# Patient Record
Sex: MALE | Race: WHITE | Employment: OTHER | URBAN - NONMETROPOLITAN AREA
[De-identification: names, ages, dates, MRNs, and addresses within clinical notes are randomized per-mention and may not be internally consistent; named-entity substitution may affect disease eponyms.]

---

## 2017-01-10 ENCOUNTER — NURSE ONLY (OUTPATIENT)
Dept: FAMILY MEDICINE CLINIC | Age: 77
End: 2017-01-10

## 2017-01-10 DIAGNOSIS — I10 ESSENTIAL HYPERTENSION: ICD-10-CM

## 2017-01-10 DIAGNOSIS — E78.5 HYPERLIPIDEMIA, UNSPECIFIED HYPERLIPIDEMIA TYPE: ICD-10-CM

## 2017-01-10 DIAGNOSIS — R74.8 ELEVATED VITAMIN B12 LEVEL: ICD-10-CM

## 2017-01-10 DIAGNOSIS — R00.1 BRADYCARDIA: Primary | ICD-10-CM

## 2017-01-10 PROCEDURE — 36415 COLL VENOUS BLD VENIPUNCTURE: CPT | Performed by: NURSE PRACTITIONER

## 2017-01-11 LAB
A/G RATIO: 1.7 (ref 1.1–2.2)
ALBUMIN SERPL-MCNC: 4.3 G/DL (ref 3.4–5)
ALP BLD-CCNC: 59 U/L (ref 40–129)
ALT SERPL-CCNC: 36 U/L (ref 10–40)
ANION GAP SERPL CALCULATED.3IONS-SCNC: 17 MMOL/L (ref 3–16)
AST SERPL-CCNC: 29 U/L (ref 15–37)
BASOPHILS ABSOLUTE: 0.1 K/UL (ref 0–0.2)
BASOPHILS RELATIVE PERCENT: 1.9 %
BILIRUB SERPL-MCNC: 0.5 MG/DL (ref 0–1)
BUN BLDV-MCNC: 21 MG/DL (ref 7–20)
CALCIUM SERPL-MCNC: 9.3 MG/DL (ref 8.3–10.6)
CHLORIDE BLD-SCNC: 101 MMOL/L (ref 99–110)
CHOLESTEROL, TOTAL: 137 MG/DL (ref 0–199)
CO2: 27 MMOL/L (ref 21–32)
CREAT SERPL-MCNC: 1.1 MG/DL (ref 0.8–1.3)
EOSINOPHILS ABSOLUTE: 0.5 K/UL (ref 0–0.6)
EOSINOPHILS RELATIVE PERCENT: 7.1 %
FOLATE: 17.14 NG/ML (ref 4.78–24.2)
GFR AFRICAN AMERICAN: >60
GFR NON-AFRICAN AMERICAN: >60
GLOBULIN: 2.6 G/DL
GLUCOSE BLD-MCNC: 114 MG/DL (ref 70–99)
HCT VFR BLD CALC: 44.5 % (ref 40.5–52.5)
HDLC SERPL-MCNC: 45 MG/DL (ref 40–60)
HEMOGLOBIN: 15 G/DL (ref 13.5–17.5)
LDL CHOLESTEROL CALCULATED: 71 MG/DL
LYMPHOCYTES ABSOLUTE: 1.9 K/UL (ref 1–5.1)
LYMPHOCYTES RELATIVE PERCENT: 26.4 %
MCH RBC QN AUTO: 31.6 PG (ref 26–34)
MCHC RBC AUTO-ENTMCNC: 33.8 G/DL (ref 31–36)
MCV RBC AUTO: 93.5 FL (ref 80–100)
MONOCYTES ABSOLUTE: 0.6 K/UL (ref 0–1.3)
MONOCYTES RELATIVE PERCENT: 8 %
NEUTROPHILS ABSOLUTE: 4 K/UL (ref 1.7–7.7)
NEUTROPHILS RELATIVE PERCENT: 56.6 %
PDW BLD-RTO: 13.5 % (ref 12.4–15.4)
PLATELET # BLD: 190 K/UL (ref 135–450)
PMV BLD AUTO: 7.3 FL (ref 5–10.5)
POTASSIUM SERPL-SCNC: 3.8 MMOL/L (ref 3.5–5.1)
RBC # BLD: 4.76 M/UL (ref 4.2–5.9)
SODIUM BLD-SCNC: 145 MMOL/L (ref 136–145)
TOTAL PROTEIN: 6.9 G/DL (ref 6.4–8.2)
TRIGL SERPL-MCNC: 103 MG/DL (ref 0–150)
TSH REFLEX: 4.03 UIU/ML (ref 0.27–4.2)
VITAMIN B-12: 581 PG/ML (ref 211–911)
VLDLC SERPL CALC-MCNC: 21 MG/DL
WBC # BLD: 7 K/UL (ref 4–11)

## 2017-01-13 ENCOUNTER — OFFICE VISIT (OUTPATIENT)
Dept: FAMILY MEDICINE CLINIC | Age: 77
End: 2017-01-13

## 2017-01-13 VITALS
DIASTOLIC BLOOD PRESSURE: 60 MMHG | SYSTOLIC BLOOD PRESSURE: 130 MMHG | OXYGEN SATURATION: 97 % | TEMPERATURE: 97.4 F | HEART RATE: 73 BPM

## 2017-01-13 DIAGNOSIS — E03.9 HYPOTHYROIDISM, UNSPECIFIED TYPE: ICD-10-CM

## 2017-01-13 DIAGNOSIS — I10 ESSENTIAL HYPERTENSION: ICD-10-CM

## 2017-01-13 DIAGNOSIS — G25.2 ESSENTIAL AND OTHER SPECIFIED FORMS OF TREMOR: ICD-10-CM

## 2017-01-13 DIAGNOSIS — L30.9 DERMATITIS: Primary | ICD-10-CM

## 2017-01-13 DIAGNOSIS — G25.81 RLS (RESTLESS LEGS SYNDROME): ICD-10-CM

## 2017-01-13 DIAGNOSIS — E78.5 HYPERLIPIDEMIA, UNSPECIFIED HYPERLIPIDEMIA TYPE: ICD-10-CM

## 2017-01-13 DIAGNOSIS — G25.0 ESSENTIAL AND OTHER SPECIFIED FORMS OF TREMOR: ICD-10-CM

## 2017-01-13 PROCEDURE — 99213 OFFICE O/P EST LOW 20 MIN: CPT | Performed by: NURSE PRACTITIONER

## 2017-01-13 RX ORDER — LEVOTHYROXINE SODIUM 0.07 MG/1
75 TABLET ORAL DAILY
Qty: 30 TABLET | Refills: 1 | Status: SHIPPED | OUTPATIENT
Start: 2017-01-13 | End: 2017-03-23 | Stop reason: SDUPTHER

## 2017-01-13 ASSESSMENT — PATIENT HEALTH QUESTIONNAIRE - PHQ9
SUM OF ALL RESPONSES TO PHQ QUESTIONS 1-9: 2
SUM OF ALL RESPONSES TO PHQ9 QUESTIONS 1 & 2: 2
1. LITTLE INTEREST OR PLEASURE IN DOING THINGS: 1
2. FEELING DOWN, DEPRESSED OR HOPELESS: 1

## 2017-01-27 PROBLEM — E03.9 HYPOTHYROIDISM: Status: ACTIVE | Noted: 2017-01-27

## 2017-01-27 ASSESSMENT — ENCOUNTER SYMPTOMS
RESPIRATORY NEGATIVE: 1
EYES NEGATIVE: 1
GASTROINTESTINAL NEGATIVE: 1

## 2017-01-30 ENCOUNTER — NURSE ONLY (OUTPATIENT)
Dept: FAMILY MEDICINE CLINIC | Age: 77
End: 2017-01-30

## 2017-01-30 DIAGNOSIS — F32.A DEPRESSION, UNSPECIFIED DEPRESSION TYPE: Primary | ICD-10-CM

## 2017-01-30 DIAGNOSIS — E78.5 HYPERLIPIDEMIA: ICD-10-CM

## 2017-01-30 RX ORDER — SIMVASTATIN 10 MG
TABLET ORAL
Qty: 30 TABLET | Refills: 5 | Status: SHIPPED | OUTPATIENT
Start: 2017-01-30 | End: 2017-07-31 | Stop reason: SDUPTHER

## 2017-02-17 ENCOUNTER — TELEPHONE (OUTPATIENT)
Dept: FAMILY MEDICINE CLINIC | Age: 77
End: 2017-02-17

## 2017-02-22 ENCOUNTER — OFFICE VISIT (OUTPATIENT)
Dept: FAMILY MEDICINE CLINIC | Age: 77
End: 2017-02-22

## 2017-02-22 VITALS
BODY MASS INDEX: 37.16 KG/M2 | SYSTOLIC BLOOD PRESSURE: 122 MMHG | OXYGEN SATURATION: 97 % | HEART RATE: 52 BPM | DIASTOLIC BLOOD PRESSURE: 62 MMHG | WEIGHT: 244.4 LBS

## 2017-02-22 DIAGNOSIS — H61.23 BILATERAL IMPACTED CERUMEN: Primary | ICD-10-CM

## 2017-02-22 PROCEDURE — 69210 REMOVE IMPACTED EAR WAX UNI: CPT | Performed by: NURSE PRACTITIONER

## 2017-02-22 PROCEDURE — 99212 OFFICE O/P EST SF 10 MIN: CPT | Performed by: NURSE PRACTITIONER

## 2017-03-01 RX ORDER — MELOXICAM 15 MG/1
TABLET ORAL
Qty: 30 TABLET | Refills: 2 | Status: SHIPPED | OUTPATIENT
Start: 2017-03-01 | End: 2017-05-31 | Stop reason: SDUPTHER

## 2017-03-03 RX ORDER — VENLAFAXINE 75 MG/1
TABLET ORAL
Qty: 60 TABLET | Refills: 4 | Status: SHIPPED | OUTPATIENT
Start: 2017-03-03 | End: 2017-07-31 | Stop reason: SDUPTHER

## 2017-03-03 RX ORDER — CLONAZEPAM 2 MG/1
2 TABLET ORAL NIGHTLY
Qty: 30 TABLET | Refills: 0 | Status: SHIPPED | OUTPATIENT
Start: 2017-03-03 | End: 2017-03-31 | Stop reason: SDUPTHER

## 2017-03-23 DIAGNOSIS — E03.9 HYPOTHYROIDISM, UNSPECIFIED TYPE: ICD-10-CM

## 2017-03-23 RX ORDER — LEVOTHYROXINE SODIUM 0.07 MG/1
TABLET ORAL
Qty: 30 TABLET | Refills: 2 | Status: SHIPPED | OUTPATIENT
Start: 2017-03-23 | End: 2017-07-01 | Stop reason: SDUPTHER

## 2017-03-31 RX ORDER — AMLODIPINE BESYLATE 10 MG/1
TABLET ORAL
Qty: 30 TABLET | Refills: 3 | Status: SHIPPED | OUTPATIENT
Start: 2017-03-31 | End: 2017-07-31 | Stop reason: SDUPTHER

## 2017-03-31 RX ORDER — CLONAZEPAM 2 MG/1
TABLET ORAL
Qty: 30 TABLET | Refills: 0 | Status: SHIPPED | OUTPATIENT
Start: 2017-04-02 | End: 2017-04-27 | Stop reason: SDUPTHER

## 2017-04-13 ENCOUNTER — OFFICE VISIT (OUTPATIENT)
Dept: FAMILY MEDICINE CLINIC | Age: 77
End: 2017-04-13

## 2017-04-13 VITALS
SYSTOLIC BLOOD PRESSURE: 142 MMHG | WEIGHT: 245.4 LBS | OXYGEN SATURATION: 98 % | DIASTOLIC BLOOD PRESSURE: 64 MMHG | HEIGHT: 68 IN | HEART RATE: 60 BPM | BODY MASS INDEX: 37.19 KG/M2

## 2017-04-13 DIAGNOSIS — Z23 NEED FOR TDAP VACCINATION: ICD-10-CM

## 2017-04-13 DIAGNOSIS — L71.9 ROSACEA: ICD-10-CM

## 2017-04-13 DIAGNOSIS — G25.81 RESTLESS LEGS SYNDROME: Primary | ICD-10-CM

## 2017-04-13 PROCEDURE — 99213 OFFICE O/P EST LOW 20 MIN: CPT | Performed by: NURSE PRACTITIONER

## 2017-04-13 RX ORDER — TETRACYCLINE HYDROCHLORIDE 250 MG/1
250 CAPSULE ORAL 2 TIMES DAILY
Qty: 60 CAPSULE | Refills: 0 | Status: SHIPPED | OUTPATIENT
Start: 2017-04-13 | End: 2017-04-27 | Stop reason: SDUPTHER

## 2017-04-13 RX ORDER — TETRACYCLINE HYDROCHLORIDE 250 MG/1
250 CAPSULE ORAL NIGHTLY
Qty: 30 CAPSULE | Refills: 11 | Status: SHIPPED | OUTPATIENT
Start: 2017-05-13 | End: 2017-04-27 | Stop reason: SDUPTHER

## 2017-04-13 ASSESSMENT — PATIENT HEALTH QUESTIONNAIRE - PHQ9
2. FEELING DOWN, DEPRESSED OR HOPELESS: 0
SUM OF ALL RESPONSES TO PHQ QUESTIONS 1-9: 0
1. LITTLE INTEREST OR PLEASURE IN DOING THINGS: 0
SUM OF ALL RESPONSES TO PHQ9 QUESTIONS 1 & 2: 0

## 2017-04-23 ASSESSMENT — ENCOUNTER SYMPTOMS
EYES NEGATIVE: 1
RESPIRATORY NEGATIVE: 1
GASTROINTESTINAL NEGATIVE: 1

## 2017-04-27 DIAGNOSIS — L71.9 ROSACEA: ICD-10-CM

## 2017-04-27 RX ORDER — TETRACYCLINE HYDROCHLORIDE 250 MG/1
250 CAPSULE ORAL NIGHTLY
Qty: 30 CAPSULE | Refills: 11 | Status: SHIPPED | OUTPATIENT
Start: 2017-04-27 | End: 2018-08-09

## 2017-04-27 RX ORDER — DUTASTERIDE 0.5 MG/1
CAPSULE, LIQUID FILLED ORAL
Qty: 30 CAPSULE | Refills: 4 | Status: SHIPPED | OUTPATIENT
Start: 2017-04-27 | End: 2017-10-11 | Stop reason: ALTCHOICE

## 2017-05-01 RX ORDER — CLONAZEPAM 2 MG/1
TABLET ORAL
Qty: 30 TABLET | Refills: 0 | Status: SHIPPED | OUTPATIENT
Start: 2017-05-01 | End: 2017-05-31 | Stop reason: SDUPTHER

## 2017-05-08 ENCOUNTER — HOSPITAL ENCOUNTER (OUTPATIENT)
Dept: PULMONOLOGY | Age: 77
Discharge: HOME | End: 2017-05-08
Attending: INTERNAL MEDICINE | Admitting: INTERNAL MEDICINE

## 2017-05-08 ENCOUNTER — OFFICE VISIT (OUTPATIENT)
Dept: PULMONOLOGY | Age: 77
End: 2017-05-08

## 2017-05-08 VITALS
OXYGEN SATURATION: 98 % | SYSTOLIC BLOOD PRESSURE: 130 MMHG | DIASTOLIC BLOOD PRESSURE: 70 MMHG | WEIGHT: 245 LBS | HEART RATE: 57 BPM | BODY MASS INDEX: 37.13 KG/M2 | HEIGHT: 68 IN | TEMPERATURE: 97.8 F | RESPIRATION RATE: 20 BRPM

## 2017-05-08 VITALS — OXYGEN SATURATION: 99 %

## 2017-05-08 DIAGNOSIS — G47.33 OSA TREATED WITH BIPAP: Primary | ICD-10-CM

## 2017-05-08 DIAGNOSIS — R05.9 COUGH: ICD-10-CM

## 2017-05-08 DIAGNOSIS — J84.9 INTERSTITIAL PULMONARY DISEASE (HCC): ICD-10-CM

## 2017-05-08 DIAGNOSIS — J84.9 ILD (INTERSTITIAL LUNG DISEASE) (HCC): ICD-10-CM

## 2017-05-08 PROCEDURE — 99214 OFFICE O/P EST MOD 30 MIN: CPT | Performed by: INTERNAL MEDICINE

## 2017-05-08 ASSESSMENT — SLEEP AND FATIGUE QUESTIONNAIRES
HOW LIKELY ARE YOU TO NOD OFF OR FALL ASLEEP WHILE SITTING AND TALKING TO SOMEONE: 0
HOW LIKELY ARE YOU TO NOD OFF OR FALL ASLEEP WHILE LYING DOWN TO REST IN THE AFTERNOON WHEN CIRCUMSTANCES PERMIT: 2
HOW LIKELY ARE YOU TO NOD OFF OR FALL ASLEEP WHILE SITTING QUIETLY AFTER LUNCH WITHOUT ALCOHOL: 2
HOW LIKELY ARE YOU TO NOD OFF OR FALL ASLEEP WHILE SITTING INACTIVE IN A PUBLIC PLACE: 1
ESS TOTAL SCORE: 13
HOW LIKELY ARE YOU TO NOD OFF OR FALL ASLEEP WHILE WATCHING TV: 2
HOW LIKELY ARE YOU TO NOD OFF OR FALL ASLEEP WHILE SITTING AND READING: 3
NECK CIRCUMFERENCE (INCHES): 15
HOW LIKELY ARE YOU TO NOD OFF OR FALL ASLEEP WHEN YOU ARE A PASSENGER IN A CAR FOR AN HOUR WITHOUT A BREAK: 3
HOW LIKELY ARE YOU TO NOD OFF OR FALL ASLEEP IN A CAR, WHILE STOPPED FOR A FEW MINUTES IN TRAFFIC: 0

## 2017-05-15 ENCOUNTER — OFFICE VISIT (OUTPATIENT)
Dept: FAMILY MEDICINE CLINIC | Age: 77
End: 2017-05-15

## 2017-05-15 VITALS
HEIGHT: 68 IN | OXYGEN SATURATION: 98 % | SYSTOLIC BLOOD PRESSURE: 138 MMHG | HEART RATE: 58 BPM | DIASTOLIC BLOOD PRESSURE: 64 MMHG | WEIGHT: 245.6 LBS | BODY MASS INDEX: 37.22 KG/M2

## 2017-05-15 DIAGNOSIS — G47.33 OSA TREATED WITH BIPAP: ICD-10-CM

## 2017-05-15 DIAGNOSIS — F41.9 ANXIETY: ICD-10-CM

## 2017-05-15 DIAGNOSIS — R00.1 BRADYCARDIA: ICD-10-CM

## 2017-05-15 DIAGNOSIS — G25.2 ESSENTIAL AND OTHER SPECIFIED FORMS OF TREMOR: ICD-10-CM

## 2017-05-15 DIAGNOSIS — J84.9 ILD (INTERSTITIAL LUNG DISEASE) (HCC): ICD-10-CM

## 2017-05-15 DIAGNOSIS — G25.0 ESSENTIAL AND OTHER SPECIFIED FORMS OF TREMOR: ICD-10-CM

## 2017-05-15 DIAGNOSIS — G25.81 RESTLESS LEGS SYNDROME (RLS): ICD-10-CM

## 2017-05-15 DIAGNOSIS — N39.44 NOCTURNAL ENURESIS: ICD-10-CM

## 2017-05-15 DIAGNOSIS — L71.9 ROSACEA: ICD-10-CM

## 2017-05-15 DIAGNOSIS — I25.9 CHRONIC ISCHEMIC HEART DISEASE: ICD-10-CM

## 2017-05-15 DIAGNOSIS — I50.32 CHRONIC DIASTOLIC CONGESTIVE HEART FAILURE (HCC): ICD-10-CM

## 2017-05-15 DIAGNOSIS — G25.81 RESTLESS LEGS SYNDROME: ICD-10-CM

## 2017-05-15 DIAGNOSIS — E78.5 HYPERLIPIDEMIA, UNSPECIFIED HYPERLIPIDEMIA TYPE: ICD-10-CM

## 2017-05-15 DIAGNOSIS — Z01.818 PRE-OP EXAMINATION: Primary | ICD-10-CM

## 2017-05-15 DIAGNOSIS — I20.8 ANGINAL EQUIVALENT (HCC): ICD-10-CM

## 2017-05-15 DIAGNOSIS — R05.9 COUGH: ICD-10-CM

## 2017-05-15 DIAGNOSIS — I10 ESSENTIAL HYPERTENSION: ICD-10-CM

## 2017-05-15 DIAGNOSIS — E03.9 HYPOTHYROIDISM, UNSPECIFIED TYPE: ICD-10-CM

## 2017-05-15 PROCEDURE — 93000 ELECTROCARDIOGRAM COMPLETE: CPT | Performed by: NURSE PRACTITIONER

## 2017-05-15 PROCEDURE — 99213 OFFICE O/P EST LOW 20 MIN: CPT | Performed by: NURSE PRACTITIONER

## 2017-05-19 ENCOUNTER — HOSPITAL ENCOUNTER (OUTPATIENT)
Dept: SURGERY | Age: 77
Discharge: HOME | End: 2017-05-19
Attending: UROLOGY | Admitting: UROLOGY

## 2017-05-19 VITALS
HEART RATE: 67 BPM | DIASTOLIC BLOOD PRESSURE: 77 MMHG | TEMPERATURE: 97.8 F | SYSTOLIC BLOOD PRESSURE: 145 MMHG | OXYGEN SATURATION: 94 % | RESPIRATION RATE: 16 BRPM

## 2017-05-19 RX ORDER — DIPHENHYDRAMINE HYDROCHLORIDE 50 MG/ML
12.5 INJECTION INTRAMUSCULAR; INTRAVENOUS
Status: ACTIVE | OUTPATIENT
Start: 2017-05-19 | End: 2017-05-19

## 2017-05-19 RX ORDER — ONDANSETRON 2 MG/ML
4 INJECTION INTRAMUSCULAR; INTRAVENOUS
Status: ACTIVE | OUTPATIENT
Start: 2017-05-19 | End: 2017-05-19

## 2017-05-19 RX ORDER — OXYBUTYNIN CHLORIDE 10 MG/1
10 TABLET, EXTENDED RELEASE ORAL DAILY
Qty: 30 TABLET | Refills: 3 | Status: SHIPPED | OUTPATIENT
Start: 2017-05-19 | End: 2017-10-11 | Stop reason: ALTCHOICE

## 2017-05-19 RX ORDER — HYDROMORPHONE HCL 110MG/55ML
0.5 PATIENT CONTROLLED ANALGESIA SYRINGE INTRAVENOUS EVERY 5 MIN PRN
Status: DISCONTINUED | OUTPATIENT
Start: 2017-05-19 | End: 2017-05-20 | Stop reason: HOSPADM

## 2017-05-19 RX ORDER — LABETALOL HYDROCHLORIDE 5 MG/ML
5 INJECTION, SOLUTION INTRAVENOUS EVERY 10 MIN PRN
Status: DISCONTINUED | OUTPATIENT
Start: 2017-05-19 | End: 2017-05-20 | Stop reason: HOSPADM

## 2017-05-19 RX ORDER — MORPHINE SULFATE 4 MG/ML
1 INJECTION, SOLUTION INTRAMUSCULAR; INTRAVENOUS EVERY 5 MIN PRN
Status: DISCONTINUED | OUTPATIENT
Start: 2017-05-19 | End: 2017-05-20 | Stop reason: HOSPADM

## 2017-05-19 RX ORDER — SODIUM CHLORIDE 0.9 % (FLUSH) 0.9 %
10 SYRINGE (ML) INJECTION PRN
Status: DISCONTINUED | OUTPATIENT
Start: 2017-05-19 | End: 2017-05-20 | Stop reason: HOSPADM

## 2017-05-19 RX ORDER — PROMETHAZINE HYDROCHLORIDE 25 MG/ML
6.25 INJECTION, SOLUTION INTRAMUSCULAR; INTRAVENOUS
Status: ACTIVE | OUTPATIENT
Start: 2017-05-19 | End: 2017-05-19

## 2017-05-19 RX ORDER — SODIUM CHLORIDE, SODIUM LACTATE, POTASSIUM CHLORIDE, CALCIUM CHLORIDE 600; 310; 30; 20 MG/100ML; MG/100ML; MG/100ML; MG/100ML
INJECTION, SOLUTION INTRAVENOUS CONTINUOUS
Status: DISCONTINUED | OUTPATIENT
Start: 2017-05-19 | End: 2017-05-20 | Stop reason: HOSPADM

## 2017-05-19 RX ORDER — HYDROMORPHONE HCL 110MG/55ML
0.25 PATIENT CONTROLLED ANALGESIA SYRINGE INTRAVENOUS EVERY 5 MIN PRN
Status: DISCONTINUED | OUTPATIENT
Start: 2017-05-19 | End: 2017-05-20 | Stop reason: HOSPADM

## 2017-05-19 RX ORDER — MEPERIDINE HYDROCHLORIDE 25 MG/ML
12.5 INJECTION INTRAMUSCULAR; INTRAVENOUS; SUBCUTANEOUS EVERY 5 MIN PRN
Status: DISCONTINUED | OUTPATIENT
Start: 2017-05-19 | End: 2017-05-20 | Stop reason: HOSPADM

## 2017-05-19 RX ORDER — SODIUM CHLORIDE 0.9 % (FLUSH) 0.9 %
10 SYRINGE (ML) INJECTION EVERY 12 HOURS SCHEDULED
Status: DISCONTINUED | OUTPATIENT
Start: 2017-05-19 | End: 2017-05-20 | Stop reason: HOSPADM

## 2017-05-19 RX ORDER — HYDRALAZINE HYDROCHLORIDE 20 MG/ML
5 INJECTION INTRAMUSCULAR; INTRAVENOUS EVERY 10 MIN PRN
Status: DISCONTINUED | OUTPATIENT
Start: 2017-05-19 | End: 2017-05-20 | Stop reason: HOSPADM

## 2017-05-19 RX ORDER — LIDOCAINE HYDROCHLORIDE 10 MG/ML
1 INJECTION, SOLUTION EPIDURAL; INFILTRATION; INTRACAUDAL; PERINEURAL
Status: COMPLETED | OUTPATIENT
Start: 2017-05-19 | End: 2017-05-19

## 2017-05-19 RX ORDER — MORPHINE SULFATE 4 MG/ML
2 INJECTION, SOLUTION INTRAMUSCULAR; INTRAVENOUS EVERY 5 MIN PRN
Status: DISCONTINUED | OUTPATIENT
Start: 2017-05-19 | End: 2017-05-20 | Stop reason: HOSPADM

## 2017-05-19 RX ADMIN — LIDOCAINE HYDROCHLORIDE: 10 INJECTION, SOLUTION EPIDURAL; INFILTRATION; INTRACAUDAL; PERINEURAL at 11:57

## 2017-05-19 RX ADMIN — SODIUM CHLORIDE, SODIUM LACTATE, POTASSIUM CHLORIDE, CALCIUM CHLORIDE: 600; 310; 30; 20 INJECTION, SOLUTION INTRAVENOUS at 11:57

## 2017-05-19 ASSESSMENT — PAIN SCALES - GENERAL
PAINLEVEL_OUTOF10: 0
PAINLEVEL_OUTOF10: 0

## 2017-05-19 ASSESSMENT — PAIN - FUNCTIONAL ASSESSMENT: PAIN_FUNCTIONAL_ASSESSMENT: 0-10

## 2017-06-01 RX ORDER — CLONAZEPAM 2 MG/1
TABLET ORAL
Qty: 30 TABLET | Refills: 0 | Status: SHIPPED | OUTPATIENT
Start: 2017-06-01 | End: 2017-06-30 | Stop reason: SDUPTHER

## 2017-06-06 RX ORDER — BACLOFEN 10 MG/1
5 TABLET ORAL 3 TIMES DAILY PRN
Qty: 90 TABLET | Refills: 1 | Status: SHIPPED | OUTPATIENT
Start: 2017-06-06 | End: 2017-06-30

## 2017-06-30 ENCOUNTER — OFFICE VISIT (OUTPATIENT)
Dept: FAMILY MEDICINE CLINIC | Age: 77
End: 2017-06-30

## 2017-06-30 VITALS
HEART RATE: 57 BPM | DIASTOLIC BLOOD PRESSURE: 70 MMHG | OXYGEN SATURATION: 98 % | BODY MASS INDEX: 36.77 KG/M2 | SYSTOLIC BLOOD PRESSURE: 112 MMHG | HEIGHT: 68 IN | WEIGHT: 242.6 LBS

## 2017-06-30 DIAGNOSIS — Z01.818 PRE-OP EXAMINATION: Primary | ICD-10-CM

## 2017-06-30 DIAGNOSIS — E78.5 HYPERLIPIDEMIA, UNSPECIFIED HYPERLIPIDEMIA TYPE: ICD-10-CM

## 2017-06-30 DIAGNOSIS — I50.32 CHRONIC DIASTOLIC CONGESTIVE HEART FAILURE (HCC): ICD-10-CM

## 2017-06-30 DIAGNOSIS — E03.9 HYPOTHYROIDISM, UNSPECIFIED TYPE: ICD-10-CM

## 2017-06-30 DIAGNOSIS — I25.9 CHRONIC ISCHEMIC HEART DISEASE: ICD-10-CM

## 2017-06-30 DIAGNOSIS — F41.8 DEPRESSION WITH ANXIETY: ICD-10-CM

## 2017-06-30 DIAGNOSIS — N39.41 URGE INCONTINENCE OF URINE: ICD-10-CM

## 2017-06-30 DIAGNOSIS — G47.33 OSA TREATED WITH BIPAP: ICD-10-CM

## 2017-06-30 DIAGNOSIS — G25.2 ESSENTIAL AND OTHER SPECIFIED FORMS OF TREMOR: ICD-10-CM

## 2017-06-30 DIAGNOSIS — N40.1 BENIGN PROSTATIC HYPERPLASIA WITH LOWER URINARY TRACT SYMPTOMS, UNSPECIFIED MORPHOLOGY: ICD-10-CM

## 2017-06-30 DIAGNOSIS — G25.81 RESTLESS LEGS SYNDROME: ICD-10-CM

## 2017-06-30 DIAGNOSIS — J84.9 ILD (INTERSTITIAL LUNG DISEASE) (HCC): ICD-10-CM

## 2017-06-30 DIAGNOSIS — G25.0 ESSENTIAL AND OTHER SPECIFIED FORMS OF TREMOR: ICD-10-CM

## 2017-06-30 DIAGNOSIS — I10 ESSENTIAL HYPERTENSION: ICD-10-CM

## 2017-06-30 LAB
ANION GAP SERPL CALCULATED.3IONS-SCNC: 17 MMOL/L (ref 3–16)
BASOPHILS ABSOLUTE: 0.2 K/UL (ref 0–0.2)
BASOPHILS RELATIVE PERCENT: 2 %
BUN BLDV-MCNC: 25 MG/DL (ref 7–20)
CALCIUM SERPL-MCNC: 9.5 MG/DL (ref 8.3–10.6)
CHLORIDE BLD-SCNC: 99 MMOL/L (ref 99–110)
CO2: 25 MMOL/L (ref 21–32)
CREAT SERPL-MCNC: 1.3 MG/DL (ref 0.8–1.3)
EOSINOPHILS ABSOLUTE: 0.4 K/UL (ref 0–0.6)
EOSINOPHILS RELATIVE PERCENT: 4.6 %
GFR AFRICAN AMERICAN: >60
GFR NON-AFRICAN AMERICAN: 54
GLUCOSE BLD-MCNC: 97 MG/DL (ref 70–99)
HCT VFR BLD CALC: 44.3 % (ref 40.5–52.5)
HEMOGLOBIN: 15.2 G/DL (ref 13.5–17.5)
LYMPHOCYTES ABSOLUTE: 1.9 K/UL (ref 1–5.1)
LYMPHOCYTES RELATIVE PERCENT: 21.4 %
MCH RBC QN AUTO: 32.3 PG (ref 26–34)
MCHC RBC AUTO-ENTMCNC: 34.3 G/DL (ref 31–36)
MCV RBC AUTO: 94.3 FL (ref 80–100)
MONOCYTES ABSOLUTE: 0.6 K/UL (ref 0–1.3)
MONOCYTES RELATIVE PERCENT: 7.2 %
NEUTROPHILS ABSOLUTE: 5.7 K/UL (ref 1.7–7.7)
NEUTROPHILS RELATIVE PERCENT: 64.8 %
PDW BLD-RTO: 13.7 % (ref 12.4–15.4)
PLATELET # BLD: 182 K/UL (ref 135–450)
PMV BLD AUTO: 7.7 FL (ref 5–10.5)
POTASSIUM SERPL-SCNC: 3.5 MMOL/L (ref 3.5–5.1)
RBC # BLD: 4.7 M/UL (ref 4.2–5.9)
SODIUM BLD-SCNC: 141 MMOL/L (ref 136–145)
WBC # BLD: 8.7 K/UL (ref 4–11)

## 2017-06-30 PROCEDURE — 36415 COLL VENOUS BLD VENIPUNCTURE: CPT | Performed by: NURSE PRACTITIONER

## 2017-06-30 PROCEDURE — 99212 OFFICE O/P EST SF 10 MIN: CPT | Performed by: NURSE PRACTITIONER

## 2017-06-30 RX ORDER — CLONAZEPAM 2 MG/1
TABLET ORAL
Qty: 30 TABLET | Refills: 0 | Status: SHIPPED | OUTPATIENT
Start: 2017-06-30 | End: 2017-08-03 | Stop reason: SDUPTHER

## 2017-07-14 LAB
GLUCOSE BLD-MCNC: 117 MG/DL (ref 70–99)
PERFORMED ON: ABNORMAL

## 2017-07-31 DIAGNOSIS — E78.5 HYPERLIPIDEMIA: ICD-10-CM

## 2017-07-31 RX ORDER — VENLAFAXINE 75 MG/1
TABLET ORAL
Qty: 60 TABLET | Refills: 3 | Status: SHIPPED | OUTPATIENT
Start: 2017-07-31 | End: 2017-11-18 | Stop reason: SDUPTHER

## 2017-07-31 RX ORDER — SIMVASTATIN 10 MG
TABLET ORAL
Qty: 30 TABLET | Refills: 4 | Status: SHIPPED | OUTPATIENT
Start: 2017-07-31 | End: 2018-01-02 | Stop reason: SDUPTHER

## 2017-07-31 RX ORDER — AMLODIPINE BESYLATE 10 MG/1
TABLET ORAL
Qty: 30 TABLET | Refills: 2 | Status: SHIPPED | OUTPATIENT
Start: 2017-07-31 | End: 2017-10-30 | Stop reason: SDUPTHER

## 2017-08-03 ENCOUNTER — OFFICE VISIT (OUTPATIENT)
Dept: FAMILY MEDICINE CLINIC | Age: 77
End: 2017-08-03

## 2017-08-03 VITALS
BODY MASS INDEX: 36.68 KG/M2 | HEIGHT: 68 IN | DIASTOLIC BLOOD PRESSURE: 70 MMHG | HEART RATE: 54 BPM | OXYGEN SATURATION: 98 % | WEIGHT: 242 LBS | SYSTOLIC BLOOD PRESSURE: 138 MMHG

## 2017-08-03 DIAGNOSIS — E55.9 VITAMIN D DEFICIENCY: ICD-10-CM

## 2017-08-03 DIAGNOSIS — G25.81 RESTLESS LEGS SYNDROME: ICD-10-CM

## 2017-08-03 DIAGNOSIS — G20 PARALYSIS AGITANS (HCC): Primary | ICD-10-CM

## 2017-08-03 DIAGNOSIS — E78.5 HYPERLIPIDEMIA, UNSPECIFIED HYPERLIPIDEMIA TYPE: ICD-10-CM

## 2017-08-03 DIAGNOSIS — I10 ESSENTIAL HYPERTENSION: ICD-10-CM

## 2017-08-03 DIAGNOSIS — E03.9 HYPOTHYROIDISM, UNSPECIFIED TYPE: ICD-10-CM

## 2017-08-03 LAB
A/G RATIO: 1.8 (ref 1.1–2.2)
ALBUMIN SERPL-MCNC: 4.8 G/DL (ref 3.4–5)
ALP BLD-CCNC: 51 U/L (ref 40–129)
ALT SERPL-CCNC: 36 U/L (ref 10–40)
ANION GAP SERPL CALCULATED.3IONS-SCNC: 17 MMOL/L (ref 3–16)
AST SERPL-CCNC: 31 U/L (ref 15–37)
BILIRUB SERPL-MCNC: 0.7 MG/DL (ref 0–1)
BUN BLDV-MCNC: 20 MG/DL (ref 7–20)
CALCIUM SERPL-MCNC: 9.9 MG/DL (ref 8.3–10.6)
CHLORIDE BLD-SCNC: 98 MMOL/L (ref 99–110)
CHOLESTEROL, TOTAL: 182 MG/DL (ref 0–199)
CO2: 27 MMOL/L (ref 21–32)
CREAT SERPL-MCNC: 1 MG/DL (ref 0.8–1.3)
GFR AFRICAN AMERICAN: >60
GFR NON-AFRICAN AMERICAN: >60
GLOBULIN: 2.6 G/DL
GLUCOSE BLD-MCNC: 98 MG/DL (ref 70–99)
HDLC SERPL-MCNC: 45 MG/DL (ref 40–60)
LDL CHOLESTEROL CALCULATED: 102 MG/DL
POTASSIUM SERPL-SCNC: 3.3 MMOL/L (ref 3.5–5.1)
SODIUM BLD-SCNC: 142 MMOL/L (ref 136–145)
TOTAL PROTEIN: 7.4 G/DL (ref 6.4–8.2)
TRIGL SERPL-MCNC: 175 MG/DL (ref 0–150)
VLDLC SERPL CALC-MCNC: 35 MG/DL

## 2017-08-03 PROCEDURE — 36415 COLL VENOUS BLD VENIPUNCTURE: CPT | Performed by: NURSE PRACTITIONER

## 2017-08-03 PROCEDURE — 99214 OFFICE O/P EST MOD 30 MIN: CPT | Performed by: NURSE PRACTITIONER

## 2017-08-03 RX ORDER — CLONAZEPAM 2 MG/1
TABLET ORAL
Qty: 30 TABLET | Refills: 0 | Status: SHIPPED | OUTPATIENT
Start: 2017-08-03 | End: 2017-08-30 | Stop reason: SDUPTHER

## 2017-08-03 ASSESSMENT — ENCOUNTER SYMPTOMS
RESPIRATORY NEGATIVE: 1
EYES NEGATIVE: 1
GASTROINTESTINAL NEGATIVE: 1

## 2017-08-04 LAB
TSH SERPL DL<=0.05 MIU/L-ACNC: 3.16 UIU/ML (ref 0.27–4.2)
VITAMIN D 25-HYDROXY: 41.9 NG/ML

## 2017-08-07 DIAGNOSIS — E87.6 LOW BLOOD POTASSIUM: Primary | ICD-10-CM

## 2017-08-30 DIAGNOSIS — G25.81 RESTLESS LEGS SYNDROME: ICD-10-CM

## 2017-08-30 DIAGNOSIS — E03.9 HYPOTHYROIDISM, UNSPECIFIED TYPE: ICD-10-CM

## 2017-08-30 RX ORDER — LEVOTHYROXINE SODIUM 0.07 MG/1
TABLET ORAL
Qty: 30 TABLET | Refills: 1 | Status: SHIPPED | OUTPATIENT
Start: 2017-08-30 | End: 2017-10-30 | Stop reason: SDUPTHER

## 2017-08-31 RX ORDER — CLONAZEPAM 2 MG/1
TABLET ORAL
Qty: 30 TABLET | Refills: 0 | Status: SHIPPED | OUTPATIENT
Start: 2017-08-31 | End: 2017-10-30 | Stop reason: SDUPTHER

## 2017-10-02 RX ORDER — DOXAZOSIN 8 MG/1
TABLET ORAL
Qty: 60 TABLET | Refills: 9 | Status: SHIPPED | OUTPATIENT
Start: 2017-10-02 | End: 2018-08-28 | Stop reason: SDUPTHER

## 2017-10-11 ENCOUNTER — HOSPITAL ENCOUNTER (OUTPATIENT)
Dept: OTHER | Age: 77
Discharge: HOME | End: 2017-10-11
Attending: INTERNAL MEDICINE | Admitting: INTERNAL MEDICINE

## 2017-10-11 VITALS
TEMPERATURE: 97.4 F | HEIGHT: 68 IN | SYSTOLIC BLOOD PRESSURE: 137 MMHG | HEART RATE: 44 BPM | WEIGHT: 240 LBS | DIASTOLIC BLOOD PRESSURE: 66 MMHG | OXYGEN SATURATION: 97 % | BODY MASS INDEX: 36.37 KG/M2 | RESPIRATION RATE: 16 BRPM

## 2017-10-11 ASSESSMENT — PAIN DESCRIPTION - DESCRIPTORS: DESCRIPTORS: BURNING

## 2017-10-11 ASSESSMENT — PAIN SCALES - GENERAL
PAINLEVEL_OUTOF10: 0

## 2017-10-11 ASSESSMENT — PAIN - FUNCTIONAL ASSESSMENT: PAIN_FUNCTIONAL_ASSESSMENT: 0-10

## 2017-10-11 NOTE — PLAN OF CARE
ENDOSCOPY  PRE, INTRA, & POST PROCEDURAL  CARE PLAN    HEMODYNAMIC STATUS  INTERDISCIPLINARY   Goal: Hemodynamic Status to Baseline / Discharge Criteria Met  Interventions     1. Obtain Patient  Medical /  Surgical History     1 Assess & Review Allergies Prior to Endo & All  Meds (PRN)     2. Assess / Monitor Vital Signs / LOC (PRN). Intra Procedure VS/ LOC  Q5 Mins  & As Per Policy Until Discharge. 3. Obtain Baseline Opal & Post Procedural  Opal Per   Policy     4. Check Monitors, Alarms On     5. Patient Re Assessed by Physician     6. Monitor  I&O Post Procedure   NURSING   SAFETY & PSYCHO SOCIAL  INTERDISCIPLINARY   Goal: Patient Returns to Baseline Activity/ Meets Discharge Criteria  Interventions     1. Greet Patient with ID Badge/ Picture in View (PRN)     2. Be Available & Sensitive to Patients Needs (PRN)     3. Communicate referral to Pastoral Care as Appropriate (PRN)     4. Provide Age Specific Measures (PRN)     4. Admission Data Base Reviewed     5. Administer Meds Per Orders (PRN)     5. Maintain Baseline Activity  Or Activity as Ordered Per Physician     NUTRITION   INTERDISCIPLINARY   Goal: Patient to baseline/ Improved Nutrition  Interventions     1. Assess NPO Status / Notify Physician if Necessary (PRN)     2. NPO per Physician Orders     3. Assess Nutritional Status (PRN)     4. Assess Ability to Swallow as Indicated     LAB & DIAGNOSTICS   Goal: Additional Tests per Physicians   Orders  Interventions     1. Lab & Diagnostics per Physician Orders (PRN)     2.  Obtain  Urine / Serum HCG & FSBS On All Diabetic Patients  Per Policy & (PRN)     3. Assess Lab Time Out  for  Patient Safety as Needed (PRN)   RESPIRATORY  INTERDISCIPLINARY   Goal: Airway   Patency, SAO2   Saturation, Cough mechanism Maintained   Interventions     1. Evaluate Bilateral Breath Sounds  Baseline, (PRN), & Prior to Discharge     2. Weight & Height Noted ( PRN)     3.   Assess Baseline SPo2 (PRN)

## 2017-10-11 NOTE — H&P
History and Physical / Pre-Sedation Assessment    Patient:  Shania Hough   :   1940     Intended Procedure:  EGD    HPI: 77year old male with esophageal stricture complains of recurrent dysphagia    Nurses notes reviewed and agreed. Medications reviewed  Allergies: Allergies   Allergen Reactions    Percocet [Oxycodone-Acetaminophen] Itching    Ace Inhibitors Other (See Comments)     Cough       Physical Exam:  Vital Signs: BP (!) 161/76   Pulse (!) 45   Temp 97 °F (36.1 °C) (Temporal)   Resp 16   Ht 5' 8\" (1.727 m)   Wt 240 lb (108.9 kg)   SpO2 98%   BMI 36.49 kg/m²    Airway: Mallampati: II (soft palate, uvula, fauces visible)  Pulmonary:Normal  Cardiac:Normal  Abdomen:Normal    Pre-Procedure Assessment / Plan:  ASA: Class 3 - A patient with severe systemic disease that limits activity but is not incapacitating  Level of Sedation Plan: Moderate sedation  Post Procedure plan: Return to same level of care    I assessed the patient and find that the patient is in satisfactory condition to proceed with the planned procedure and sedation plan. I have explained the risk, benefits, and alternatives to the procedure; the patient understands and agrees to proceed.        Rock Olson  10/11/2017

## 2017-10-11 NOTE — OP NOTE
Al Adames   10/11/2017  Esophagogastroduodenoscopy  A pre-procedure re-evaluation was performed immediately prior to the procedure.   Preprocedure Dx: dysphagia  Postprocedure Dx: esophageal stricture  Medications: Procedural sedation with Versed & Fentanyl  Complications: None  Estimated Blood Loss: <5cc  Specimens: were obtained  Billy Rahman
examined carefully  during withdrawal.  The patient tolerated the procedure well without  any difficulties. FINDINGS:  ESOPHAGUS:  There was evidence of tortuous esophagus with multiple  contractions. The Z-line was identified at 40 cm from entry. There  was evidence of moderate stricture at GE junction at 40 cm from entry  characterized by less than 1 cm thickness and 15 mm diameter. Biopsies were taken to rule out high-risk pathology. Then 18, 19, 20  mm balloon catheter was used to sequentially dilate this stricture. There was 4 cm hiatal hernia from 40 to 44 cm from entry. STOMACH:  Examined portion of the stomach appeared normal both in  forward and retroflexed views. DUODENUM:  Examined portion of the duodenum appeared normal.    SUMMARY:  1. Peptic stricture at 40 cm from entry. 2.  Tortuous, redundant esophagus. 3.  A 4 cm hiatal hernia. RECOMMENDATIONS:  1. Discharge the patient to home when standard parameters are met. 2.  Continue PPI therapy. 3.  Await pathology results. 4.  Repeat EGD p.r.n. dysphagia. 5.  The patient may also need a barium esophagram to better  characterize the dysmotility aspect of dysphagia. Yogesh Cruz MD    D: 10/11/2017 11:27:15       T: 10/11/2017 16:53:14     [email or web address]  Job#: [id number]     Doc#: [id number]    CC:   John Jaimes NP

## 2017-10-20 ENCOUNTER — HOSPITAL ENCOUNTER (OUTPATIENT)
Dept: GENERAL RADIOLOGY | Age: 77
Discharge: HOME | End: 2017-10-20
Attending: INTERNAL MEDICINE | Admitting: INTERNAL MEDICINE

## 2017-10-20 DIAGNOSIS — R13.19 OTHER DYSPHAGIA: ICD-10-CM

## 2017-10-20 DIAGNOSIS — R13.10 ODYNOPHAGIA: ICD-10-CM

## 2017-10-30 DIAGNOSIS — G25.81 RESTLESS LEGS SYNDROME: ICD-10-CM

## 2017-10-30 DIAGNOSIS — E03.9 HYPOTHYROIDISM, UNSPECIFIED TYPE: ICD-10-CM

## 2017-10-30 RX ORDER — AMLODIPINE BESYLATE 10 MG/1
TABLET ORAL
Qty: 30 TABLET | Refills: 1 | Status: SHIPPED | OUTPATIENT
Start: 2017-10-30 | End: 2018-01-02 | Stop reason: SDUPTHER

## 2017-10-30 NOTE — TELEPHONE ENCOUNTER
Medication Agreement 4/29/15  [place] 4/29/15, 7/29/15, 10/29/15, 1/13/16, 4/13/16, 7/13/16, 10/13/16, 1/13/17, 4/13/17, 7/13/17: pt no showed for appointment  Urine Drug 4/29/15;2/2/16, 1/30/17    Has appt 11/8/17

## 2017-11-01 RX ORDER — LEVOTHYROXINE SODIUM 0.07 MG/1
TABLET ORAL
Qty: 30 TABLET | Refills: 0 | Status: SHIPPED | OUTPATIENT
Start: 2017-11-01 | End: 2017-11-18 | Stop reason: SDUPTHER

## 2017-11-01 RX ORDER — CLONAZEPAM 2 MG/1
TABLET ORAL
Qty: 30 TABLET | Refills: 0 | Status: SHIPPED | OUTPATIENT
Start: 2017-11-01 | End: 2017-12-01 | Stop reason: SDUPTHER

## 2017-11-08 ENCOUNTER — OFFICE VISIT (OUTPATIENT)
Dept: FAMILY MEDICINE CLINIC | Age: 77
End: 2017-11-08

## 2017-11-08 VITALS
SYSTOLIC BLOOD PRESSURE: 128 MMHG | OXYGEN SATURATION: 99 % | DIASTOLIC BLOOD PRESSURE: 72 MMHG | HEART RATE: 79 BPM | BODY MASS INDEX: 37.9 KG/M2 | WEIGHT: 250.1 LBS | HEIGHT: 68 IN

## 2017-11-08 DIAGNOSIS — G25.81 RESTLESS LEGS SYNDROME (RLS): ICD-10-CM

## 2017-11-08 DIAGNOSIS — F33.42 RECURRENT MAJOR DEPRESSIVE EPISODES, IN FULL REMISSION (HCC): Primary | ICD-10-CM

## 2017-11-08 DIAGNOSIS — F41.9 ANXIETY: ICD-10-CM

## 2017-11-08 DIAGNOSIS — Z23 NEED FOR INFLUENZA VACCINATION: ICD-10-CM

## 2017-11-08 PROCEDURE — 90688 IIV4 VACCINE SPLT 0.5 ML IM: CPT | Performed by: NURSE PRACTITIONER

## 2017-11-08 PROCEDURE — 99213 OFFICE O/P EST LOW 20 MIN: CPT | Performed by: NURSE PRACTITIONER

## 2017-11-08 PROCEDURE — G0008 ADMIN INFLUENZA VIRUS VAC: HCPCS | Performed by: NURSE PRACTITIONER

## 2017-11-08 RX ORDER — TRIAMCINOLONE ACETONIDE 1 MG/G
CREAM TOPICAL 2 TIMES DAILY
COMMUNITY
End: 2017-12-03

## 2017-11-08 NOTE — PROGRESS NOTES
Patient presented today for Influenza vaccine. Patient tolerated with no reaction. Patient informed to call the office if any concerns. Vaccine Information Sheet, \"Influenza - Inactivated\"  given to [employer], and verbalized understanding. Patient responses:    Have you ever had a reaction to a flu vaccine? No  Are you able to eat eggs without adverse effects? Yes  Do you have any current illness? No  Have you ever had Guillian [place]ch[place] Syndrome? No    Flu vaccine given per order. Please see immunization tab.

## 2017-11-08 NOTE — PROGRESS NOTES
impaired memory. Symptoms/signs of vipul: none. External stressors: birth. Current treatment includes: Effexor- 75 mg bid. Medication side effects: none. Review of Systems   Constitutional: Negative. HENT: Negative. Eyes: Negative. Respiratory: Negative. Cardiovascular: Negative. Gastrointestinal: Negative. Genitourinary: Negative. Musculoskeletal: Positive for myalgias (Due to restless legs syndrome). Skin: Negative. Neurological: Negative. Endo/Heme/Allergies: Negative. Psychiatric/Behavioral: Positive for depression (Controlled with medication). All other systems reviewed and are negative.        Past Medical History:   Diagnosis Date    Arthritis     Back injuries     BPH (benign prostatic hyperplasia)     CPAP (continuous positive airway pressure) dependence     Diverticulosis     colonoscopy 9/2015    GERD (gastroesophageal reflux disease)     Hiatal hernia     History of colon polyps     seen on colonoscopy again 9/2015    Hyperlipidemia     Hypertension     ILD (interstitial lung disease) ([facility] [address] 7/30/2013    Internal hemorrhoid     colonoscopy 5/06/94    Lichen sclerosus of penis 2017    Dr Andreas Dandy Mild cognitive impairment 08/2016    [facility] neurology    COLLIN (obstructive sleep apnea)     Renal insufficiency     Restless legs syndrome     Rosacea     Schatzki's ring     last dilated 2002    Sleep apnea      Family History   Problem Relation Age of Onset    Stroke Father     Cancer Father      prostate    Diabetes Sister     Diabetes Brother     Asthma Neg Hx     Emphysema Neg Hx     Heart Failure Neg Hx     Hypertension Neg Hx      Past Surgical History:   Procedure Laterality Date    CIRCUMCISION      COLONOSCOPY  2010    COLONOSCOPY  9/14/2015    CORONARY ANGIOPLASTY      CYSTOSCOPY  5/13/16    CYSTOSCOPY  05/19/2017    CYSTOSCOPY     CYSTOSCOPY  07/14/2017    ENDOSCOPY, COLON, DIAGNOSTIC  10/11/2017    esoph. stricture    FRACTURE SURGERY      right leg    FRACTURE SURGERY      wrist   right    HERNIA REPAIR      TOE SURGERY      TURP  07/14/2017    UPPER GASTROINTESTINAL ENDOSCOPY  09/12/2016    UPPER GASTROINTESTINAL ENDOSCOPY  10/11/2017    esophageal stricture     Social History     Social History    Marital status:      Spouse name: N/A    Number of children: N/A    Years of education: N/A     Occupational History    Not on file. Social History Main Topics    Smoking status: Former Smoker     Packs/day: 1.50     Years: 17.00     Types: Cigarettes     Quit date: 1/1/1974    Smokeless tobacco: Never Used    Alcohol use No    Drug use: No    Sexual activity: Not Currently     Other Topics Concern    Not on file     Social History Narrative    No narrative on file     Current Outpatient Prescriptions   Medication Sig Dispense Refill    Resveratrol (RESERVAPAK PLUS PO) Take by mouth OMEGA Q Plus      triamcinolone (KENALOG) 0.1 % cream Apply topically 2 times daily Apply topically 2 times daily.       levothyroxine (SYNTHROID) 75 MCG tablet TAKE ONE TABLET BY MOUTH DAILY 30 tablet 0    clonazePAM (KLONOPIN) 2 MG tablet TAKE ONE TABLET BY MOUTH EVERY NIGHT AT BEDTIME 30 tablet 0    amLODIPine (NORVASC) 10 MG tablet TAKE ONE TABLET BY MOUTH DAILY 30 tablet 1    Mirabegron ER 25 MG TB24 Take 25 mg by mouth 2 times daily      doxazosin (CARDURA) 8 MG tablet TAKE ONE TABLET BY MOUTH TWICE A DAY 60 tablet 9    simvastatin (ZOCOR) 10 MG tablet TAKE ONE TABLET BY MOUTH ONCE NIGHTLY 30 tablet 4    venlafaxine (EFFEXOR) 75 MG tablet TAKE ONE TABLET BY MOUTH TWICE A DAY 60 tablet 3    meloxicam (MOBIC) 15 MG tablet TAKE ONE TABLET BY MOUTH DAILY 30 tablet 5    albuterol sulfate (PROAIR RESPICLICK) 803 (90 BASE) MCG/ACT aerosol powder inhalation Inhale 2 puffs into the lungs every 6 hours as needed for Wheezing or Shortness of Breath 1 Inhaler 5    tetracycline (ACHROMYCIN;SUMYCIN) 250 MG capsule Take 1 (right side): No submandibular adenopathy present. Head (left side): No submandibular adenopathy present. He has no cervical adenopathy. Neurological: He is alert and oriented to person, place, and time. He has normal strength. Gait normal.   Skin: Skin is warm, dry and intact. No lesion and no rash noted. Psychiatric: He has a normal mood and affect. His speech is normal and behavior is normal. Judgment and thought content normal. Cognition and memory are normal.   Nursing note and vitals reviewed. /72 (Site: Left Arm, Position: Sitting, Cuff Size: Large Adult)   Pulse 79   Ht 5' 8\" (1.727 m)   Wt 250 lb 1.6 oz (113.4 kg)   SpO2 99%   BMI 38.03 kg/m²   Body mass index is 38.03 kg/m². BP Readings from Last 2 Encounters:   11/08/17 128/72   10/11/17 137/66       Wt Readings from Last 3 Encounters:   11/08/17 250 lb 1.6 oz (113.4 kg)   10/11/17 240 lb (108.9 kg)   08/03/17 242 lb (109.8 kg)       Lab Review   No visits with results within 2 Month(s) from this visit.    Latest known visit with results is:   Office Visit on 08/03/2017   Component Date Value    Sodium 08/03/2017 142     Potassium 08/03/2017 3.3*    Chloride 08/03/2017 98*    CO2 08/03/2017 27     Anion Gap 08/03/2017 17*    Glucose 08/03/2017 98     BUN 08/03/2017 20     CREATININE 08/03/2017 1.0     GFR Non- 08/03/2017 >60     GFR  08/03/2017 >60     Calcium 08/03/2017 9.9     Total Protein 08/03/2017 7.4     Alb 08/03/2017 4.8     Albumin/Globulin Ratio 08/03/2017 1.8     Total Bilirubin 08/03/2017 0.7     Alkaline Phosphatase 08/03/2017 51     ALT 08/03/2017 36     AST 08/03/2017 31     Globulin 08/03/2017 2.6     Cholesterol, Total 08/03/2017 182     Triglycerides 08/03/2017 175*    HDL 08/03/2017 45     LDL Calculated 08/03/2017 102*    VLDL CHOLESTEROL CALCULA* 08/03/2017 35     Vit D, 25-Hydroxy 08/04/2017 41.9     TSH 08/04/2017 3.16        No results found operating machinery, vehicles, or placing oneself in situations of risk to their health, ie heights and climbing and stairs.     The patient denies nausea, vomiting, diarrhea and constipation. No respiratory problems. No increased sleepiness, drowsiness or confusion. The patient states that the medications and treatment have helped improve the quality of life and helped in psychosocial functioning. There are no indicators for possible drug abuse, addiction or diversion problems. Attestation: The Prescription Monitoring Report for this patient was reviewed today. (Shorty Peoples CNP)  Documentation: Existing medication contract. , Possible medication side effects, risk of tolerance and/or dependence, and alternative treatments discussed., No signs of potential drug abuse or diversion identified. , Random urine drug screen sent today. (Shorty Peoples, TONO)    Anxiety  -     Drug Panel-PM-HI Res-UR Interp-A    Need for influenza vaccination  -     INFLUENZA, QUADV, 3 YRS AND OLDER, IM, MDV, 0.5ML ([facility])          Patient has been instructed call the office immediately with new symptoms, change in symptoms or worsening of symptoms. If this is not feasible, patient is instructed to report to the emergency room. Medication profile reviewed. Medication side effects and possible impairments from medications were discussed as applicable. Allergies were reviewed. Health maintenance was reviewed and updated as appropriate.

## 2017-11-08 NOTE — PATIENT INSTRUCTIONS
Patient Education        High Blood Pressure: Care Instructions  Your Care Instructions  If your blood pressure is usually above 140/90, you have high blood pressure, or hypertension. That means the top number is 140 or higher or the bottom number is 90 or higher, or both. Despite what a lot of people think, high blood pressure usually doesn't cause headaches or make you feel dizzy or lightheaded. It usually has no symptoms. But it does increase your risk for heart attack, stroke, and kidney or eye damage. The higher your blood pressure, the more your risk increases. Your doctor will give you a goal for your blood pressure. Your goal will be based on your health and your age. An example of a goal is to keep your blood pressure below 140/90. Lifestyle changes, such as eating healthy and being active, are always important to help lower blood pressure. You might also take medicine to reach your blood pressure goal.  Follow-up care is a key part of your treatment and safety. Be sure to make and go to all appointments, and call your doctor if you are having problems. It's also a good idea to know your test results and keep a list of the medicines you take. How can you care for yourself at home? Medical treatment  · If you stop taking your medicine, your blood pressure will go back up. You may take one or more types of medicine to lower your blood pressure. Be safe with medicines. Take your medicine exactly as prescribed. Call your doctor if you think you are having a problem with your medicine. · Talk to your doctor before you start taking aspirin every day. Aspirin can help certain people lower their risk of a heart attack or stroke. But taking aspirin isn't right for everyone, because it can cause serious bleeding. · See your doctor regularly. You may need to see the doctor more often at first or until your blood pressure comes down.   · If you are taking blood pressure medicine, talk to your doctor before you take decongestants or anti-inflammatory medicine, such as ibuprofen. Some of these medicines can raise blood pressure. · Learn how to check your blood pressure at home. Lifestyle changes  · Stay at a healthy weight. This is especially important if you put on weight around the waist. Losing even 10 pounds can help you lower your blood pressure. · If your doctor recommends it, get more exercise. Walking is a good choice. Bit by bit, increase the amount you walk every day. Try for at least 30 minutes on most days of the week. You also may want to swim, bike, or do other activities. · Avoid or limit alcohol. Talk to your doctor about whether you can drink any alcohol. · Try to limit how much sodium you eat to less than 2,300 milligrams (mg) a day. Your doctor may ask you to try to eat less than 1,500 mg a day. · Eat plenty of fruits (such as bananas and oranges), vegetables, legumes, whole grains, and low-fat dairy products. · Lower the amount of saturated fat in your diet. Saturated fat is found in animal products such as milk, cheese, and meat. Limiting these foods may help you lose weight and also lower your risk for heart disease. · Do not smoke. Smoking increases your risk for heart attack and stroke. If you need help quitting, talk to your doctor about stop-smoking programs and medicines. These can increase your chances of quitting for good. When should you call for help? Call 911 anytime you think you may need emergency care. This may mean having symptoms that suggest that your blood pressure is causing a serious heart or blood vessel problem. Your blood pressure may be over 180/110. For example, call 911 if:  · You have symptoms of a heart attack. These may include:  ¨ Chest pain or pressure, or a strange feeling in the chest.  ¨ Sweating. ¨ Shortness of breath. ¨ Nausea or vomiting.   ¨ Pain, pressure, or a strange feeling in the back, neck, jaw, or upper belly or in one or both shoulders or following instructions - You are on medications which could impair your senses, you are at risk of weakness, falls, dizziness, or drowsiness. You should be careful during activities which could place you at risk of harm, such as climbing, using stairs, operating machinery, or driving vehicles. If you feel you cannot safely do these activities, you should request others to help you, or avoid the activities altogether. If you are drowsy for any other reason, you should use the same precautions as listed above.

## 2017-11-09 ASSESSMENT — ENCOUNTER SYMPTOMS
RESPIRATORY NEGATIVE: 1
EYES NEGATIVE: 1
GASTROINTESTINAL NEGATIVE: 1

## 2017-11-12 LAB
6-ACETYLMORPHINE: NOT DETECTED
7-AMINOCLONAZEPAM: PRESENT
ALPHA-OH-ALPRAZOLAM: NOT DETECTED
ALPRAZOLAM: NOT DETECTED
AMPHETAMINE: NOT DETECTED
BARBITURATES: NOT DETECTED
BENZOYLECGONINE: NOT DETECTED
BUPRENORPHINE: NOT DETECTED
CARISOPRODOL: NOT DETECTED
CLONAZEPAM: NOT DETECTED
CODEINE: NOT DETECTED
CREATININE URINE: 157.7 MG/DL (ref 20–400)
DIAZEPAM: NOT DETECTED
DRUGS EXPECTED: NORMAL
EER PAIN MGT DRUG PANEL, HIGH RES/EMIT U: NORMAL
ETHYL GLUCURONIDE: NOT DETECTED
FENTANYL: NOT DETECTED
HYDROCODONE: NOT DETECTED
HYDROMORPHONE: NOT DETECTED
LORAZEPAM: NOT DETECTED
MARIJUANA METABOLITE: NOT DETECTED
MDA: NOT DETECTED
MDEA: NOT DETECTED
MDMA URINE: NOT DETECTED
MEPERIDINE: NOT DETECTED
METHADONE: NOT DETECTED
METHAMPHETAMINE: NOT DETECTED
METHYLPHENIDATE: NOT DETECTED
MIDAZOLAM: NOT DETECTED
MORPHINE: NOT DETECTED
NORBUPRENORPHINE, FREE: NOT DETECTED
NORDIAZEPAM: NOT DETECTED
NORFENTANYL: NOT DETECTED
NORHYDROCODONE, URINE: NOT DETECTED
NOROXYCODONE: NOT DETECTED
NOROXYMORPHONE, URINE: NOT DETECTED
OXAZEPAM: NOT DETECTED
OXYCODONE: NOT DETECTED
OXYMORPHONE: NOT DETECTED
PAIN MANAGEMENT DRUG PANEL: NORMAL
PAIN MANAGEMENT DRUG PANEL: NORMAL
PCP: NOT DETECTED
PHENTERMINE: NOT DETECTED
PROPOXYPHENE: NOT DETECTED
TAPENTADOL, URINE: NOT DETECTED
TAPENTADOL-O-SULFATE, URINE: NOT DETECTED
TEMAZEPAM: NOT DETECTED
TRAMADOL: NOT DETECTED
ZOLPIDEM: NOT DETECTED

## 2017-11-13 ENCOUNTER — OFFICE VISIT (OUTPATIENT)
Dept: PULMONOLOGY | Age: 77
End: 2017-11-13

## 2017-11-13 VITALS
SYSTOLIC BLOOD PRESSURE: 130 MMHG | OXYGEN SATURATION: 96 % | WEIGHT: 256 LBS | DIASTOLIC BLOOD PRESSURE: 62 MMHG | BODY MASS INDEX: 38.92 KG/M2 | RESPIRATION RATE: 24 BRPM | HEART RATE: 66 BPM

## 2017-11-13 DIAGNOSIS — Z99.89 OSA ON CPAP: ICD-10-CM

## 2017-11-13 DIAGNOSIS — R05.9 COUGH: ICD-10-CM

## 2017-11-13 DIAGNOSIS — G47.33 OSA ON CPAP: ICD-10-CM

## 2017-11-13 DIAGNOSIS — J84.9 ILD (INTERSTITIAL LUNG DISEASE) (HCC): Primary | ICD-10-CM

## 2017-11-13 PROCEDURE — 99214 OFFICE O/P EST MOD 30 MIN: CPT | Performed by: INTERNAL MEDICINE

## 2017-11-13 ASSESSMENT — SLEEP AND FATIGUE QUESTIONNAIRES
HOW LIKELY ARE YOU TO NOD OFF OR FALL ASLEEP WHEN YOU ARE A PASSENGER IN A CAR FOR AN HOUR WITHOUT A BREAK: 0
HOW LIKELY ARE YOU TO NOD OFF OR FALL ASLEEP WHILE SITTING INACTIVE IN A PUBLIC PLACE: 0
HOW LIKELY ARE YOU TO NOD OFF OR FALL ASLEEP WHILE SITTING QUIETLY AFTER LUNCH WITHOUT ALCOHOL: 0
HOW LIKELY ARE YOU TO NOD OFF OR FALL ASLEEP WHILE SITTING AND READING: 2
HOW LIKELY ARE YOU TO NOD OFF OR FALL ASLEEP WHILE LYING DOWN TO REST IN THE AFTERNOON WHEN CIRCUMSTANCES PERMIT: 3
NECK CIRCUMFERENCE (INCHES): 16
ESS TOTAL SCORE: 7
HOW LIKELY ARE YOU TO NOD OFF OR FALL ASLEEP IN A CAR, WHILE STOPPED FOR A FEW MINUTES IN TRAFFIC: 0
HOW LIKELY ARE YOU TO NOD OFF OR FALL ASLEEP WHILE WATCHING TV: 2
HOW LIKELY ARE YOU TO NOD OFF OR FALL ASLEEP WHILE SITTING AND TALKING TO SOMEONE: 0

## 2017-11-13 NOTE — PROGRESS NOTES
CHIEF COMPLAINT: Dyspnea and cough    Consulting provider: Arlette Almeida NP    HPI:   Presenting hx: The patient is a 76 yo man with hx of former tobacco abuse, GERD, COLLIN on CPAP who presents for evaluation of chronic dyspnea and cough. Patient complains of shortness of breath. Symptoms include drainage from nose, dyspnea on exertion, post nasal drip and productive cough. Symptoms began a few years ago, gradually worsening since that time. The dyspnea occurs with strenuous activity. Patient denies hemoptysis . Aggravating factors include exertion, exercise and climbing stairs. The patient reports an exercise tolerance of approximately several blocks on the flat and 2 flights of stairs, limited primarily by dyspnea. He is a former smoker for 15 years x 1 ppd, quit 1974. Denies any birds in home; hx of CTD. He had two cousins die from pulmonary fibrosis. Since last clinic visit, patient reports his dyspnea is about the same. He broke his CPAP machine and got a new machine from his friend. He is having trouble with mask leak. ESS today is 7. There are no changes to past medical history, family history, social history or review of systems(except as noted in the history section) since prior note.       Past Medical History:   Diagnosis Date    Arthritis     Back injuries     BPH (benign prostatic hyperplasia)     CPAP (continuous positive airway pressure) dependence     Diverticulosis     colonoscopy 9/2015    GERD (gastroesophageal reflux disease)     Hiatal hernia     History of colon polyps     seen on colonoscopy again 9/2015    Hyperlipidemia     Hypertension     ILD (interstitial lung disease) ([facility] [address] 7/30/2013    Internal hemorrhoid     colonoscopy 7/87/60    Lichen sclerosus of penis 2017    Dr Sarah Fletcher Mild cognitive impairment 08/2016    [facility] neurolog[facility]    COLLIN (obstructive sleep apnea)     Renal insufficiency     Restless legs syndrome     Rosacea     Eder's ring     last dilated 2002    Sleep apnea        Past Surgical History:        Procedure Laterality Date    CIRCUMCISION      COLONOSCOPY  2010    COLONOSCOPY  9/14/2015    CORONARY ANGIOPLASTY      CYSTOSCOPY  5/13/16    CYSTOSCOPY  05/19/2017    CYSTOSCOPY     CYSTOSCOPY  07/14/2017    ENDOSCOPY, COLON, DIAGNOSTIC  10/11/2017    esoph. stricture    FRACTURE SURGERY      right leg    FRACTURE SURGERY      wrist   right    HERNIA REPAIR      TOE SURGERY      TURP  07/14/2017    UPPER GASTROINTESTINAL ENDOSCOPY  09/12/2016    UPPER GASTROINTESTINAL ENDOSCOPY  10/11/2017    esophageal stricture       Allergies:  is allergic to percocet [oxycodone-acetaminophen] and ace inhibitors. Social History:    TOBACCO:   reports that he quit smoking about 43 years ago. His smoking use included Cigarettes. He has a 25.50 pack-year smoking history. He has never used smokeless tobacco.  ETOH:   reports that he does not drink alcohol. Patient currently lives independently  Environmental/chemical exposure: none       Family History:       Problem Relation Age of Onset    Stroke Father     Cancer Father      prostate    Diabetes Sister     Diabetes Brother     Asthma Neg Hx     Emphysema Neg Hx     Heart Failure Neg Hx     Hypertension Neg Hx        Current Medications:    Current Outpatient Prescriptions:     Resveratrol (RESERVAPAK PLUS PO), Take by mouth OMEGA Q Plus, Disp: , Rfl:     triamcinolone (KENALOG) 0.1 % cream, Apply topically 2 times daily Apply topically 2 times daily. , Disp: , Rfl:     levothyroxine (SYNTHROID) 75 MCG tablet, TAKE ONE TABLET BY MOUTH DAILY, Disp: 30 tablet, Rfl: 0    clonazePAM (KLONOPIN) 2 MG tablet, TAKE ONE TABLET BY MOUTH EVERY NIGHT AT BEDTIME, Disp: 30 tablet, Rfl: 0    amLODIPine (NORVASC) 10 MG tablet, TAKE ONE TABLET BY MOUTH DAILY, Disp: 30 tablet, Rfl: 1    Mirabegron ER 25 MG TB24, Take 25 mg by mouth 2 times daily, Disp: , Rfl:     doxazosin (CARDURA) 8 MG tablet, TAKE ONE TABLET BY MOUTH TWICE A DAY, Disp: 60 tablet, Rfl: 9    simvastatin (ZOCOR) 10 MG tablet, TAKE ONE TABLET BY MOUTH ONCE NIGHTLY, Disp: 30 tablet, Rfl: 4    venlafaxine (EFFEXOR) 75 MG tablet, TAKE ONE TABLET BY MOUTH TWICE A DAY, Disp: 60 tablet, Rfl: 3    meloxicam (MOBIC) 15 MG tablet, TAKE ONE TABLET BY MOUTH DAILY, Disp: 30 tablet, Rfl: 5    albuterol sulfate (PROAIR RESPICLICK) 984 (90 BASE) MCG/ACT aerosol powder inhalation, Inhale 2 puffs into the lungs every 6 hours as needed for Wheezing or Shortness of Breath, Disp: 1 Inhaler, Rfl: 5    tetracycline (ACHROMYCIN;SUMYCIN) 250 MG capsule, Take 1 capsule by mouth nightly, Disp: 30 capsule, Rfl: 11    methocarbamol (ROBAXIN) 500 MG tablet, Take 1 tablet by mouth 3 times daily as needed (spasm), Disp: 90 tablet, Rfl: 5    triamterene-hydrochlorothiazide (MAXZIDE-25) 37.5-25 MG per tablet, TAKE ONE TABLET BY MOUTH DAILY, Disp: 30 tablet, Rfl: 10    ranitidine (ZANTAC) 150 MG tablet, TAKE ONE TABLET BY MOUTH TWICE A DAY, Disp: 60 tablet, Rfl: 11    Vitamin D (CHOLECALCIFEROL) 1000 UNITS CAPS capsule, Take 1,000 Units by mouth daily. , Disp: , Rfl:     Calcium Carbonate-Vitamin D (CALCIUM + D) 600-200 MG-UNIT TABS, Take 600 mg by mouth daily. , Disp: , Rfl:       Objective:   PHYSICAL EXAM:        VITALS:    /62 (Site: Right Arm, Position: Sitting, Cuff Size: Large Adult)   Pulse 66   Resp 24   Wt 256 lb (116.1 kg)   SpO2 96%   BMI 38.92 kg/m²     Constitutional: In no acute distress. Appears stated age. Well developed and nourished  Eyes: PERRL. No sclera icterus. No conjunctival injection. ENT: No ocular or auricular discharge or visible masses. Oropharynx clear. MP2  Neck: Trachea midline. Normal thyroid. Resp: No accessory muscle use. No crackles. No wheezes. No rhonchi. No dullness on percussion. CTAB. CV: Regular rate. Regular rhythm. No murmur or rub. Normal S1 and S2. No edema  GI: Abdomen non-tender. Non-distended. No masses. coronary arteries. No pericardial effusion. Small sliding hiatal hernia is noted. ASSESSMENT AND PLAN:      Dyspnea/ interstitial lung disease     Based on the information available thus far, crackles on physical, and previous CT scan results, I favor a diagnosis of early ILD. Etiology of ILD is unknown; however age and prior smoking hx make Idiopathic Pulmonary Fibrosis or familial pulmonary fibrosis most likely. Findings on CT are not definitive. ILD serologies were wnl.   - PFTs were stable  - offered pulmonary rehab, patient has not been interested yet    Cough, chronic  Most likely secondary to post-nasal drip. - no benefit from flonase/astelin   - tessalon perles prn  - try albuterol prn given obstruction on pfts      Interstitial lung disease    - were stable by pfts  -  monitor disease with longitudinal pfts, repeat again in 6 months  -  We previously discussed the FDA approved medicines      COLLIN on CPAP   Patient has been on PAP for long time.    - AHI previously  improved on Bipap 20/15 down to 3.5previouslu ; reviewed current compliance-he has been using auto CPAP ranging from 8-16 with an AHI of 3.9  - Patient's complaining of pressure being too high and leakage  - Adjust to CPAP 14 CWP and repeat compliance report in 2 months  - orders to DME for new supplies  - No driving if sleepy, groggy or fatigue  - Encouraged increasing activity through the day

## 2017-11-13 NOTE — PATIENT INSTRUCTIONS
PFT PREP  You have been scheduled for a Pulmonary Function Test on 5/14/18 at 12:00 pm at [facility].   Nothing my mouth 1 hour prior to test (water only is OK). No smoking or inhalers 4 hours prior to test unless directed differently by the physician. Please PRE-REGISTER at [phone] option 2, option 2,prior to your test date. Also, please remember to arrive 20 to 30 minutes prior to testing unless otherwise instructed.

## 2017-11-20 RX ORDER — VENLAFAXINE 75 MG/1
TABLET ORAL
Qty: 60 TABLET | Refills: 2 | Status: SHIPPED | OUTPATIENT
Start: 2017-11-20 | End: 2018-01-29 | Stop reason: SDUPTHER

## 2017-12-01 DIAGNOSIS — G25.81 RESTLESS LEGS SYNDROME: ICD-10-CM

## 2017-12-01 RX ORDER — CLONAZEPAM 2 MG/1
TABLET ORAL
Qty: 30 TABLET | Refills: 0 | Status: SHIPPED | OUTPATIENT
Start: 2017-12-01 | End: 2018-01-02 | Stop reason: SDUPTHER

## 2017-12-01 NOTE — TELEPHONE ENCOUNTER
Medication Agreement 4/29/15  [facility] 4/29/15, 7/29/15, 10/29/15, 1/13/16, 4/13/16, 7/13/16, 10/13/16, 1/13/17, 4/13/17, 7/13/17: pt no showed for appointment, 8/3/17  Urine Drug 4/29/15;2/2/16, 1/30/17    HAS APPT 2/8/18

## 2017-12-05 ENCOUNTER — OFFICE VISIT (OUTPATIENT)
Dept: FAMILY MEDICINE CLINIC | Age: 77
End: 2017-12-05

## 2017-12-05 ENCOUNTER — HOSPITAL ENCOUNTER (OUTPATIENT)
Dept: MRI IMAGING | Age: 77
Discharge: HOME | End: 2017-12-05
Attending: NURSE PRACTITIONER | Admitting: NURSE PRACTITIONER

## 2017-12-05 VITALS
OXYGEN SATURATION: 97 % | BODY MASS INDEX: 37.8 KG/M2 | HEART RATE: 59 BPM | WEIGHT: 248.6 LBS | SYSTOLIC BLOOD PRESSURE: 150 MMHG | DIASTOLIC BLOOD PRESSURE: 70 MMHG

## 2017-12-05 DIAGNOSIS — R51.9 NEW ONSET OF HEADACHES AFTER AGE 50: Primary | ICD-10-CM

## 2017-12-05 DIAGNOSIS — Z09 ENCOUNTER FOR EXAMINATION FOLLOWING TREATMENT AT HOSPITAL: ICD-10-CM

## 2017-12-05 DIAGNOSIS — R51.9 NEW ONSET OF HEADACHES AFTER AGE 50: ICD-10-CM

## 2017-12-05 DIAGNOSIS — G44.309 POST-TRAUMATIC HEADACHE, NOT INTRACTABLE: ICD-10-CM

## 2017-12-05 DIAGNOSIS — R51.9 SCALP TENDERNESS: ICD-10-CM

## 2017-12-05 PROCEDURE — 99214 OFFICE O/P EST MOD 30 MIN: CPT | Performed by: NURSE PRACTITIONER

## 2017-12-05 ASSESSMENT — ENCOUNTER SYMPTOMS
RHINORRHEA: 0
NAUSEA: 0
EYE REDNESS: 0
VOMITING: 0
PHOTOPHOBIA: 0
FACIAL SWEATING: 0
BACK PAIN: 0
ABDOMINAL PAIN: 0
SCALP TENDERNESS: 1
EYE WATERING: 0
RESPIRATORY NEGATIVE: 1
SINUS PRESSURE: 0
BLURRED VISION: 0
EYE PAIN: 0
SORE THROAT: 0
EYES NEGATIVE: 1
SWOLLEN GLANDS: 0
VISUAL CHANGE: 0
COUGH: 0

## 2017-12-05 NOTE — PROGRESS NOTES
numbness, phonophobia, photophobia, rhinorrhea, seizures, sinus pressure, sore throat, swollen glands, tingling, tinnitus, visual change, vomiting, weakness or weight loss. The symptoms are aggravated by coughing, sneezing and hunger. He has tried acetaminophen for the symptoms. The treatment provided mild relief. There is no history of cancer, cluster headaches, hypertension, immunosuppression, migraine headaches, migraines in the family, obesity, pseudotumor cerebri, recent head traumas, sinus disease or TMJ. Mr. Elie Lezama states he went to the ER yesterday because of how severe his headache was getting. He states these headaches he has been having for the last 5 days are the worse headaches he has ever had. He admits to the headaches coming and going on the back of his left side of his head and when the headaches occur, he states he cannot do anything because of how severe his headache feels. He states at times the headache pulsates and he has a sharp shooting pain. He admits to having scalp tenderness and pain when he chews at times. He states he does not have scalp tenderness or pain when he chews all the time. He admits to having occasional neck pain with his headaches. He states he had a head CT in the ER which was negative for acute abnormalities. He currently does not have a headache. He states when he bends over his headache returns.       Past Medical History:   Diagnosis Date    Arthritis     Back injuries     BPH (benign prostatic hyperplasia)     CPAP (continuous positive airway pressure) dependence     Diverticulosis     colonoscopy 9/2015    GERD (gastroesophageal reflux disease)     Hiatal hernia     History of colon polyps     seen on colonoscopy again 9/2015    Hyperlipidemia     Hypertension     [facility] 7/30/2013    Internal hemorrhoid     colonoscopy 3/24/96    Lichen sclerosus of penis 2017    Dr Marcus Dumont Mild cognitive impairment 08/2016 [facility] neurology    COLLIN (obstructive sleep apnea)     Renal insufficiency     Restless legs syndrome     Rosacea     Schatzki's ring     last dilated 2002    Sleep apnea       Past Surgical History:   Procedure Laterality Date    CIRCUMCISION      COLONOSCOPY  2010    COLONOSCOPY  9/14/2015    CORONARY ANGIOPLASTY      CYSTOSCOPY  5/13/16    CYSTOSCOPY  05/19/2017    CYSTOSCOPY     CYSTOSCOPY  07/14/2017    ENDOSCOPY, COLON, DIAGNOSTIC  10/11/2017    esoph. stricture    FRACTURE SURGERY      right leg    FRACTURE SURGERY      wrist   right    HERNIA REPAIR      TOE SURGERY      TURP  07/14/2017    UPPER GASTROINTESTINAL ENDOSCOPY  09/12/2016    UPPER GASTROINTESTINAL ENDOSCOPY  10/11/2017    esophageal stricture       Family History   Problem Relation Age of Onset    Stroke Father     Cancer Father      prostate    Diabetes Sister     Diabetes Brother     Asthma Neg Hx     Emphysema Neg Hx     Heart Failure Neg Hx     Hypertension Neg Hx        Social History   Substance Use Topics    Smoking status: Former Smoker     Packs/day: 1.50     Years: 17.00     Types: Cigarettes     Quit date: 1/1/1974    Smokeless tobacco: Never Used    Alcohol use No      Current Outpatient Prescriptions   Medication Sig Dispense Refill    clonazePAM (KLONOPIN) 2 MG tablet TAKE ONE TABLET BY MOUTH ONCE NIGHTLY AT BEDTIME 30 tablet 0    levothyroxine (SYNTHROID) 75 MCG tablet TAKE ONE TABLET BY MOUTH DAILY 30 tablet 5    venlafaxine (EFFEXOR) 75 MG tablet TAKE ONE TABLET BY MOUTH TWICE A DAY 60 tablet 2    meloxicam (MOBIC) 15 MG tablet TAKE ONE TABLET BY MOUTH DAILY 30 tablet 5    amLODIPine (NORVASC) 10 MG tablet TAKE ONE TABLET BY MOUTH DAILY 30 tablet 1    Mirabegron ER 25 MG TB24 Take 25 mg by mouth 2 times daily      doxazosin (CARDURA) 8 MG tablet TAKE ONE TABLET BY MOUTH TWICE A DAY 60 tablet 9    simvastatin (ZOCOR) 10 MG tablet TAKE ONE TABLET BY MOUTH ONCE NIGHTLY 30 tablet 4    noted. He is not diaphoretic. Psychiatric: He has a normal mood and affect. His behavior is normal. Judgment and thought content normal.   Nursing note and vitals reviewed. Admission on 12/03/2017, Discharged on 12/03/2017   Component Date Value Ref Range Status    Sodium 12/03/2017 138  136 - 145 mmol/L Final    Potassium 12/03/2017 3.6  3.5 - 5.1 mmol/L Final    Chloride 12/03/2017 99  99 - 110 mmol/L Final    CO2 12/03/2017 27  21 - 32 mmol/L Final    Anion Gap 12/03/2017 12  3 - 16 Final    Glucose 12/03/2017 115* 70 - 99 mg/dL Final    BUN 12/03/2017 20  7 - 20 mg/dL Final    CREATININE 12/03/2017 1.1  0.8 - 1.3 mg/dL Final    GFR Non- 12/03/2017 >60  >60 Final    Comment: >60 mL/min/1.73m2 EGFR, calc. for ages 25 and older using the  MDRD formula (not corrected for weight), is valid for stable  renal function.  GFR  12/03/2017 >60  >60 Final    Comment: Chronic Kidney Disease: less than 60 ml/min/1.73 sq.m. Kidney Failure: less than 15 ml/min/1.73 sq.m. Results valid for patients 18 years and older.       Calcium 12/03/2017 9.2  8.3 - 10.6 mg/dL Final    WBC 12/03/2017 6.3  4.0 - 11.0 K/uL Final    RBC 12/03/2017 4.69  4.20 - 5.90 M/uL Final    Hemoglobin 12/03/2017 14.9  13.5 - 17.5 g/dL Final    Hematocrit 12/03/2017 42.3  40.5 - 52.5 % Final    MCV 12/03/2017 90.3  80.0 - 100.0 fL Final    MCH 12/03/2017 31.7  26.0 - 34.0 pg Final    MCHC 12/03/2017 35.1  31.0 - 36.0 g/dL Final    RDW 12/03/2017 13.4  12.4 - 15.4 % Final    Platelets 18/67/2965 201  135 - 450 K/uL Final    MPV 12/03/2017 6.9  5.0 - 10.5 fL Final    Neutrophils % 12/03/2017 50.8  % Final    Lymphocytes % 12/03/2017 27.5  % Final    Monocytes % 12/03/2017 10.2  % Final    Eosinophils % 12/03/2017 9.1  % Final    Basophils % 12/03/2017 2.4  % Final    Neutrophils # 12/03/2017 3.2  1.7 - 7.7 K/uL Final    Lymphocytes # 12/03/2017 1.7  1.0 - 5.1 K/uL Final   

## 2017-12-06 LAB
C-REACTIVE PROTEIN: 1.3 MG/L (ref 0–5.1)
SEDIMENTATION RATE, ERYTHROCYTE: 10 MM/HR (ref 0–20)

## 2017-12-06 NOTE — PATIENT INSTRUCTIONS
Patient Education        Headache: Care Instructions  Your Care Instructions    Headaches have many possible causes. Most headaches aren't a sign of a more serious problem, and they will get better on their own. Home treatment may help you feel better faster. The doctor has checked you carefully, but problems can develop later. If you notice any problems or new symptoms, get medical treatment right away. Follow-up care is a key part of your treatment and safety. Be sure to make and go to all appointments, and call your doctor if you are having problems. It's also a good idea to know your test results and keep a list of the medicines you take. How can you care for yourself at home? · Do not drive if you have taken a prescription pain medicine. · Rest in a quiet, dark room until your headache is gone. Close your eyes and try to relax or go to sleep. Don't watch TV or read. · Put a cold, moist cloth or cold pack on the painful area for 10 to 20 minutes at a time. Put a thin cloth between the cold pack and your skin. · Use a warm, moist towel or a heating pad set on low to relax tight shoulder and neck muscles. · Have someone gently massage your neck and shoulders. · Take pain medicines exactly as directed. ¨ If the doctor gave you a prescription medicine for pain, take it as prescribed. ¨ If you are not taking a prescription pain medicine, ask your doctor if you can take an over-the-counter medicine. · Be careful not to take pain medicine more often than the instructions allow, because you may get worse or more frequent headaches when the medicine wears off. · Do not ignore new symptoms that occur with a headache, such as a fever, weakness or numbness, vision changes, or confusion. These may be signs of a more serious problem. To prevent headaches  · Keep a headache diary so you can figure out what triggers your headaches. Avoiding triggers may help you prevent headaches.  Record when each headache began, how long it lasted, and what the pain was like (throbbing, aching, stabbing, or dull). Write down any other symptoms you had with the headache, such as nausea, flashing lights or dark spots, or sensitivity to bright light or loud noise. Note if the headache occurred near your period. List anything that might have triggered the headache, such as certain foods (chocolate, cheese, wine) or odors, smoke, bright light, stress, or lack of sleep. · Find healthy ways to deal with stress. Headaches are most common during or right after stressful times. Take time to relax before and after you do something that has caused a headache in the past.  · Try to keep your muscles relaxed by keeping good posture. Check your jaw, face, neck, and shoulder muscles for tension, and try relaxing them. When sitting at a desk, change positions often, and stretch for 30 seconds each hour. · Get plenty of sleep and exercise. · Eat regularly and well. Long periods without food can trigger a headache. · Treat yourself to a massage. Some people find that regular massages are very helpful in relieving tension. · Limit caffeine by not drinking too much coffee, tea, or soda. But don't quit caffeine suddenly, because that can also give you headaches. · Reduce eyestrain from computers by blinking frequently and looking away from the computer screen every so often. Make sure you have proper eyewear and that your monitor is set up properly, about an arm's length away. · Seek help if you have depression or anxiety. Your headaches may be linked to these conditions. Treatment can both prevent headaches and help with symptoms of anxiety or depression. When should you call for help? Call 911 anytime you think you may need emergency care. For example, call if:  · You have signs of a stroke. These may include:  ¨ Sudden numbness, paralysis, or weakness in your face, arm, or leg, especially on only one side of your body.   ¨ Sudden vision changes. ¨ Sudden trouble speaking. ¨ Sudden confusion or trouble understanding simple statements. ¨ Sudden problems with walking or balance. ¨ A sudden, severe headache that is different from past headaches. Call your doctor now or seek immediate medical care if:  · You have a new or worse headache. · Your headache gets much worse. Where can you learn more? Go to [email or web address] and sign in to your [employer] account. Enter [id number] in the [employer] box to learn more about \"Headache: Care Instructions. \"     If you do not have an account, please click on the \"Sign Up Now\" link. Current as of: October 14, 2016  Content Version: 11.3  © 8553-3974 [employer]. Care instructions adapted under license by [facility] [address] If you have questions about a medical condition or this instruction, always ask your healthcare professional. [place] [address] any warranty or liability for your use of this information.

## 2017-12-15 ENCOUNTER — TELEPHONE (OUTPATIENT)
Dept: FAMILY MEDICINE CLINIC | Age: 77
End: 2017-12-15

## 2018-01-02 DIAGNOSIS — G25.81 RESTLESS LEGS SYNDROME: ICD-10-CM

## 2018-01-02 DIAGNOSIS — E78.5 HYPERLIPIDEMIA: ICD-10-CM

## 2018-01-02 RX ORDER — TRIAMTERENE AND HYDROCHLOROTHIAZIDE 37.5; 25 MG/1; MG/1
TABLET ORAL
Qty: 30 TABLET | Refills: 9 | Status: SHIPPED | OUTPATIENT
Start: 2018-01-02 | End: 2018-01-31 | Stop reason: SDUPTHER

## 2018-01-02 RX ORDER — AMLODIPINE BESYLATE 10 MG/1
TABLET ORAL
Qty: 30 TABLET | Refills: 2 | Status: SHIPPED | OUTPATIENT
Start: 2018-01-02 | End: 2018-04-02 | Stop reason: SDUPTHER

## 2018-01-02 RX ORDER — CLONAZEPAM 2 MG/1
TABLET ORAL
Qty: 30 TABLET | Refills: 0 | Status: SHIPPED | OUTPATIENT
Start: 2018-01-02 | End: 2018-01-29 | Stop reason: SDUPTHER

## 2018-01-02 RX ORDER — SIMVASTATIN 10 MG
TABLET ORAL
Qty: 30 TABLET | Refills: 3 | Status: SHIPPED | OUTPATIENT
Start: 2018-01-02 | End: 2018-01-30 | Stop reason: SDUPTHER

## 2018-01-22 ENCOUNTER — OFFICE VISIT (OUTPATIENT)
Dept: PULMONOLOGY | Age: 78
End: 2018-01-22

## 2018-01-22 ENCOUNTER — TELEPHONE (OUTPATIENT)
Dept: PULMONOLOGY | Age: 78
End: 2018-01-22

## 2018-01-22 VITALS
WEIGHT: 243 LBS | BODY MASS INDEX: 36.83 KG/M2 | DIASTOLIC BLOOD PRESSURE: 74 MMHG | HEART RATE: 55 BPM | OXYGEN SATURATION: 98 % | TEMPERATURE: 97.9 F | RESPIRATION RATE: 22 BRPM | HEIGHT: 68 IN | SYSTOLIC BLOOD PRESSURE: 138 MMHG

## 2018-01-22 DIAGNOSIS — Z99.89 OSA ON CPAP: Primary | ICD-10-CM

## 2018-01-22 DIAGNOSIS — G47.33 OSA ON CPAP: Primary | ICD-10-CM

## 2018-01-22 PROCEDURE — 99213 OFFICE O/P EST LOW 20 MIN: CPT | Performed by: INTERNAL MEDICINE

## 2018-01-22 ASSESSMENT — SLEEP AND FATIGUE QUESTIONNAIRES
NECK CIRCUMFERENCE (INCHES): 16
HOW LIKELY ARE YOU TO NOD OFF OR FALL ASLEEP WHEN YOU ARE A PASSENGER IN A CAR FOR AN HOUR WITHOUT A BREAK: 2
ESS TOTAL SCORE: 14
HOW LIKELY ARE YOU TO NOD OFF OR FALL ASLEEP WHILE SITTING QUIETLY AFTER LUNCH WITHOUT ALCOHOL: 3
HOW LIKELY ARE YOU TO NOD OFF OR FALL ASLEEP WHILE SITTING INACTIVE IN A PUBLIC PLACE: 2
HOW LIKELY ARE YOU TO NOD OFF OR FALL ASLEEP IN A CAR, WHILE STOPPED FOR A FEW MINUTES IN TRAFFIC: 0
HOW LIKELY ARE YOU TO NOD OFF OR FALL ASLEEP WHILE SITTING AND TALKING TO SOMEONE: 0
HOW LIKELY ARE YOU TO NOD OFF OR FALL ASLEEP WHILE WATCHING TV: 3
HOW LIKELY ARE YOU TO NOD OFF OR FALL ASLEEP WHILE SITTING AND READING: 2
HOW LIKELY ARE YOU TO NOD OFF OR FALL ASLEEP WHILE LYING DOWN TO REST IN THE AFTERNOON WHEN CIRCUMSTANCES PERMIT: 2

## 2018-01-22 NOTE — PATIENT INSTRUCTIONS
PFT PREP  You have been scheduled for a Pulmonary Function Test on 7/23/18 at 12:00 pm at [facility].   Nothing my mouth 1 hour prior to test (water only is OK). No smoking or inhalers 4 hours prior to test unless directed differently by the physician. Please PRE-REGISTER at [phone] option 2, option 2,prior to your test date. Also, please remember to arrive 20 to 30 minutes prior to testing unless otherwise instructed.

## 2018-01-22 NOTE — PROGRESS NOTES
TWICE A DAY, Disp: 60 tablet, Rfl: 2    meloxicam (MOBIC) 15 MG tablet, TAKE ONE TABLET BY MOUTH DAILY, Disp: 30 tablet, Rfl: 5    Mirabegron ER 25 MG TB24, Take 25 mg by mouth 2 times daily, Disp: , Rfl:     doxazosin (CARDURA) 8 MG tablet, TAKE ONE TABLET BY MOUTH TWICE A DAY, Disp: 60 tablet, Rfl: 9    albuterol sulfate (PROAIR RESPICLICK) 262 (90 BASE) MCG/ACT aerosol powder inhalation, Inhale 2 puffs into the lungs every 6 hours as needed for Wheezing or Shortness of Breath, Disp: 1 Inhaler, Rfl: 5    tetracycline (ACHROMYCIN;SUMYCIN) 250 MG capsule, Take 1 capsule by mouth nightly, Disp: 30 capsule, Rfl: 11    methocarbamol (ROBAXIN) 500 MG tablet, Take 1 tablet by mouth 3 times daily as needed (spasm), Disp: 90 tablet, Rfl: 5    ranitidine (ZANTAC) 150 MG tablet, TAKE ONE TABLET BY MOUTH TWICE A DAY, Disp: 60 tablet, Rfl: 11    Vitamin D (CHOLECALCIFEROL) 1000 UNITS CAPS capsule, Take 1,000 Units by mouth daily. , Disp: , Rfl:     Calcium Carbonate-Vitamin D (CALCIUM + D) 600-200 MG-UNIT TABS, Take 600 mg by mouth daily. , Disp: , Rfl:       Objective:   PHYSICAL EXAM:        VITALS:    /74 (Site: Right Arm, Position: Sitting, Cuff Size: Large Adult)   Pulse 55   Temp 97.9 °F (36.6 °C) (Oral)   Resp 22   Ht 5' 8\" (1.727 m)   Wt 243 lb (110.2 kg)   SpO2 98% Comment: RA  BMI 36.95 kg/m²     Constitutional: In no acute distress. Appears stated age. Obese. Eyes: PERRL. No sclera icterus. No conjunctival injection. ENT: No ocular or auricular discharge or visible masses. Oropharynx clear. MP2  Neck: Trachea midline. Normal thyroid. Resp: No accessory muscle use. No crackles. No wheezes. No rhonchi. No dullness on percussion. CTAB. CV: Regular rate. Regular rhythm. No murmur or rub. Normal S1 and S2. No edema  GI: Abdomen non-tender. Non-distended. No masses. Skin: Warm and dry. No nodules on exposed extremities. No rash on exposed extremities. Lymph: No cervical LAD.  No supraclavicular LAD. M/S: No cyanosis. No synovitis or joint deformity. No clubbing. Neuro: Cranial nerves are grossly intact. Moving all extremities. Motor and sensation grossly intact. Psych: Oriented x 3. No anxiety or agitation. DATA:      LABS:    PFTs 2/12/13:  FVC 3.41 (84%) FEV1 2.75 (93%) FEV1/FVC ratio 80%  TLC 5.59 (84%) DLCO 18.51 (67%)   PFTs (7-1-2013):  FVC 3.36 (83%) FEV1 2.67 (90%) FEV1/FVC ratio 80% TLC 5.46 (82%) DLCO 18.75 (68%)  6 Minute Walk Test (7-1-2013)- 1400 feet, SpO2 low 94%  PFTs 12/2/13   FVC 3.39 (84%) FEV1 2.75 (93%) FEV1/FVC ratio  81%  TLC 5.78 (87%) DLCO 21.27 (77%)  PFTs 6/11/14  FVC 3.40 (85%) FEV1 2.67 (91%) FEV1/FVC ratio 79%   TLC 5.66 (85%) DLCO 21.31 (78%)  PFTs 6/18/15  FVC 3.33 (84%) FEV1 2.58 (89%) FEV1/FVC ratio 78%   TLC 5.82 (88%) DLCO 20.05 (74%)   6MWT 1120 feet, low sat 97%  PFTs 12/28/15  FVC 3.11 (79%) FEV1 2.46 (86%) FEV1/FVC ratio  79%  TLC 5.12 (78%) DLCO 15.69 (%)   6mwt 1120 feet, low sat 94%  PFTs 5/2/16  FVC 3.24 (82%) FEV1 2.68 (94%) FEV1/FVC ratio  83% TLC 5.51 (83%) DLCO 17.92 (66%)   PFTs 10/31/16  FVC 3.26 (83%) FEV1 2.67 (94%) FEV1/FVC ratio 82%  TLC 5.64 (86%) DLCO 17.64 (65%)   PFTs 5/8/17  FVC 3.76 (96%) FEV1 2.20 (78%) FEV1/FVC ratio  58%  TLC 5.67 (86%) DLCO 19.69 (73%)     CLAY, RF, SSA, SSB all wnl  7/13    IMAGING:    I personally reviewed and interpreted the following in the office:    HR Chest CT 7/24/13: mild bilateral basilar predominant subpleural reticulation without associated traction bronchiectasis or honeycomb change    Chest CT  (dated 6/11/12): There are bilateral subpleural reticular and ground glass opacities. No evident pneumothorax is seen, particularly on the left. No dominant mass or focal area of consolidation. There is a moderate amount of calcification within the thoracic aorta and coronary arteries. No pericardial effusion. Small sliding hiatal hernia is noted.         ASSESSMENT AND PLAN:      Dyspnea/ interstitial lung disease     Based on the information available thus far, crackles on physical, and previous CT scan results, I favor a diagnosis of early ILD. Etiology of ILD is unknown; however age and prior smoking hx make Idiopathic Pulmonary Fibrosis or familial pulmonary fibrosis most likely. Findings on CT are not definitive. ILD serologies were wnl.   - PFTs were stable  - offered pulmonary rehab, patient has not been interested yet    Cough, chronic  Most likely secondary to post-nasal drip. - no benefit from flonase/astelin   - tessalon perles prn  - try albuterol prn given obstruction on pfts  - currently improved      Interstitial lung disease    - were stable by pfts  -  monitor disease with longitudinal pfts, repeat again prior to f/u  -  We previously discussed the FDA approved medicines      COLLIN on CPAP   Patient has been on PAP for long time.    - AHI previously  improved on Bipap 20/15 down to 3.5previously ; reviewed prior compliance-he has been using auto CPAP ranging from 8-16 with an AHI of 3.9  - Patient complained of pressure being too high and leakage  - Adjusted to CPAP 14 CWP and repeat compliance report pending  - orders to DME for new supplies  - No driving if sleepy, groggy or fatigue  - Encouraged increasing activity through the day

## 2018-01-29 DIAGNOSIS — G25.81 RESTLESS LEGS SYNDROME: ICD-10-CM

## 2018-01-29 NOTE — TELEPHONE ENCOUNTER
Medication Agreement 4/29/15  Oarrs 4/29/15, 7/29/15, 10/29/15, 1/13/16, 4/13/16, 7/13/16, 10/13/16, 1/13/17, 4/13/17, 7/13/17: pt no showed for appointment  Urine Drug 4/29/15;2/2/16, 1/30/17    Last ov 11/8/17  Next ov 2/8/18

## 2018-01-30 DIAGNOSIS — E78.5 HYPERLIPIDEMIA: ICD-10-CM

## 2018-01-30 RX ORDER — SIMVASTATIN 10 MG
TABLET ORAL
Qty: 30 TABLET | Refills: 3 | Status: SHIPPED | OUTPATIENT
Start: 2018-01-30 | End: 2018-08-31 | Stop reason: SDUPTHER

## 2018-01-31 RX ORDER — VENLAFAXINE 75 MG/1
TABLET ORAL
Qty: 60 TABLET | Refills: 1 | Status: SHIPPED | OUTPATIENT
Start: 2018-01-31 | End: 2018-06-02 | Stop reason: SDUPTHER

## 2018-01-31 RX ORDER — TRIAMTERENE AND HYDROCHLOROTHIAZIDE 37.5; 25 MG/1; MG/1
TABLET ORAL
Qty: 30 TABLET | Refills: 9 | Status: SHIPPED | OUTPATIENT
Start: 2018-01-31 | End: 2018-12-17 | Stop reason: SDUPTHER

## 2018-02-01 RX ORDER — CLONAZEPAM 2 MG/1
TABLET ORAL
Qty: 30 TABLET | Refills: 0 | Status: SHIPPED | OUTPATIENT
Start: 2018-02-01 | End: 2018-03-03 | Stop reason: SDUPTHER

## 2018-02-08 ENCOUNTER — OFFICE VISIT (OUTPATIENT)
Dept: FAMILY MEDICINE CLINIC | Age: 78
End: 2018-02-08

## 2018-02-08 VITALS
HEART RATE: 65 BPM | HEIGHT: 68 IN | BODY MASS INDEX: 38.04 KG/M2 | OXYGEN SATURATION: 97 % | WEIGHT: 251 LBS | DIASTOLIC BLOOD PRESSURE: 84 MMHG | SYSTOLIC BLOOD PRESSURE: 138 MMHG

## 2018-02-08 DIAGNOSIS — I10 ESSENTIAL HYPERTENSION: ICD-10-CM

## 2018-02-08 DIAGNOSIS — G25.81 RESTLESS LEGS SYNDROME: ICD-10-CM

## 2018-02-08 DIAGNOSIS — E55.9 VITAMIN D DEFICIENCY: ICD-10-CM

## 2018-02-08 DIAGNOSIS — N48.0 LICHEN SCLEROSUS OF PENIS: ICD-10-CM

## 2018-02-08 DIAGNOSIS — L29.1 SCROTAL PRURITUS: ICD-10-CM

## 2018-02-08 DIAGNOSIS — E03.9 HYPOTHYROIDISM, UNSPECIFIED TYPE: ICD-10-CM

## 2018-02-08 DIAGNOSIS — R10.31 RIGHT LOWER QUADRANT ABDOMINAL PAIN: Primary | ICD-10-CM

## 2018-02-08 DIAGNOSIS — E78.5 HYPERLIPIDEMIA, UNSPECIFIED HYPERLIPIDEMIA TYPE: ICD-10-CM

## 2018-02-08 LAB
BILIRUBIN, POC: NEGATIVE
BLOOD URINE, POC: NEGATIVE
CLARITY, POC: CLEAR
COLOR, POC: YELLOW
GLUCOSE URINE, POC: NEGATIVE
KETONES, POC: NEGATIVE
LEUKOCYTE EST, POC: NORMAL
NITRITE, POC: NEGATIVE
PH, POC: 6.5
PROTEIN, POC: NEGATIVE
SPECIFIC GRAVITY, POC: 1.01
UROBILINOGEN, POC: 0.2

## 2018-02-08 PROCEDURE — 99215 OFFICE O/P EST HI 40 MIN: CPT | Performed by: NURSE PRACTITIONER

## 2018-02-08 PROCEDURE — 81002 URINALYSIS NONAUTO W/O SCOPE: CPT | Performed by: NURSE PRACTITIONER

## 2018-02-08 RX ORDER — OMEPRAZOLE 40 MG/1
40 CAPSULE, DELAYED RELEASE ORAL
COMMUNITY
Start: 2018-02-02 | End: 2018-11-12 | Stop reason: SDUPTHER

## 2018-02-08 RX ORDER — TRIAMCINOLONE ACETONIDE 1 MG/G
CREAM TOPICAL
Qty: 80 G | Refills: 5 | Status: SHIPPED | OUTPATIENT
Start: 2018-02-08 | End: 2019-02-08 | Stop reason: SDUPTHER

## 2018-02-08 RX ORDER — DIAPER,BRIEF,INFANT-TODD,DISP
EACH MISCELLANEOUS
Qty: 1 TUBE | Refills: 5 | Status: SHIPPED | OUTPATIENT
Start: 2018-02-08 | End: 2018-02-15

## 2018-02-08 ASSESSMENT — ENCOUNTER SYMPTOMS
VOMITING: 0
DIARRHEA: 0
FLATUS: 0
RESPIRATORY NEGATIVE: 1
EYES NEGATIVE: 1
HEMATOCHEZIA: 0
BELCHING: 1
CONSTIPATION: 0
NAUSEA: 0
ABDOMINAL PAIN: 1

## 2018-02-08 NOTE — PROGRESS NOTES
Topics    Smoking status: Former Smoker     Packs/day: 1.50     Years: 17.00     Types: Cigarettes     Quit date: 1/1/1974    Smokeless tobacco: Never Used    Alcohol use No    Drug use: No    Sexual activity: Not Currently     Other Topics Concern    Not on file     Social History Narrative    No narrative on file     Current Outpatient Prescriptions   Medication Sig Dispense Refill    clonazePAM (KLONOPIN) 2 MG tablet TAKE ONE TABLET BY MOUTH EVERY NIGHT AT BEDTIME 30 tablet 0    venlafaxine (EFFEXOR) 75 MG tablet TAKE ONE TABLET BY MOUTH TWICE A DAY 60 tablet 1    triamterene-hydrochlorothiazide (MAXZIDE-25) 37.5-25 MG per tablet TAKE ONE TABLET BY MOUTH DAILY 30 tablet 9    simvastatin (ZOCOR) 10 MG tablet TAKE ONE TABLET BY MOUTH ONCE NIGHTLY 30 tablet 3    amLODIPine (NORVASC) 10 MG tablet TAKE ONE TABLET BY MOUTH DAILY 30 tablet 2    levothyroxine (SYNTHROID) 75 MCG tablet TAKE ONE TABLET BY MOUTH DAILY 30 tablet 5    meloxicam (MOBIC) 15 MG tablet TAKE ONE TABLET BY MOUTH DAILY 30 tablet 5    Mirabegron ER 25 MG TB24 Take 25 mg by mouth 2 times daily      doxazosin (CARDURA) 8 MG tablet TAKE ONE TABLET BY MOUTH TWICE A DAY 60 tablet 9    albuterol sulfate (PROAIR RESPICLICK) 508 (90 BASE) MCG/ACT aerosol powder inhalation Inhale 2 puffs into the lungs every 6 hours as needed for Wheezing or Shortness of Breath 1 Inhaler 5    tetracycline (ACHROMYCIN;SUMYCIN) 250 MG capsule Take 1 capsule by mouth nightly 30 capsule 11    methocarbamol (ROBAXIN) 500 MG tablet Take 1 tablet by mouth 3 times daily as needed (spasm) 90 tablet 5    ranitidine (ZANTAC) 150 MG tablet TAKE ONE TABLET BY MOUTH TWICE A DAY 60 tablet 11    Vitamin D (CHOLECALCIFEROL) 1000 UNITS CAPS capsule Take 1,000 Units by mouth daily.  Calcium Carbonate-Vitamin D (CALCIUM + D) 600-200 MG-UNIT TABS Take 600 mg by mouth daily. No current facility-administered medications for this visit.            Objective: diarrhea and constipation. No respiratory problems. No increased sleepiness, drowsiness or confusion. The patient states that the medications and treatment have helped improve the quality of life and helped in psychosocial functioning. There are no indicators for possible drug abuse, addiction or diversion problems. Attestation: The Prescription Monitoring Report for this patient was reviewed today. Valerie Pichardo, CNP)  Documentation: Possible medication side effects, risk of tolerance/dependence & alternative treatments discussed., No signs of potential drug abuse or diversion identified. (Dangelo Peoples, TONO)    Hypothyroidism, unspecified type  Stable continue current regimen    Vitamin D deficiency  Discussed with patient that we make vitamin D from the sun and get it from some food sources, but it is very common to be deficient. Discussed the need for vitamin D replacement because low vitamin D can cause fatigue, joint aches and has been implicated in heart disease, bone disease like osteoporosis, and some other chronic illnesses. Patient will start/continue vitamin D supplement as per order. Patient has been instructed call the office immediately with new symptoms, change in symptoms or worsening of symptoms. If this is not feasible, patient is instructed to report to the emergency room. Medication profile reviewed. Medication side effects and possible impairments from medications were discussed as applicable. Allergies were reviewed. Health maintenance was reviewed and updated as appropriate.

## 2018-02-10 LAB — URINE CULTURE, ROUTINE: NORMAL

## 2018-03-03 DIAGNOSIS — G25.81 RESTLESS LEGS SYNDROME: ICD-10-CM

## 2018-03-05 RX ORDER — CLONAZEPAM 2 MG/1
TABLET ORAL
Qty: 30 TABLET | Refills: 0 | Status: SHIPPED | OUTPATIENT
Start: 2018-03-05 | End: 2018-04-02 | Stop reason: SDUPTHER

## 2018-04-02 DIAGNOSIS — G25.81 RESTLESS LEGS SYNDROME: ICD-10-CM

## 2018-04-04 RX ORDER — AMLODIPINE BESYLATE 10 MG/1
TABLET ORAL
Qty: 60 TABLET | Refills: 1 | Status: SHIPPED | OUTPATIENT
Start: 2018-04-04 | End: 2018-05-25 | Stop reason: SDUPTHER

## 2018-04-04 RX ORDER — CLONAZEPAM 2 MG/1
TABLET ORAL
Qty: 30 TABLET | Refills: 0 | Status: SHIPPED | OUTPATIENT
Start: 2018-04-04 | End: 2018-05-02 | Stop reason: SDUPTHER

## 2018-04-19 ENCOUNTER — TELEPHONE (OUTPATIENT)
Dept: PHARMACY | Facility: CLINIC | Age: 78
End: 2018-04-19

## 2018-05-02 DIAGNOSIS — E03.9 HYPOTHYROIDISM, UNSPECIFIED TYPE: ICD-10-CM

## 2018-05-02 RX ORDER — LEVOTHYROXINE SODIUM 0.07 MG/1
TABLET ORAL
Qty: 90 TABLET | Refills: 1 | Status: SHIPPED | OUTPATIENT
Start: 2018-05-02 | End: 2018-11-15 | Stop reason: SDUPTHER

## 2018-05-02 RX ORDER — MELOXICAM 15 MG/1
TABLET ORAL
Qty: 90 TABLET | Refills: 3 | Status: SHIPPED | OUTPATIENT
Start: 2018-05-02 | End: 2019-02-08

## 2018-05-09 ENCOUNTER — HOSPITAL ENCOUNTER (OUTPATIENT)
Dept: OTHER | Age: 78
Discharge: HOME | End: 2018-05-09
Attending: NURSE PRACTITIONER | Admitting: NURSE PRACTITIONER

## 2018-05-09 ENCOUNTER — OFFICE VISIT (OUTPATIENT)
Dept: FAMILY MEDICINE CLINIC | Age: 78
End: 2018-05-09

## 2018-05-09 VITALS
HEIGHT: 68 IN | WEIGHT: 253 LBS | BODY MASS INDEX: 38.34 KG/M2 | SYSTOLIC BLOOD PRESSURE: 130 MMHG | DIASTOLIC BLOOD PRESSURE: 72 MMHG

## 2018-05-09 DIAGNOSIS — M25.551 BILATERAL HIP PAIN: ICD-10-CM

## 2018-05-09 DIAGNOSIS — G25.81 RESTLESS LEGS SYNDROME: ICD-10-CM

## 2018-05-09 DIAGNOSIS — Z23 NEED FOR SHINGLES VACCINE: ICD-10-CM

## 2018-05-09 DIAGNOSIS — M25.552 BILATERAL HIP PAIN: ICD-10-CM

## 2018-05-09 DIAGNOSIS — I73.9 CLAUDICATION (HCC): Primary | ICD-10-CM

## 2018-05-09 DIAGNOSIS — M79.605 PAIN IN BOTH LOWER EXTREMITIES: ICD-10-CM

## 2018-05-09 DIAGNOSIS — M79.604 PAIN IN BOTH LOWER EXTREMITIES: ICD-10-CM

## 2018-05-09 PROCEDURE — 99213 OFFICE O/P EST LOW 20 MIN: CPT | Performed by: NURSE PRACTITIONER

## 2018-05-09 PROCEDURE — 36415 COLL VENOUS BLD VENIPUNCTURE: CPT | Performed by: NURSE PRACTITIONER

## 2018-05-09 RX ORDER — MAGNESIUM GLUCONATE 27 MG(500)
500 TABLET ORAL DAILY
COMMUNITY

## 2018-05-09 ASSESSMENT — ENCOUNTER SYMPTOMS
GASTROINTESTINAL NEGATIVE: 1
EYES NEGATIVE: 1
RESPIRATORY NEGATIVE: 1

## 2018-05-10 LAB
ANION GAP SERPL CALCULATED.3IONS-SCNC: 16 MMOL/L (ref 3–16)
BUN BLDV-MCNC: 20 MG/DL (ref 7–20)
CALCIUM SERPL-MCNC: 9.2 MG/DL (ref 8.3–10.6)
CHLORIDE BLD-SCNC: 102 MMOL/L (ref 99–110)
CO2: 26 MMOL/L (ref 21–32)
CREAT SERPL-MCNC: 1.1 MG/DL (ref 0.8–1.3)
GFR AFRICAN AMERICAN: >60
GFR NON-AFRICAN AMERICAN: >60
GLUCOSE BLD-MCNC: 116 MG/DL (ref 70–99)
MAGNESIUM: 2.4 MG/DL (ref 1.8–2.4)
POTASSIUM SERPL-SCNC: 3.4 MMOL/L (ref 3.5–5.1)
SODIUM BLD-SCNC: 144 MMOL/L (ref 136–145)

## 2018-05-16 ENCOUNTER — HOSPITAL ENCOUNTER (OUTPATIENT)
Dept: VASCULAR LAB | Age: 78
Discharge: HOME | End: 2018-05-16
Attending: NURSE PRACTITIONER | Admitting: NURSE PRACTITIONER

## 2018-05-16 DIAGNOSIS — M79.604 PAIN OF RIGHT LEG: ICD-10-CM

## 2018-05-25 RX ORDER — AMLODIPINE BESYLATE 10 MG/1
TABLET ORAL
Qty: 30 TABLET | Refills: 2 | Status: SHIPPED | OUTPATIENT
Start: 2018-05-25 | End: 2018-09-14 | Stop reason: SDUPTHER

## 2018-06-02 DIAGNOSIS — G25.81 RESTLESS LEGS SYNDROME: ICD-10-CM

## 2018-06-04 RX ORDER — VENLAFAXINE 75 MG/1
TABLET ORAL
Qty: 60 TABLET | Refills: 2 | Status: SHIPPED | OUTPATIENT
Start: 2018-06-04 | End: 2018-08-09 | Stop reason: SDUPTHER

## 2018-06-04 RX ORDER — CLONAZEPAM 2 MG/1
TABLET ORAL
Qty: 30 TABLET | Refills: 0 | Status: SHIPPED | OUTPATIENT
Start: 2018-06-04 | End: 2018-07-03 | Stop reason: SDUPTHER

## 2018-07-03 DIAGNOSIS — G25.81 RESTLESS LEGS SYNDROME: ICD-10-CM

## 2018-07-03 NOTE — TELEPHONE ENCOUNTER
Medication Agreement 4/29/15, 2/8/18  Oarrs 4/29/15, 7/29/15, 10/29/15, 1/13/16, 4/13/16, 7/13/16, 10/13/16, 1/13/17, 4/13/17, 7/13/17,2/8/18, 6/4/18  Urine Drug 4/29/15;2/2/16, 1/30/17,11/08/17    Last ov 5-9-18  Next ov 8-9-18

## 2018-07-05 RX ORDER — CLONAZEPAM 2 MG/1
TABLET ORAL
Qty: 30 TABLET | Refills: 0 | Status: SHIPPED | OUTPATIENT
Start: 2018-07-05 | End: 2018-08-02 | Stop reason: SDUPTHER

## 2018-07-23 ENCOUNTER — OFFICE VISIT (OUTPATIENT)
Dept: PULMONOLOGY | Age: 78
End: 2018-07-23

## 2018-07-23 VITALS
HEART RATE: 57 BPM | BODY MASS INDEX: 38.01 KG/M2 | OXYGEN SATURATION: 97 % | HEIGHT: 68 IN | DIASTOLIC BLOOD PRESSURE: 68 MMHG | RESPIRATION RATE: 21 BRPM | WEIGHT: 250.8 LBS | SYSTOLIC BLOOD PRESSURE: 122 MMHG

## 2018-07-23 DIAGNOSIS — G47.33 OBSTRUCTIVE SLEEP APNEA: ICD-10-CM

## 2018-07-23 DIAGNOSIS — J84.9 ILD (INTERSTITIAL LUNG DISEASE) (HCC): Primary | ICD-10-CM

## 2018-07-23 PROCEDURE — 99213 OFFICE O/P EST LOW 20 MIN: CPT | Performed by: INTERNAL MEDICINE

## 2018-07-23 ASSESSMENT — SLEEP AND FATIGUE QUESTIONNAIRES
ESS TOTAL SCORE: 13
HOW LIKELY ARE YOU TO NOD OFF OR FALL ASLEEP IN A CAR, WHILE STOPPED FOR A FEW MINUTES IN TRAFFIC: 0
HOW LIKELY ARE YOU TO NOD OFF OR FALL ASLEEP WHILE SITTING INACTIVE IN A PUBLIC PLACE: 1
NECK CIRCUMFERENCE (INCHES): 16
HOW LIKELY ARE YOU TO NOD OFF OR FALL ASLEEP WHILE WATCHING TV: 2
HOW LIKELY ARE YOU TO NOD OFF OR FALL ASLEEP WHILE SITTING AND READING: 2
HOW LIKELY ARE YOU TO NOD OFF OR FALL ASLEEP WHEN YOU ARE A PASSENGER IN A CAR FOR AN HOUR WITHOUT A BREAK: 2
HOW LIKELY ARE YOU TO NOD OFF OR FALL ASLEEP WHILE SITTING QUIETLY AFTER LUNCH WITHOUT ALCOHOL: 3
HOW LIKELY ARE YOU TO NOD OFF OR FALL ASLEEP WHILE LYING DOWN TO REST IN THE AFTERNOON WHEN CIRCUMSTANCES PERMIT: 3
HOW LIKELY ARE YOU TO NOD OFF OR FALL ASLEEP WHILE SITTING AND TALKING TO SOMEONE: 0

## 2018-07-23 NOTE — PROGRESS NOTES
CHIEF COMPLAINT: Dyspnea and cough    Consulting provider: Pako Lyle NP    HPI:   Presenting hx: The patient is a 70 yo man with hx of former tobacco abuse, GERD, COLLIN on CPAP who presents for evaluation of chronic dyspnea and cough. Patient complains of shortness of breath. Symptoms include drainage from nose, dyspnea on exertion, post nasal drip and productive cough. Symptoms began a few years ago, gradually worsening since that time. The dyspnea occurs with strenuous activity. Patient denies hemoptysis . Aggravating factors include exertion, exercise and climbing stairs. The patient reports an exercise tolerance of approximately several blocks on the flat and 2 flights of stairs, limited primarily by dyspnea. He is a former smoker for 15 years x 1 ppd, quit 1974. Denies any birds in home; hx of CTD. He had two cousins die from pulmonary fibrosis. Since last clinic visit, patient reports his dyspnea is stable. ESS today is 13 (up from 7 prior to changes in CPAP). However, he much prefers the lower pressures and feels his wife is sleeping better. AHI=4 on compliance report. There are no changes to past medical history, family history, social history or review of systems(except as noted in the history section) since prior note.       Past Medical History:   Diagnosis Date    Arthritis     Back injuries     BPH (benign prostatic hyperplasia)     CPAP (continuous positive airway pressure) dependence     Diverticulosis     colonoscopy 9/2015    Esophageal dysmotility     GERD (gastroesophageal reflux disease)     Hiatal hernia     History of colon polyps     seen on colonoscopy again 9/2015    Hyperlipidemia     Hypertension     ILD (interstitial lung disease) ([facility] [address] 7/30/2013    Internal hemorrhoid     colonoscopy 7/49/25    Lichen sclerosus of penis 2017    Dr Marissa Corey Mild cognitive impairment 08/2016    [facility]    COLLIN (obstructive sleep apnea)     Renal insufficiency     Restless legs syndrome     Rosacea     Schatzki's ring     last dilated 2002    Scrotal pruritus 2/8/2018    Sleep apnea        Past Surgical History:        Procedure Laterality Date    CIRCUMCISION      COLONOSCOPY  2010    COLONOSCOPY  9/14/2015    CORONARY ANGIOPLASTY      CYSTOSCOPY  5/13/16    CYSTOSCOPY  05/19/2017    CYSTOSCOPY     CYSTOSCOPY  07/14/2017    ENDOSCOPY, COLON, DIAGNOSTIC  10/11/2017    esoph. stricture    FRACTURE SURGERY      right leg    FRACTURE SURGERY      wrist   right    HERNIA REPAIR      TOE SURGERY      TURP  07/14/2017    UPPER GASTROINTESTINAL ENDOSCOPY  09/12/2016    UPPER GASTROINTESTINAL ENDOSCOPY  10/11/2017    esophageal stricture       Allergies:  is allergic to percocet [oxycodone-acetaminophen] and ace inhibitors. Social History:    TOBACCO:   reports that he quit smoking about 44 years ago. His smoking use included Cigarettes. He has a 25.50 pack-year smoking history. He has never used smokeless tobacco.  ETOH:   reports that he does not drink alcohol.   Patient currently lives independently  Environmental/chemical exposure: none       Family History:   Family History   Problem Relation Age of Onset    Stroke Father     Cancer Father         prostate    Diabetes Sister     Diabetes Brother     Asthma Neg Hx     Emphysema Neg Hx     Heart Failure Neg Hx     Hypertension Neg Hx        Current Medications:    Current Outpatient Prescriptions:     clonazePAM (KLONOPIN) 2 MG tablet, TAKE ONE TABLET BY MOUTH ONCE NIGHTLY AT BEDTIME, Disp: 30 tablet, Rfl: 0    venlafaxine (EFFEXOR) 75 MG tablet, TAKE ONE TABLET BY MOUTH TWICE A DAY, Disp: 60 tablet, Rfl: 2    amLODIPine (NORVASC) 10 MG tablet, TAKE ONE TABLET BY MOUTH DAILY, Disp: 30 tablet, Rfl: 2    magnesium gluconate (MAGONATE) 500 MG tablet, Take 500 mg by mouth 2 times daily, Disp: , Rfl:     levothyroxine (SYNTHROID) 75 MCG tablet, TAKE ONE TABLET BY MOUTH DAILY, Disp: 90 tablet, Rfl: 1   clear.  MP2  Neck: Trachea midline. Normal thyroid. Resp: No accessory muscle use. few bibasilar crackles. No wheezes. No rhonchi. No dullness on percussion. CV: Regular rate. Regular rhythm. No murmur or rub. Normal S1 and S2. No edema  GI: Abdomen non-tender. Non-distended. No masses. Skin: Warm and dry. No nodules on exposed extremities. No rash on exposed extremities. Lymph: No cervical LAD. No supraclavicular LAD. M/S: No cyanosis. No synovitis or joint deformity. No clubbing. Neuro: Cranial nerves are grossly intact. Moving all extremities. Motor and sensation grossly intact. Psych: Oriented x 3. No anxiety or agitation.        DATA:      LABS:    PFTs 2/12/13:  FVC 3.41 (84%) FEV1 2.75 (93%) FEV1/FVC ratio 80%  TLC 5.59 (84%) DLCO 18.51 (67%)   PFTs (7-1-2013):  FVC 3.36 (83%) FEV1 2.67 (90%) FEV1/FVC ratio 80% TLC 5.46 (82%) DLCO 18.75 (68%)  6 Minute Walk Test (7-1-2013)- 1400 feet, SpO2 low 94%  PFTs 12/2/13   FVC 3.39 (84%) FEV1 2.75 (93%) FEV1/FVC ratio  81%  TLC 5.78 (87%) DLCO 21.27 (77%)  PFTs 6/11/14  FVC 3.40 (85%) FEV1 2.67 (91%) FEV1/FVC ratio 79%   TLC 5.66 (85%) DLCO 21.31 (78%)  PFTs 6/18/15  FVC 3.33 (84%) FEV1 2.58 (89%) FEV1/FVC ratio 78%   TLC 5.82 (88%) DLCO 20.05 (74%)   6MWT 1120 feet, low sat 97%  PFTs 12/28/15  FVC 3.11 (79%) FEV1 2.46 (86%) FEV1/FVC ratio  79%  TLC 5.12 (78%) DLCO 15.69 (%)   6mwt 1120 feet, low sat 94%  PFTs 5/2/16  FVC 3.24 (82%) FEV1 2.68 (94%) FEV1/FVC ratio  83% TLC 5.51 (83%) DLCO 17.92 (66%)   PFTs 10/31/16  FVC 3.26 (83%) FEV1 2.67 (94%) FEV1/FVC ratio 82%  TLC 5.64 (86%) DLCO 17.64 (65%)   PFTs 5/8/17  FVC 3.76 (96%) FEV1 2.20 (78%) FEV1/FVC ratio  58%  TLC 5.67 (86%) DLCO 19.69 (73%)     CLAY, RF, SSA, SSB all wnl  7/13    IMAGING:    I personally reviewed and interpreted the following in the office:    HR Chest CT 7/24/13: mild bilateral basilar predominant subpleural reticulation without associated traction bronchiectasis or honeycomb

## 2018-08-02 DIAGNOSIS — G25.81 RESTLESS LEGS SYNDROME: ICD-10-CM

## 2018-08-06 NOTE — TELEPHONE ENCOUNTER
Controlled Substances Monitoring:     RX Monitoring 8/6/2018   Attestation The Prescription Monitoring Report for this patient was reviewed today. Documentation No signs of potential drug abuse or diversion identified. Medication Contracts Existing medication contract.        Last refill was on July 9, 2018 so that means that 30 days later would be August 8, 2018

## 2018-08-08 RX ORDER — CLONAZEPAM 2 MG/1
TABLET ORAL
Qty: 30 TABLET | Refills: 0 | Status: SHIPPED | OUTPATIENT
Start: 2018-08-08 | End: 2018-09-06 | Stop reason: SDUPTHER

## 2018-08-09 ENCOUNTER — OFFICE VISIT (OUTPATIENT)
Dept: FAMILY MEDICINE CLINIC | Age: 78
End: 2018-08-09

## 2018-08-09 VITALS
WEIGHT: 249 LBS | BODY MASS INDEX: 37.74 KG/M2 | SYSTOLIC BLOOD PRESSURE: 150 MMHG | HEART RATE: 65 BPM | DIASTOLIC BLOOD PRESSURE: 74 MMHG | OXYGEN SATURATION: 98 % | HEIGHT: 68 IN

## 2018-08-09 DIAGNOSIS — K21.9 GASTROESOPHAGEAL REFLUX DISEASE WITHOUT ESOPHAGITIS: ICD-10-CM

## 2018-08-09 DIAGNOSIS — E55.9 VITAMIN D DEFICIENCY: ICD-10-CM

## 2018-08-09 DIAGNOSIS — I10 ESSENTIAL HYPERTENSION: ICD-10-CM

## 2018-08-09 DIAGNOSIS — E78.5 HYPERLIPIDEMIA, UNSPECIFIED HYPERLIPIDEMIA TYPE: ICD-10-CM

## 2018-08-09 DIAGNOSIS — Z12.5 SCREENING FOR PROSTATE CANCER: ICD-10-CM

## 2018-08-09 DIAGNOSIS — F33.42 MAJOR DEPRESSIVE DISORDER, RECURRENT, IN FULL REMISSION (HCC): Primary | ICD-10-CM

## 2018-08-09 DIAGNOSIS — F41.8 DEPRESSION WITH ANXIETY: ICD-10-CM

## 2018-08-09 DIAGNOSIS — E03.9 HYPOTHYROIDISM, UNSPECIFIED TYPE: ICD-10-CM

## 2018-08-09 DIAGNOSIS — Z23 NEED FOR SHINGLES VACCINE: ICD-10-CM

## 2018-08-09 DIAGNOSIS — G25.81 RESTLESS LEGS SYNDROME (RLS): ICD-10-CM

## 2018-08-09 PROCEDURE — 3288F FALL RISK ASSESSMENT DOCD: CPT | Performed by: NURSE PRACTITIONER

## 2018-08-09 PROCEDURE — 99214 OFFICE O/P EST MOD 30 MIN: CPT | Performed by: NURSE PRACTITIONER

## 2018-08-09 RX ORDER — VENLAFAXINE 75 MG/1
TABLET ORAL
Qty: 60 TABLET | Refills: 5 | Status: SHIPPED | OUTPATIENT
Start: 2018-08-09 | End: 2018-10-09 | Stop reason: SDUPTHER

## 2018-08-09 ASSESSMENT — ENCOUNTER SYMPTOMS
GASTROINTESTINAL NEGATIVE: 1
RESPIRATORY NEGATIVE: 1
EYES NEGATIVE: 1

## 2018-08-09 NOTE — PROGRESS NOTES
Subjective:     Patient Name: Charly Dupree is a 69 y.o. male. Chief Complaint   Patient presents with    Other     restless leg syndrome 3 month medication contract     Hyperlipidemia     pt is not fasting for blood work    Hypertension     pt checks bp occasionally at home, pt takes medication as perscribed, pt had no chest pain , swelling but always has sob    Other     vit d       HPI  Hypertension:  Home blood pressure monitoring: No. Patient complains of chest pain that is interrmitent and ongoing x years, shortness of breath, dry cough and fatigue--- all seem to be at baseline but fatigue may be slightly worse. Antihypertensive medication side effects: no medication side effects noted. Use of agents associated with hypertension: none. History of present illness on initial date of evaluation:              Charly Dupree is a 77 y.o. patient with PMH of HTN, HLD, ILD (sees Dr. Vic Burk), DD, gr I, COLLIN on CPAP, fatigue and SOB is seen in hospital f/u and is S/P RHC/LHC with Dr. Meri Chi on 11/15/16. Mild CAD, normal LVEF 60%, normal RH pressures seen. He has a history of bradycardia and has been offered a consult with EP which he declines at this time. He states he has always had a slow HR. Sodium (mmol/L)   Date Value   05/09/2018 144    BUN (mg/dL)   Date Value   05/09/2018 20    Glucose (mg/dL)   Date Value   05/09/2018 116 (H)      Potassium (mmol/L)   Date Value   05/09/2018 3.4 (L)    CREATININE (mg/dL)   Date Value   05/09/2018 1.1         Hyperlipidemia:  No new myalgias or GI upset on simvastatin (Zocor). Lab Results   Component Value Date    CHOL 182 08/03/2017    TRIG 175 (H) 08/03/2017    HDL 45 08/03/2017    LDLCALC 102 (H) 08/03/2017     Lab Results   Component Value Date    ALT 36 08/03/2017    AST 31 08/03/2017        Hypothyroidism: Recent symptoms: fatigue. He denies none.  Patient is  taking his medication consistently on an empty stomach. Lab Results   Component Value Date    TSHREFLEX 4.03 01/10/2017    TSHREFLEX 4.05 03/23/2016    TSHREFLEX 4.14 12/15/2015     Lab Results   Component Value Date    TSH 3.16 08/03/2017    TSH 3.11 07/13/2016    TSH 4.92 (H) 10/29/2015     Vitamin D Deficiency  Patient with vitamin d deficiency and currently on supplement without adverse reactions. Restless Leg Syndrome  Patient has been complaining of cramps and spasms in the lower extremities, especially at rest and at night.  He reports waking up several times a night due to pain and cramping in the lower extremities that is relieves with walking.  He denies any complaint during activities.  These symptoms have been going on for years and is not improving.  He has also tried Mirapex and Requip  without relief. Patient currently on Klonopin for his restless leg and has been for several years. He has tried and failed other medications such as Requip Mirapex and gabapentin. Patient does feel the use of this medication is helpful and that the benefits outweigh the risk of the long-term use of Klonopin. ANGEL LUIS Doll is here for follow up of heartburn. The Patient has current symptoms of heartburn that include:   no other symptoms. Patient currently denies abdominal bloating, belching, belching and eructation, choking on food, deep pressure at base of neck, difficulty swallowing, heartburn, hematemesis, hoarseness, laryngitis, melena, nausea and shortness of breath. Symptoms have been present for awhile and is a chronic condition. Patient denies dysphagia. Patient has not lost weight. Patient denies melena, hematochezia, hematemesis, and coffee ground emesis. Medical therapy currently includes proton pump inhibitors and is  well controlled on current therapy. Mood Disorder:  Patient presents for follow-up of depression and anxiety disorder. Current complaints include: none. He denies any other symptoms. Symptoms/signs of vipul: none.   External right    HERNIA REPAIR      TOE SURGERY      TURP  07/14/2017    UPPER GASTROINTESTINAL ENDOSCOPY  09/12/2016    UPPER GASTROINTESTINAL ENDOSCOPY  10/11/2017    esophageal stricture     Social History     Social History    Marital status:      Spouse name: N/A    Number of children: N/A    Years of education: N/A     Occupational History    Not on file.      Social History Main Topics    Smoking status: Former Smoker     Packs/day: 1.50     Years: 17.00     Types: Cigarettes     Quit date: 1/1/1974    Smokeless tobacco: Never Used    Alcohol use No    Drug use: No    Sexual activity: Not Currently     Other Topics Concern    Not on file     Social History Narrative    No narrative on file     Current Outpatient Prescriptions   Medication Sig Dispense Refill    clonazePAM (KLONOPIN) 2 MG tablet TAKE ONE TABLET BY MOUTH EVERY NIGHT AT BEDTIME 30 tablet 0    venlafaxine (EFFEXOR) 75 MG tablet TAKE ONE TABLET BY MOUTH TWICE A DAY 60 tablet 2    amLODIPine (NORVASC) 10 MG tablet TAKE ONE TABLET BY MOUTH DAILY 30 tablet 2    magnesium gluconate (MAGONATE) 500 MG tablet Take 500 mg by mouth 2 times daily      levothyroxine (SYNTHROID) 75 MCG tablet TAKE ONE TABLET BY MOUTH DAILY 90 tablet 1    meloxicam (MOBIC) 15 MG tablet TAKE ONE TABLET BY MOUTH DAILY 90 tablet 3    omeprazole (PRILOSEC) 40 MG delayed release capsule Take 40 mg by mouth      triamcinolone (KENALOG) 0.1 % cream Apply topically every 7 days 80 g 5    triamterene-hydrochlorothiazide (MAXZIDE-25) 37.5-25 MG per tablet TAKE ONE TABLET BY MOUTH DAILY 30 tablet 9    simvastatin (ZOCOR) 10 MG tablet TAKE ONE TABLET BY MOUTH ONCE NIGHTLY 30 tablet 3    doxazosin (CARDURA) 8 MG tablet TAKE ONE TABLET BY MOUTH TWICE A DAY 60 tablet 9    albuterol sulfate (PROAIR RESPICLICK) 271 (90 BASE) MCG/ACT aerosol powder inhalation Inhale 2 puffs into the lungs every 6 hours as needed for Wheezing or Shortness of Breath 1 Inhaler 5    methocarbamol (ROBAXIN) 500 MG tablet Take 1 tablet by mouth 3 times daily as needed (spasm) 90 tablet 5    ranitidine (ZANTAC) 150 MG tablet TAKE ONE TABLET BY MOUTH TWICE A DAY (Patient taking differently: TAKE ONE TABLET BY MOUTH TWICE A DAY as needed) 60 tablet 11    Vitamin D (CHOLECALCIFEROL) 1000 UNITS CAPS capsule Take 1,000 Units by mouth daily.  Calcium Carbonate-Vitamin D (CALCIUM + D) 600-200 MG-UNIT TABS Take 600 mg by mouth daily. No current facility-administered medications for this visit. No changes in past medical history, past surgical history, social history, or family history were noted during the patient encounter unless specifically listed above. All updates of past medical history, past surgical history, social history, or family history were reviewed personally by me during the office visit. All problems listed in the assessment are stable unless noted otherwise. Medication profile reviewed personally by me during the office visit. Medication side effects and possible impairments from medications were discussed as applicable. Objective:       Physical Exam   Constitutional: He is oriented to person, place, and time. He appears well-developed and well-nourished. No distress. HENT:   Head: Normocephalic and atraumatic. Right Ear: Tympanic membrane, external ear and ear canal normal.   Left Ear: Tympanic membrane, external ear and ear canal normal.   Nose: Nose normal.   Mouth/Throat: Oropharynx is clear and moist and mucous membranes are normal.   Eyes: Conjunctivae, EOM and lids are normal. Pupils are equal, round, and reactive to light. Neck: Normal range of motion. Neck supple. No JVD present. Carotid bruit is not present. No thyromegaly present. Cardiovascular: Normal rate, regular rhythm, normal heart sounds and normal pulses. Exam reveals no gallop and no friction rub. No murmur heard.   Pulses:       Radial pulses are 2+ on the right side, and 2+ ability to function and improve sleep and mood symptoms.        Patient given following instructions - You are on medications which could impair your senses, you are at risk of weakness, falls, dizziness, or drowsiness. You should be careful during activities which could place you at risk of harm, such as climbing, using stairs, operating machinery, or driving vehicles. If you feel you cannot safely do these activities, you should request others to help you, or avoid the activities altogether. If you are drowsy for any other reason, you should use the same precautions as listed above.     The patient is made aware of the potential of drowsiness with the prescribed medications, and that care should be exercised in operating machinery, vehicles, or placing oneself in situations of risk to their health, ie heights and climbing and stairs.     The patient denies nausea, vomiting, diarrhea and constipation. No respiratory problems. No increased sleepiness, drowsiness or confusion. The patient states that the medications and treatment have helped improve the quality of life and helped in psychosocial functioning. There are no indicators for possible drug abuse, addiction or diversion problems. Attestation: The Prescription Monitoring Report for this patient was reviewed today. (Breann Peoples, APRN - CNP)  Documentation: No signs of potential drug abuse or diversion identified. , Possible medication side effects, risk of tolerance/dependence & alternative treatments discussed. (Breann Peoples, APRN - CNP)    Depression with anxiety  -     venlafaxine (EFFEXOR) 75 MG tablet; TAKE ONE TABLET BY MOUTH TWICE A DAY  Educated if patient develops SI/HI/vipul to call 911 or go to ER. Discussed use, benefit, risks and side effects of prescribed medications. Barriers to compliance discussed. All patient questions answered. Pt voiced understanding.     Gastroesophageal reflux disease without esophagitis  Patient doing well on PPI. Follow-up with GI as recommended. Vitamin D deficiency  -     Vitamin D 25 Hydroxy; Future  Discussed with patient that we make vitamin D from the sun and get it from some food sources, but it is very common to be deficient. Discussed the need for vitamin D replacement because low vitamin D can cause fatigue, joint aches and has been implicated in heart disease, bone disease like osteoporosis, and some other chronic illnesses. Patient will start/continue vitamin D supplement as per order. Need for shingles vaccine  -     zoster recombinant adjuvanted vaccine (SHINGRIX) 50 MCG SUSR injection; Inject 0.5 mLs into the muscle once for 1 dose One now and one in 2-6  month    Screening for prostate cancer  -     Psa screening; Future    Other orders  -     Cancel: TSH without Reflex; Future          Patient has been instructed call the office immediately with new symptoms, change in symptoms or worsening of symptoms. If this is not feasible, patient is instructed to report to the emergency room. Medication profile reviewed. Medication side effects and possible impairments from medications were discussed as applicable. Allergies were reviewed. Health maintenance was reviewed and updated as appropriate.

## 2018-08-15 ENCOUNTER — NURSE ONLY (OUTPATIENT)
Dept: FAMILY MEDICINE CLINIC | Age: 78
End: 2018-08-15

## 2018-08-15 DIAGNOSIS — Z12.5 SCREENING FOR PROSTATE CANCER: ICD-10-CM

## 2018-08-15 DIAGNOSIS — I10 ESSENTIAL HYPERTENSION: ICD-10-CM

## 2018-08-15 DIAGNOSIS — E03.9 HYPOTHYROIDISM, UNSPECIFIED TYPE: ICD-10-CM

## 2018-08-15 DIAGNOSIS — E55.9 VITAMIN D DEFICIENCY: ICD-10-CM

## 2018-08-15 DIAGNOSIS — E78.5 HYPERLIPIDEMIA, UNSPECIFIED HYPERLIPIDEMIA TYPE: ICD-10-CM

## 2018-08-15 LAB
A/G RATIO: 1.6 (ref 1.1–2.2)
ALBUMIN SERPL-MCNC: 4.4 G/DL (ref 3.4–5)
ALP BLD-CCNC: 56 U/L (ref 40–129)
ALT SERPL-CCNC: 30 U/L (ref 10–40)
ANION GAP SERPL CALCULATED.3IONS-SCNC: 15 MMOL/L (ref 3–16)
AST SERPL-CCNC: 25 U/L (ref 15–37)
BASOPHILS ABSOLUTE: 0.1 K/UL (ref 0–0.2)
BASOPHILS RELATIVE PERCENT: 1 %
BILIRUB SERPL-MCNC: 0.6 MG/DL (ref 0–1)
BUN BLDV-MCNC: 25 MG/DL (ref 7–20)
CALCIUM SERPL-MCNC: 9.5 MG/DL (ref 8.3–10.6)
CHLORIDE BLD-SCNC: 102 MMOL/L (ref 99–110)
CHOLESTEROL, TOTAL: 156 MG/DL (ref 0–199)
CO2: 26 MMOL/L (ref 21–32)
CREAT SERPL-MCNC: 1.3 MG/DL (ref 0.8–1.3)
EOSINOPHILS ABSOLUTE: 0.5 K/UL (ref 0–0.6)
EOSINOPHILS RELATIVE PERCENT: 7.7 %
GFR AFRICAN AMERICAN: >60
GFR NON-AFRICAN AMERICAN: 54
GLOBULIN: 2.7 G/DL
GLUCOSE BLD-MCNC: 113 MG/DL (ref 70–99)
HCT VFR BLD CALC: 42.1 % (ref 40.5–52.5)
HDLC SERPL-MCNC: 38 MG/DL (ref 40–60)
HEMOGLOBIN: 14.7 G/DL (ref 13.5–17.5)
LDL CHOLESTEROL CALCULATED: 96 MG/DL
LYMPHOCYTES ABSOLUTE: 1.7 K/UL (ref 1–5.1)
LYMPHOCYTES RELATIVE PERCENT: 24.7 %
MCH RBC QN AUTO: 32.1 PG (ref 26–34)
MCHC RBC AUTO-ENTMCNC: 34.8 G/DL (ref 31–36)
MCV RBC AUTO: 92.1 FL (ref 80–100)
MONOCYTES ABSOLUTE: 0.5 K/UL (ref 0–1.3)
MONOCYTES RELATIVE PERCENT: 7.3 %
NEUTROPHILS ABSOLUTE: 4 K/UL (ref 1.7–7.7)
NEUTROPHILS RELATIVE PERCENT: 59.3 %
PDW BLD-RTO: 12.9 % (ref 12.4–15.4)
PLATELET # BLD: 197 K/UL (ref 135–450)
PMV BLD AUTO: 7.7 FL (ref 5–10.5)
POTASSIUM SERPL-SCNC: 3.5 MMOL/L (ref 3.5–5.1)
PROSTATE SPECIFIC ANTIGEN: 3.06 NG/ML (ref 0–4)
RBC # BLD: 4.57 M/UL (ref 4.2–5.9)
SODIUM BLD-SCNC: 143 MMOL/L (ref 136–145)
TOTAL PROTEIN: 7.1 G/DL (ref 6.4–8.2)
TRIGL SERPL-MCNC: 110 MG/DL (ref 0–150)
TSH SERPL DL<=0.05 MIU/L-ACNC: 2.17 UIU/ML (ref 0.27–4.2)
VITAMIN D 25-HYDROXY: 62 NG/ML
VLDLC SERPL CALC-MCNC: 22 MG/DL
WBC # BLD: 6.8 K/UL (ref 4–11)

## 2018-08-15 PROCEDURE — 36415 COLL VENOUS BLD VENIPUNCTURE: CPT | Performed by: NURSE PRACTITIONER

## 2018-08-29 RX ORDER — DOXAZOSIN 8 MG/1
TABLET ORAL
Qty: 60 TABLET | Refills: 8 | Status: SHIPPED | OUTPATIENT
Start: 2018-08-29 | End: 2019-02-08 | Stop reason: SDUPTHER

## 2018-08-31 DIAGNOSIS — E78.5 HYPERLIPIDEMIA: ICD-10-CM

## 2018-08-31 RX ORDER — SIMVASTATIN 10 MG
TABLET ORAL
Qty: 90 TABLET | Refills: 2 | Status: SHIPPED | OUTPATIENT
Start: 2018-08-31 | End: 2019-02-08 | Stop reason: SDUPTHER

## 2018-09-04 ENCOUNTER — OFFICE VISIT (OUTPATIENT)
Dept: CARDIOLOGY CLINIC | Age: 78
End: 2018-09-04

## 2018-09-04 VITALS
OXYGEN SATURATION: 96 % | SYSTOLIC BLOOD PRESSURE: 130 MMHG | WEIGHT: 250 LBS | HEART RATE: 57 BPM | BODY MASS INDEX: 37.89 KG/M2 | HEIGHT: 68 IN | DIASTOLIC BLOOD PRESSURE: 74 MMHG

## 2018-09-04 DIAGNOSIS — I25.9 CHRONIC ISCHEMIC HEART DISEASE: Primary | ICD-10-CM

## 2018-09-04 DIAGNOSIS — I20.8 ANGINAL EQUIVALENT (HCC): ICD-10-CM

## 2018-09-04 DIAGNOSIS — I25.10 CORONARY ARTERY DISEASE INVOLVING NATIVE CORONARY ARTERY OF NATIVE HEART WITHOUT ANGINA PECTORIS: ICD-10-CM

## 2018-09-04 DIAGNOSIS — I10 ESSENTIAL HYPERTENSION: ICD-10-CM

## 2018-09-04 DIAGNOSIS — E78.5 HYPERLIPIDEMIA, UNSPECIFIED HYPERLIPIDEMIA TYPE: ICD-10-CM

## 2018-09-04 DIAGNOSIS — R07.89 OTHER CHEST PAIN: ICD-10-CM

## 2018-09-04 DIAGNOSIS — R06.02 SHORTNESS OF BREATH: ICD-10-CM

## 2018-09-04 PROCEDURE — 99215 OFFICE O/P EST HI 40 MIN: CPT | Performed by: INTERNAL MEDICINE

## 2018-09-04 RX ORDER — ASPIRIN 81 MG/1
81 TABLET ORAL DAILY
Qty: 30 TABLET | Refills: 3
Start: 2018-09-04 | End: 2022-05-20 | Stop reason: ALTCHOICE

## 2018-09-04 NOTE — PROGRESS NOTES
legs syndrome (RLS)    Obstructive sleep apnea    Depression    Acute bronchitis    Shortness of breath    Anxiety    COLLIN treated with BiPAP    Chronic diastolic congestive heart failure (HCC)    Anginal equivalent (HCC)    Hypothyroidism    Rosacea    Vitamin D deficiency    Lichen sclerosus of penis    Scrotal pruritus    GERD (gastroesophageal reflux disease)    Coronary artery disease involving native coronary artery of native heart without angina pectoris    Other chest pain       Past Medical History:   has a past medical history of Arthritis; Back injuries; BPH (benign prostatic hyperplasia); CPAP (continuous positive airway pressure) dependence; Diverticulosis; Esophageal dysmotility; GERD (gastroesophageal reflux disease); Hiatal hernia; History of colon polyps; Hyperlipidemia; Hypertension; ILD (interstitial lung disease) ([facility] [address] Internal hemorrhoid; Lichen sclerosus of penis; Mild cognitive impairment; COLLIN (obstructive sleep apnea); Renal insufficiency; Restless legs syndrome; Rosacea; Schatzki's ring; Scrotal pruritus; and Sleep apnea. Surgical History:   has a past surgical history that includes Circumcision; fracture surgery; fracture surgery; Toe Surgery; hernia repair; Colonoscopy (2010); Colonoscopy (9/14/2015); Cystocopy (5/13/16); Upper gastrointestinal endoscopy (09/12/2016); Coronary angioplasty; Cystoscopy (05/19/2017); TURP (07/14/2017); Cystoscopy (07/14/2017); Endoscopy, colon, diagnostic (10/11/2017); and Upper gastrointestinal endoscopy (10/11/2017). Social History:   reports that he quit smoking about 44 years ago. His smoking use included Cigarettes. He has a 25.50 pack-year smoking history. He has never used smokeless tobacco. He reports that he does not drink alcohol or use drugs. Family History:  No evidence for sudden cardiac death or premature CAD    Medications:  Reviewed and are listed in nursing record.  and/or listed below  Outpatient Medications:  Current Outpatient Prescriptions on File Prior to Visit   Medication Sig Dispense Refill    simvastatin (ZOCOR) 10 MG tablet TAKE ONE TABLET BY MOUTH ONCE NIGHTLY 90 tablet 2    doxazosin (CARDURA) 8 MG tablet TAKE ONE TABLET BY MOUTH TWICE A DAY 60 tablet 8    venlafaxine (EFFEXOR) 75 MG tablet TAKE ONE TABLET BY MOUTH TWICE A DAY 60 tablet 5    clonazePAM (KLONOPIN) 2 MG tablet TAKE ONE TABLET BY MOUTH EVERY NIGHT AT BEDTIME 30 tablet 0    amLODIPine (NORVASC) 10 MG tablet TAKE ONE TABLET BY MOUTH DAILY 30 tablet 2    magnesium gluconate (MAGONATE) 500 MG tablet Take 500 mg by mouth 2 times daily      levothyroxine (SYNTHROID) 75 MCG tablet TAKE ONE TABLET BY MOUTH DAILY 90 tablet 1    meloxicam (MOBIC) 15 MG tablet TAKE ONE TABLET BY MOUTH DAILY 90 tablet 3    omeprazole (PRILOSEC) 40 MG delayed release capsule Take 40 mg by mouth      triamcinolone (KENALOG) 0.1 % cream Apply topically every 7 days 80 g 5    triamterene-hydrochlorothiazide (MAXZIDE-25) 37.5-25 MG per tablet TAKE ONE TABLET BY MOUTH DAILY 30 tablet 9    albuterol sulfate (PROAIR RESPICLICK) 523 (90 BASE) MCG/ACT aerosol powder inhalation Inhale 2 puffs into the lungs every 6 hours as needed for Wheezing or Shortness of Breath 1 Inhaler 5    methocarbamol (ROBAXIN) 500 MG tablet Take 1 tablet by mouth 3 times daily as needed (spasm) 90 tablet 5    ranitidine (ZANTAC) 150 MG tablet TAKE ONE TABLET BY MOUTH TWICE A DAY (Patient taking differently: TAKE ONE TABLET BY MOUTH TWICE A DAY as needed) 60 tablet 11    Vitamin D (CHOLECALCIFEROL) 1000 UNITS CAPS capsule Take 1,000 Units by mouth daily.  Calcium Carbonate-Vitamin D (CALCIUM + D) 600-200 MG-UNIT TABS Take 600 mg by mouth daily. No current facility-administered medications on file prior to visit. In-patient schedule medications:      Infusion Medications:         Allergies:  Percocet [oxycodone-acetaminophen] and Ace inhibitors     Review of

## 2018-09-05 ENCOUNTER — TELEPHONE (OUTPATIENT)
Dept: CARDIOLOGY CLINIC | Age: 78
End: 2018-09-05

## 2018-09-06 ENCOUNTER — TELEPHONE (OUTPATIENT)
Dept: CARDIOLOGY CLINIC | Age: 78
End: 2018-09-06

## 2018-09-06 DIAGNOSIS — G25.81 RESTLESS LEGS SYNDROME: ICD-10-CM

## 2018-09-06 RX ORDER — CLONAZEPAM 2 MG/1
TABLET ORAL
Qty: 30 TABLET | Refills: 0 | OUTPATIENT
Start: 2018-09-06

## 2018-09-06 RX ORDER — CLONAZEPAM 2 MG/1
2 TABLET ORAL NIGHTLY
Qty: 30 TABLET | Refills: 0 | Status: SHIPPED | OUTPATIENT
Start: 2018-09-07 | End: 2018-10-09 | Stop reason: SDUPTHER

## 2018-09-06 NOTE — TELEPHONE ENCOUNTER
Patient is scheduled with Dr. Vale Forde for Right and Left Heart Cath on 9/10/18 at [facility], arrival time of 7:30am to the Cath Lab. Please have patient arrive to the main entrance of [facility] at 7:15am and check in with the registration desk. Confirmed with patient's wife.

## 2018-09-10 ENCOUNTER — HOSPITAL ENCOUNTER (OUTPATIENT)
Dept: CARDIAC CATH/INVASIVE PROCEDURES | Age: 78
Discharge: HOME | End: 2018-09-10
Attending: INTERNAL MEDICINE
Payer: MEDICARE

## 2018-09-10 VITALS — HEIGHT: 68 IN | BODY MASS INDEX: 37.89 KG/M2 | WEIGHT: 250 LBS

## 2018-09-10 PROBLEM — I25.110 CORONARY ARTERY DISEASE INVOLVING NATIVE CORONARY ARTERY OF NATIVE HEART WITH UNSTABLE ANGINA PECTORIS (HCC): Status: ACTIVE | Noted: 2018-09-04

## 2018-09-10 LAB
A/G RATIO: 1.6 (ref 1.1–2.2)
ALBUMIN SERPL-MCNC: 4.4 G/DL (ref 3.4–5)
ALP BLD-CCNC: 76 U/L (ref 40–129)
ALT SERPL-CCNC: 30 U/L (ref 10–40)
ANION GAP SERPL CALCULATED.3IONS-SCNC: 13 MMOL/L (ref 3–16)
AST SERPL-CCNC: 22 U/L (ref 15–37)
BILIRUB SERPL-MCNC: 0.4 MG/DL (ref 0–1)
BUN BLDV-MCNC: 20 MG/DL (ref 7–20)
CALCIUM SERPL-MCNC: 9.2 MG/DL (ref 8.3–10.6)
CHLORIDE BLD-SCNC: 103 MMOL/L (ref 99–110)
CHOLESTEROL, TOTAL: 153 MG/DL (ref 0–199)
CO2: 27 MMOL/L (ref 21–32)
CREAT SERPL-MCNC: 1.1 MG/DL (ref 0.8–1.3)
GFR AFRICAN AMERICAN: >60
GFR NON-AFRICAN AMERICAN: >60
GLOBULIN: 2.8 G/DL
GLUCOSE BLD-MCNC: 121 MG/DL (ref 70–99)
HCT VFR BLD CALC: 42.5 % (ref 40.5–52.5)
HDLC SERPL-MCNC: 37 MG/DL (ref 40–60)
HEMOGLOBIN: 15.2 G/DL (ref 13.5–17.5)
INR BLD: 1.1 (ref 0.86–1.14)
LDL CHOLESTEROL CALCULATED: 72 MG/DL
MCH RBC QN AUTO: 32.5 PG (ref 26–34)
MCHC RBC AUTO-ENTMCNC: 35.8 G/DL (ref 31–36)
MCV RBC AUTO: 90.7 FL (ref 80–100)
PDW BLD-RTO: 13.4 % (ref 12.4–15.4)
PLATELET # BLD: 201 K/UL (ref 135–450)
PMV BLD AUTO: 7.1 FL (ref 5–10.5)
POC ACT LR: 161 SEC
POTASSIUM SERPL-SCNC: 3.4 MMOL/L (ref 3.5–5.1)
PROTHROMBIN TIME: 12.5 SEC (ref 9.8–13)
RBC # BLD: 4.68 M/UL (ref 4.2–5.9)
SODIUM BLD-SCNC: 143 MMOL/L (ref 136–145)
TOTAL PROTEIN: 7.2 G/DL (ref 6.4–8.2)
TRIGL SERPL-MCNC: 219 MG/DL (ref 0–150)
VLDLC SERPL CALC-MCNC: 44 MG/DL
WBC # BLD: 7.3 K/UL (ref 4–11)

## 2018-09-10 PROCEDURE — 80053 COMPREHEN METABOLIC PANEL: CPT

## 2018-09-10 PROCEDURE — 93005 ELECTROCARDIOGRAM TRACING: CPT | Performed by: INTERNAL MEDICINE

## 2018-09-10 PROCEDURE — 99152 MOD SED SAME PHYS/QHP 5/>YRS: CPT

## 2018-09-10 PROCEDURE — 6360000004 HC RX CONTRAST MEDICATION: Performed by: INTERNAL MEDICINE

## 2018-09-10 PROCEDURE — 99153 MOD SED SAME PHYS/QHP EA: CPT

## 2018-09-10 PROCEDURE — 6370000000 HC RX 637 (ALT 250 FOR IP)

## 2018-09-10 PROCEDURE — C1894 INTRO/SHEATH, NON-LASER: HCPCS

## 2018-09-10 PROCEDURE — 6360000002 HC RX W HCPCS

## 2018-09-10 PROCEDURE — 99152 MOD SED SAME PHYS/QHP 5/>YRS: CPT | Performed by: INTERNAL MEDICINE

## 2018-09-10 PROCEDURE — 85027 COMPLETE CBC AUTOMATED: CPT

## 2018-09-10 PROCEDURE — 85347 COAGULATION TIME ACTIVATED: CPT

## 2018-09-10 PROCEDURE — 93571 IV DOP VEL&/PRESS C FLO 1ST: CPT | Performed by: INTERNAL MEDICINE

## 2018-09-10 PROCEDURE — 93571 IV DOP VEL&/PRESS C FLO 1ST: CPT

## 2018-09-10 PROCEDURE — 2720000010 HC SURG SUPPLY STERILE

## 2018-09-10 PROCEDURE — 2580000003 HC RX 258

## 2018-09-10 PROCEDURE — 80061 LIPID PANEL: CPT

## 2018-09-10 PROCEDURE — C1769 GUIDE WIRE: HCPCS

## 2018-09-10 PROCEDURE — 85610 PROTHROMBIN TIME: CPT

## 2018-09-10 PROCEDURE — 93460 R&L HRT ART/VENTRICLE ANGIO: CPT

## 2018-09-10 PROCEDURE — C1887 CATHETER, GUIDING: HCPCS

## 2018-09-10 PROCEDURE — 2709999900 HC NON-CHARGEABLE SUPPLY

## 2018-09-10 PROCEDURE — 93460 R&L HRT ART/VENTRICLE ANGIO: CPT | Performed by: INTERNAL MEDICINE

## 2018-09-10 PROCEDURE — 2500000003 HC RX 250 WO HCPCS

## 2018-09-10 RX ORDER — SODIUM CHLORIDE 0.9 % (FLUSH) 0.9 %
10 SYRINGE (ML) INJECTION PRN
Status: DISCONTINUED | OUTPATIENT
Start: 2018-09-10 | End: 2018-09-11 | Stop reason: HOSPADM

## 2018-09-10 RX ORDER — SODIUM CHLORIDE 9 MG/ML
INJECTION, SOLUTION INTRAVENOUS CONTINUOUS
Status: DISCONTINUED | OUTPATIENT
Start: 2018-09-10 | End: 2018-09-11 | Stop reason: HOSPADM

## 2018-09-10 RX ORDER — ASPIRIN 325 MG
325 TABLET ORAL ONCE
Status: COMPLETED | OUTPATIENT
Start: 2018-09-10 | End: 2018-09-10

## 2018-09-10 RX ORDER — ONDANSETRON 2 MG/ML
4 INJECTION INTRAMUSCULAR; INTRAVENOUS EVERY 6 HOURS PRN
Status: DISCONTINUED | OUTPATIENT
Start: 2018-09-10 | End: 2018-09-11 | Stop reason: HOSPADM

## 2018-09-10 RX ORDER — ACETAMINOPHEN 325 MG/1
650 TABLET ORAL EVERY 4 HOURS PRN
Status: DISCONTINUED | OUTPATIENT
Start: 2018-09-10 | End: 2018-09-11 | Stop reason: HOSPADM

## 2018-09-10 RX ORDER — SODIUM CHLORIDE 9 MG/ML
INJECTION, SOLUTION INTRAVENOUS ONCE
Status: COMPLETED | OUTPATIENT
Start: 2018-09-10 | End: 2018-09-10

## 2018-09-10 RX ORDER — SODIUM CHLORIDE 0.9 % (FLUSH) 0.9 %
10 SYRINGE (ML) INJECTION EVERY 12 HOURS SCHEDULED
Status: DISCONTINUED | OUTPATIENT
Start: 2018-09-10 | End: 2018-09-11 | Stop reason: HOSPADM

## 2018-09-10 RX ADMIN — IOVERSOL 75 ML: 741 INJECTION INTRA-ARTERIAL; INTRAVENOUS at 10:16

## 2018-09-10 RX ADMIN — SODIUM CHLORIDE: 9 INJECTION, SOLUTION INTRAVENOUS at 08:03

## 2018-09-10 RX ADMIN — Medication 325 MG: at 08:03

## 2018-09-10 NOTE — H&P
Essential hypertension    Restless legs syndrome (RLS)    Obstructive sleep apnea    Depression    Acute bronchitis    Shortness of breath    Anxiety    COLLIN treated with BiPAP    Chronic diastolic congestive heart failure (HCC)    Anginal equivalent (HCC)    Hypothyroidism    Rosacea    Vitamin D deficiency    Lichen sclerosus of penis    Scrotal pruritus    GERD (gastroesophageal reflux disease)    Coronary artery disease involving native coronary artery of native heart without angina pectoris    Other chest pain         Past Medical History:   has a past medical history of Arthritis; Back injuries; BPH (benign prostatic hyperplasia); CPAP (continuous positive airway pressure) dependence; Diverticulosis; Esophageal dysmotility; GERD (gastroesophageal reflux disease); Hiatal hernia; History of colon polyps; Hyperlipidemia; Hypertension; ILD (interstitial lung disease) ([facility] [address] Internal hemorrhoid; Lichen sclerosus of penis; Mild cognitive impairment; COLLIN (obstructive sleep apnea); Renal insufficiency; Restless legs syndrome; Rosacea; Schatzki's ring; Scrotal pruritus; and Sleep apnea.     Surgical History:   has a past surgical history that includes Circumcision; fracture surgery; fracture surgery; Toe Surgery; hernia repair; Colonoscopy (2010); Colonoscopy (9/14/2015); Cystocopy (5/13/16); Upper gastrointestinal endoscopy (09/12/2016); Coronary angioplasty; Cystoscopy (05/19/2017); TURP (07/14/2017); Cystoscopy (07/14/2017); Endoscopy, colon, diagnostic (10/11/2017); and Upper gastrointestinal endoscopy (10/11/2017).    Social History:   reports that he quit smoking about 44 years ago. His smoking use included Cigarettes. He has a 25.50 pack-year smoking history. He has never used smokeless tobacco. He reports that he does not drink alcohol or use drugs.      Family History:  No evidence for sudden cardiac death or premature CAD     Medications:  Reviewed and are listed in nursing record.  and/or

## 2018-09-10 NOTE — BRIEF OP NOTE
Procedures: LHC, RHC, CA, LVG, FFR sedation    LM <20%  LAD 50-60% mid.  FFR 0.83  Cx/OM 30%  RCA 30%  LVEF 60%  Normal right heart pressures

## 2018-09-11 LAB
EKG ATRIAL RATE: 51 BPM
EKG DIAGNOSIS: NORMAL
EKG P AXIS: 36 DEGREES
EKG P-R INTERVAL: 210 MS
EKG Q-T INTERVAL: 388 MS
EKG QRS DURATION: 104 MS
EKG QTC CALCULATION (BAZETT): 357 MS
EKG R AXIS: -35 DEGREES
EKG T AXIS: 119 DEGREES
EKG VENTRICULAR RATE: 51 BPM
POC ACT LR: 262 SEC

## 2018-09-13 NOTE — COMMUNICATION BODY
[address] [facility]   Cardiovascular Evaluation    PATIENT: Julian Walker  DATE: 2018  MRN: [id number]  CSN: [id number]  : 1940    Primary Care Doctor: ADORE Jones CNP    Reason for evaluation:   Worsening BERTRAND (last seen 11/10/2016); Hyperlipidemia; Hypertension; Discuss Labs (08/15/2018); Chest Pain (rarely); History of present illness on initial date of evaluation:   Julian Walker is a 69 y.o. male who presents for cardiac follow up of  HTN, HLD, interstitial lung disease and COLLIN (uses bipap). My last OV 2016 and Dr. Bethany Gordon saw him in 11/15. Currently followed by Dr. Arik Grey for ILD. Note ECHO in  with EF of 34%; diastolic dysfunction. Most recent ECHO 2015 with EF of 55-60 %. Grade I diastolic dysfunction. Most recent [facility] 2015 with no evidence of stress induced reversible ischemia, EF of 62%. Note most recent LHC/RHC 11/15/16 showed  LM <20% LAD 30-50% prox to mid. FFR 0.81; Cx/OM 30%; RCA 30%; LVEF 60% RA 3,  RV 35/3,  PA ,  W 6. Today he reports he is fatigued and SOB with new CP. He states he has SOB with any exertion. He states he does wear his C-pap machine. He states he had some new-onset chest pressure (middle of chest without radiation or associated symptoms). Has had 2 episodes with last couple months ago. Most recent EKG 17 showed SB; left axis; LAFB; ST abnormality lateral leads consider ischemia; possible LVH with repolarization (no change from  study). Most recent arterial doppler lower extremity with CLOVER's >1 and no arterial insufficiency.        Patient Active Problem List   Diagnosis    Bradycardia    HTN (hypertension)    Cough    COLLIN on CPAP    Abnormal PFT    ILD (interstitial lung disease) ([address]    Hyperlipidemia    Restless legs syndrome    Essential and other specified forms of tremor    Paralysis agitans [facility] [address]    Chronic ischemic heart disease    Essential hypertension    Restless Medications:  Current Outpatient Prescriptions on File Prior to Visit   Medication Sig Dispense Refill    simvastatin (ZOCOR) 10 MG tablet TAKE ONE TABLET BY MOUTH ONCE NIGHTLY 90 tablet 2    doxazosin (CARDURA) 8 MG tablet TAKE ONE TABLET BY MOUTH TWICE A DAY 60 tablet 8    venlafaxine (EFFEXOR) 75 MG tablet TAKE ONE TABLET BY MOUTH TWICE A DAY 60 tablet 5    clonazePAM (KLONOPIN) 2 MG tablet TAKE ONE TABLET BY MOUTH EVERY NIGHT AT BEDTIME 30 tablet 0    amLODIPine (NORVASC) 10 MG tablet TAKE ONE TABLET BY MOUTH DAILY 30 tablet 2    magnesium gluconate (MAGONATE) 500 MG tablet Take 500 mg by mouth 2 times daily      levothyroxine (SYNTHROID) 75 MCG tablet TAKE ONE TABLET BY MOUTH DAILY 90 tablet 1    meloxicam (MOBIC) 15 MG tablet TAKE ONE TABLET BY MOUTH DAILY 90 tablet 3    omeprazole (PRILOSEC) 40 MG delayed release capsule Take 40 mg by mouth      triamcinolone (KENALOG) 0.1 % cream Apply topically every 7 days 80 g 5    triamterene-hydrochlorothiazide (MAXZIDE-25) 37.5-25 MG per tablet TAKE ONE TABLET BY MOUTH DAILY 30 tablet 9    albuterol sulfate (PROAIR RESPICLICK) 508 (90 BASE) MCG/ACT aerosol powder inhalation Inhale 2 puffs into the lungs every 6 hours as needed for Wheezing or Shortness of Breath 1 Inhaler 5    methocarbamol (ROBAXIN) 500 MG tablet Take 1 tablet by mouth 3 times daily as needed (spasm) 90 tablet 5    ranitidine (ZANTAC) 150 MG tablet TAKE ONE TABLET BY MOUTH TWICE A DAY (Patient taking differently: TAKE ONE TABLET BY MOUTH TWICE A DAY as needed) 60 tablet 11    Vitamin D (CHOLECALCIFEROL) 1000 UNITS CAPS capsule Take 1,000 Units by mouth daily.  Calcium Carbonate-Vitamin D (CALCIUM + D) 600-200 MG-UNIT TABS Take 600 mg by mouth daily. No current facility-administered medications on file prior to visit. In-patient schedule medications:      Infusion Medications:         Allergies:  Percocet [oxycodone-acetaminophen] and Ace inhibitors     Review of Systems:   All 12 point review of symptoms completed. Pertinent positives identified in the HPI, all other review of symptoms negative as below.     Physical Examination:    Vitals:    09/04/18 1417   BP: 130/74   Pulse: 57   SpO2: 96%    Weight: 250 lb (113.4 kg)     Wt Readings from Last 3 Encounters:   09/04/18 250 lb (113.4 kg)   08/09/18 249 lb (112.9 kg)   07/23/18 250 lb 12.8 oz (113.8 kg)     No intake or output data in the 24 hours ending 09/04/18 1514    General Appearance:  Alert, cooperative, no distress, appears stated age   Head:  Normocephalic, without obvious abnormality, atraumatic   Eyes:  PERRL, conjunctiva/corneas clear       Nose: Nares normal, no drainage or sinus tenderness   Throat: Lips, mucosa, and tongue normal   Neck: Supple, symmetrical, trachea midline, no adenopathy, thyroid: not enlarged, symmetric, no tenderness/mass/nodules, no carotid bruit or JVD       Lungs:   Soft crackles in the bases, respirations unlabored   Chest Wall:  No tenderness or deformity   Heart:  Regular rhythm and normal rate; S1, S2 are normal Soft ANGELA; no rub or gallop   Abdomen:   Soft, non-tender, bowel sounds active all four quadrants,  no masses, no organomegaly           Extremities: Extremities normal, atraumatic, no cyanosis or edema   Pulses: 2+ and symmetric   Skin: Skin color, texture, turgor normal, no rashes or lesions   Pysch: Normal mood and affect   Neurologic: Normal gross motor and sensory exam.         Labs    Lab Results   Component Value Date    CHOL 156 08/15/2018    CHOL 182 08/03/2017    CHOL 137 01/10/2017     Lab Results   Component Value Date    TRIG 110 08/15/2018    TRIG 175 (H) 08/03/2017    TRIG 103 01/10/2017     Lab Results   Component Value Date    HDL 38 (L) 08/15/2018    HDL 45 08/03/2017    HDL 45 01/10/2017     Lab Results   Component Value Date    LDLCALC 96 08/15/2018    LDLCALC 102 (H) 08/03/2017    LDLCALC 71 01/10/2017     Lab Results   Component Value Date    LABVLDL 22 08/15/2018    LABVLDL 35 08/03/2017    LABVLDL 21 01/10/2017     No results found for: CHOLHDLRATIO    Assessment:  69 y.o. patient with:  1. SOB/Fatigue/CP: Concern for worsened CAD given increased BERTRAND which may be anginal equivalent and new-onset CP clinically concerning for unstable angina. He had non-obstructive CAD with borderline LAD lesion (FFR 0.81) in Nov. 2016. He merits repeat LHC and RHC to assess coronary arteries and pulm pressures given hx ILD. 2.  Hypertension:  Well controlled and will continue current medical regimen. ~BP: 130/74     3. Hyperlipidemia: Most recent labs August 2018 see results above I personally reviewed. Well controlled and will continue current medical regimen. 4. Interstitial lung disease:  Dr. Sven Baker diagnosed him with early ILD and lungs should not cause significant symptoms. PFTs 5/2/16 FVC 3.24 (82%) FEV1 2.68 (94%) FEV1/FVC ratio 83% TLC 5.51 (83%) DLCO 17.92 (66%    5. COLLIN   ~wear bipap    Plan:  1. Angiogram - worsening SOB/new onset CP  2. Follow up after procedure      QUALITY MEASURES  1. Tobacco Cessation Counseling: NA  2. Retake of BP if >140/90:   NA  3. Documentation to PCP/referring for new patient:  Sent to PCP at close of office visit  4. CAD patient on anti-platelet: No  5. CAD patient on STATIN therapy:  Yes  6. Patient with CHF and aFib on anticoagulation:  No     Cost of prescription medications and patient compliance have been reviewed with patient. All questions answered. All questions and concerns were addressed to the patient/family. Alternatives to my treatment were discussed. The note was completed using EMR. Every effort was made to ensure accuracy; however, inadvertent computerized transcription errors may be present.     Merritt Montes MD  9/4/2018  3:14 PM

## 2018-09-26 ENCOUNTER — OFFICE VISIT (OUTPATIENT)
Dept: CARDIOLOGY CLINIC | Age: 78
End: 2018-09-26
Payer: MEDICARE

## 2018-09-26 VITALS
HEART RATE: 56 BPM | OXYGEN SATURATION: 97 % | DIASTOLIC BLOOD PRESSURE: 70 MMHG | SYSTOLIC BLOOD PRESSURE: 124 MMHG | HEIGHT: 68 IN | WEIGHT: 248 LBS | BODY MASS INDEX: 37.59 KG/M2

## 2018-09-26 DIAGNOSIS — I25.10 CORONARY ARTERY DISEASE INVOLVING NATIVE CORONARY ARTERY OF NATIVE HEART WITHOUT ANGINA PECTORIS: ICD-10-CM

## 2018-09-26 DIAGNOSIS — R06.02 SHORTNESS OF BREATH: ICD-10-CM

## 2018-09-26 DIAGNOSIS — J84.9 ILD (INTERSTITIAL LUNG DISEASE) (HCC): ICD-10-CM

## 2018-09-26 DIAGNOSIS — I10 ESSENTIAL HYPERTENSION: Primary | ICD-10-CM

## 2018-09-26 DIAGNOSIS — I50.32 CHRONIC DIASTOLIC CONGESTIVE HEART FAILURE (HCC): ICD-10-CM

## 2018-09-26 DIAGNOSIS — E78.5 HYPERLIPIDEMIA, UNSPECIFIED HYPERLIPIDEMIA TYPE: ICD-10-CM

## 2018-09-26 DIAGNOSIS — Z99.89 OSA ON CPAP: ICD-10-CM

## 2018-09-26 DIAGNOSIS — G47.33 OSA ON CPAP: ICD-10-CM

## 2018-09-26 DIAGNOSIS — I51.89 DIASTOLIC DYSFUNCTION: ICD-10-CM

## 2018-09-26 PROCEDURE — 99213 OFFICE O/P EST LOW 20 MIN: CPT | Performed by: NURSE PRACTITIONER

## 2018-09-26 RX ORDER — POTASSIUM CHLORIDE 750 MG/1
10 TABLET, EXTENDED RELEASE ORAL DAILY
Qty: 30 TABLET | Refills: 5 | Status: SHIPPED | OUTPATIENT
Start: 2018-09-26 | End: 2019-04-19 | Stop reason: SDUPTHER

## 2018-09-26 NOTE — PROGRESS NOTES
[facility]   Cardiac Evaluation    Primary Care Doctor:  ADORE Lovelace - CNP    Chief Complaint   Patient presents with    Follow Up After Procedure     [place] [facility] [place]    Hyperlipidemia    Hypertension    Discuss Labs     cmp & cbc 09/10/2018    Other     groin site healed    Fatigue        History of Present Illness:   I had the pleasure of seeing Ludin Gregory in follow up for right and left heart catheterization for symptoms of chest pain and shortness of breath. This revealed non-obstructive coronary artery disease noting 50-60% LAD with negative FFR, and normal right heart pressures. He has a hx of HTN, HLD, dCHF, as well as ILD and COLLIN on CPAP. He denies any further chest pain. He states had a couple of times but has since not recurred. He admits to dyspnea on exertion with walking around [employer], sometimes needs to use scooter. He is able to walk to barn and back without difficulty (100 ft each way). He gets shortness of breath with bending over. He has had a dry nagging cough for years, recently improved. Ludin Gregory describes symptoms including dyspnea, fatigue but denies chest pain, palpitations, orthopnea, PND, early saiety, edema, syncope. NYHA:   II  ACC/ AHA Stage:    C    Past Medical History:   has a past medical history of Arthritis; Back injuries; BPH (benign prostatic hyperplasia); CPAP (continuous positive airway pressure) dependence; Diverticulosis; Esophageal dysmotility; GERD (gastroesophageal reflux disease); Hiatal hernia; History of colon polyps; Hyperlipidemia; Hypertension; ILD (interstitial lung disease) ([address] Internal hemorrhoid; Lichen sclerosus of penis; Mild cognitive impairment; COLLIN (obstructive sleep apnea); Renal insufficiency; Restless legs syndrome; Rosacea; Schatzki's ring; Scrotal pruritus; and Sleep apnea.   Surgical History:   has a past surgical history that includes Circumcision; fracture surgery; fracture surgery; complaints. · Integumentary: No rash or pruritis. · Neurological: No headache, diplopia, change in muscle strength, numbness or tingling. No change in gait, balance, coordination, mood, affect, memory, mentation, behavior. · Psychiatric: No anxiety, no depression. · Endocrine: No malaise, fatigue or temperature intolerance. No excessive thirst, fluid intake, or urination. No tremor. · Hematologic/Lymphatic: No abnormal bruising or bleeding, blood clots or swollen lymph nodes. · Allergic/Immunologic: No nasal congestion or hives. Physical Examination:    Vitals:    09/26/18 1347   BP: 124/70   Pulse: 56   SpO2: 97%   Weight: 248 lb (112.5 kg)   Height: 5' 8\" (1.727 m)        Constitutional and General Appearance: Warm and dry, no apparent distress, normal coloration  HEENT:  Normocephalic, atraumatic  Respiratory:  · Normal excursion and expansion without use of accessory muscles  · Resp Auscultation: Normal breath sounds without dullness  Cardiovascular:  · The apical impulses not displaced  · Heart tones are crisp and normal  · JVP 8 cm H2O  · Regular rate and rhythm, normal S1S2, no m/g/r  · Peripheral pulses are symmetrical and full  · There is no clubbing, cyanosis of the extremities.   · 1+ BLE edema  · Pedal Pulses: 2+ and equal   Abdomen:  · No masses or tenderness  · Liver/Spleen: No Abnormalities Noted  Neurological/Psychiatric:  · Alert and oriented in all spheres  · Moves all extremities well  · Exhibits normal gait balance and coordination  · No abnormalities of mood, affect, memory, mentation, or behavior are noted    Lab Data:    CBC:   Lab Results   Component Value Date    WBC 7.3 09/10/2018    WBC 6.8 08/15/2018    WBC 6.3 12/03/2017    RBC 4.68 09/10/2018    RBC 4.57 08/15/2018    RBC 4.69 12/03/2017    HGB 15.2 09/10/2018    HGB 14.7 08/15/2018    HGB 14.9 12/03/2017    HCT 42.5 09/10/2018    HCT 42.1 08/15/2018    HCT 42.3 12/03/2017    MCV 90.7 09/10/2018    MCV 92.1 08/15/2018    MCV 90.3 12/03/2017    RDW 13.4 09/10/2018    RDW 12.9 08/15/2018    RDW 13.4 12/03/2017     09/10/2018     08/15/2018     12/03/2017     BMP:  Lab Results   Component Value Date     09/10/2018     08/15/2018     05/09/2018    K 3.4 09/10/2018    K 3.5 08/15/2018    K 3.4 05/09/2018     09/10/2018     08/15/2018     05/09/2018    CO2 27 09/10/2018    CO2 26 08/15/2018    CO2 26 05/09/2018    BUN 20 09/10/2018    BUN 25 08/15/2018    BUN 20 05/09/2018    CREATININE 1.1 09/10/2018    CREATININE 1.3 08/15/2018    CREATININE 1.1 05/09/2018     BNP: No results found for: PROBNP     Cardiac Imaging:  [facility] 9/10/18  Procedures: LHC, RHC, CA, LVG, FFR sedation   LM <20%  LAD 50-60% mid. FFR 0.83  Cx/OM 30%  RCA 30%  LVEF 60%  Normal right heart pressures (RA 2, RV 31/-1/6, PA 32/7/21, PW 7, LVEDP 17)    Echo Nov 2015:   Left ventricular systolic function is normal.   Ejection fraction is visually estimated to be 55-60 %.   There is reversal of E/A inflow velocities across the mitral valve suggesting type I diastolic dysfunction.    The right atrium is mildly dilated.   Aortic valve appears sclerotic but opens adequately. Stress MPI Nov 2015:   IMPRESSION:   1. No evidence of stress induced reversible perfusion defects to suggest   ischemia. 2. Ejection fraction of 62%   3. No focal wall motion abnormality. Assessment:    1. Essential hypertension    2. Hyperlipidemia, unspecified hyperlipidemia type    3. Coronary artery disease involving native coronary artery of native heart without angina pectoris    4. Shortness of breath    5. ILD (interstitial lung disease) ([address]    6. COLLIN on CPAP    7. Chronic diastolic congestive heart failure ([address]    8. Diastolic dysfunction          Plan:   1. No change in heart/ BP medicines  2. Add a potassium pill once daily (10 mEq)  3.  Follow up with Dr. Rossi Reveal in 6 months      I appreciate the opportunity of cooperating in the

## 2018-10-09 DIAGNOSIS — G25.81 RESTLESS LEGS SYNDROME: ICD-10-CM

## 2018-10-09 DIAGNOSIS — F41.8 DEPRESSION WITH ANXIETY: ICD-10-CM

## 2018-10-09 RX ORDER — VENLAFAXINE 75 MG/1
TABLET ORAL
Qty: 60 TABLET | Refills: 0 | Status: SHIPPED | OUTPATIENT
Start: 2018-10-09 | End: 2018-11-09 | Stop reason: SDUPTHER

## 2018-10-10 RX ORDER — CLONAZEPAM 2 MG/1
TABLET ORAL
Qty: 30 TABLET | Refills: 0 | Status: SHIPPED | OUTPATIENT
Start: 2018-10-10 | End: 2018-11-09 | Stop reason: SDUPTHER

## 2018-11-09 ENCOUNTER — HOSPITAL ENCOUNTER (OUTPATIENT)
Age: 78
Discharge: HOME | End: 2018-11-09
Payer: MEDICARE

## 2018-11-09 ENCOUNTER — HOSPITAL ENCOUNTER (OUTPATIENT)
Dept: GENERAL RADIOLOGY | Age: 78
Discharge: HOME | End: 2018-11-09
Payer: MEDICARE

## 2018-11-09 ENCOUNTER — OFFICE VISIT (OUTPATIENT)
Dept: FAMILY MEDICINE CLINIC | Age: 78
End: 2018-11-09
Payer: MEDICARE

## 2018-11-09 VITALS
HEIGHT: 68 IN | OXYGEN SATURATION: 98 % | SYSTOLIC BLOOD PRESSURE: 128 MMHG | HEART RATE: 64 BPM | DIASTOLIC BLOOD PRESSURE: 74 MMHG | BODY MASS INDEX: 38.34 KG/M2 | WEIGHT: 253 LBS

## 2018-11-09 DIAGNOSIS — M54.50 CHRONIC MIDLINE LOW BACK PAIN WITHOUT SCIATICA: ICD-10-CM

## 2018-11-09 DIAGNOSIS — G89.29 CHRONIC MIDLINE LOW BACK PAIN WITHOUT SCIATICA: ICD-10-CM

## 2018-11-09 DIAGNOSIS — G25.81 RESTLESS LEGS SYNDROME: Primary | ICD-10-CM

## 2018-11-09 DIAGNOSIS — Z23 FLU VACCINE NEED: ICD-10-CM

## 2018-11-09 DIAGNOSIS — F41.8 DEPRESSION WITH ANXIETY: ICD-10-CM

## 2018-11-09 PROCEDURE — 99214 OFFICE O/P EST MOD 30 MIN: CPT | Performed by: NURSE PRACTITIONER

## 2018-11-09 PROCEDURE — G0008 ADMIN INFLUENZA VIRUS VAC: HCPCS | Performed by: NURSE PRACTITIONER

## 2018-11-09 PROCEDURE — G0444 DEPRESSION SCREEN ANNUAL: HCPCS | Performed by: NURSE PRACTITIONER

## 2018-11-09 PROCEDURE — 90688 IIV4 VACCINE SPLT 0.5 ML IM: CPT | Performed by: NURSE PRACTITIONER

## 2018-11-09 PROCEDURE — 72100 X-RAY EXAM L-S SPINE 2/3 VWS: CPT

## 2018-11-09 RX ORDER — CLONAZEPAM 2 MG/1
TABLET ORAL
Qty: 30 TABLET | Refills: 0 | Status: CANCELLED | OUTPATIENT
Start: 2018-11-09 | End: 2018-12-09

## 2018-11-09 RX ORDER — CLONAZEPAM 2 MG/1
2 TABLET ORAL NIGHTLY PRN
Qty: 60 TABLET | Refills: 0 | Status: CANCELLED | OUTPATIENT
Start: 2018-11-09 | End: 2018-12-09

## 2018-11-09 RX ORDER — VENLAFAXINE 75 MG/1
TABLET ORAL
Qty: 60 TABLET | Refills: 5 | Status: SHIPPED | OUTPATIENT
Start: 2018-11-09 | End: 2019-02-08 | Stop reason: SDUPTHER

## 2018-11-09 RX ORDER — CLONAZEPAM 2 MG/1
TABLET ORAL
Qty: 30 TABLET | Refills: 0 | Status: SHIPPED | OUTPATIENT
Start: 2018-11-09 | End: 2018-12-08 | Stop reason: SDUPTHER

## 2018-11-09 ASSESSMENT — PATIENT HEALTH QUESTIONNAIRE - PHQ9
9. THOUGHTS THAT YOU WOULD BE BETTER OFF DEAD, OR OF HURTING YOURSELF: 0
8. MOVING OR SPEAKING SO SLOWLY THAT OTHER PEOPLE COULD HAVE NOTICED. OR THE OPPOSITE, BEING SO FIGETY OR RESTLESS THAT YOU HAVE BEEN MOVING AROUND A LOT MORE THAN USUAL: 0
SUM OF ALL RESPONSES TO PHQ9 QUESTIONS 1 & 2: 2
6. FEELING BAD ABOUT YOURSELF - OR THAT YOU ARE A FAILURE OR HAVE LET YOURSELF OR YOUR FAMILY DOWN: 0
5. POOR APPETITE OR OVEREATING: 0
SUM OF ALL RESPONSES TO PHQ QUESTIONS 1-9: 3
7. TROUBLE CONCENTRATING ON THINGS, SUCH AS READING THE NEWSPAPER OR WATCHING TELEVISION: 0
SUM OF ALL RESPONSES TO PHQ QUESTIONS 1-9: 3
3. TROUBLE FALLING OR STAYING ASLEEP: 1
2. FEELING DOWN, DEPRESSED OR HOPELESS: 1
4. FEELING TIRED OR HAVING LITTLE ENERGY: 0
1. LITTLE INTEREST OR PLEASURE IN DOING THINGS: 1
10. IF YOU CHECKED OFF ANY PROBLEMS, HOW DIFFICULT HAVE THESE PROBLEMS MADE IT FOR YOU TO DO YOUR WORK, TAKE CARE OF THINGS AT HOME, OR GET ALONG WITH OTHER PEOPLE: 1

## 2018-11-09 ASSESSMENT — ENCOUNTER SYMPTOMS
BACK PAIN: 1
EYES NEGATIVE: 1
GASTROINTESTINAL NEGATIVE: 1
RESPIRATORY NEGATIVE: 1
BOWEL INCONTINENCE: 0
ABDOMINAL PAIN: 0

## 2018-11-09 NOTE — PROGRESS NOTES
 Asthma Neg Hx     Emphysema Neg Hx     Heart Failure Neg Hx     Hypertension Neg Hx      Past Surgical History:   Procedure Laterality Date    CIRCUMCISION      COLONOSCOPY  2010    COLONOSCOPY  9/14/2015    CORONARY ANGIOPLASTY      CYSTOSCOPY  5/13/16    CYSTOSCOPY  05/19/2017    CYSTOSCOPY     CYSTOSCOPY  07/14/2017    ENDOSCOPY, COLON, DIAGNOSTIC  10/11/2017    esoph. stricture    FRACTURE SURGERY      right leg    FRACTURE SURGERY      wrist   right    HERNIA REPAIR      TOE SURGERY      TURP  07/14/2017    UPPER GASTROINTESTINAL ENDOSCOPY  09/12/2016    UPPER GASTROINTESTINAL ENDOSCOPY  10/11/2017    esophageal stricture     Social History     Social History    Marital status:      Spouse name: N/A    Number of children: N/A    Years of education: N/A     Occupational History    Not on file.      Social History Main Topics    Smoking status: Former Smoker     Packs/day: 1.50     Years: 17.00     Types: Cigarettes     Quit date: 1/1/1974    Smokeless tobacco: Never Used    Alcohol use No    Drug use: No    Sexual activity: Not Currently     Other Topics Concern    Not on file     Social History Narrative    No narrative on file     Current Outpatient Prescriptions   Medication Sig Dispense Refill    clonazePAM (KLONOPIN) 2 MG tablet TAKE ONE TABLET BY MOUTH ONCE NIGHTLY FOR 30 DAYS 30 tablet 0    venlafaxine (EFFEXOR) 75 MG tablet TAKE ONE TABLET BY MOUTH TWICE A DAY 60 tablet 0    potassium chloride (KLOR-CON M) 10 MEQ extended release tablet Take 1 tablet by mouth daily 30 tablet 5    amLODIPine (NORVASC) 10 MG tablet TAKE ONE TABLET BY MOUTH DAILY 30 tablet 5    aspirin EC 81 MG EC tablet Take 1 tablet by mouth daily 30 tablet 3    simvastatin (ZOCOR) 10 MG tablet TAKE ONE TABLET BY MOUTH ONCE NIGHTLY 90 tablet 2    doxazosin (CARDURA) 8 MG tablet TAKE ONE TABLET BY MOUTH TWICE A DAY 60 tablet 8    magnesium gluconate (MAGONATE) 500 MG tablet Take 500 mg by

## 2018-11-12 RX ORDER — OMEPRAZOLE 40 MG/1
40 CAPSULE, DELAYED RELEASE ORAL DAILY
Qty: 30 CAPSULE | Refills: 0 | Status: SHIPPED | OUTPATIENT
Start: 2018-11-12 | End: 2018-12-08 | Stop reason: SDUPTHER

## 2018-11-15 DIAGNOSIS — E03.9 HYPOTHYROIDISM, UNSPECIFIED TYPE: ICD-10-CM

## 2018-11-15 LAB
6-ACETYLMORPHINE: NOT DETECTED
7-AMINOCLONAZEPAM: PRESENT
ALPHA-OH-ALPRAZOLAM: NOT DETECTED
ALPRAZOLAM: NOT DETECTED
AMPHETAMINE: NOT DETECTED
BARBITURATES: NOT DETECTED
BENZOYLECGONINE: NOT DETECTED
BUPRENORPHINE: NOT DETECTED
CARISOPRODOL: NOT DETECTED
CLONAZEPAM: NOT DETECTED
CODEINE: NOT DETECTED
CREATININE URINE: 108 MG/DL (ref 20–400)
DIAZEPAM: NOT DETECTED
DRUGS EXPECTED: NORMAL
EER PAIN MGT DRUG PANEL, HIGH RES/EMIT U: NORMAL
ETHYL GLUCURONIDE: NOT DETECTED
FENTANYL: NOT DETECTED
HYDROCODONE: NOT DETECTED
HYDROMORPHONE: NOT DETECTED
LORAZEPAM: NOT DETECTED
MARIJUANA METABOLITE: NOT DETECTED
MDA: NOT DETECTED
MDEA: NOT DETECTED
MDMA URINE: NOT DETECTED
MEPERIDINE: NOT DETECTED
METHADONE: NOT DETECTED
METHAMPHETAMINE: NOT DETECTED
METHYLPHENIDATE: NOT DETECTED
MIDAZOLAM: NOT DETECTED
MORPHINE: NOT DETECTED
NORBUPRENORPHINE, FREE: NOT DETECTED
NORDIAZEPAM: NOT DETECTED
NORFENTANYL: NOT DETECTED
NORHYDROCODONE, URINE: NOT DETECTED
NOROXYCODONE: NOT DETECTED
NOROXYMORPHONE, URINE: NOT DETECTED
OXAZEPAM: NOT DETECTED
OXYCODONE: NOT DETECTED
OXYMORPHONE: NOT DETECTED
PAIN MANAGEMENT DRUG PANEL: NORMAL
PAIN MANAGEMENT DRUG PANEL: NORMAL
PCP: NOT DETECTED
PHENTERMINE: NOT DETECTED
PROPOXYPHENE: NOT DETECTED
TAPENTADOL, URINE: NOT DETECTED
TAPENTADOL-O-SULFATE, URINE: NOT DETECTED
TEMAZEPAM: NOT DETECTED
TRAMADOL: NOT DETECTED
ZOLPIDEM: NOT DETECTED

## 2018-11-15 RX ORDER — LEVOTHYROXINE SODIUM 0.07 MG/1
TABLET ORAL
Qty: 90 TABLET | Refills: 1 | Status: SHIPPED | OUTPATIENT
Start: 2018-11-15 | End: 2019-02-08 | Stop reason: SDUPTHER

## 2018-12-08 DIAGNOSIS — G25.81 RESTLESS LEGS SYNDROME: ICD-10-CM

## 2018-12-10 RX ORDER — CLONAZEPAM 2 MG/1
TABLET ORAL
Qty: 30 TABLET | Refills: 0 | Status: SHIPPED | OUTPATIENT
Start: 2018-12-10 | End: 2019-01-08 | Stop reason: SDUPTHER

## 2018-12-17 RX ORDER — TRIAMTERENE AND HYDROCHLOROTHIAZIDE 37.5; 25 MG/1; MG/1
TABLET ORAL
Qty: 90 TABLET | Refills: 0 | Status: SHIPPED | OUTPATIENT
Start: 2018-12-17 | End: 2019-02-08 | Stop reason: SDUPTHER

## 2019-01-08 DIAGNOSIS — G25.81 RESTLESS LEGS SYNDROME: ICD-10-CM

## 2019-01-10 RX ORDER — CLONAZEPAM 2 MG/1
TABLET ORAL
Qty: 30 TABLET | Refills: 0 | Status: SHIPPED | OUTPATIENT
Start: 2019-01-10 | End: 2019-02-08 | Stop reason: SDUPTHER

## 2019-02-08 ENCOUNTER — OFFICE VISIT (OUTPATIENT)
Dept: FAMILY MEDICINE CLINIC | Age: 79
End: 2019-02-08
Payer: MEDICARE

## 2019-02-08 VITALS
BODY MASS INDEX: 37.28 KG/M2 | DIASTOLIC BLOOD PRESSURE: 80 MMHG | HEIGHT: 68 IN | HEART RATE: 57 BPM | SYSTOLIC BLOOD PRESSURE: 138 MMHG | OXYGEN SATURATION: 98 % | WEIGHT: 246 LBS

## 2019-02-08 DIAGNOSIS — E03.9 HYPOTHYROIDISM, UNSPECIFIED TYPE: ICD-10-CM

## 2019-02-08 DIAGNOSIS — F41.9 ANXIETY: ICD-10-CM

## 2019-02-08 DIAGNOSIS — G20 PARALYSIS AGITANS (HCC): ICD-10-CM

## 2019-02-08 DIAGNOSIS — I10 ESSENTIAL HYPERTENSION: ICD-10-CM

## 2019-02-08 DIAGNOSIS — N48.0 LICHEN SCLEROSUS OF PENIS: ICD-10-CM

## 2019-02-08 DIAGNOSIS — G25.81 RESTLESS LEGS SYNDROME: ICD-10-CM

## 2019-02-08 DIAGNOSIS — E78.5 HYPERLIPIDEMIA, UNSPECIFIED HYPERLIPIDEMIA TYPE: ICD-10-CM

## 2019-02-08 DIAGNOSIS — E55.9 VITAMIN D DEFICIENCY: ICD-10-CM

## 2019-02-08 DIAGNOSIS — F33.41 RECURRENT MAJOR DEPRESSIVE DISORDER, IN PARTIAL REMISSION (HCC): Primary | ICD-10-CM

## 2019-02-08 PROCEDURE — 99214 OFFICE O/P EST MOD 30 MIN: CPT | Performed by: NURSE PRACTITIONER

## 2019-02-08 PROCEDURE — 36415 COLL VENOUS BLD VENIPUNCTURE: CPT | Performed by: NURSE PRACTITIONER

## 2019-02-08 PROCEDURE — G0444 DEPRESSION SCREEN ANNUAL: HCPCS | Performed by: NURSE PRACTITIONER

## 2019-02-08 RX ORDER — OMEPRAZOLE 40 MG/1
CAPSULE, DELAYED RELEASE ORAL
Qty: 90 CAPSULE | Refills: 2 | Status: SHIPPED | OUTPATIENT
Start: 2019-02-08 | End: 2019-12-17 | Stop reason: SDUPTHER

## 2019-02-08 RX ORDER — LEVOTHYROXINE SODIUM 0.07 MG/1
TABLET ORAL
Qty: 90 TABLET | Refills: 1 | Status: SHIPPED | OUTPATIENT
Start: 2019-02-08 | End: 2019-11-13 | Stop reason: SDUPTHER

## 2019-02-08 RX ORDER — SIMVASTATIN 10 MG
TABLET ORAL
Qty: 90 TABLET | Refills: 2 | Status: SHIPPED | OUTPATIENT
Start: 2019-02-08 | End: 2019-11-01 | Stop reason: ALTCHOICE

## 2019-02-08 RX ORDER — VENLAFAXINE 75 MG/1
TABLET ORAL
Qty: 180 TABLET | Refills: 1 | Status: SHIPPED | OUTPATIENT
Start: 2019-02-08 | End: 2019-10-23 | Stop reason: SDUPTHER

## 2019-02-08 RX ORDER — AMLODIPINE BESYLATE 10 MG/1
TABLET ORAL
Qty: 90 TABLET | Refills: 1 | Status: SHIPPED | OUTPATIENT
Start: 2019-02-08 | End: 2019-09-18 | Stop reason: SDUPTHER

## 2019-02-08 RX ORDER — METHOCARBAMOL 500 MG/1
500 TABLET, FILM COATED ORAL 3 TIMES DAILY PRN
Qty: 90 TABLET | Refills: 5 | Status: SHIPPED | OUTPATIENT
Start: 2019-02-08 | End: 2022-03-25 | Stop reason: SDUPTHER

## 2019-02-08 RX ORDER — CLONAZEPAM 2 MG/1
2 TABLET ORAL NIGHTLY PRN
Qty: 30 TABLET | Refills: 0 | Status: SHIPPED | OUTPATIENT
Start: 2019-02-09 | End: 2019-03-11 | Stop reason: SDUPTHER

## 2019-02-08 RX ORDER — DOXAZOSIN 8 MG/1
TABLET ORAL
Qty: 180 TABLET | Refills: 1 | Status: SHIPPED | OUTPATIENT
Start: 2019-02-08 | End: 2019-09-05 | Stop reason: SDUPTHER

## 2019-02-08 RX ORDER — TRIAMCINOLONE ACETONIDE 1 MG/G
CREAM TOPICAL
Qty: 80 G | Refills: 5 | Status: SHIPPED | OUTPATIENT
Start: 2019-02-08 | End: 2021-04-23 | Stop reason: SDUPTHER

## 2019-02-08 RX ORDER — TRIAMTERENE AND HYDROCHLOROTHIAZIDE 37.5; 25 MG/1; MG/1
TABLET ORAL
Qty: 90 TABLET | Refills: 1 | Status: SHIPPED | OUTPATIENT
Start: 2019-02-08 | End: 2019-09-18 | Stop reason: SDUPTHER

## 2019-02-08 ASSESSMENT — ENCOUNTER SYMPTOMS
GASTROINTESTINAL NEGATIVE: 1
SHORTNESS OF BREATH: 1
EYES NEGATIVE: 1

## 2019-02-08 ASSESSMENT — PATIENT HEALTH QUESTIONNAIRE - PHQ9
SUM OF ALL RESPONSES TO PHQ QUESTIONS 1-9: 4
4. FEELING TIRED OR HAVING LITTLE ENERGY: 1
SUM OF ALL RESPONSES TO PHQ9 QUESTIONS 1 & 2: 2
SUM OF ALL RESPONSES TO PHQ QUESTIONS 1-9: 4
10. IF YOU CHECKED OFF ANY PROBLEMS, HOW DIFFICULT HAVE THESE PROBLEMS MADE IT FOR YOU TO DO YOUR WORK, TAKE CARE OF THINGS AT HOME, OR GET ALONG WITH OTHER PEOPLE: 1
7. TROUBLE CONCENTRATING ON THINGS, SUCH AS READING THE NEWSPAPER OR WATCHING TELEVISION: 0
8. MOVING OR SPEAKING SO SLOWLY THAT OTHER PEOPLE COULD HAVE NOTICED. OR THE OPPOSITE, BEING SO FIGETY OR RESTLESS THAT YOU HAVE BEEN MOVING AROUND A LOT MORE THAN USUAL: 0
9. THOUGHTS THAT YOU WOULD BE BETTER OFF DEAD, OR OF HURTING YOURSELF: 0
1. LITTLE INTEREST OR PLEASURE IN DOING THINGS: 1
5. POOR APPETITE OR OVEREATING: 0
6. FEELING BAD ABOUT YOURSELF - OR THAT YOU ARE A FAILURE OR HAVE LET YOURSELF OR YOUR FAMILY DOWN: 0
2. FEELING DOWN, DEPRESSED OR HOPELESS: 1
3. TROUBLE FALLING OR STAYING ASLEEP: 1

## 2019-02-09 LAB
A/G RATIO: 1.6 (ref 1.1–2.2)
ALBUMIN SERPL-MCNC: 4.5 G/DL (ref 3.4–5)
ALP BLD-CCNC: 63 U/L (ref 40–129)
ALT SERPL-CCNC: 31 U/L (ref 10–40)
ANION GAP SERPL CALCULATED.3IONS-SCNC: 16 MMOL/L (ref 3–16)
AST SERPL-CCNC: 26 U/L (ref 15–37)
BILIRUB SERPL-MCNC: 0.8 MG/DL (ref 0–1)
BUN BLDV-MCNC: 20 MG/DL (ref 7–20)
CALCIUM SERPL-MCNC: 9.8 MG/DL (ref 8.3–10.6)
CHLORIDE BLD-SCNC: 99 MMOL/L (ref 99–110)
CHOLESTEROL, TOTAL: 149 MG/DL (ref 0–199)
CO2: 27 MMOL/L (ref 21–32)
CREAT SERPL-MCNC: 1.1 MG/DL (ref 0.8–1.3)
GFR AFRICAN AMERICAN: >60
GFR NON-AFRICAN AMERICAN: >60
GLOBULIN: 2.9 G/DL
GLUCOSE BLD-MCNC: 111 MG/DL (ref 70–99)
HDLC SERPL-MCNC: 40 MG/DL (ref 40–60)
LDL CHOLESTEROL CALCULATED: 84 MG/DL
POTASSIUM SERPL-SCNC: 3.8 MMOL/L (ref 3.5–5.1)
SODIUM BLD-SCNC: 142 MMOL/L (ref 136–145)
TOTAL PROTEIN: 7.4 G/DL (ref 6.4–8.2)
TRIGL SERPL-MCNC: 127 MG/DL (ref 0–150)
TSH SERPL DL<=0.05 MIU/L-ACNC: 2.26 UIU/ML (ref 0.27–4.2)
VITAMIN D 25-HYDROXY: 42.6 NG/ML
VLDLC SERPL CALC-MCNC: 25 MG/DL

## 2019-02-12 ENCOUNTER — TELEPHONE (OUTPATIENT)
Dept: FAMILY MEDICINE CLINIC | Age: 79
End: 2019-02-12

## 2019-03-11 DIAGNOSIS — G25.81 RESTLESS LEGS SYNDROME: ICD-10-CM

## 2019-03-13 RX ORDER — CLONAZEPAM 2 MG/1
TABLET ORAL
Qty: 30 TABLET | Refills: 0 | Status: SHIPPED | OUTPATIENT
Start: 2019-03-13 | End: 2019-04-11 | Stop reason: SDUPTHER

## 2019-04-11 DIAGNOSIS — G25.81 RESTLESS LEGS SYNDROME: ICD-10-CM

## 2019-04-12 RX ORDER — CLONAZEPAM 2 MG/1
TABLET ORAL
Qty: 30 TABLET | Refills: 0 | Status: SHIPPED | OUTPATIENT
Start: 2019-04-12 | End: 2019-05-13 | Stop reason: SDUPTHER

## 2019-04-22 RX ORDER — POTASSIUM CHLORIDE 750 MG/1
TABLET, FILM COATED, EXTENDED RELEASE ORAL
Qty: 30 TABLET | Refills: 4 | Status: SHIPPED | OUTPATIENT
Start: 2019-04-22 | End: 2019-10-03 | Stop reason: SDUPTHER

## 2019-04-22 NOTE — TELEPHONE ENCOUNTER
NPDD Pt  Last office visit 9/26/2018  Plan:   1. No change in heart/ BP medicines  2. Add a potassium pill once daily (10 mEq)  3. Follow up with Dr. Marga Jones in 6 months      No upcoming appt scheduled.

## 2019-05-13 DIAGNOSIS — G25.81 RESTLESS LEGS SYNDROME: ICD-10-CM

## 2019-05-14 RX ORDER — CLONAZEPAM 2 MG/1
TABLET ORAL
Qty: 30 TABLET | Refills: 0 | Status: SHIPPED | OUTPATIENT
Start: 2019-05-14 | End: 2019-06-10 | Stop reason: SDUPTHER

## 2019-06-10 DIAGNOSIS — G25.81 RESTLESS LEGS SYNDROME: ICD-10-CM

## 2019-06-10 NOTE — TELEPHONE ENCOUNTER
Last prescription refill date is May 15, 2019 so that means that 30 days later would be June 14, 2019    Controlled Substance Monitoring:    Acute and Chronic Pain Monitoring:   RX Monitoring 6/10/2019   Attestation -   Periodic Controlled Substance Monitoring No signs of potential drug abuse or diversion identified. Chronic Pain > 80 MEDD -   Chronic Pain > 120 MEDD Obtained or confirmed \"Medication Contract\" on file.

## 2019-06-13 ENCOUNTER — OFFICE VISIT (OUTPATIENT)
Dept: FAMILY MEDICINE CLINIC | Age: 79
End: 2019-06-13
Payer: MEDICARE

## 2019-06-13 VITALS
HEART RATE: 48 BPM | OXYGEN SATURATION: 98 % | SYSTOLIC BLOOD PRESSURE: 128 MMHG | WEIGHT: 240 LBS | DIASTOLIC BLOOD PRESSURE: 70 MMHG | HEIGHT: 68 IN | BODY MASS INDEX: 36.37 KG/M2

## 2019-06-13 DIAGNOSIS — G25.81 RESTLESS LEGS SYNDROME: ICD-10-CM

## 2019-06-13 DIAGNOSIS — F41.9 ANXIETY: ICD-10-CM

## 2019-06-13 DIAGNOSIS — F33.41 RECURRENT MAJOR DEPRESSIVE DISORDER, IN PARTIAL REMISSION (HCC): Primary | ICD-10-CM

## 2019-06-13 PROCEDURE — 99213 OFFICE O/P EST LOW 20 MIN: CPT | Performed by: NURSE PRACTITIONER

## 2019-06-13 PROCEDURE — G0444 DEPRESSION SCREEN ANNUAL: HCPCS | Performed by: NURSE PRACTITIONER

## 2019-06-13 RX ORDER — CLONAZEPAM 2 MG/1
TABLET ORAL
Qty: 30 TABLET | Refills: 0 | Status: CANCELLED | OUTPATIENT
Start: 2019-06-13

## 2019-06-13 ASSESSMENT — PATIENT HEALTH QUESTIONNAIRE - PHQ9
6. FEELING BAD ABOUT YOURSELF - OR THAT YOU ARE A FAILURE OR HAVE LET YOURSELF OR YOUR FAMILY DOWN: 0
SUM OF ALL RESPONSES TO PHQ QUESTIONS 1-9: 7
4. FEELING TIRED OR HAVING LITTLE ENERGY: 2
7. TROUBLE CONCENTRATING ON THINGS, SUCH AS READING THE NEWSPAPER OR WATCHING TELEVISION: 0
8. MOVING OR SPEAKING SO SLOWLY THAT OTHER PEOPLE COULD HAVE NOTICED. OR THE OPPOSITE, BEING SO FIGETY OR RESTLESS THAT YOU HAVE BEEN MOVING AROUND A LOT MORE THAN USUAL: 0
1. LITTLE INTEREST OR PLEASURE IN DOING THINGS: 1
SUM OF ALL RESPONSES TO PHQ9 QUESTIONS 1 & 2: 1
10. IF YOU CHECKED OFF ANY PROBLEMS, HOW DIFFICULT HAVE THESE PROBLEMS MADE IT FOR YOU TO DO YOUR WORK, TAKE CARE OF THINGS AT HOME, OR GET ALONG WITH OTHER PEOPLE: 1
3. TROUBLE FALLING OR STAYING ASLEEP: 3
SUM OF ALL RESPONSES TO PHQ QUESTIONS 1-9: 7
9. THOUGHTS THAT YOU WOULD BE BETTER OFF DEAD, OR OF HURTING YOURSELF: 0
2. FEELING DOWN, DEPRESSED OR HOPELESS: 0
5. POOR APPETITE OR OVEREATING: 1

## 2019-06-13 ASSESSMENT — ENCOUNTER SYMPTOMS
GASTROINTESTINAL NEGATIVE: 1
RESPIRATORY NEGATIVE: 1
EYES NEGATIVE: 1

## 2019-06-13 NOTE — PROGRESS NOTES
Subjective:     Patient Name: Miriam Ruiz is a 69 y.o. male. Chief Complaint   Patient presents with    Anxiety     3 month follo up for klonopin , pt said medication helps he is having no problems       HPI  Restless Leg Syndrome  Patient here for follow up of cramps and spasms in the lower extremities/ RLS, especially at rest and at night. He reports waking up several times a night due to pain and cramping in the lower extremities that is relieved with walking on rare occasion now that he is using klonopin. He denies any complaint during activities. These symptoms have been going on for years and is improved. He has also tried mirapex and requip  without relief. Mood Disorder:  Patient presents for follow-up of depression and anxiety disorder. Current complaints include: hypersomnia, fatigue and changes in appetite/weight: overeating. He denies any other symptoms, increased use of drugs or alcohol and suicidal thoughts or behavior. Symptoms/signs of vipul: none. External stressors: nothing new. Current treatment includes: Effexor- 75 mg bid and benzodiazepine- klonopin 2 mg nightly (also used for RLS)  . Medication side effects: none. PHQ-9  2/8/2019 11/9/2018 4/13/2017 1/13/2017 10/13/2016 7/13/2016 5/4/2016   Little interest or pleasure in doing things 1 1 0 1 1 3 1   Feeling down, depressed, or hopeless 1 1 0 1 1 - 1   Trouble falling or staying asleep, or sleeping too much 1 1 - - - 0 -   Feeling tired or having little energy 1 0 - - - 3 -   Poor appetite or overeating 0 0 - - - 2 -   Feeling bad about yourself - or that you are a failure or have let yourself or your family down 0 0 - - - 0 -   Trouble concentrating on things, such as reading the newspaper or watching television 0 0 - - - 0 -   Moving or speaking so slowly that other people could have noticed.  Or the opposite - being so fidgety or restless that you have been moving around a lot more than usual 0 0 - - - 3 -   Thoughts that you would be better off dead, or of hurting yourself in some way 0 0 - - - 0 -   PHQ-2 Score 2 2 0 2 2 3 2   PHQ-9 Total Score 4 3 0 2 2 11 2   If you checked off any problems, how difficult have these problems made it for you to do your work, take care of things at home, or get along with other people? 1 1 - - - 0 -     Interpretation of Total Score Total Score Depression Severity: 1-4 = Minimal depression, 5-9 = Mild depression, 10-14 = Moderate depression, 15-19 = Moderately severe depression, 20-27 = Severe depression    Review of Systems   Constitutional: Positive for fatigue. HENT: Negative. Eyes: Negative. Respiratory: Negative. Cardiovascular: Negative. Gastrointestinal: Negative. Genitourinary: Negative. Musculoskeletal: Negative. Skin: Negative. Neurological: Negative. Psychiatric/Behavioral: Positive for sleep disturbance. Negative for confusion, self-injury and suicidal ideas. The patient is not nervous/anxious. All other systems reviewed and are negative.        Past Medical History:   Diagnosis Date    Arthritis     Back injuries     BPH (benign prostatic hyperplasia)     CPAP (continuous positive airway pressure) dependence     Diverticulosis     colonoscopy 9/2015    Esophageal dysmotility     GERD (gastroesophageal reflux disease)     Hiatal hernia     History of colon polyps     seen on colonoscopy again 9/2015    Hyperlipidemia     Hypertension     ILD (interstitial lung disease) ([facility] [address] 7/30/2013    Internal hemorrhoid     colonoscopy 3/74/60    Lichen sclerosus of penis 2017    Dr Bernardo Diamond Mild cognitive impairment 08/2016    [facility] neurology    COLLIN (obstructive sleep apnea)     Renal insufficiency     Restless legs syndrome     Rosacea     Schatzki's ring     last dilated 2002    Scrotal pruritus 2/8/2018    Sleep apnea      Family History   Problem Relation Age of Onset    Stroke Father     Cancer Father         prostate    Diabetes Sister     Diabetes Brother     Asthma Neg Hx     Emphysema Neg Hx     Heart Failure Neg Hx     Hypertension Neg Hx      Past Surgical History:   Procedure Laterality Date    CIRCUMCISION      COLONOSCOPY      COLONOSCOPY  2015    CORONARY ANGIOPLASTY      CYSTOSCOPY  16    CYSTOSCOPY  2017    CYSTOSCOPY     CYSTOSCOPY  2017    ENDOSCOPY, COLON, DIAGNOSTIC  10/11/2017    esoph. stricture    FRACTURE SURGERY      right leg    FRACTURE SURGERY      wrist   right    HERNIA REPAIR      TOE SURGERY      TURP  2017    UPPER GASTROINTESTINAL ENDOSCOPY  2016    UPPER GASTROINTESTINAL ENDOSCOPY  10/11/2017    esophageal stricture     Social History     Socioeconomic History    Marital status:      Spouse name: Not on file    Number of children: Not on file    Years of education: Not on file    Highest education level: Not on file   Occupational History    Not on file   Social Needs    Financial resource strain: Not on file    Food insecurity:     Worry: Not on file     Inability: Not on file    Transportation needs:     Medical: Not on file     Non-medical: Not on file   Tobacco Use    Smoking status: Former Smoker     Packs/day: 1.50     Years: 17.00     Pack years: 25.50     Types: Cigarettes     Last attempt to quit: 1974     Years since quittin.4    Smokeless tobacco: Never Used   Substance and Sexual Activity    Alcohol use: No     Alcohol/week: 0.0 oz    Drug use: No    Sexual activity: Not Currently   Lifestyle    Physical activity:     Days per week: Not on file     Minutes per session: Not on file    Stress: Not on file   Relationships    Social connections:     Talks on phone: Not on file     Gets together: Not on file     Attends Yazidism service: Not on file     Active member of club or organization: Not on file     Attends meetings of clubs or organizations: Not on file     Relationship status: Not on file   [facility] Intimate partner violence:     Fear of current or ex partner: Not on file     Emotionally abused: Not on file     Physically abused: Not on file     Forced sexual activity: Not on file   Other Topics Concern    Not on file   Social History Narrative    Not on file     Current Outpatient Medications   Medication Sig Dispense Refill    clonazePAM (KLONOPIN) 2 MG tablet TAKE ONE TABLET BY MOUTH ONCE NIGHTLY AT BEDTIME AS NEEDED FOR RESTLESS LEG SYNDROME 30 tablet 0    potassium chloride (KLOR-CON) 10 MEQ extended release tablet TAKE ONE TABLET BY MOUTH DAILY 30 tablet 4    triamcinolone (KENALOG) 0.1 % cream Apply topically every 7 days 80 g 5    NONFORMULARY selenex      triamterene-hydrochlorothiazide (MAXZIDE-25) 37.5-25 MG per tablet TAKE ONE TABLET BY MOUTH DAILY 90 tablet 1    omeprazole (PRILOSEC) 40 MG delayed release capsule TAKE ONE CAPSULE BY MOUTH DAILY 90 capsule 2    levothyroxine (SYNTHROID) 75 MCG tablet TAKE ONE TABLET BY MOUTH DAILY 90 tablet 1    venlafaxine (EFFEXOR) 75 MG tablet TAKE ONE TABLET BY MOUTH TWICE A  tablet 1    amLODIPine (NORVASC) 10 MG tablet TAKE ONE TABLET BY MOUTH DAILY 90 tablet 1    simvastatin (ZOCOR) 10 MG tablet TAKE ONE TABLET BY MOUTH ONCE NIGHTLY 90 tablet 2    doxazosin (CARDURA) 8 MG tablet TAKE ONE TABLET BY MOUTH TWICE A  tablet 1    methocarbamol (ROBAXIN) 500 MG tablet Take 1 tablet by mouth 3 times daily as needed (spasm) 90 tablet 5    aspirin EC 81 MG EC tablet Take 1 tablet by mouth daily 30 tablet 3    magnesium gluconate (MAGONATE) 500 MG tablet Take 500 mg by mouth daily       albuterol sulfate (PROAIR RESPICLICK) 053 (90 BASE) MCG/ACT aerosol powder inhalation Inhale 2 puffs into the lungs every 6 hours as needed for Wheezing or Shortness of Breath 1 Inhaler 5    ranitidine (ZANTAC) 150 MG tablet TAKE ONE TABLET BY MOUTH TWICE A DAY (Patient taking differently: TAKE ONE TABLET BY MOUTH TWICE A DAY as needed) 60 tablet 11    Vitamin D (CHOLECALCIFEROL) 1000 UNITS CAPS capsule Take 1,000 Units by mouth daily.  Calcium Carbonate-Vitamin D (CALCIUM + D) 600-200 MG-UNIT TABS Take 600 mg by mouth daily. No current facility-administered medications for this visit. No changes in past medical history, past surgical history, social history, orfamily history were noted during the patient encounter unless specifically listed above. All updates of past medical history, past surgical history, social history, or family history were reviewed personally by me duringthe office visit. All problems listed in the assessment are stable unless noted otherwise. Medication profile reviewed personally by me during the office visit. Medication side effects and possible impairments frommedications were discussed as applicable. Objective:       Physical Exam   Constitutional: He is oriented to person, place, and time. Vital signs are normal. He appears well-developed and well-nourished. He is cooperative. Non-toxic appearance. He does not have a sickly appearance. No distress. HENT:   Head: Normocephalic and atraumatic. Right Ear: Hearing, tympanic membrane and ear canal normal.   Left Ear: Hearing, tympanic membrane and ear canal normal.   Nose: Nose normal.   Mouth/Throat: Uvula is midline, oropharynx is clear and moist and mucous membranes are normal.   Eyes: Conjunctivae and lids are normal.   Neck: Neck supple. Cardiovascular: Normal rate, regular rhythm, normal heart sounds and normal pulses. Pulmonary/Chest: Effort normal and breath sounds normal. No accessory muscle usage. No respiratory distress. Abdominal: Soft. Normal appearance. There is no tenderness. Lymphadenopathy:        Head (right side): No submental and no submandibular adenopathy present. Head (left side): No submental and no submandibular adenopathy present. He has no cervical adenopathy.    Neurological: He is alert and oriented to person, place, and time. Skin: Skin is warm and dry. No lesion and no rash noted. Psychiatric: He has a normal mood and affect. His speech is normal and behavior is normal. Cognition and memory are normal.       /70 (Site: Left Upper Arm, Position: Sitting, Cuff Size: Large Adult)   Pulse (!) 48   Ht 5' 8\" (1.727 m)   Wt 240 lb (108.9 kg)   SpO2 98%   BMI 36.49 kg/m²   Body mass index is 36.49 kg/m². BP Readings from Last 2 Encounters:   02/08/19 138/80   11/09/18 128/74       Wt Readings from Last 3 Encounters:   02/08/19 246 lb (111.6 kg)   11/09/18 253 lb (114.8 kg)   09/26/18 248 lb (112.5 kg)       Lab Review   No visits with results within 2 Month(s) from this visit. Latest known visit with results is:   Office Visit on 02/08/2019   Component Date Value    Sodium 02/08/2019 142     Potassium 02/08/2019 3.8     Chloride 02/08/2019 99     CO2 02/08/2019 27     Anion Gap 02/08/2019 16     Glucose 02/08/2019 111*    BUN 02/08/2019 20     CREATININE 02/08/2019 1.1     GFR Non- 02/08/2019 >60     GFR  02/08/2019 >60     Calcium 02/08/2019 9.8     Total Protein 02/08/2019 7.4     Alb 02/08/2019 4.5     Albumin/Globulin Ratio 02/08/2019 1.6     Total Bilirubin 02/08/2019 0.8     Alkaline Phosphatase 02/08/2019 63     ALT 02/08/2019 31     AST 02/08/2019 26     Globulin 02/08/2019 2.9     Cholesterol, Total 02/08/2019 149     Triglycerides 02/08/2019 127     HDL 02/08/2019 40     LDL Calculated 02/08/2019 84     VLDL Cholesterol Calcula* 02/08/2019 25     Vit D, 25-Hydroxy 02/08/2019 42.6     TSH 02/08/2019 2.26        No results found for this visit on 06/13/19. Assessment:       1. Recurrent major depressive disorder, in partial remission ([address]    2. Restless legs syndrome    3. Anxiety        No results found for this visit on 06/13/19. Plan:       Alejandro Medina was seen today for anxiety.     Diagnoses and all orders for this visit:    Recurrent major depressive disorder, in partial remission ([facility] [address]  Appears well controlled on current medication regimen. Continue medication regimen. Educated if patient develops SI/HI/vipul to call 911 or go to ER. Discussed use, benefit, risks and side effects of prescribed medications. Barriers to compliance discussed. All patient questions answered. Pt voiced understanding. Restless legs syndrome  Appears well controlled on current medication regimen. Continue medication regimen. Anxiety  Chronic benzodiazepine treatment protocol was discussed with the patient again including taking medications only as prescribed , using one pharmacy and getting all controlled substances from one physician unless okayed with me. Proper safeguarding and disposal of medications were also discussed with the patient.     The current treatment regimen is needed to decrease the patient's anxiety symptoms, improve the quality of life and ability to function and improve sleep and mood symptoms.        Patient given following instructions - You are on medications which could impair your senses, you are at risk of weakness, falls, dizziness, or drowsiness. You should be careful during activities which could place you at risk of harm, such as climbing, using stairs, operating machinery, or driving vehicles. If you feel you cannot safely do these activities, you should request others to help you, or avoid the activities altogether. If you are drowsy for any other reason, you should use the same precautions as listed above.     The patient is made aware of the potential of drowsiness with the prescribed medications, and that care should be exercised in operating machinery, vehicles, or placing oneself in situations of risk to their health, ie heights and climbing and stairs.     The patient denies nausea, vomiting, diarrhea and constipation. No respiratory problems. No increased sleepiness, drowsiness or confusion.  The

## 2019-06-14 RX ORDER — CLONAZEPAM 2 MG/1
TABLET ORAL
Qty: 30 TABLET | Refills: 0 | Status: SHIPPED | OUTPATIENT
Start: 2019-06-14 | End: 2019-07-11 | Stop reason: SDUPTHER

## 2019-07-11 DIAGNOSIS — G25.81 RESTLESS LEGS SYNDROME: ICD-10-CM

## 2019-07-15 RX ORDER — CLONAZEPAM 2 MG/1
TABLET ORAL
Qty: 30 TABLET | Refills: 0 | Status: SHIPPED | OUTPATIENT
Start: 2019-07-15 | End: 2019-08-12 | Stop reason: SDUPTHER

## 2019-07-24 ENCOUNTER — OFFICE VISIT (OUTPATIENT)
Dept: PULMONOLOGY | Age: 79
End: 2019-07-24
Payer: MEDICARE

## 2019-07-24 VITALS
HEIGHT: 68 IN | BODY MASS INDEX: 36.22 KG/M2 | WEIGHT: 239 LBS | OXYGEN SATURATION: 95 % | DIASTOLIC BLOOD PRESSURE: 60 MMHG | SYSTOLIC BLOOD PRESSURE: 112 MMHG | HEART RATE: 60 BPM

## 2019-07-24 DIAGNOSIS — Z99.89 OSA ON CPAP: Primary | ICD-10-CM

## 2019-07-24 DIAGNOSIS — G47.33 OSA ON CPAP: Primary | ICD-10-CM

## 2019-07-24 DIAGNOSIS — J84.9 ILD (INTERSTITIAL LUNG DISEASE) (HCC): ICD-10-CM

## 2019-07-24 PROCEDURE — 99213 OFFICE O/P EST LOW 20 MIN: CPT | Performed by: INTERNAL MEDICINE

## 2019-07-24 ASSESSMENT — SLEEP AND FATIGUE QUESTIONNAIRES
HOW LIKELY ARE YOU TO NOD OFF OR FALL ASLEEP WHILE SITTING AND TALKING TO SOMEONE: 0
HOW LIKELY ARE YOU TO NOD OFF OR FALL ASLEEP WHILE SITTING QUIETLY AFTER LUNCH WITHOUT ALCOHOL: 1
NECK CIRCUMFERENCE (INCHES): 17.25
HOW LIKELY ARE YOU TO NOD OFF OR FALL ASLEEP WHILE SITTING INACTIVE IN A PUBLIC PLACE: 0
HOW LIKELY ARE YOU TO NOD OFF OR FALL ASLEEP WHILE LYING DOWN TO REST IN THE AFTERNOON WHEN CIRCUMSTANCES PERMIT: 3
HOW LIKELY ARE YOU TO NOD OFF OR FALL ASLEEP WHEN YOU ARE A PASSENGER IN A CAR FOR AN HOUR WITHOUT A BREAK: 2
HOW LIKELY ARE YOU TO NOD OFF OR FALL ASLEEP WHILE SITTING AND READING: 2
ESS TOTAL SCORE: 10
HOW LIKELY ARE YOU TO NOD OFF OR FALL ASLEEP IN A CAR, WHILE STOPPED FOR A FEW MINUTES IN TRAFFIC: 0
HOW LIKELY ARE YOU TO NOD OFF OR FALL ASLEEP WHILE WATCHING TV: 2

## 2019-07-24 NOTE — PROGRESS NOTES
tablet, Rfl: 1    omeprazole (PRILOSEC) 40 MG delayed release capsule, TAKE ONE CAPSULE BY MOUTH DAILY, Disp: 90 capsule, Rfl: 2    levothyroxine (SYNTHROID) 75 MCG tablet, TAKE ONE TABLET BY MOUTH DAILY, Disp: 90 tablet, Rfl: 1    venlafaxine (EFFEXOR) 75 MG tablet, TAKE ONE TABLET BY MOUTH TWICE A DAY, Disp: 180 tablet, Rfl: 1    amLODIPine (NORVASC) 10 MG tablet, TAKE ONE TABLET BY MOUTH DAILY, Disp: 90 tablet, Rfl: 1    simvastatin (ZOCOR) 10 MG tablet, TAKE ONE TABLET BY MOUTH ONCE NIGHTLY, Disp: 90 tablet, Rfl: 2    doxazosin (CARDURA) 8 MG tablet, TAKE ONE TABLET BY MOUTH TWICE A DAY, Disp: 180 tablet, Rfl: 1    methocarbamol (ROBAXIN) 500 MG tablet, Take 1 tablet by mouth 3 times daily as needed (spasm), Disp: 90 tablet, Rfl: 5    aspirin EC 81 MG EC tablet, Take 1 tablet by mouth daily, Disp: 30 tablet, Rfl: 3    magnesium gluconate (MAGONATE) 500 MG tablet, Take 500 mg by mouth daily , Disp: , Rfl:     albuterol sulfate (PROAIR RESPICLICK) 133 (90 BASE) MCG/ACT aerosol powder inhalation, Inhale 2 puffs into the lungs every 6 hours as needed for Wheezing or Shortness of Breath, Disp: 1 Inhaler, Rfl: 5    ranitidine (ZANTAC) 150 MG tablet, TAKE ONE TABLET BY MOUTH TWICE A DAY (Patient taking differently: TAKE ONE TABLET BY MOUTH TWICE A DAY as needed), Disp: 60 tablet, Rfl: 11    Vitamin D (CHOLECALCIFEROL) 1000 UNITS CAPS capsule, Take 1,000 Units by mouth daily. , Disp: , Rfl:     Calcium Carbonate-Vitamin D (CALCIUM + D) 600-200 MG-UNIT TABS, Take 600 mg by mouth daily. , Disp: , Rfl:       Objective:   PHYSICAL EXAM:        VITALS:    /60 (Site: Right Upper Arm, Position: Sitting, Cuff Size: Large Adult)   Pulse 60   Ht 5' 8\" (1.727 m)   Wt 239 lb (108.4 kg)   SpO2 95% Comment: RA  BMI 36.34 kg/m²     Constitutional: In no acute distress. Appears stated age. Obese. Eyes: PERRL. No sclera icterus. No conjunctival injection. ENT: No ocular or auricular discharge or visible masses.

## 2019-08-12 DIAGNOSIS — G25.81 RESTLESS LEGS SYNDROME: ICD-10-CM

## 2019-08-15 RX ORDER — CLONAZEPAM 2 MG/1
TABLET ORAL
Qty: 30 TABLET | Refills: 0 | Status: SHIPPED | OUTPATIENT
Start: 2019-08-15 | End: 2019-09-13 | Stop reason: SDUPTHER

## 2019-09-05 RX ORDER — DOXAZOSIN 8 MG/1
TABLET ORAL
Qty: 180 TABLET | Refills: 0 | Status: SHIPPED | OUTPATIENT
Start: 2019-09-05 | End: 2019-12-10 | Stop reason: SDUPTHER

## 2019-09-13 DIAGNOSIS — G25.81 RESTLESS LEGS SYNDROME: ICD-10-CM

## 2019-09-13 RX ORDER — CLONAZEPAM 2 MG/1
TABLET ORAL
Qty: 30 TABLET | Refills: 0 | Status: SHIPPED | OUTPATIENT
Start: 2019-09-13 | End: 2019-09-18 | Stop reason: ALTCHOICE

## 2019-09-18 ENCOUNTER — OFFICE VISIT (OUTPATIENT)
Dept: FAMILY MEDICINE CLINIC | Age: 79
End: 2019-09-18
Payer: MEDICARE

## 2019-09-18 VITALS
SYSTOLIC BLOOD PRESSURE: 138 MMHG | DIASTOLIC BLOOD PRESSURE: 74 MMHG | BODY MASS INDEX: 34.71 KG/M2 | HEART RATE: 64 BPM | OXYGEN SATURATION: 98 % | HEIGHT: 68 IN | WEIGHT: 229 LBS

## 2019-09-18 DIAGNOSIS — G25.81 RESTLESS LEGS SYNDROME: ICD-10-CM

## 2019-09-18 DIAGNOSIS — I20.9 ANGINA, CLASS III (HCC): ICD-10-CM

## 2019-09-18 DIAGNOSIS — I10 ESSENTIAL HYPERTENSION: ICD-10-CM

## 2019-09-18 DIAGNOSIS — Z12.5 SCREENING FOR PROSTATE CANCER: ICD-10-CM

## 2019-09-18 DIAGNOSIS — I20.8 ANGINAL EQUIVALENT (HCC): ICD-10-CM

## 2019-09-18 DIAGNOSIS — E55.9 VITAMIN D DEFICIENCY: ICD-10-CM

## 2019-09-18 DIAGNOSIS — I50.32 CHRONIC DIASTOLIC CONGESTIVE HEART FAILURE (HCC): Primary | ICD-10-CM

## 2019-09-18 DIAGNOSIS — E78.5 HYPERLIPIDEMIA, UNSPECIFIED HYPERLIPIDEMIA TYPE: ICD-10-CM

## 2019-09-18 DIAGNOSIS — E03.9 HYPOTHYROIDISM, UNSPECIFIED TYPE: ICD-10-CM

## 2019-09-18 LAB
A/G RATIO: 1.8 (ref 1.1–2.2)
ALBUMIN SERPL-MCNC: 4.8 G/DL (ref 3.4–5)
ALP BLD-CCNC: 62 U/L (ref 40–129)
ALT SERPL-CCNC: 28 U/L (ref 10–40)
ANION GAP SERPL CALCULATED.3IONS-SCNC: 15 MMOL/L (ref 3–16)
AST SERPL-CCNC: 26 U/L (ref 15–37)
BASOPHILS ABSOLUTE: 0.1 K/UL (ref 0–0.2)
BASOPHILS RELATIVE PERCENT: 1 %
BILIRUB SERPL-MCNC: 0.7 MG/DL (ref 0–1)
BUN BLDV-MCNC: 17 MG/DL (ref 7–20)
CALCIUM SERPL-MCNC: 9.5 MG/DL (ref 8.3–10.6)
CHLORIDE BLD-SCNC: 101 MMOL/L (ref 99–110)
CHOLESTEROL, TOTAL: 144 MG/DL (ref 0–199)
CO2: 25 MMOL/L (ref 21–32)
CREAT SERPL-MCNC: 1.2 MG/DL (ref 0.8–1.3)
EOSINOPHILS ABSOLUTE: 0.2 K/UL (ref 0–0.6)
EOSINOPHILS RELATIVE PERCENT: 3.4 %
GFR AFRICAN AMERICAN: >60
GFR NON-AFRICAN AMERICAN: 59
GLOBULIN: 2.7 G/DL
GLUCOSE BLD-MCNC: 101 MG/DL (ref 70–99)
HCT VFR BLD CALC: 43.3 % (ref 40.5–52.5)
HDLC SERPL-MCNC: 47 MG/DL (ref 40–60)
HEMOGLOBIN: 14.7 G/DL (ref 13.5–17.5)
LDL CHOLESTEROL CALCULATED: 72 MG/DL
LYMPHOCYTES ABSOLUTE: 1.6 K/UL (ref 1–5.1)
LYMPHOCYTES RELATIVE PERCENT: 22.8 %
MCH RBC QN AUTO: 31.9 PG (ref 26–34)
MCHC RBC AUTO-ENTMCNC: 34 G/DL (ref 31–36)
MCV RBC AUTO: 93.6 FL (ref 80–100)
MONOCYTES ABSOLUTE: 0.5 K/UL (ref 0–1.3)
MONOCYTES RELATIVE PERCENT: 7.1 %
NEUTROPHILS ABSOLUTE: 4.5 K/UL (ref 1.7–7.7)
NEUTROPHILS RELATIVE PERCENT: 65.7 %
PDW BLD-RTO: 14.2 % (ref 12.4–15.4)
PLATELET # BLD: 219 K/UL (ref 135–450)
PMV BLD AUTO: 7.2 FL (ref 5–10.5)
POTASSIUM SERPL-SCNC: 3.6 MMOL/L (ref 3.5–5.1)
PROSTATE SPECIFIC ANTIGEN: 2.62 NG/ML (ref 0–4)
RBC # BLD: 4.63 M/UL (ref 4.2–5.9)
SODIUM BLD-SCNC: 141 MMOL/L (ref 136–145)
TOTAL PROTEIN: 7.5 G/DL (ref 6.4–8.2)
TRIGL SERPL-MCNC: 125 MG/DL (ref 0–150)
TSH SERPL DL<=0.05 MIU/L-ACNC: 3.02 UIU/ML (ref 0.27–4.2)
VLDLC SERPL CALC-MCNC: 25 MG/DL
WBC # BLD: 6.8 K/UL (ref 4–11)

## 2019-09-18 PROCEDURE — 36415 COLL VENOUS BLD VENIPUNCTURE: CPT | Performed by: NURSE PRACTITIONER

## 2019-09-18 PROCEDURE — G0444 DEPRESSION SCREEN ANNUAL: HCPCS | Performed by: NURSE PRACTITIONER

## 2019-09-18 PROCEDURE — 99215 OFFICE O/P EST HI 40 MIN: CPT | Performed by: NURSE PRACTITIONER

## 2019-09-18 RX ORDER — TRIAMTERENE AND HYDROCHLOROTHIAZIDE 37.5; 25 MG/1; MG/1
TABLET ORAL
Qty: 90 TABLET | Refills: 1 | Status: SHIPPED | OUTPATIENT
Start: 2019-09-18 | End: 2020-02-10 | Stop reason: SDUPTHER

## 2019-09-18 RX ORDER — PRAMIPEXOLE DIHYDROCHLORIDE 1 MG/1
1 TABLET ORAL NIGHTLY
Qty: 30 TABLET | Refills: 1 | Status: SHIPPED | OUTPATIENT
Start: 2019-09-18 | End: 2019-10-23 | Stop reason: SDUPTHER

## 2019-09-18 RX ORDER — AMLODIPINE BESYLATE 10 MG/1
TABLET ORAL
Qty: 90 TABLET | Refills: 1 | Status: SHIPPED | OUTPATIENT
Start: 2019-09-18 | End: 2020-02-10 | Stop reason: SDUPTHER

## 2019-09-18 ASSESSMENT — PATIENT HEALTH QUESTIONNAIRE - PHQ9
7. TROUBLE CONCENTRATING ON THINGS, SUCH AS READING THE NEWSPAPER OR WATCHING TELEVISION: 0
6. FEELING BAD ABOUT YOURSELF - OR THAT YOU ARE A FAILURE OR HAVE LET YOURSELF OR YOUR FAMILY DOWN: 0
SUM OF ALL RESPONSES TO PHQ QUESTIONS 1-9: 5
1. LITTLE INTEREST OR PLEASURE IN DOING THINGS: 1
SUM OF ALL RESPONSES TO PHQ9 QUESTIONS 1 & 2: 1
8. MOVING OR SPEAKING SO SLOWLY THAT OTHER PEOPLE COULD HAVE NOTICED. OR THE OPPOSITE, BEING SO FIGETY OR RESTLESS THAT YOU HAVE BEEN MOVING AROUND A LOT MORE THAN USUAL: 0
2. FEELING DOWN, DEPRESSED OR HOPELESS: 0
5. POOR APPETITE OR OVEREATING: 1
SUM OF ALL RESPONSES TO PHQ QUESTIONS 1-9: 5
9. THOUGHTS THAT YOU WOULD BE BETTER OFF DEAD, OR OF HURTING YOURSELF: 0
3. TROUBLE FALLING OR STAYING ASLEEP: 2
4. FEELING TIRED OR HAVING LITTLE ENERGY: 1
10. IF YOU CHECKED OFF ANY PROBLEMS, HOW DIFFICULT HAVE THESE PROBLEMS MADE IT FOR YOU TO DO YOUR WORK, TAKE CARE OF THINGS AT HOME, OR GET ALONG WITH OTHER PEOPLE: 1

## 2019-09-18 ASSESSMENT — ENCOUNTER SYMPTOMS
SHORTNESS OF BREATH: 1
EYES NEGATIVE: 1
CHEST TIGHTNESS: 1
GASTROINTESTINAL NEGATIVE: 1

## 2019-09-18 NOTE — PROGRESS NOTES
Subjective:     Patient Name: Shiva Quezada is a 69 y.o. male. No chief complaint on file. HPI   Congestive Heart Failure  Patient presents for re-evaluation of congestive heart failure. Patient's current complaints are chest pressure/discomfort, dyspnea, fatigue and lower extremity edema. He denies chest pain, claudication, exertional chest pressure/discomfort, irregular heart beat, near-syncope, orthopnea, palpitations, paroxysmal nocturnal dyspnea, syncope and tachypnea. He states he is compliant all of the time with his medications. He states he is compliant most of the time with his diet. Angina  Patient is a 69 y.o. male who presents for follow-up of angina. Quality of the pain is described as pressure and heaviness. Location: retrosternal area  Occurs randomly. Duration: intermittent (1-10 minutes). The pain does not radiate. The symptoms occur weekly. Associated symptoms described are none. The pattern of symptoms has been stable. Precipitating factors are none noted. Factors which relieve the discomfort are eating. The pain began years ago but has reoccurred about 3-4 week(s) ago. The severity on a scale of 1 to 10 is 0. Patient has not seen cardiology in 1 year. The patient was supposed to follow-up with cardiology approximately March 2019. Hypothyroidism: Recent symptoms: none. He denies fatigue, weight gain, weight loss, cold intolerance, heat intolerance, hair loss, dry skin, constipation, diarrhea, edema, anxiety, palpitations and dysphagia. Patient is  taking his medication consistently on an empty stomach.     Lab Results   Component Value Date    TSHREFLEX 4.03 01/10/2017    TSHREFLEX 4.05 03/23/2016    TSHREFLEX 4.14 12/15/2015     Lab Results   Component Value Date    TSH 2.26 02/08/2019    TSH 2.17 08/15/2018    TSH 3.16 08/03/2017      Restless Leg Syndrome  Patient has been complaining of cramps and spasms in the lower extremities, especially at this is not feasible, patient is instructed to report to the emergency room. Medication profile reviewed. Medication side effects and possible impairments from medications were discussed as applicable. Allergies were reviewed. Health maintenance was reviewed and updated as appropriate.

## 2019-09-19 LAB — VITAMIN D 25-HYDROXY: 39.7 NG/ML

## 2019-10-04 RX ORDER — POTASSIUM CHLORIDE 750 MG/1
TABLET, FILM COATED, EXTENDED RELEASE ORAL
Qty: 30 TABLET | Refills: 3 | Status: SHIPPED | OUTPATIENT
Start: 2019-10-04 | End: 2020-02-07 | Stop reason: SDUPTHER

## 2019-10-09 ENCOUNTER — OFFICE VISIT (OUTPATIENT)
Dept: CARDIOLOGY CLINIC | Age: 79
End: 2019-10-09
Payer: MEDICARE

## 2019-10-09 VITALS
OXYGEN SATURATION: 98 % | HEART RATE: 58 BPM | DIASTOLIC BLOOD PRESSURE: 74 MMHG | HEIGHT: 68 IN | SYSTOLIC BLOOD PRESSURE: 144 MMHG | BODY MASS INDEX: 35.77 KG/M2 | WEIGHT: 236 LBS

## 2019-10-09 DIAGNOSIS — E78.5 HYPERLIPIDEMIA, UNSPECIFIED HYPERLIPIDEMIA TYPE: ICD-10-CM

## 2019-10-09 DIAGNOSIS — I10 ESSENTIAL HYPERTENSION: ICD-10-CM

## 2019-10-09 DIAGNOSIS — R07.9 CHEST PAIN, UNSPECIFIED TYPE: ICD-10-CM

## 2019-10-09 DIAGNOSIS — I25.110 CORONARY ARTERY DISEASE INVOLVING NATIVE CORONARY ARTERY OF NATIVE HEART WITH UNSTABLE ANGINA PECTORIS (HCC): Primary | ICD-10-CM

## 2019-10-09 PROCEDURE — 93000 ELECTROCARDIOGRAM COMPLETE: CPT | Performed by: INTERNAL MEDICINE

## 2019-10-09 PROCEDURE — 99215 OFFICE O/P EST HI 40 MIN: CPT | Performed by: INTERNAL MEDICINE

## 2019-10-10 ENCOUNTER — TELEPHONE (OUTPATIENT)
Dept: CARDIOLOGY CLINIC | Age: 79
End: 2019-10-10

## 2019-10-15 ENCOUNTER — HOSPITAL ENCOUNTER (INPATIENT)
Dept: CARDIAC CATH/INVASIVE PROCEDURES | Age: 79
LOS: 1 days | Discharge: HOME | DRG: 247 | End: 2019-10-16
Attending: INTERNAL MEDICINE | Admitting: INTERNAL MEDICINE
Payer: MEDICARE

## 2019-10-15 PROBLEM — I25.10 CAD IN NATIVE ARTERY: Status: ACTIVE | Noted: 2019-10-15

## 2019-10-15 LAB
ANION GAP SERPL CALCULATED.3IONS-SCNC: 13 MMOL/L (ref 3–16)
BUN BLDV-MCNC: 19 MG/DL (ref 7–20)
CALCIUM SERPL-MCNC: 9.5 MG/DL (ref 8.3–10.6)
CHLORIDE BLD-SCNC: 100 MMOL/L (ref 99–110)
CHOLESTEROL, TOTAL: 148 MG/DL (ref 0–199)
CO2: 28 MMOL/L (ref 21–32)
CREAT SERPL-MCNC: 1 MG/DL (ref 0.8–1.3)
GFR AFRICAN AMERICAN: >60
GFR NON-AFRICAN AMERICAN: >60
GLUCOSE BLD-MCNC: 107 MG/DL (ref 70–99)
HCT VFR BLD CALC: 42.1 % (ref 40.5–52.5)
HDLC SERPL-MCNC: 48 MG/DL (ref 40–60)
HEMOGLOBIN: 14.7 G/DL (ref 13.5–17.5)
INR BLD: 1.13 (ref 0.86–1.14)
LDL CHOLESTEROL CALCULATED: 74 MG/DL
MCH RBC QN AUTO: 32 PG (ref 26–34)
MCHC RBC AUTO-ENTMCNC: 35 G/DL (ref 31–36)
MCV RBC AUTO: 91.3 FL (ref 80–100)
PDW BLD-RTO: 13.5 % (ref 12.4–15.4)
PLATELET # BLD: 207 K/UL (ref 135–450)
PLATELET # BLD: 225 K/UL (ref 135–450)
PMV BLD AUTO: 7 FL (ref 5–10.5)
POC ACT LR: 203 SEC
POC ACT LR: 237 SEC
POTASSIUM REFLEX MAGNESIUM: 3.6 MMOL/L (ref 3.5–5.1)
PROTHROMBIN TIME: 12.8 SEC (ref 9.8–13)
RBC # BLD: 4.61 M/UL (ref 4.2–5.9)
SODIUM BLD-SCNC: 141 MMOL/L (ref 136–145)
TRIGL SERPL-MCNC: 129 MG/DL (ref 0–150)
VLDLC SERPL CALC-MCNC: 26 MG/DL
WBC # BLD: 8 K/UL (ref 4–11)

## 2019-10-15 PROCEDURE — B2151ZZ FLUOROSCOPY OF LEFT HEART USING LOW OSMOLAR CONTRAST: ICD-10-PCS | Performed by: INTERNAL MEDICINE

## 2019-10-15 PROCEDURE — 6360000002 HC RX W HCPCS

## 2019-10-15 PROCEDURE — 80048 BASIC METABOLIC PNL TOTAL CA: CPT

## 2019-10-15 PROCEDURE — 36415 COLL VENOUS BLD VENIPUNCTURE: CPT

## 2019-10-15 PROCEDURE — C1725 CATH, TRANSLUMIN NON-LASER: HCPCS

## 2019-10-15 PROCEDURE — 85347 COAGULATION TIME ACTIVATED: CPT

## 2019-10-15 PROCEDURE — 93005 ELECTROCARDIOGRAM TRACING: CPT | Performed by: INTERNAL MEDICINE

## 2019-10-15 PROCEDURE — C1887 CATHETER, GUIDING: HCPCS

## 2019-10-15 PROCEDURE — 85049 AUTOMATED PLATELET COUNT: CPT

## 2019-10-15 PROCEDURE — 027034Z DILATION OF CORONARY ARTERY, ONE ARTERY WITH DRUG-ELUTING INTRALUMINAL DEVICE, PERCUTANEOUS APPROACH: ICD-10-PCS | Performed by: INTERNAL MEDICINE

## 2019-10-15 PROCEDURE — 2060000000 HC ICU INTERMEDIATE R&B

## 2019-10-15 PROCEDURE — 4A023N8 MEASUREMENT OF CARDIAC SAMPLING AND PRESSURE, BILATERAL, PERCUTANEOUS APPROACH: ICD-10-PCS | Performed by: INTERNAL MEDICINE

## 2019-10-15 PROCEDURE — B2111ZZ FLUOROSCOPY OF MULTIPLE CORONARY ARTERIES USING LOW OSMOLAR CONTRAST: ICD-10-PCS | Performed by: INTERNAL MEDICINE

## 2019-10-15 PROCEDURE — 80061 LIPID PANEL: CPT

## 2019-10-15 PROCEDURE — C9600 PERC DRUG-EL COR STENT SING: HCPCS

## 2019-10-15 PROCEDURE — 6370000000 HC RX 637 (ALT 250 FOR IP): Performed by: INTERNAL MEDICINE

## 2019-10-15 PROCEDURE — 2709999900 HC NON-CHARGEABLE SUPPLY

## 2019-10-15 PROCEDURE — 93458 L HRT ARTERY/VENTRICLE ANGIO: CPT

## 2019-10-15 PROCEDURE — 6360000004 HC RX CONTRAST MEDICATION

## 2019-10-15 PROCEDURE — 99152 MOD SED SAME PHYS/QHP 5/>YRS: CPT

## 2019-10-15 PROCEDURE — 99291 CRITICAL CARE FIRST HOUR: CPT | Performed by: INTERNAL MEDICINE

## 2019-10-15 PROCEDURE — 85027 COMPLETE CBC AUTOMATED: CPT

## 2019-10-15 PROCEDURE — C1769 GUIDE WIRE: HCPCS

## 2019-10-15 PROCEDURE — 2720000010 HC SURG SUPPLY STERILE

## 2019-10-15 PROCEDURE — 6360000002 HC RX W HCPCS: Performed by: INTERNAL MEDICINE

## 2019-10-15 PROCEDURE — C1874 STENT, COATED/COV W/DEL SYS: HCPCS

## 2019-10-15 PROCEDURE — 2500000003 HC RX 250 WO HCPCS

## 2019-10-15 PROCEDURE — 2580000003 HC RX 258

## 2019-10-15 PROCEDURE — 6370000000 HC RX 637 (ALT 250 FOR IP)

## 2019-10-15 PROCEDURE — 93571 IV DOP VEL&/PRESS C FLO 1ST: CPT

## 2019-10-15 PROCEDURE — 99153 MOD SED SAME PHYS/QHP EA: CPT

## 2019-10-15 PROCEDURE — 85610 PROTHROMBIN TIME: CPT

## 2019-10-15 PROCEDURE — 2580000003 HC RX 258: Performed by: INTERNAL MEDICINE

## 2019-10-15 PROCEDURE — C1894 INTRO/SHEATH, NON-LASER: HCPCS

## 2019-10-15 RX ORDER — MIDAZOLAM HYDROCHLORIDE 1 MG/ML
INJECTION INTRAMUSCULAR; INTRAVENOUS
Status: COMPLETED | OUTPATIENT
Start: 2019-10-15 | End: 2019-10-15

## 2019-10-15 RX ORDER — METHOCARBAMOL 500 MG/1
500 TABLET, FILM COATED ORAL 3 TIMES DAILY PRN
Status: DISCONTINUED | OUTPATIENT
Start: 2019-10-15 | End: 2019-10-16 | Stop reason: HOSPADM

## 2019-10-15 RX ORDER — VENLAFAXINE 37.5 MG/1
75 TABLET ORAL 2 TIMES DAILY
Status: DISCONTINUED | OUTPATIENT
Start: 2019-10-15 | End: 2019-10-16 | Stop reason: HOSPADM

## 2019-10-15 RX ORDER — FAMOTIDINE 20 MG/1
40 TABLET, FILM COATED ORAL DAILY
Status: DISCONTINUED | OUTPATIENT
Start: 2019-10-15 | End: 2019-10-16 | Stop reason: HOSPADM

## 2019-10-15 RX ORDER — PRAMIPEXOLE DIHYDROCHLORIDE 1 MG/1
1 TABLET ORAL NIGHTLY
Status: DISCONTINUED | OUTPATIENT
Start: 2019-10-15 | End: 2019-10-16 | Stop reason: HOSPADM

## 2019-10-15 RX ORDER — POTASSIUM CHLORIDE 750 MG/1
10 TABLET, EXTENDED RELEASE ORAL DAILY
Status: DISCONTINUED | OUTPATIENT
Start: 2019-10-15 | End: 2019-10-16 | Stop reason: HOSPADM

## 2019-10-15 RX ORDER — ONDANSETRON 2 MG/ML
4 INJECTION INTRAMUSCULAR; INTRAVENOUS EVERY 6 HOURS PRN
Status: DISCONTINUED | OUTPATIENT
Start: 2019-10-15 | End: 2019-10-16 | Stop reason: HOSPADM

## 2019-10-15 RX ORDER — SODIUM CHLORIDE 0.9 % (FLUSH) 0.9 %
10 SYRINGE (ML) INJECTION EVERY 12 HOURS SCHEDULED
Status: DISCONTINUED | OUTPATIENT
Start: 2019-10-15 | End: 2019-10-16 | Stop reason: HOSPADM

## 2019-10-15 RX ORDER — SODIUM CHLORIDE 0.9 % (FLUSH) 0.9 %
10 SYRINGE (ML) INJECTION PRN
Status: DISCONTINUED | OUTPATIENT
Start: 2019-10-15 | End: 2019-10-16 | Stop reason: HOSPADM

## 2019-10-15 RX ORDER — ASPIRIN 81 MG/1
81 TABLET ORAL DAILY
Status: DISCONTINUED | OUTPATIENT
Start: 2019-10-15 | End: 2019-10-16 | Stop reason: HOSPADM

## 2019-10-15 RX ORDER — AMLODIPINE BESYLATE 5 MG/1
10 TABLET ORAL DAILY
Status: DISCONTINUED | OUTPATIENT
Start: 2019-10-15 | End: 2019-10-16 | Stop reason: HOSPADM

## 2019-10-15 RX ORDER — FENTANYL CITRATE 50 UG/ML
INJECTION, SOLUTION INTRAMUSCULAR; INTRAVENOUS
Status: COMPLETED | OUTPATIENT
Start: 2019-10-15 | End: 2019-10-15

## 2019-10-15 RX ORDER — CLOPIDOGREL BISULFATE 75 MG/1
TABLET ORAL
Status: COMPLETED | OUTPATIENT
Start: 2019-10-15 | End: 2019-10-15

## 2019-10-15 RX ORDER — MAGNESIUM GLUCONATE 27 MG(500)
500 TABLET ORAL DAILY
Status: DISCONTINUED | OUTPATIENT
Start: 2019-10-15 | End: 2019-10-16 | Stop reason: HOSPADM

## 2019-10-15 RX ORDER — CLOPIDOGREL BISULFATE 75 MG/1
75 TABLET ORAL DAILY
Status: DISCONTINUED | OUTPATIENT
Start: 2019-10-16 | End: 2019-10-16 | Stop reason: HOSPADM

## 2019-10-15 RX ORDER — TRIAMCINOLONE ACETONIDE 1 MG/G
CREAM TOPICAL
Status: DISCONTINUED | OUTPATIENT
Start: 2019-10-15 | End: 2019-10-16 | Stop reason: HOSPADM

## 2019-10-15 RX ORDER — SODIUM CHLORIDE 9 MG/ML
INJECTION, SOLUTION INTRAVENOUS CONTINUOUS
Status: ACTIVE | OUTPATIENT
Start: 2019-10-15 | End: 2019-10-15

## 2019-10-15 RX ORDER — ATORVASTATIN CALCIUM 80 MG/1
80 TABLET, FILM COATED ORAL NIGHTLY
Status: DISCONTINUED | OUTPATIENT
Start: 2019-10-15 | End: 2019-10-16 | Stop reason: HOSPADM

## 2019-10-15 RX ORDER — EPTIFIBATIDE 0.75 MG/ML
2 INJECTION, SOLUTION INTRAVENOUS CONTINUOUS
Status: DISCONTINUED | OUTPATIENT
Start: 2019-10-15 | End: 2019-10-16

## 2019-10-15 RX ORDER — PANTOPRAZOLE SODIUM 40 MG/1
40 TABLET, DELAYED RELEASE ORAL
Status: DISCONTINUED | OUTPATIENT
Start: 2019-10-16 | End: 2019-10-16 | Stop reason: HOSPADM

## 2019-10-15 RX ORDER — TRIAMTERENE AND HYDROCHLOROTHIAZIDE 37.5; 25 MG/1; MG/1
1 TABLET ORAL DAILY
Status: DISCONTINUED | OUTPATIENT
Start: 2019-10-15 | End: 2019-10-16 | Stop reason: HOSPADM

## 2019-10-15 RX ORDER — LEVOTHYROXINE SODIUM 0.15 MG/1
75 TABLET ORAL DAILY
Status: DISCONTINUED | OUTPATIENT
Start: 2019-10-15 | End: 2019-10-16 | Stop reason: HOSPADM

## 2019-10-15 RX ORDER — ASPIRIN 81 MG/1
TABLET, CHEWABLE ORAL
Status: COMPLETED | OUTPATIENT
Start: 2019-10-15 | End: 2019-10-15

## 2019-10-15 RX ORDER — DOXAZOSIN 2 MG/1
8 TABLET ORAL 2 TIMES DAILY
Status: DISCONTINUED | OUTPATIENT
Start: 2019-10-15 | End: 2019-10-16 | Stop reason: HOSPADM

## 2019-10-15 RX ORDER — CALCIUM CARBONATE/VITAMIN D3 250-3.125
500 TABLET ORAL DAILY
Status: DISCONTINUED | OUTPATIENT
Start: 2019-10-15 | End: 2019-10-16 | Stop reason: HOSPADM

## 2019-10-15 RX ADMIN — FENTANYL CITRATE 50 MCG: 50 INJECTION, SOLUTION INTRAMUSCULAR; INTRAVENOUS at 11:34

## 2019-10-15 RX ADMIN — SODIUM CHLORIDE: 9 INJECTION, SOLUTION INTRAVENOUS at 12:50

## 2019-10-15 RX ADMIN — ASPIRIN 324 MG: 81 TABLET, CHEWABLE ORAL at 12:18

## 2019-10-15 RX ADMIN — EPTIFIBATIDE 2 MCG/KG/MIN: 0.75 INJECTION, SOLUTION INTRAVENOUS at 20:12

## 2019-10-15 RX ADMIN — CLOPIDOGREL BISULFATE 600 MG: 75 TABLET ORAL at 12:18

## 2019-10-15 RX ADMIN — PRAMIPEXOLE DIHYDROCHLORIDE 1 MG: 1 TABLET ORAL at 20:15

## 2019-10-15 RX ADMIN — VENLAFAXINE 75 MG: 37.5 TABLET ORAL at 20:14

## 2019-10-15 RX ADMIN — DOXAZOSIN 8 MG: 2 TABLET ORAL at 20:14

## 2019-10-15 RX ADMIN — EPTIFIBATIDE 2 MCG/KG/MIN: 0.75 INJECTION, SOLUTION INTRAVENOUS at 12:22

## 2019-10-15 RX ADMIN — MIDAZOLAM HYDROCHLORIDE 1 MG: 1 INJECTION INTRAMUSCULAR; INTRAVENOUS at 11:23

## 2019-10-15 RX ADMIN — FENTANYL CITRATE 50 MCG: 50 INJECTION, SOLUTION INTRAMUSCULAR; INTRAVENOUS at 11:23

## 2019-10-15 RX ADMIN — EPTIFIBATIDE 2 MCG/KG/MIN: 0.75 INJECTION, SOLUTION INTRAVENOUS at 13:38

## 2019-10-15 RX ADMIN — MIDAZOLAM HYDROCHLORIDE 1 MG: 1 INJECTION INTRAMUSCULAR; INTRAVENOUS at 11:28

## 2019-10-15 RX ADMIN — ATORVASTATIN CALCIUM 80 MG: 80 TABLET, FILM COATED ORAL at 20:15

## 2019-10-15 ASSESSMENT — PAIN SCALES - GENERAL: PAINLEVEL_OUTOF10: 0

## 2019-10-16 VITALS
RESPIRATION RATE: 16 BRPM | HEIGHT: 68 IN | WEIGHT: 236 LBS | OXYGEN SATURATION: 98 % | HEART RATE: 58 BPM | DIASTOLIC BLOOD PRESSURE: 70 MMHG | SYSTOLIC BLOOD PRESSURE: 159 MMHG | BODY MASS INDEX: 35.77 KG/M2 | TEMPERATURE: 97.8 F

## 2019-10-16 LAB
ANION GAP SERPL CALCULATED.3IONS-SCNC: 11 MMOL/L (ref 3–16)
BUN BLDV-MCNC: 15 MG/DL (ref 7–20)
CALCIUM SERPL-MCNC: 9 MG/DL (ref 8.3–10.6)
CHLORIDE BLD-SCNC: 95 MMOL/L (ref 99–110)
CO2: 29 MMOL/L (ref 21–32)
CREAT SERPL-MCNC: 1 MG/DL (ref 0.8–1.3)
EKG ATRIAL RATE: 54 BPM
EKG ATRIAL RATE: 56 BPM
EKG ATRIAL RATE: 58 BPM
EKG DIAGNOSIS: NORMAL
EKG P AXIS: 36 DEGREES
EKG P AXIS: 38 DEGREES
EKG P AXIS: 48 DEGREES
EKG P-R INTERVAL: 202 MS
EKG P-R INTERVAL: 212 MS
EKG P-R INTERVAL: 214 MS
EKG Q-T INTERVAL: 370 MS
EKG Q-T INTERVAL: 400 MS
EKG Q-T INTERVAL: 446 MS
EKG QRS DURATION: 102 MS
EKG QRS DURATION: 106 MS
EKG QRS DURATION: 112 MS
EKG QTC CALCULATION (BAZETT): 344 MS
EKG QTC CALCULATION (BAZETT): 379 MS
EKG QTC CALCULATION (BAZETT): 430 MS
EKG R AXIS: -39 DEGREES
EKG R AXIS: -40 DEGREES
EKG R AXIS: -43 DEGREES
EKG T AXIS: 110 DEGREES
EKG T AXIS: 111 DEGREES
EKG T AXIS: 115 DEGREES
EKG VENTRICULAR RATE: 52 BPM
EKG VENTRICULAR RATE: 54 BPM
EKG VENTRICULAR RATE: 56 BPM
GFR AFRICAN AMERICAN: >60
GFR NON-AFRICAN AMERICAN: >60
GLUCOSE BLD-MCNC: 107 MG/DL (ref 70–99)
PLATELET # BLD: 201 K/UL (ref 135–450)
PLATELET # BLD: 205 K/UL (ref 135–450)
POTASSIUM SERPL-SCNC: 3.1 MMOL/L (ref 3.5–5.1)
SODIUM BLD-SCNC: 135 MMOL/L (ref 136–145)

## 2019-10-16 PROCEDURE — 36415 COLL VENOUS BLD VENIPUNCTURE: CPT

## 2019-10-16 PROCEDURE — 2580000003 HC RX 258: Performed by: INTERNAL MEDICINE

## 2019-10-16 PROCEDURE — 6370000000 HC RX 637 (ALT 250 FOR IP): Performed by: NURSE PRACTITIONER

## 2019-10-16 PROCEDURE — 99239 HOSP IP/OBS DSCHRG MGMT >30: CPT | Performed by: NURSE PRACTITIONER

## 2019-10-16 PROCEDURE — 93010 ELECTROCARDIOGRAM REPORT: CPT | Performed by: INTERNAL MEDICINE

## 2019-10-16 PROCEDURE — 6370000000 HC RX 637 (ALT 250 FOR IP): Performed by: INTERNAL MEDICINE

## 2019-10-16 PROCEDURE — 85049 AUTOMATED PLATELET COUNT: CPT

## 2019-10-16 PROCEDURE — 93005 ELECTROCARDIOGRAM TRACING: CPT | Performed by: INTERNAL MEDICINE

## 2019-10-16 PROCEDURE — 80048 BASIC METABOLIC PNL TOTAL CA: CPT

## 2019-10-16 RX ORDER — FAMOTIDINE 40 MG/1
40 TABLET, FILM COATED ORAL DAILY
Qty: 60 TABLET | Refills: 3 | COMMUNITY
Start: 2019-10-17 | End: 2019-10-23

## 2019-10-16 RX ORDER — POTASSIUM CHLORIDE 20 MEQ/1
40 TABLET, EXTENDED RELEASE ORAL ONCE
Status: COMPLETED | OUTPATIENT
Start: 2019-10-16 | End: 2019-10-16

## 2019-10-16 RX ORDER — NITROGLYCERIN 0.4 MG/1
0.4 TABLET SUBLINGUAL EVERY 5 MIN PRN
Qty: 25 TABLET | Refills: 3 | Status: SHIPPED | OUTPATIENT
Start: 2019-10-16 | End: 2022-01-13 | Stop reason: SDUPTHER

## 2019-10-16 RX ORDER — CLOPIDOGREL BISULFATE 75 MG/1
75 TABLET ORAL DAILY
Qty: 30 TABLET | Refills: 11 | Status: SHIPPED | OUTPATIENT
Start: 2019-10-17 | End: 2020-02-10 | Stop reason: SDUPTHER

## 2019-10-16 RX ADMIN — PANTOPRAZOLE SODIUM 40 MG: 40 TABLET, DELAYED RELEASE ORAL at 06:21

## 2019-10-16 RX ADMIN — ASPIRIN 81 MG: 81 TABLET ORAL at 08:02

## 2019-10-16 RX ADMIN — AMLODIPINE BESYLATE 10 MG: 5 TABLET ORAL at 08:02

## 2019-10-16 RX ADMIN — Medication 10 ML: at 08:02

## 2019-10-16 RX ADMIN — POTASSIUM CHLORIDE 10 MEQ: 750 TABLET, EXTENDED RELEASE ORAL at 08:02

## 2019-10-16 RX ADMIN — POTASSIUM CHLORIDE 40 MEQ: 20 TABLET, EXTENDED RELEASE ORAL at 08:34

## 2019-10-16 RX ADMIN — FAMOTIDINE 40 MG: 20 TABLET ORAL at 08:02

## 2019-10-16 RX ADMIN — TRIAMTERENE AND HYDROCHLOROTHIAZIDE 1 TABLET: 37.5; 25 TABLET ORAL at 08:09

## 2019-10-16 RX ADMIN — VITAMIN D, TAB 1000IU (100/BT) 1000 UNITS: 25 TAB at 08:02

## 2019-10-16 RX ADMIN — LEVOTHYROXINE SODIUM 75 MCG: 150 TABLET ORAL at 06:21

## 2019-10-16 RX ADMIN — VENLAFAXINE 75 MG: 37.5 TABLET ORAL at 08:09

## 2019-10-16 RX ADMIN — CALCIUM CARBONATE-CHOLECALCIFEROL TAB 250 MG-125 UNIT 500 MG: 250-125 TAB at 08:02

## 2019-10-16 RX ADMIN — DOXAZOSIN 8 MG: 2 TABLET ORAL at 08:02

## 2019-10-16 RX ADMIN — CLOPIDOGREL BISULFATE 75 MG: 75 TABLET ORAL at 08:02

## 2019-10-16 ASSESSMENT — PAIN SCALES - GENERAL: PAINLEVEL_OUTOF10: 0

## 2019-10-17 ENCOUNTER — TELEPHONE (OUTPATIENT)
Dept: PHARMACY | Facility: CLINIC | Age: 79
End: 2019-10-17

## 2019-10-18 LAB
POC ACT LR: 162 SEC
POC ACT LR: 175 SEC

## 2019-10-23 ENCOUNTER — OFFICE VISIT (OUTPATIENT)
Dept: FAMILY MEDICINE CLINIC | Age: 79
End: 2019-10-23
Payer: MEDICARE

## 2019-10-23 VITALS
OXYGEN SATURATION: 98 % | BODY MASS INDEX: 35.61 KG/M2 | SYSTOLIC BLOOD PRESSURE: 138 MMHG | DIASTOLIC BLOOD PRESSURE: 72 MMHG | HEART RATE: 55 BPM | WEIGHT: 235 LBS | HEIGHT: 68 IN

## 2019-10-23 DIAGNOSIS — R07.9 CHEST PAIN, UNSPECIFIED TYPE: ICD-10-CM

## 2019-10-23 DIAGNOSIS — R00.1 BRADYCARDIA: ICD-10-CM

## 2019-10-23 DIAGNOSIS — Z09 HOSPITAL DISCHARGE FOLLOW-UP: ICD-10-CM

## 2019-10-23 DIAGNOSIS — Z23 FLU VACCINE NEED: ICD-10-CM

## 2019-10-23 DIAGNOSIS — I25.10 CAD IN NATIVE ARTERY: ICD-10-CM

## 2019-10-23 DIAGNOSIS — I50.32 CHRONIC DIASTOLIC CONGESTIVE HEART FAILURE (HCC): ICD-10-CM

## 2019-10-23 DIAGNOSIS — I25.110 CORONARY ARTERY DISEASE INVOLVING NATIVE CORONARY ARTERY OF NATIVE HEART WITH UNSTABLE ANGINA PECTORIS (HCC): Primary | ICD-10-CM

## 2019-10-23 PROCEDURE — 1111F DSCHRG MED/CURRENT MED MERGE: CPT | Performed by: NURSE PRACTITIONER

## 2019-10-23 PROCEDURE — 99495 TRANSJ CARE MGMT MOD F2F 14D: CPT | Performed by: NURSE PRACTITIONER

## 2019-10-23 PROCEDURE — G0008 ADMIN INFLUENZA VIRUS VAC: HCPCS | Performed by: NURSE PRACTITIONER

## 2019-10-23 PROCEDURE — 90686 IIV4 VACC NO PRSV 0.5 ML IM: CPT | Performed by: NURSE PRACTITIONER

## 2019-10-23 RX ORDER — VENLAFAXINE 75 MG/1
TABLET ORAL
Qty: 180 TABLET | Refills: 1 | Status: SHIPPED | OUTPATIENT
Start: 2019-10-23 | End: 2020-05-11

## 2019-10-23 RX ORDER — ATORVASTATIN CALCIUM 40 MG/1
40 TABLET, FILM COATED ORAL DAILY
Qty: 90 TABLET | Refills: 1 | Status: SHIPPED | OUTPATIENT
Start: 2019-10-23 | End: 2020-02-10 | Stop reason: SDUPTHER

## 2019-10-23 RX ORDER — PRAMIPEXOLE DIHYDROCHLORIDE 1 MG/1
1 TABLET ORAL NIGHTLY
Qty: 90 TABLET | Refills: 1 | Status: SHIPPED | OUTPATIENT
Start: 2019-10-23 | End: 2020-05-01 | Stop reason: SDUPTHER

## 2019-11-01 ENCOUNTER — OFFICE VISIT (OUTPATIENT)
Dept: CARDIOLOGY CLINIC | Age: 79
End: 2019-11-01
Payer: MEDICARE

## 2019-11-01 VITALS
HEIGHT: 68 IN | WEIGHT: 229.5 LBS | SYSTOLIC BLOOD PRESSURE: 138 MMHG | BODY MASS INDEX: 34.78 KG/M2 | OXYGEN SATURATION: 97 % | DIASTOLIC BLOOD PRESSURE: 70 MMHG | HEART RATE: 62 BPM

## 2019-11-01 DIAGNOSIS — R00.1 BRADYCARDIA: ICD-10-CM

## 2019-11-01 DIAGNOSIS — I25.110 CORONARY ARTERY DISEASE INVOLVING NATIVE CORONARY ARTERY OF NATIVE HEART WITH UNSTABLE ANGINA PECTORIS (HCC): Primary | ICD-10-CM

## 2019-11-01 DIAGNOSIS — I20.8 ANGINAL EQUIVALENT (HCC): ICD-10-CM

## 2019-11-01 DIAGNOSIS — I10 ESSENTIAL HYPERTENSION: ICD-10-CM

## 2019-11-01 DIAGNOSIS — Z95.5 S/P DRUG ELUTING CORONARY STENT PLACEMENT: ICD-10-CM

## 2019-11-01 DIAGNOSIS — E78.2 MIXED HYPERLIPIDEMIA: ICD-10-CM

## 2019-11-01 PROCEDURE — 99214 OFFICE O/P EST MOD 30 MIN: CPT | Performed by: NURSE PRACTITIONER

## 2019-11-01 ASSESSMENT — ENCOUNTER SYMPTOMS
BACK PAIN: 1
CHEST TIGHTNESS: 0
VOMITING: 0
NAUSEA: 0
WHEEZING: 0
BLOOD IN STOOL: 0
SHORTNESS OF BREATH: 1

## 2019-11-12 DIAGNOSIS — E03.9 HYPOTHYROIDISM, UNSPECIFIED TYPE: ICD-10-CM

## 2019-11-13 RX ORDER — LEVOTHYROXINE SODIUM 0.07 MG/1
TABLET ORAL
Qty: 90 TABLET | Refills: 0 | Status: SHIPPED | OUTPATIENT
Start: 2019-11-13 | End: 2020-03-16

## 2019-11-21 ENCOUNTER — OFFICE VISIT (OUTPATIENT)
Dept: FAMILY MEDICINE CLINIC | Age: 79
End: 2019-11-21
Payer: MEDICARE

## 2019-11-21 VITALS
HEIGHT: 68 IN | HEART RATE: 74 BPM | BODY MASS INDEX: 36.22 KG/M2 | OXYGEN SATURATION: 98 % | DIASTOLIC BLOOD PRESSURE: 64 MMHG | WEIGHT: 239 LBS | SYSTOLIC BLOOD PRESSURE: 120 MMHG

## 2019-11-21 DIAGNOSIS — Z13.6 SCREENING FOR CARDIOVASCULAR CONDITION: ICD-10-CM

## 2019-11-21 DIAGNOSIS — E66.01 CLASS 2 SEVERE OBESITY DUE TO EXCESS CALORIES WITH SERIOUS COMORBIDITY AND BODY MASS INDEX (BMI) OF 36.0 TO 36.9 IN ADULT (HCC): ICD-10-CM

## 2019-11-21 DIAGNOSIS — Z00.00 ROUTINE GENERAL MEDICAL EXAMINATION AT A HEALTH CARE FACILITY: Primary | ICD-10-CM

## 2019-11-21 PROCEDURE — G0447 BEHAVIOR COUNSEL OBESITY 15M: HCPCS | Performed by: NURSE PRACTITIONER

## 2019-11-21 PROCEDURE — G0446 INTENS BEHAVE THER CARDIO DX: HCPCS | Performed by: NURSE PRACTITIONER

## 2019-11-21 PROCEDURE — G0439 PPPS, SUBSEQ VISIT: HCPCS | Performed by: NURSE PRACTITIONER

## 2019-11-21 ASSESSMENT — LIFESTYLE VARIABLES: HOW OFTEN DO YOU HAVE A DRINK CONTAINING ALCOHOL: 0

## 2019-11-21 ASSESSMENT — PATIENT HEALTH QUESTIONNAIRE - PHQ9
SUM OF ALL RESPONSES TO PHQ QUESTIONS 1-9: 0
SUM OF ALL RESPONSES TO PHQ QUESTIONS 1-9: 0

## 2019-12-05 ENCOUNTER — OFFICE VISIT (OUTPATIENT)
Dept: FAMILY MEDICINE CLINIC | Age: 79
End: 2019-12-05
Payer: MEDICARE

## 2019-12-05 VITALS
WEIGHT: 240 LBS | HEART RATE: 76 BPM | OXYGEN SATURATION: 98 % | HEIGHT: 68 IN | BODY MASS INDEX: 36.37 KG/M2 | SYSTOLIC BLOOD PRESSURE: 140 MMHG | TEMPERATURE: 97.9 F | DIASTOLIC BLOOD PRESSURE: 62 MMHG

## 2019-12-05 DIAGNOSIS — R05.3 PERSISTENT COUGH FOR 3 WEEKS OR LONGER: Primary | ICD-10-CM

## 2019-12-05 PROCEDURE — 99213 OFFICE O/P EST LOW 20 MIN: CPT | Performed by: NURSE PRACTITIONER

## 2019-12-05 RX ORDER — BENZONATATE 200 MG/1
200 CAPSULE ORAL 3 TIMES DAILY PRN
Qty: 20 CAPSULE | Refills: 0 | Status: SHIPPED | OUTPATIENT
Start: 2019-12-05 | End: 2019-12-13 | Stop reason: SDUPTHER

## 2019-12-05 ASSESSMENT — ENCOUNTER SYMPTOMS
HEARTBURN: 0
SORE THROAT: 0
WHEEZING: 0
SHORTNESS OF BREATH: 1
RHINORRHEA: 1
COUGH: 1
HEMOPTYSIS: 0

## 2019-12-06 ENCOUNTER — HOSPITAL ENCOUNTER (OUTPATIENT)
Age: 79
Discharge: HOME | End: 2019-12-06
Payer: MEDICARE

## 2019-12-06 ENCOUNTER — HOSPITAL ENCOUNTER (OUTPATIENT)
Dept: GENERAL RADIOLOGY | Age: 79
Discharge: HOME | End: 2019-12-06
Payer: MEDICARE

## 2019-12-06 DIAGNOSIS — R05.3 PERSISTENT COUGH FOR 3 WEEKS OR LONGER: ICD-10-CM

## 2019-12-06 PROCEDURE — 71046 X-RAY EXAM CHEST 2 VIEWS: CPT

## 2019-12-10 ENCOUNTER — TELEPHONE (OUTPATIENT)
Dept: PULMONOLOGY | Age: 79
End: 2019-12-10

## 2019-12-11 RX ORDER — DOXAZOSIN 8 MG/1
TABLET ORAL
Qty: 180 TABLET | Refills: 2 | Status: SHIPPED | OUTPATIENT
Start: 2019-12-11 | End: 2020-09-08

## 2019-12-12 ENCOUNTER — OFFICE VISIT (OUTPATIENT)
Dept: PULMONOLOGY | Age: 79
End: 2019-12-12
Payer: MEDICARE

## 2019-12-12 VITALS
BODY MASS INDEX: 36.83 KG/M2 | HEART RATE: 62 BPM | RESPIRATION RATE: 18 BRPM | DIASTOLIC BLOOD PRESSURE: 68 MMHG | SYSTOLIC BLOOD PRESSURE: 138 MMHG | OXYGEN SATURATION: 98 % | WEIGHT: 243 LBS | HEIGHT: 68 IN

## 2019-12-12 DIAGNOSIS — G47.33 OSA ON CPAP: ICD-10-CM

## 2019-12-12 DIAGNOSIS — J84.9 ILD (INTERSTITIAL LUNG DISEASE) (HCC): Primary | ICD-10-CM

## 2019-12-12 DIAGNOSIS — Z99.89 OSA ON CPAP: ICD-10-CM

## 2019-12-12 DIAGNOSIS — R05.9 COUGH: ICD-10-CM

## 2019-12-12 PROCEDURE — 99214 OFFICE O/P EST MOD 30 MIN: CPT | Performed by: INTERNAL MEDICINE

## 2019-12-12 RX ORDER — PREDNISONE 10 MG/1
10 TABLET ORAL DAILY
Qty: 30 TABLET | Refills: 1 | Status: SHIPPED
Start: 2019-12-12 | End: 2020-02-10 | Stop reason: ALTCHOICE

## 2019-12-13 RX ORDER — BENZONATATE 200 MG/1
200 CAPSULE ORAL 3 TIMES DAILY PRN
Qty: 20 CAPSULE | Refills: 0 | Status: SHIPPED | OUTPATIENT
Start: 2019-12-13 | End: 2020-01-02

## 2019-12-17 RX ORDER — OMEPRAZOLE 40 MG/1
CAPSULE, DELAYED RELEASE ORAL
Qty: 30 CAPSULE | Refills: 1 | Status: SHIPPED | OUTPATIENT
Start: 2019-12-17 | End: 2020-02-17

## 2019-12-27 ENCOUNTER — HOSPITAL ENCOUNTER (OUTPATIENT)
Dept: CT IMAGING | Age: 79
Discharge: HOME | End: 2019-12-27
Payer: MEDICARE

## 2019-12-27 ENCOUNTER — HOSPITAL ENCOUNTER (OUTPATIENT)
Dept: PULMONOLOGY | Age: 79
Discharge: HOME | End: 2019-12-27
Payer: MEDICARE

## 2019-12-27 DIAGNOSIS — J84.9 ILD (INTERSTITIAL LUNG DISEASE) (HCC): ICD-10-CM

## 2019-12-27 LAB
DLCO %PRED: 73 %
DLCO PRED: NORMAL
DLCO/VA %PRED: NORMAL
DLCO/VA PRED: NORMAL
DLCO/VA: NORMAL
DLCO: NORMAL
EXPIRATORY TIME-POST: NORMAL
EXPIRATORY TIME: NORMAL
FEF 25-75% %CHNG: NORMAL
FEF 25-75% %PRED-POST: NORMAL
FEF 25-75% %PRED-PRE: NORMAL
FEF 25-75% PRED: NORMAL
FEF 25-75%-POST: NORMAL
FEF 25-75%-PRE: NORMAL
FEV1 %PRED-POST: NORMAL %
FEV1 %PRED-PRE: 96 %
FEV1 PRED: NORMAL
FEV1-POST: NORMAL
FEV1-PRE: NORMAL
FEV1/FVC %PRED-POST: NORMAL
FEV1/FVC %PRED-PRE: NORMAL
FEV1/FVC PRED: NORMAL
FEV1/FVC-POST: NORMAL %
FEV1/FVC-PRE: 80 %
FVC %PRED-POST: NORMAL
FVC %PRED-PRE: NORMAL
FVC PRED: NORMAL
FVC-POST: NORMAL
FVC-PRE: NORMAL
GAW %PRED: NORMAL
GAW PRED: NORMAL
GAW: NORMAL
IC %PRED: NORMAL
IC PRED: NORMAL
IC: NORMAL
MEP: NORMAL
MIP: NORMAL
MVV %PRED-PRE: NORMAL
MVV PRED: NORMAL
MVV-PRE: NORMAL
PEF %PRED-POST: NORMAL
PEF %PRED-PRE: NORMAL
PEF PRED: NORMAL
PEF%CHNG: NORMAL
PEF-POST: NORMAL
PEF-PRE: NORMAL
RAW %PRED: NORMAL
RAW PRED: NORMAL
RAW: NORMAL
RV %PRED: NORMAL
RV PRED: NORMAL
RV: NORMAL
SVC %PRED: NORMAL
SVC PRED: NORMAL
SVC: NORMAL
TLC %PRED: 74 %
TLC PRED: NORMAL
TLC: NORMAL
VA %PRED: NORMAL
VA PRED: NORMAL
VA: NORMAL
VTG %PRED: NORMAL
VTG PRED: NORMAL
VTG: NORMAL

## 2019-12-27 PROCEDURE — 94726 PLETHYSMOGRAPHY LUNG VOLUMES: CPT

## 2019-12-27 PROCEDURE — 94640 AIRWAY INHALATION TREATMENT: CPT

## 2019-12-27 PROCEDURE — 94618 PULMONARY STRESS TESTING: CPT

## 2019-12-27 PROCEDURE — 94060 EVALUATION OF WHEEZING: CPT

## 2019-12-27 PROCEDURE — 71250 CT THORAX DX C-: CPT

## 2019-12-27 PROCEDURE — 94729 DIFFUSING CAPACITY: CPT

## 2019-12-27 ASSESSMENT — PULMONARY FUNCTION TESTS
FEV1/FVC_PRE: 80
FEV1_PERCENT_PREDICTED_PRE: 96

## 2020-01-02 RX ORDER — BENZONATATE 200 MG/1
CAPSULE ORAL
Qty: 20 CAPSULE | Refills: 0 | Status: SHIPPED | OUTPATIENT
Start: 2020-01-02 | End: 2020-01-22 | Stop reason: ALTCHOICE

## 2020-01-08 ENCOUNTER — TELEPHONE (OUTPATIENT)
Dept: FAMILY MEDICINE CLINIC | Age: 80
End: 2020-01-08

## 2020-01-08 RX ORDER — DEXTROMETHORPHAN HYDROBROMIDE AND PROMETHAZINE HYDROCHLORIDE 15; 6.25 MG/5ML; MG/5ML
5 SYRUP ORAL 4 TIMES DAILY PRN
Qty: 180 ML | Refills: 0 | Status: SHIPPED | OUTPATIENT
Start: 2020-01-08 | End: 2020-01-15

## 2020-01-08 NOTE — TELEPHONE ENCOUNTER
I saw patient for this already with the last time being on 12/5/2019 and then the patient was referred back to his pulmonologist who he saw on 12/12/2019 who did put him on some steroids and was told to follow-up with pulmonology if he had no improvement  Recommend following up with pulmonologist

## 2020-01-08 NOTE — TELEPHONE ENCOUNTER
The patient's wife Shanita Fitch called in to request an appointment with Ms. Pedro Luis Salas for the patients chronic cough. Shanita Fitch requested a same day appointment 1/8/2020.      Shanita Fitch requested a call back at:  [phone]

## 2020-01-09 ENCOUNTER — TELEPHONE (OUTPATIENT)
Dept: FAMILY MEDICINE CLINIC | Age: 80
End: 2020-01-09

## 2020-01-09 RX ORDER — GUAIFENESIN AND DEXTROMETHORPHAN HYDROBROMIDE 100; 10 MG/5ML; MG/5ML
10 SOLUTION ORAL 4 TIMES DAILY PRN
Qty: 120 ML | Refills: 0 | Status: SHIPPED | OUTPATIENT
Start: 2020-01-09 | End: 2020-01-22 | Stop reason: ALTCHOICE

## 2020-01-09 NOTE — TELEPHONE ENCOUNTER
Wife called and said that the cough meds that you called in to [address] is on back order was wanting to see if could get something else called in.

## 2020-01-22 ENCOUNTER — TELEPHONE (OUTPATIENT)
Dept: PULMONOLOGY | Age: 80
End: 2020-01-22

## 2020-01-22 ENCOUNTER — OFFICE VISIT (OUTPATIENT)
Dept: PULMONOLOGY | Age: 80
End: 2020-01-22
Payer: MEDICARE

## 2020-01-22 VITALS
BODY MASS INDEX: 37.41 KG/M2 | SYSTOLIC BLOOD PRESSURE: 132 MMHG | WEIGHT: 246.8 LBS | TEMPERATURE: 97.8 F | DIASTOLIC BLOOD PRESSURE: 74 MMHG | RESPIRATION RATE: 18 BRPM | HEIGHT: 68 IN | OXYGEN SATURATION: 97 % | HEART RATE: 78 BPM

## 2020-01-22 PROCEDURE — 99214 OFFICE O/P EST MOD 30 MIN: CPT | Performed by: INTERNAL MEDICINE

## 2020-01-22 NOTE — PATIENT INSTRUCTIONS
Daniele Liu MD  [address]  [place], [facility]  Phone: [phone]  Appointment will be scheduled and you will be notified by Dr. Wang Goodwin office. Remember to bring all pulmonary medications to your next appointment with the office. Please keep all of your future appointments scheduled by [facility] office. Out of respect for other patients and providers, you may be asked to reschedule your appointment if you arrive later than your scheduled appointment time. Appointments cancelled less than 24hrs in advance will be considered a no show. Patients with three missed appointments within 1 year or four missed appointments within 2 years can be dismissed from the practice.

## 2020-01-22 NOTE — TELEPHONE ENCOUNTER
Pt asking if he should stay on prednisone? OV 1/22/20:    ASSESSMENT AND PLAN:        interstitial lung disease     Based on the information available thus far, crackles on physical, and previous CT scan results, I favor a diagnosis of early ILD. Etiology of ILD is unknown; however age and prior smoking hx make Idiopathic Pulmonary Fibrosis or familial pulmonary fibrosis most likely. Findings on CT are not definitive. ILD serologies were wnl.      -My view the CT changes have not progressed much over the last several years. PFTs are also relatively stable  - offered pulmonary rehab, patient has not been interested yet     Cough, chronic  Most likely secondary to post-nasal drip. - no benefit from flonase/astelin   - tessalon perles prn  -albuterol prn given obstruction on pfts  -Trial of prednisone 10 mg daily for the next 4 to 6 weeks did not help           COLLIN on CPAP   Patient has been on PAP for long time.    - Patient complained of pressure being too high and leakage  - CPAP 14 CWP  - orders to DME for new supplies as needed  - No driving if sleepy, groggy or fatigue  - Encouraged increasing activity through the day

## 2020-01-22 NOTE — PROGRESS NOTES
TABLET BY MOUTH TWICE A DAY, Disp: 180 tablet, Rfl: 2    levothyroxine (SYNTHROID) 75 MCG tablet, TAKE ONE TABLET BY MOUTH DAILY, Disp: 90 tablet, Rfl: 0    atorvastatin (LIPITOR) 40 MG tablet, Take 1 tablet by mouth daily, Disp: 90 tablet, Rfl: 1    pramipexole (MIRAPEX) 1 MG tablet, Take 1 tablet by mouth nightly, Disp: 90 tablet, Rfl: 1    venlafaxine (EFFEXOR) 75 MG tablet, TAKE ONE TABLET BY MOUTH TWICE A DAY, Disp: 180 tablet, Rfl: 1    clopidogrel (PLAVIX) 75 MG tablet, Take 1 tablet by mouth daily, Disp: 30 tablet, Rfl: 11    nitroGLYCERIN (NITROSTAT) 0.4 MG SL tablet, Place 1 tablet under the tongue every 5 minutes as needed for Chest pain up to max of 3 total doses. If no relief after 1 dose, call 911., Disp: 25 tablet, Rfl: 3    potassium chloride (KLOR-CON) 10 MEQ extended release tablet, TAKE ONE TABLET BY MOUTH DAILY, Disp: 30 tablet, Rfl: 3    amLODIPine (NORVASC) 10 MG tablet, TAKE ONE TABLET BY MOUTH DAILY, Disp: 90 tablet, Rfl: 1    triamterene-hydrochlorothiazide (MAXZIDE-25) 37.5-25 MG per tablet, TAKE ONE TABLET BY MOUTH DAILY, Disp: 90 tablet, Rfl: 1    triamcinolone (KENALOG) 0.1 % cream, Apply topically every 7 days, Disp: 80 g, Rfl: 5    methocarbamol (ROBAXIN) 500 MG tablet, Take 1 tablet by mouth 3 times daily as needed (spasm), Disp: 90 tablet, Rfl: 5    aspirin EC 81 MG EC tablet, Take 1 tablet by mouth daily, Disp: 30 tablet, Rfl: 3    magnesium gluconate (MAGONATE) 500 MG tablet, Take 500 mg by mouth daily , Disp: , Rfl:     Vitamin D (CHOLECALCIFEROL) 1000 UNITS CAPS capsule, Take 1,000 Units by mouth daily. , Disp: , Rfl:     Calcium Carbonate-Vitamin D (CALCIUM + D) 600-200 MG-UNIT TABS, Take 600 mg by mouth daily. , Disp: , Rfl:       Objective:   PHYSICAL EXAM:        VITALS:    /74 (Site: Left Upper Arm, Position: Sitting, Cuff Size: Large Adult)   Pulse 78   Temp 97.8 °F (36.6 °C) (Oral)   Resp 18   Ht 5' 8\" (1.727 m)   Wt 246 lb 12.8 oz (111.9 kg) 12/27/19  FVC 3.22 (85%) FEV1 2.58 (96%) FEV1/FVC ratio  80%  TLC 4.81 (74%) DLCO 19.20 (73%)  6mwt 1120 feet, low sat 95%       CLAY, RF, SSA, SSB all wnl  7/13    IMAGING:    I personally reviewed and interpreted the following in the office:    Chest HRCT 12/27/19: mild bilateral patchy subpleural reticular changes without significant traction bronchiectasis. Few areas on the left side of focal honeycomb changes. CXR 12/6/19: Bilateral basilar reticular opacities increased compared with 7 years previous, without focal airspace consolidation    HR Chest CT 7/24/13: mild bilateral basilar predominant subpleural reticulation without associated traction bronchiectasis or honeycomb change    Chest CT  (dated 6/11/12): There are bilateral subpleural reticular and ground glass opacities. No evident pneumothorax is seen, particularly on the left. No dominant mass or focal area of consolidation. There is a moderate amount of calcification within the thoracic aorta and coronary arteries. No pericardial effusion. Small sliding hiatal hernia is noted. ASSESSMENT AND PLAN:      interstitial lung disease     Based on the information available thus far, crackles on physical, and previous CT scan results, I favor a diagnosis of early ILD. Etiology of ILD is unknown; however age and prior smoking hx make Idiopathic Pulmonary Fibrosis or familial pulmonary fibrosis most likely. Findings on CT are not definitive. ILD serologies were wnl.     -My view the CT changes have not progressed much over the last several years. PFTs are also relatively stable  - offered pulmonary rehab, patient has not been interested yet    Cough, chronic  Most likely secondary to post-nasal drip. - no benefit from flonase/astelin   - tessalon perles prn  -albuterol prn given obstruction on pfts  -Trial of prednisone 10 mg daily for the next 4 to 6 weeks did not help        COLLIN on CPAP   Patient has been on PAP for long time.    - Patient

## 2020-02-07 NOTE — PROGRESS NOTES
[facility]   Cardiovascular Evaluation    PATIENT: Ceci Montez  DATE: 2/10/2020  MRN: [id number]  CSN: [id number]  : 1940    Primary Care Doctor: ADORE Ochoa - CNP    Subjective: Mr. Shella Rubinstein is here today for follow up CAD, HTN, HLD      History of present illness on initial date of evaluation:   Ceci Montez is a 67 y.o. male who presents for cardiac f/u CAD s/p LAD DANTE 10/19, HTN, HLD, interstitial lung disease and COLLIN (uses bipap). Currently followed by Dr. Katarina Harris for ILD. Most recent ECHO 2015 with EF of 55-60%. Grade I DD (no change from  study). Most recent [facility] 2015 with no evidence of stress induced reversible ischemia, EF of 62%. Most recent arterial doppler lower extremity with CLOVER's >1 and no arterial insufficiency. At [address] [facility] 18 he c/o fatigued and SOB with new CP. Subsequently underwent LHC 9/10/18  LM <20%, LAD 50-60% mid. FFR 0.83, Cx/OM 30%, RCA 30%, LVEF 60%, Normal right heart pressures (no significant change from  study). At [address] [facility] 10/9/19 c/o random chest heaviness and given abnl EKG (ST change anterolateral leads is new compared to  EKG)  he underwent LHC 10/15/19 w/ PCTA to mid LAD with 3.0 x 18 DANTE 3.25 x 8; LM <20%; LAD 50% prox, 70% mid. FFR 0.79; Cx 40% distal;OM1 40% prox; RCA 30% mid. Most recent EKG 10/16/19 SB 54 bpm; 1st degree A-V block; LVH , ST abnormality consider ischemia     He reports 1 week ago left chest pain unlike pain prior to stent. Pain was intermittent for 1 day. Describes pain as an ache, worse with deep breathing and movement of left arm. He has had no episodes of CP since. He does have SOB which is at baseline and unchanged. Denies , edema, dizziness, palpitations and syncope.        Patient Active Problem List   Diagnosis    Bradycardia    HTN (hypertension)    Cough    COLLIN on CPAP    Abnormal PFT    ILD (interstitial lung disease) (HCC)    Hyperlipidemia    Restless legs syndrome    Essential and other specified forms of tremor    Paralysis agitans ([facility])    Chronic ischemic heart disease    Essential hypertension    Restless legs syndrome (RLS)    Obstructive sleep apnea    Depression    Acute bronchitis    Shortness of breath    Anxiety    COLLIN treated with BiPAP    Chronic diastolic congestive heart failure ([facility])    Anginal equivalent ([facility])    Hypothyroidism    Rosacea    Vitamin D deficiency    Lichen sclerosus of penis    Scrotal pruritus    GERD (gastroesophageal reflux disease)    Coronary artery disease involving native coronary artery of native heart with unstable angina pectoris ([facility])    Chest pain    Angina, class III ([facility])    Recurrent major depressive disorder, in partial remission ([facility] [address]    CAD in native artery       Past Medical History:   has a past medical history of Arthritis, Back injuries, BPH (benign prostatic hyperplasia), CPAP (continuous positive airway pressure) dependence, Diverticulosis, Esophageal dysmotility, GERD (gastroesophageal reflux disease), Hiatal hernia, History of colon polyps, Hyperlipidemia, Hypertension, ILD (interstitial lung disease) ([facility] [address] Internal hemorrhoid, Lichen sclerosus of penis, Mild cognitive impairment, COLLIN (obstructive sleep apnea), Renal insufficiency, Restless legs syndrome, Rosacea, Schatzki's ring, Scrotal pruritus, and Sleep apnea. Surgical History:   has a past surgical history that includes Circumcision; fracture surgery; fracture surgery; Toe Surgery; hernia repair; Colonoscopy (2010); Colonoscopy (9/14/2015); Cystocopy (5/13/16); Upper gastrointestinal endoscopy (09/12/2016); Cystoscopy (05/19/2017); TURP (07/14/2017); Cystoscopy (07/14/2017); Endoscopy, colon, diagnostic (10/11/2017); Upper gastrointestinal endoscopy (10/11/2017); Coronary angioplasty (10/15/2019); and Prostate surgery. Social History:   reports that he quit smoking about 46 years ago. His smoking use included cigarettes. 144 09/18/2019    CHOL 149 02/08/2019     Lab Results   Component Value Date    TRIG 129 10/15/2019    TRIG 125 09/18/2019    TRIG 127 02/08/2019     Lab Results   Component Value Date    HDL 48 10/15/2019    HDL 47 09/18/2019    HDL 40 02/08/2019     Lab Results   Component Value Date    LDLCALC 74 10/15/2019    LDLCALC 72 09/18/2019    LDLCALC 84 02/08/2019     Lab Results   Component Value Date    LABVLDL 26 10/15/2019    LABVLDL 25 09/18/2019    LABVLDL 25 02/08/2019   \  Assessment:    67 y.o. patient with:    1. CP/hx CAD:  At [address] 10/9/19 c/o random chest heaviness and given abnl EKG (ST change anterolateral leads is new compared to 9/18 EKG)  he underwent LHC 10/15/19 w/ PCTA to mid LAD with 3.0 x 18 DANTE 3.25 x 8.      2.  Hypertension:  Well controlled and will continue current medical regimen. ~BP: 128/62     3. Hyperlipidemia: Most recent labs 10/15/19 see results above I personally reviewed. Well controlled and will continue current medical regimen. 4. Interstitial lung disease:  Stable. Dr. Allan Tolentino diagnosed him with early ILD and lungs should not cause significant symptoms. PFTs 5/2/16 FVC 3.24 (82%) FEV1 2.68 (94%) FEV1/FVC ratio 83% TLC 5.51 (83%) DLCO 17.92 (66%)    5. COLLIN   ~wears bipap    Plan:  1. Meds reviewed. Refills as warranted  2. No changes today. Continue current medications. 3. Will check CMP, CBC, Fasting lipids. Call out office when you have them done so we can   4. Recent CP x 1 day sounds musculoskeletal and unlike prior angina. No further issues with since. Follow clinically. 5. Follow up with me in 3 months     This note was scribed in the presence of Chad Le MD by Jaqui Rdz RN    I, Dr. Chicho Wright, personally performed the services described in this documentation, as scribed by the above signed scribe in my presence. It is both accurate and complete to my knowledge.  I agree with the details independently gathered by the clinical support staff, while

## 2020-02-10 ENCOUNTER — OFFICE VISIT (OUTPATIENT)
Dept: CARDIOLOGY CLINIC | Age: 80
End: 2020-02-10
Payer: MEDICARE

## 2020-02-10 VITALS
DIASTOLIC BLOOD PRESSURE: 62 MMHG | WEIGHT: 246 LBS | HEIGHT: 68 IN | BODY MASS INDEX: 37.28 KG/M2 | HEART RATE: 66 BPM | SYSTOLIC BLOOD PRESSURE: 128 MMHG | OXYGEN SATURATION: 96 %

## 2020-02-10 PROCEDURE — 99214 OFFICE O/P EST MOD 30 MIN: CPT | Performed by: INTERNAL MEDICINE

## 2020-02-10 RX ORDER — TRIAMTERENE AND HYDROCHLOROTHIAZIDE 37.5; 25 MG/1; MG/1
TABLET ORAL
Qty: 90 TABLET | Refills: 3 | Status: SHIPPED | OUTPATIENT
Start: 2020-02-10 | End: 2021-03-10

## 2020-02-10 RX ORDER — CLOPIDOGREL BISULFATE 75 MG/1
75 TABLET ORAL DAILY
Qty: 90 TABLET | Refills: 3 | Status: SHIPPED | OUTPATIENT
Start: 2020-02-10 | End: 2021-02-02

## 2020-02-10 RX ORDER — AMLODIPINE BESYLATE 10 MG/1
TABLET ORAL
Qty: 90 TABLET | Refills: 3 | Status: SHIPPED | OUTPATIENT
Start: 2020-02-10 | End: 2021-03-08

## 2020-02-10 RX ORDER — POTASSIUM CHLORIDE 750 MG/1
TABLET, FILM COATED, EXTENDED RELEASE ORAL
Qty: 90 TABLET | Refills: 3 | Status: SHIPPED | OUTPATIENT
Start: 2020-02-10 | End: 2020-05-18

## 2020-02-10 RX ORDER — ATORVASTATIN CALCIUM 40 MG/1
40 TABLET, FILM COATED ORAL DAILY
Qty: 90 TABLET | Refills: 3 | Status: SHIPPED | OUTPATIENT
Start: 2020-02-10 | End: 2021-04-19

## 2020-02-10 RX ORDER — POTASSIUM CHLORIDE 750 MG/1
TABLET, FILM COATED, EXTENDED RELEASE ORAL
Qty: 30 TABLET | Refills: 3 | Status: SHIPPED | OUTPATIENT
Start: 2020-02-10 | End: 2020-02-10 | Stop reason: SDUPTHER

## 2020-02-10 NOTE — LETTER
[id number]  [address] [address]  Phone: [phone]  Fax: [phone]     Brown Alvarez MD     2/10/2020     ADORE Fox CNP   [address] [facility] [address]     Patient: Regulo Frias   MR Number: [id number]   YOB: 1940   Date of Visit: 2/10/2020     Dear Dr. Paige Salas     Today I saw our mutual patient named above. Below are the relevant portions of my assessment and plan of care. If you have questions, please do not hesitate to call me. I look forward to following Mora Gabriele along with you. [address]   Cardiovascular Evaluation    PATIENT: Regulo Frias  DATE: 2/10/2020  MRN: [id number]  CSN: [id number]  : 1940    Primary Care Doctor: ADORE Fox CNP    Subjective: Mr. Ajay Dennis is here today for follow up CAD, HTN, HLD      History of present illness on initial date of evaluation:   Regulo Frias is a 67 y.o. male who presents for cardiac f/u CAD s/p LAD DANTE 10/19, HTN, HLD, interstitial lung disease and COLLIN (uses bipap). Currently followed by Dr. Malena Sanchez for ILD. Most recent ECHO 2015 with EF of 55-60%. Grade I DD (no change from  study). Most recent [facility] 2015 with no evidence of stress induced reversible ischemia, EF of 62%. Most recent arterial doppler lower extremity with COLVER's >1 and no arterial insufficiency. At [address] 18 he c/o fatigued and SOB with new CP. Subsequently underwent LHC 9/10/18  LM <20%, LAD 50-60% mid. FFR 0.83, Cx/OM 30%, RCA 30%, LVEF 60%, Normal right heart pressures (no significant change from  study). At [address] 10/9/19 c/o random chest heaviness and given abnl EKG (ST change anterolateral leads is new compared to  EKG)  he underwent LHC 10/15/19 w/ PCTA to mid LAD with 3.0 x 18 DANTE 3.25 x 8; LM <20%; LAD 50% prox, 70% mid. FFR 0.79; Cx 40% distal;OM1 40% prox; RCA 30% mid.   Most enlarged, symmetric, no tenderness/mass/nodules, no carotid bruit or JVD       Lungs:   Soft crackles in the bases, respirations unlabored   Chest Wall:  No tenderness or deformity   Heart:  Regular rhythm and normal rate; S1, S2 are normal Soft ANGELA; no rub or gallop   Abdomen:   Soft, non-tender, bowel sounds active all four quadrants,  no masses, no organomegaly           Extremities: Extremities normal, atraumatic, no cyanosis or edema   Pulses: 2+ and symmetric   Skin: Skin color, texture, turgor normal, no rashes or lesions   Pysch: Normal mood and affect   Neurologic: Normal gross motor and sensory exam.         Labs    Lab Results   Component Value Date    CHOL 148 10/15/2019    CHOL 144 09/18/2019    CHOL 149 02/08/2019     Lab Results   Component Value Date    TRIG 129 10/15/2019    TRIG 125 09/18/2019    TRIG 127 02/08/2019     Lab Results   Component Value Date    HDL 48 10/15/2019    HDL 47 09/18/2019    HDL 40 02/08/2019     Lab Results   Component Value Date    LDLCALC 74 10/15/2019    LDLCALC 72 09/18/2019    LDLCALC 84 02/08/2019     Lab Results   Component Value Date    LABVLDL 26 10/15/2019    LABVLDL 25 09/18/2019    LABVLDL 25 02/08/2019   \  Assessment:    67 y.o. patient with:    1. CP/hx CAD:  At [address] 10/9/19 c/o random chest heaviness and given abnl EKG (ST change anterolateral leads is new compared to 9/18 EKG)  he underwent LHC 10/15/19 w/ PCTA to mid LAD with 3.0 x 18 DANTE 3.25 x 8.      2.  Hypertension:  Well controlled and will continue current medical regimen. ~BP: 128/62     3. Hyperlipidemia: Most recent labs 10/15/19 see results above I personally reviewed. Well controlled and will continue current medical regimen. 4. Interstitial lung disease:  Stable. Dr. Garry Rodriguez diagnosed him with early ILD and lungs should not cause significant symptoms. PFTs 5/2/16 FVC 3.24 (82%) FEV1 2.68 (94%) FEV1/FVC ratio 83% TLC 5.51 (83%) DLCO 17.92 (66%)    5.  COLLIN   ~wears bipap    Plan:

## 2020-02-17 RX ORDER — OMEPRAZOLE 40 MG/1
CAPSULE, DELAYED RELEASE ORAL
Qty: 30 CAPSULE | Refills: 5 | Status: SHIPPED | OUTPATIENT
Start: 2020-02-17 | End: 2020-10-08

## 2020-03-04 ENCOUNTER — OFFICE VISIT (OUTPATIENT)
Dept: PULMONOLOGY | Age: 80
End: 2020-03-04
Payer: MEDICARE

## 2020-03-04 ENCOUNTER — HOSPITAL ENCOUNTER (OUTPATIENT)
Dept: GENERAL RADIOLOGY | Age: 80
Discharge: HOME | End: 2020-03-04
Payer: MEDICARE

## 2020-03-04 ENCOUNTER — HOSPITAL ENCOUNTER (OUTPATIENT)
Age: 80
Discharge: HOME | End: 2020-03-04
Payer: MEDICARE

## 2020-03-04 VITALS
WEIGHT: 251 LBS | HEART RATE: 84 BPM | SYSTOLIC BLOOD PRESSURE: 140 MMHG | DIASTOLIC BLOOD PRESSURE: 64 MMHG | OXYGEN SATURATION: 95 % | HEIGHT: 68 IN | BODY MASS INDEX: 38.04 KG/M2 | TEMPERATURE: 98.4 F

## 2020-03-04 PROBLEM — J20.2 ACUTE BRONCHITIS DUE TO STREPTOCOCCUS: Status: ACTIVE | Noted: 2020-03-04

## 2020-03-04 PROCEDURE — 71046 X-RAY EXAM CHEST 2 VIEWS: CPT

## 2020-03-04 PROCEDURE — 99214 OFFICE O/P EST MOD 30 MIN: CPT | Performed by: INTERNAL MEDICINE

## 2020-03-04 RX ORDER — LEVOFLOXACIN 500 MG/1
500 TABLET, FILM COATED ORAL DAILY
Qty: 7 TABLET | Refills: 0 | Status: SHIPPED | OUTPATIENT
Start: 2020-03-04 | End: 2020-03-11

## 2020-03-04 RX ORDER — PREDNISONE 10 MG/1
TABLET ORAL
Qty: 30 TABLET | Refills: 0 | Status: SHIPPED | OUTPATIENT
Start: 2020-03-04 | End: 2020-03-16

## 2020-03-04 NOTE — PROGRESS NOTES
CHIEF COMPLAINT: Dyspnea and cough    Consulting provider: Aime Maya NP    HPI:   Presenting hx: The patient is a 70 yo man with hx of former tobacco abuse, GERD, COLLIN on CPAP who presents for evaluation of chronic dyspnea and cough. Patient complains of shortness of breath. Symptoms include drainage from nose, dyspnea on exertion, post nasal drip and productive cough. Symptoms began a few years ago, gradually worsening since that time. The dyspnea occurs with strenuous activity. Patient denies hemoptysis . Aggravating factors include exertion, exercise and climbing stairs. The patient reports an exercise tolerance of approximately several blocks on the flat and 2 flights of stairs, limited primarily by dyspnea. He is a former smoker for 15 years x 1 ppd, quit 1974. Denies any birds in home; hx of CTD. He had two cousins die from pulmonary fibrosis. Since last clinic visit, patient has been feeling worse over the last week to 10 days. He has worsening productive cough and shortness of breath. He denies any fevers, chills or night sweats. There are no changes to past medical history, family history, social history or review of systems(except as noted in the history section) since prior note.       Past Medical History:   Diagnosis Date    Arthritis     Back injuries     BPH (benign prostatic hyperplasia)     CPAP (continuous positive airway pressure) dependence     Diverticulosis     colonoscopy 9/2015    Esophageal dysmotility     GERD (gastroesophageal reflux disease)     Hiatal hernia     History of colon polyps     seen on colonoscopy again 9/2015    Hyperlipidemia     Hypertension     ILD (interstitial lung disease) ([facility] [address] 7/30/2013    Internal hemorrhoid     colonoscopy 4/12/38    Lichen sclerosus of penis 2017    Dr Guilherme Chung Mild cognitive impairment 08/2016    [facility] neurology    COLLIN (obstructive sleep apnea)     Renal insufficiency     Restless legs syndrome     Rosacea     Schatzki's ring     last dilated 2002    Scrotal pruritus 2/8/2018    Sleep apnea        Past Surgical History:        Procedure Laterality Date    CIRCUMCISION      COLONOSCOPY  2010    COLONOSCOPY  9/14/2015    CORONARY ANGIOPLASTY  10/15/2019    CYSTOSCOPY  5/13/16    CYSTOSCOPY  05/19/2017    CYSTOSCOPY     CYSTOSCOPY  07/14/2017    ENDOSCOPY, COLON, DIAGNOSTIC  10/11/2017    esoph. stricture    FRACTURE SURGERY      right leg    FRACTURE SURGERY      wrist   right    HERNIA REPAIR      PROSTATE SURGERY      TOE SURGERY      TURP  07/14/2017    UPPER GASTROINTESTINAL ENDOSCOPY  09/12/2016    UPPER GASTROINTESTINAL ENDOSCOPY  10/11/2017    esophageal stricture    UPPER GASTROINTESTINAL ENDOSCOPY  01/28/2020       Allergies:  is allergic to percocet [oxycodone-acetaminophen]; ace inhibitors; oxybutynin; and simvastatin. Social History:    TOBACCO:   reports that he quit smoking about 46 years ago. His smoking use included cigarettes. He has a 25.50 pack-year smoking history. He has never used smokeless tobacco.  ETOH:   reports no history of alcohol use.   Patient currently lives independently  Environmental/chemical exposure: none       Family History:       Problem Relation Age of Onset    Stroke Father     Cancer Father         prostate    Other Father         colitis    Diabetes Sister     Diabetes Brother     Cancer Brother         skin    High Blood Pressure Brother     High Cholesterol Brother     COPD Mother     Asthma Neg Hx     Emphysema Neg Hx     Heart Failure Neg Hx     Hypertension Neg Hx        Current Medications:    Current Outpatient Medications:     omeprazole (PRILOSEC) 40 MG delayed release capsule, TAKE ONE CAPSULE BY MOUTH DAILY, Disp: 30 capsule, Rfl: 5    potassium chloride (KLOR-CON) 10 MEQ extended release tablet, TAKE ONE TABLET BY MOUTH DAILY, Disp: 90 tablet, Rfl: 3    atorvastatin (LIPITOR) 40 MG tablet, Take 1 tablet by mouth daily, Disp: 90 tablet, Rfl: 3    clopidogrel (PLAVIX) 75 MG tablet, Take 1 tablet by mouth daily, Disp: 90 tablet, Rfl: 3    amLODIPine (NORVASC) 10 MG tablet, TAKE ONE TABLET BY MOUTH DAILY, Disp: 90 tablet, Rfl: 3    triamterene-hydrochlorothiazide (MAXZIDE-25) 37.5-25 MG per tablet, TAKE ONE TABLET BY MOUTH DAILY, Disp: 90 tablet, Rfl: 3    doxazosin (CARDURA) 8 MG tablet, TAKE ONE TABLET BY MOUTH TWICE A DAY, Disp: 180 tablet, Rfl: 2    levothyroxine (SYNTHROID) 75 MCG tablet, TAKE ONE TABLET BY MOUTH DAILY, Disp: 90 tablet, Rfl: 0    pramipexole (MIRAPEX) 1 MG tablet, Take 1 tablet by mouth nightly, Disp: 90 tablet, Rfl: 1    venlafaxine (EFFEXOR) 75 MG tablet, TAKE ONE TABLET BY MOUTH TWICE A DAY, Disp: 180 tablet, Rfl: 1    nitroGLYCERIN (NITROSTAT) 0.4 MG SL tablet, Place 1 tablet under the tongue every 5 minutes as needed for Chest pain up to max of 3 total doses. If no relief after 1 dose, call 911., Disp: 25 tablet, Rfl: 3    triamcinolone (KENALOG) 0.1 % cream, Apply topically every 7 days, Disp: 80 g, Rfl: 5    methocarbamol (ROBAXIN) 500 MG tablet, Take 1 tablet by mouth 3 times daily as needed (spasm), Disp: 90 tablet, Rfl: 5    aspirin EC 81 MG EC tablet, Take 1 tablet by mouth daily, Disp: 30 tablet, Rfl: 3    magnesium gluconate (MAGONATE) 500 MG tablet, Take 500 mg by mouth daily , Disp: , Rfl:     Vitamin D (CHOLECALCIFEROL) 1000 UNITS CAPS capsule, Take 1,000 Units by mouth daily. , Disp: , Rfl:     Calcium Carbonate-Vitamin D (CALCIUM + D) 600-200 MG-UNIT TABS, Take 600 mg by mouth daily. , Disp: , Rfl:       Objective:   PHYSICAL EXAM:        VITALS:    BP (!) 140/64   Pulse 84   Temp 98.4 °F (36.9 °C)   Ht 5' 8\" (1.727 m)   Wt 251 lb (113.9 kg)   SpO2 95%   BMI 38.16 kg/m²     Constitutional: In no acute distress. Appears stated age. Obese. Eyes: PERRL. No sclera icterus. No conjunctival injection. ENT: No ocular or auricular discharge or visible masses. Oropharynx clear.   Neck: Trachea

## 2020-03-04 NOTE — PATIENT INSTRUCTIONS
Chest X-ray to be done today. Levaquin 500 mg daily for 7 days. Prednisone taper - 40 mg for 3 days, then 30 mg for 3 days, then 20 mg for 3 days, then 10 mg for 3 days.

## 2020-03-16 RX ORDER — LEVOTHYROXINE SODIUM 0.07 MG/1
TABLET ORAL
Qty: 90 TABLET | Refills: 0 | Status: SHIPPED | OUTPATIENT
Start: 2020-03-16 | End: 2020-06-16

## 2020-03-26 RX ORDER — DEXTROMETHORPHAN HYDROBROMIDE AND PROMETHAZINE HYDROCHLORIDE 15; 6.25 MG/5ML; MG/5ML
5 SYRUP ORAL 4 TIMES DAILY PRN
Qty: 180 ML | Refills: 0 | Status: SHIPPED | OUTPATIENT
Start: 2020-03-26 | End: 2020-04-02

## 2020-03-27 RX ORDER — BROMPHENIRAMINE MALEATE, PSEUDOEPHEDRINE HYDROCHLORIDE, AND DEXTROMETHORPHAN HYDROBROMIDE 2; 30; 10 MG/5ML; MG/5ML; MG/5ML
5 SYRUP ORAL 4 TIMES DAILY PRN
Qty: 120 ML | Refills: 0 | Status: SHIPPED | OUTPATIENT
Start: 2020-03-27 | End: 2020-04-06

## 2020-03-27 NOTE — TELEPHONE ENCOUNTER
Please call patient notified that I sent a different prescription however most pharmacies are out of all cough medications so I cannot guarantee that the pharmacy has the one that I sent

## 2020-04-01 ENCOUNTER — TELEPHONE (OUTPATIENT)
Dept: PULMONOLOGY | Age: 80
End: 2020-04-01

## 2020-05-01 ENCOUNTER — TELEPHONE (OUTPATIENT)
Dept: FAMILY MEDICINE CLINIC | Age: 80
End: 2020-05-01

## 2020-05-01 RX ORDER — PRAMIPEXOLE DIHYDROCHLORIDE 1 MG/1
2 TABLET ORAL NIGHTLY
Qty: 60 TABLET | Refills: 1
Start: 2020-05-01 | End: 2020-05-14

## 2020-05-04 ENCOUNTER — HOSPITAL ENCOUNTER (OUTPATIENT)
Age: 80
Discharge: HOME | End: 2020-05-04
Payer: MEDICARE

## 2020-05-04 LAB
A/G RATIO: 1.3 (ref 1.1–2.2)
ALBUMIN SERPL-MCNC: 4.2 G/DL (ref 3.4–5)
ALP BLD-CCNC: 80 U/L (ref 40–129)
ALT SERPL-CCNC: 23 U/L (ref 10–40)
ANION GAP SERPL CALCULATED.3IONS-SCNC: 13 MMOL/L (ref 3–16)
AST SERPL-CCNC: 22 U/L (ref 15–37)
BILIRUB SERPL-MCNC: 0.5 MG/DL (ref 0–1)
BUN BLDV-MCNC: 21 MG/DL (ref 7–20)
CALCIUM SERPL-MCNC: 9.6 MG/DL (ref 8.3–10.6)
CHLORIDE BLD-SCNC: 98 MMOL/L (ref 99–110)
CHOLESTEROL, TOTAL: 114 MG/DL (ref 0–199)
CO2: 29 MMOL/L (ref 21–32)
CREAT SERPL-MCNC: 0.9 MG/DL (ref 0.8–1.3)
GFR AFRICAN AMERICAN: >60
GFR NON-AFRICAN AMERICAN: >60
GLOBULIN: 3.3 G/DL
GLUCOSE BLD-MCNC: 158 MG/DL (ref 70–99)
HCT VFR BLD CALC: 42 % (ref 40.5–52.5)
HDLC SERPL-MCNC: 38 MG/DL (ref 40–60)
HEMOGLOBIN: 14.5 G/DL (ref 13.5–17.5)
LDL CHOLESTEROL CALCULATED: 42 MG/DL
MCH RBC QN AUTO: 30.8 PG (ref 26–34)
MCHC RBC AUTO-ENTMCNC: 34.4 G/DL (ref 31–36)
MCV RBC AUTO: 89.6 FL (ref 80–100)
PDW BLD-RTO: 13.8 % (ref 12.4–15.4)
PLATELET # BLD: 213 K/UL (ref 135–450)
PMV BLD AUTO: 6.5 FL (ref 5–10.5)
POTASSIUM SERPL-SCNC: 3.4 MMOL/L (ref 3.5–5.1)
RBC # BLD: 4.69 M/UL (ref 4.2–5.9)
SODIUM BLD-SCNC: 140 MMOL/L (ref 136–145)
TOTAL PROTEIN: 7.5 G/DL (ref 6.4–8.2)
TRIGL SERPL-MCNC: 170 MG/DL (ref 0–150)
VLDLC SERPL CALC-MCNC: 34 MG/DL
WBC # BLD: 8.2 K/UL (ref 4–11)

## 2020-05-04 PROCEDURE — 36415 COLL VENOUS BLD VENIPUNCTURE: CPT

## 2020-05-04 PROCEDURE — 80053 COMPREHEN METABOLIC PANEL: CPT

## 2020-05-04 PROCEDURE — 85027 COMPLETE CBC AUTOMATED: CPT

## 2020-05-04 PROCEDURE — 80061 LIPID PANEL: CPT

## 2020-05-11 RX ORDER — VENLAFAXINE 75 MG/1
TABLET ORAL
Qty: 180 TABLET | Refills: 0 | Status: SHIPPED | OUTPATIENT
Start: 2020-05-11 | End: 2020-08-03

## 2020-05-14 ENCOUNTER — NURSE TRIAGE (OUTPATIENT)
Dept: OTHER | Facility: CLINIC | Age: 80
End: 2020-05-14

## 2020-05-14 ENCOUNTER — TELEPHONE (OUTPATIENT)
Dept: FAMILY MEDICINE CLINIC | Age: 80
End: 2020-05-14

## 2020-05-14 RX ORDER — PRAMIPEXOLE DIHYDROCHLORIDE 1 MG/1
TABLET ORAL
Qty: 90 TABLET | Refills: 0 | Status: SHIPPED | OUTPATIENT
Start: 2020-05-14 | End: 2020-07-02 | Stop reason: DRUGHIGH

## 2020-05-14 NOTE — TELEPHONE ENCOUNTER
Pt states that he has had a cough for 10 yrs and has tried everything that he can think of to get rid of it. Was wanting to see if there was anything that he could get to help with it. Has tried the [employer] and they did not help. He uses [address]

## 2020-05-14 NOTE — TELEPHONE ENCOUNTER
I do not have any suggestions of what patient can take on a regular other than tessalon caps/perles.   I recommend he call his pulmo to see what they suggest

## 2020-05-14 NOTE — TELEPHONE ENCOUNTER
Reason for Disposition   Caller has medication question about med not prescribed by PCP and triager unable to answer question (e.g., compatibility with other med, storage)   Cough has been present for > 3 weeks    Answer Assessment - Initial Assessment Questions  1. ONSET: \"When did the cough begin? \"       Pt reports he has had a cough for about 10 years. He said it got worse in January when he had an illness and was productive but that has improved, still remains dry and nagging  2. SEVERITY: \"How bad is the cough today? \"       Mild but he is worried about going out in public  3. RESPIRATORY DISTRESS: \"Describe your breathing. \"       No SOB  4. FEVER: \"Do you have a fever? \" If so, ask: \"What is your temperature, how was it measured, and when did it start? \"      no  5. HEMOPTYSIS: \"Are you coughing up any blood? \" If so ask: \"How much? \" (flecks, streaks, tablespoons, etc.)      no  6. TREATMENT: \"What have you done so far to treat the cough? \" (e.g., meds, fluids, humidifier)      None, was calling to see if he could have some tessalon  7. CARDIAC HISTORY: \"Do you have any history of heart disease? \" (e.g., heart attack, congestive heart failure)       Has CAD, has a stent  8. LUNG HISTORY: \"Do you have any history of lung disease? \"  (e.g., pulmonary embolus, asthma, emphysema)      no  9. PE RISK FACTORS: \"Do you have a history of blood clots? \" (or: recent major surgery, recent prolonged travel, bedridden)      no  10. OTHER SYMPTOMS: \"Do you have any other symptoms? (e.g., runny nose, wheezing, chest pain)        no  11. PREGNANCY: \"Is there any chance you are pregnant? \" \"When was your last menstrual period? \"        no  12. TRAVEL: \"Have you traveled out of the country in the last month? \" (e.g., travel history, exposures)        no    Protocols used: MEDICATION QUESTION CALL-ADULT-AH, COUGH-ADULT-[place]    Call transferred back to [facility] to his MD office to discuss further options for treating his chronic cough.  He

## 2020-05-14 NOTE — PROGRESS NOTES
09/18/2019     Lab Results   Component Value Date    LDLCALC 42 05/04/2020    [address] 74 10/15/2019    LDLCALC 72 09/18/2019     Lab Results   Component Value Date    LABVLDL 34 05/04/2020    LABVLDL 26 10/15/2019    LABVLDL 25 09/18/2019   \  Assessment:    67 y.o. patient with:    1. CP/hx CAD:  At [address] 10/9/19 c/o random chest heaviness and given abnl EKG (ST change anterolateral leads is new compared to 9/18 EKG)  he underwent LHC 10/15/19 w/ PCTA to mid LAD with 3.0 x 18 DANTE 3.25 x 8.      2.  Hypertension:  Well controlled and will continue current medical regimen.   ~      3. Hyperlipidemia: Most recent labs 5/4/20 see results above I personally reviewed. Well controlled and will continue current medical regimen. 4. Interstitial lung disease:  Stable. Dr. Nilda Rodriguez diagnosed him with early ILD and lungs should not cause significant symptoms. PFTs 5/2/16 FVC 3.24 (82%) FEV1 2.68 (94%) FEV1/FVC ratio 83% TLC 5.51 (83%) DLCO 17.92 (66%)    5. COLLIN   ~wears bipap    Plan:  1. Meds reviewed. Cost of prescription medications and patient compliance have been reviewed with patient. All questions answered. All questions and concerns were addressed to the patient/family. Alternatives to my treatment were discussed. The note was completed using EMR. Every effort was made to ensure accuracy; however, inadvertent computerized transcription errors may be present.     Lazara Bucio MD  5/14/2020  2:50 PM

## 2020-05-18 ENCOUNTER — OFFICE VISIT (OUTPATIENT)
Dept: CARDIOLOGY CLINIC | Age: 80
End: 2020-05-18
Payer: MEDICARE

## 2020-05-18 VITALS
DIASTOLIC BLOOD PRESSURE: 64 MMHG | SYSTOLIC BLOOD PRESSURE: 128 MMHG | OXYGEN SATURATION: 98 % | BODY MASS INDEX: 37.89 KG/M2 | HEART RATE: 80 BPM | WEIGHT: 250 LBS | HEIGHT: 68 IN

## 2020-05-18 PROCEDURE — 99214 OFFICE O/P EST MOD 30 MIN: CPT | Performed by: INTERNAL MEDICINE

## 2020-05-18 RX ORDER — POTASSIUM CHLORIDE 750 MG/1
10 TABLET, FILM COATED, EXTENDED RELEASE ORAL 2 TIMES DAILY
Qty: 180 TABLET | Refills: 3 | Status: SHIPPED | OUTPATIENT
Start: 2020-05-18 | End: 2021-06-14

## 2020-05-18 NOTE — PATIENT INSTRUCTIONS
Plan:  1. Meds reviewed. no refills warranted today  2. Healthy lifestyle education reviewed including nutrition, controlling BP, lipids, exercise  activity, weight loss  advised  3. Advised to increase Potassium  10 meq to 1 tab twice a day to = 20 meq daily  4. Due to increasing SOB will check lexiscan myoview stress test  5  If no concerning findings on Lexiscan will refer back to DR Daron Perez  6.  Follow up in 6 months

## 2020-05-18 NOTE — LETTER
[id number]  [address] [address]  Phone: [phone]  Fax: [phone]     Gio Sorensen MD     2020     ADORE Pimentel CNP   [address] [facility] [address]     Patient: Sheela Spivey   MR Number: [id number]   YOB: 1940   Date of Visit: 2020     Dear Dr. Shirley Chahal     Today I saw our mutual patient named above. Below are the relevant portions of my assessment and plan of care. If you have questions, please do not hesitate to call me. I look forward to following Estela Aparicio along with you. [address]   Cardiovascular Evaluation    PATIENT: Sheela Spivey  DATE: 2020  MRN: [id number]  CSN: [id number]  : 1940    Primary Care Doctor: ADORE Pimentel CNP    Subjective: Mr. Francesca Jensen is here today for follow up CAD, HTN, HLD      History of present illness on initial date of evaluation:   Sheela Spivey is a 67 y.o. male who presents for cardiac f/u CAD s/p LAD DANTE 10/19, HTN, HLD, interstitial lung disease and COLLIN (uses bipap). Currently followed by Dr. Finn Burrell for ILD. Most recent ECHO 2015 with EF of 55-60%. Grade I DD (no change from  study). Most recent [facility] 2015 with no evidence of stress induced reversible ischemia, EF of 62%. Most recent arterial doppler lower extremity with CLOVER's >1 and no arterial insufficiency. At [address] 18 he c/o fatigued and SOB with new CP. Subsequently underwent LHC 9/10/18  LM <20%, LAD 50-60% mid. FFR 0.83, Cx/OM 30%, RCA 30%, LVEF 60%, Normal right heart pressures (no significant change from  study). At [address] 10/9/19 c/o random chest heaviness and given abnl EKG (ST change anterolateral leads is new compared to  EKG)  he underwent LHC 10/15/19 w/ PCTA to mid LAD with 3.0 x 18 DANTE 3.25 x 8; LM <20%; LAD 50% prox, 70% mid. FFR 0.79; Cx 40% distal;OM1 40% prox; RCA 30% mid.   Most Colonoscopy (9/14/2015); Cystocopy (5/13/16); Upper gastrointestinal endoscopy (09/12/2016); Cystoscopy (05/19/2017); TURP (07/14/2017); Cystoscopy (07/14/2017); Endoscopy, colon, diagnostic (10/11/2017); Upper gastrointestinal endoscopy (10/11/2017); Coronary angioplasty (10/15/2019); Prostate surgery; and Upper gastrointestinal endoscopy (01/28/2020). Social History:   reports that he quit smoking about 46 years ago. His smoking use included cigarettes. He has a 25.50 pack-year smoking history. He has never used smokeless tobacco. He reports that he does not drink alcohol or use drugs. Family History:  No evidence for sudden cardiac death or premature CAD    Medications:  Reviewed and are listed in nursing record. and/or listed below  Outpatient Medications: Allergies:  Percocet [oxycodone-acetaminophen]; Ace inhibitors; Oxybutynin; and Simvastatin     Review of Systems:   All 12 point review of symptoms completed. Pertinent positives identified in the HPI, all other review of symptoms negative as below. Physical Examination:    There were no vitals filed for this visit.        Wt Readings from Last 3 Encounters:   03/04/20 251 lb (113.9 kg)   02/10/20 246 lb (111.6 kg)   01/22/20 246 lb 12.8 oz (111.9 kg)     No intake or output data in the 24 hours ending 05/14/20 1450    General Appearance:  Alert, cooperative, no distress, appears stated age   Head:  Normocephalic, without obvious abnormality, atraumatic   Eyes:  PERRL, conjunctiva/corneas clear       Nose: Nares normal, no drainage or sinus tenderness   Throat: Lips, mucosa, and tongue normal   Neck: Supple, symmetrical, trachea midline, no adenopathy, thyroid: not enlarged, symmetric, no tenderness/mass/nodules, no carotid bruit or JVD       Lungs:   Soft crackles in the bases, respirations unlabored   Chest Wall:  No tenderness or deformity   Heart:  Regular rhythm and normal rate; S1, S2 are normal Soft ANGELA; no rub or gallop Abdomen:   Soft, non-tender, bowel sounds active all four quadrants,  no masses, no organomegaly           Extremities: Extremities normal, atraumatic, no cyanosis or edema   Pulses: 2+ and symmetric   Skin: Skin color, texture, turgor normal, no rashes or lesions   Pysch: Normal mood and affect   Neurologic: Normal gross motor and sensory exam.         Labs    Lab Results   Component Value Date    CHOL 114 05/04/2020    CHOL 148 10/15/2019    CHOL 144 09/18/2019     Lab Results   Component Value Date    TRIG 170 (H) 05/04/2020    TRIG 129 10/15/2019    TRIG 125 09/18/2019     Lab Results   Component Value Date    HDL 38 (L) 05/04/2020    HDL 48 10/15/2019    HDL 47 09/18/2019     Lab Results   Component Value Date    LDLCALC 42 05/04/2020    LDLCALC 74 10/15/2019    LDLCALC 72 09/18/2019     Lab Results   Component Value Date    LABVLDL 34 05/04/2020    LABVLDL 26 10/15/2019    LABVLDL 25 09/18/2019   \  Assessment:    67 y.o. patient with:    1. CP/hx CAD:  At [address] 10/9/19 c/o random chest heaviness and given abnl EKG (ST change anterolateral leads is new compared to 9/18 EKG)  he underwent LHC 10/15/19 w/ PCTA to mid LAD with 3.0 x 18 DANTE 3.25 x 8.      2.  Hypertension:  Well controlled and will continue current medical regimen.   ~      3. Hyperlipidemia: Most recent labs 5/4/20 see results above I personally reviewed. Well controlled and will continue current medical regimen. 4. Interstitial lung disease:  Stable. Dr. Ryan Feldman diagnosed him with early ILD and lungs should not cause significant symptoms. PFTs 5/2/16 FVC 3.24 (82%) FEV1 2.68 (94%) FEV1/FVC ratio 83% TLC 5.51 (83%) DLCO 17.92 (66%)    5. COLLIN   ~wears bipap    Plan:  1. Meds reviewed. Cost of prescription medications and patient compliance have been reviewed with patient. All questions answered. All questions and concerns were addressed to the patient/family. Alternatives to my treatment were discussed.  The note was completed using EMR. Every effort was made to ensure accuracy; however, inadvertent computerized transcription errors may be present. Gagandeep Nick MD  2020  2:50 PM          [address]   Cardiovascular Evaluation    PATIENT: Ezra Myers  DATE: 2020  MRN: [id number]  CSN: [id number]  : 1940    Primary Care Doctor: Maggie Cabrera, APRN - CNP    Subjective: Mr. Mac Ricks is here today for follow up CAD, HTN, HLD; today he c/o worsening SOB     PMH:   Ezra Myers is a 67 y.o. male who presents for cardiac f/u CAD s/p LAD DANTE 10/19, HTN, HLD, ILD, chronic cough, and COLLIN (uses bipap). Currently followed by Dr. Lina Short for ILD. Most recent ECHO 2015 with EF of 55-60%. Grade I DD (no change from  study). Most recent [facility] 2015 with no evidence of stress induced reversible ischemia, EF of 62%. Most recent arterial doppler lower extremity with CLOVER's >1 and no arterial insufficiency. At [address] [facility] 18 he c/o fatigued and SOB with new CP. Subsequently underwent LHC 9/10/18  LM <20%, LAD 50-60% mid. FFR 0.83, Cx/OM 30%, RCA 30%, LVEF 60%, Normal right heart pressures (no significant change from  study). At [address] [facility] 10/9/19 c/o random chest heaviness and given abnl EKG (ST change anterolateral leads is new compared to  EKG) he underwent LHC 10/15/19 w/ PCTA to mid LAD with 3.0 x 18 DANTE 3.25 x 8; LM <20%; LAD 50% prox, 70% mid. FFR 0.79; Cx 40% distal;OM1 40% prox; RCA 30% mid. Most recent EKG 10/16/19 SB 54 bpm; 1st degree A-V block; LVH; nonspecific ST abnormality. History of present illness    Today he reports feeling good overall, reports exertional SOB which has been steadily worsening. He also reports falling ill end of January had all symptoms of COVID except for fever he did not get tested at the time was unaware. He did see Dr. Lina Short in 3/20 and dx with acute bronchitis/PNA and given abx with steroid taper.   Denies chest pain,

## 2020-06-01 ENCOUNTER — HOSPITAL ENCOUNTER (OUTPATIENT)
Dept: NUCLEAR MEDICINE | Age: 80
Discharge: HOME | End: 2020-06-01
Payer: MEDICARE

## 2020-06-01 ENCOUNTER — HOSPITAL ENCOUNTER (OUTPATIENT)
Dept: NON INVASIVE DIAGNOSTICS | Age: 80
Discharge: HOME | End: 2020-06-01
Payer: MEDICARE

## 2020-06-01 LAB
LV EF: 65 %
LVEF MODALITY: NORMAL

## 2020-06-01 PROCEDURE — 3430000000 HC RX DIAGNOSTIC RADIOPHARMACEUTICAL: Performed by: INTERNAL MEDICINE

## 2020-06-01 PROCEDURE — 6360000002 HC RX W HCPCS: Performed by: INTERNAL MEDICINE

## 2020-06-01 PROCEDURE — 93017 CV STRESS TEST TRACING ONLY: CPT

## 2020-06-01 PROCEDURE — A9502 TC99M TETROFOSMIN: HCPCS | Performed by: INTERNAL MEDICINE

## 2020-06-01 PROCEDURE — 78452 HT MUSCLE IMAGE SPECT MULT: CPT

## 2020-06-01 RX ADMIN — TETROFOSMIN 31.5 MILLICURIE: 1.38 INJECTION, POWDER, LYOPHILIZED, FOR SOLUTION INTRAVENOUS at 10:25

## 2020-06-01 RX ADMIN — TETROFOSMIN 11.2 MILLICURIE: 1.38 INJECTION, POWDER, LYOPHILIZED, FOR SOLUTION INTRAVENOUS at 09:20

## 2020-06-01 RX ADMIN — REGADENOSON 0.4 MG: 0.08 INJECTION, SOLUTION INTRAVENOUS at 10:25

## 2020-06-16 RX ORDER — LEVOTHYROXINE SODIUM 0.07 MG/1
TABLET ORAL
Qty: 90 TABLET | Refills: 0 | Status: SHIPPED | OUTPATIENT
Start: 2020-06-16 | End: 2020-09-08

## 2020-07-02 ENCOUNTER — VIRTUAL VISIT (OUTPATIENT)
Dept: FAMILY MEDICINE CLINIC | Age: 80
End: 2020-07-02
Payer: MEDICARE

## 2020-07-02 PROBLEM — I73.9 CLAUDICATION (HCC): Status: ACTIVE | Noted: 2020-07-02

## 2020-07-02 PROBLEM — E66.812 CLASS 2 SEVERE OBESITY DUE TO EXCESS CALORIES WITH SERIOUS COMORBIDITY AND BODY MASS INDEX (BMI) OF 36.0 TO 36.9 IN ADULT: Status: ACTIVE | Noted: 2020-07-02

## 2020-07-02 PROBLEM — E66.01 CLASS 2 SEVERE OBESITY DUE TO EXCESS CALORIES WITH SERIOUS COMORBIDITY AND BODY MASS INDEX (BMI) OF 36.0 TO 36.9 IN ADULT (HCC): Status: ACTIVE | Noted: 2020-07-02

## 2020-07-02 PROCEDURE — 96160 PT-FOCUSED HLTH RISK ASSMT: CPT | Performed by: NURSE PRACTITIONER

## 2020-07-02 PROCEDURE — 99443 PR PHYS/QHP TELEPHONE EVALUATION 21-30 MIN: CPT | Performed by: NURSE PRACTITIONER

## 2020-07-02 RX ORDER — PRAMIPEXOLE DIHYDROCHLORIDE 1 MG/1
TABLET ORAL
Qty: 90 TABLET | Refills: 0 | Status: CANCELLED | OUTPATIENT
Start: 2020-07-02

## 2020-07-02 RX ORDER — PRAMIPEXOLE DIHYDROCHLORIDE 1 MG/1
1 TABLET ORAL 2 TIMES DAILY
Qty: 180 TABLET | Refills: 1 | Status: SHIPPED | OUTPATIENT
Start: 2020-07-02 | End: 2021-03-10

## 2020-07-02 ASSESSMENT — ENCOUNTER SYMPTOMS
ALLERGIC/IMMUNOLOGIC NEGATIVE: 1
ANAL BLEEDING: 0
RESPIRATORY NEGATIVE: 1
NAUSEA: 0
GASTROINTESTINAL NEGATIVE: 1
ABDOMINAL PAIN: 0
SHORTNESS OF BREATH: 0
EYES NEGATIVE: 1
BLOOD IN STOOL: 0

## 2020-07-02 ASSESSMENT — PATIENT HEALTH QUESTIONNAIRE - PHQ9
SUM OF ALL RESPONSES TO PHQ9 QUESTIONS 1 & 2: 1
9. THOUGHTS THAT YOU WOULD BE BETTER OFF DEAD, OR OF HURTING YOURSELF: 0
10. IF YOU CHECKED OFF ANY PROBLEMS, HOW DIFFICULT HAVE THESE PROBLEMS MADE IT FOR YOU TO DO YOUR WORK, TAKE CARE OF THINGS AT HOME, OR GET ALONG WITH OTHER PEOPLE: 1
8. MOVING OR SPEAKING SO SLOWLY THAT OTHER PEOPLE COULD HAVE NOTICED. OR THE OPPOSITE, BEING SO FIGETY OR RESTLESS THAT YOU HAVE BEEN MOVING AROUND A LOT MORE THAN USUAL: 0
6. FEELING BAD ABOUT YOURSELF - OR THAT YOU ARE A FAILURE OR HAVE LET YOURSELF OR YOUR FAMILY DOWN: 0
1. LITTLE INTEREST OR PLEASURE IN DOING THINGS: 1
5. POOR APPETITE OR OVEREATING: 3
SUM OF ALL RESPONSES TO PHQ QUESTIONS 1-9: 7
SUM OF ALL RESPONSES TO PHQ QUESTIONS 1-9: 7
4. FEELING TIRED OR HAVING LITTLE ENERGY: 3
7. TROUBLE CONCENTRATING ON THINGS, SUCH AS READING THE NEWSPAPER OR WATCHING TELEVISION: 0
3. TROUBLE FALLING OR STAYING ASLEEP: 0
2. FEELING DOWN, DEPRESSED OR HOPELESS: 0

## 2020-07-02 NOTE — PROGRESS NOTES
2020      Bere Rollins is a 67 y.o. male evaluated via telephone on 2020. Consent:  He and/or health care decision maker is aware that that he may receive a bill for this telephone service, depending on his insurance coverage, and has provided verbal consent to proceed: Yes    Bere Rollins is a 67 y.o. male who presents today for his medical conditions/complaints as noted below. 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 Patient's current complaints are chest pressure/discomfort, dyspnea, fatigue and lower extremity edema. He denies chest pain, claudication, exertional chest pressure/discomfort, irregular heart beat, near-syncope, orthopnea, palpitations, paroxysmal nocturnal dyspnea, syncope and tachypnea. He states he is compliant all of the time with his medications. He states he is compliant most of the time with his diet.     Angina  Patient is a 69 y.o. male who presents for follow-up of angina. Quality of the pain is described as pressure and heaviness at times   Location: retrosternal area  Occurs randomly. Duration: intermittent (1-10 minutes). The pain does not radiate. The symptoms occur weekly. Associated symptoms described are none. The pattern of symptoms has been stable. Precipitating factors are none noted. Factors which relieve the discomfort are eating. The pain began years ago but has reoccurred about 3-4 week(s) ago. The severity on a scale of 1 to 10 is 0.    Patient has not seen cardiology in 3-6 months. Hypothyroidism: Recent symptoms: none. He denies fatigue, weight gain, weight loss, cold intolerance, heat intolerance, hair loss, dry skin, constipation, diarrhea, edema, anxiety, tremor, palpitations and dysphagia. Patient is  taking his medication consistently on an empty stomach. Lab Results   Component Value Date    TSHREFLEX 4.03 01/10/2017    TSHREFLEX 4.05 03/23/2016    TSHREFLEX 4.14 12/15/2015     Lab Results   Component Value Date    TSH 3.02 09/18/2019    TSH 2.26 02/08/2019    TSH 2.17 08/15/2018     Restless Leg Syndrome  Patient has been complaining of cramps and spasms in the lower extremities, especially at rest and at night. He reports waking up several times a night due to pain and cramping in the lower extremities that is relieves with walking. He denies any complaint during activities. These symptoms have been going on for years and is not improving. He has also tried mirapex  with relief. Patient was on benzodiazapine  But quit using and started on mirapex    Hypertension:  Home blood pressure monitoring: No. Patient denies chest pain, headache, lightheadedness, blurred vision, peripheral edema, palpitations, dry cough and fatigue. Antihypertensive medication side effects: no medication side effects noted. Use of agents associated with hypertension: thyroid hormones. Sodium (mmol/L)   Date Value   05/04/2020 140    BUN (mg/dL)   Date Value   05/04/2020 21 (H)    Glucose (mg/dL)   Date Value   05/04/2020 158 (H)      Potassium (mmol/L)   Date Value   05/04/2020 3.4 (L)     Potassium reflex Magnesium (mmol/L)   Date Value   10/15/2019 3.6    CREATININE (mg/dL)   Date Value   05/04/2020 0.9         Hyperlipidemia:  No new myalgias or GI upset on atorvastatin (Lipitor).      Lab Results   Component Value Date    CHOL 114 05/04/2020    TRIG 170 (H) 05/04/2020    HDL 38 (L) 05/04/2020    LDLCALC 42 05/04/2020 Lab Results   Component Value Date    ALT 23 05/04/2020    AST 22 05/04/2020         Mood Disorder:  Patient presents for follow-up of depression and anxiety disorder. Current complaints include: See PHQ9 below . He denies any other symptoms. Symptoms/signs of vipul: none. External stressors: nothing new. Current treatment includes: Effexor- 75 mg bid. Medication side effects: none. PHQ-9  7/2/2020 11/21/2019 9/18/2019 6/13/2019 2/8/2019 11/9/2018 4/13/2017   Little interest or pleasure in doing things 1 0 1 1 1 1 0   Feeling down, depressed, or hopeless 0 0 0 0 1 1 0   Trouble falling or staying asleep, or sleeping too much 0 - 2 3 1 1 -   Feeling tired or having little energy 3 - 1 2 1 0 -   Poor appetite or overeating 3 - 1 1 0 0 -   Feeling bad about yourself - or that you are a failure or have let yourself or your family down 0 - 0 0 0 0 -   Trouble concentrating on things, such as reading the newspaper or watching television 0 - 0 0 0 0 -   Moving or speaking so slowly that other people could have noticed. Or the opposite - being so fidgety or restless that you have been moving around a lot more than usual 0 - 0 0 0 0 -   Thoughts that you would be better off dead, or of hurting yourself in some way 0 - 0 0 0 0 -   PHQ-2 Score 1 0 1 1 2 2 0   PHQ-9 Total Score 7 0 5 7 4 3 0   If you checked off any problems, how difficult have these problems made it for you to do your work, take care of things at home, or get along with other people? 1 - 1 1 1 1 -     Interpretation of Total Score Total Score Depression Severity: 1-4 = Minimal depression, 5-9 = Mild depression, 10-14 = Moderate depression, 15-19 = Moderately severe depression, 20-27 = Severe depression      Review of Systems   Constitutional: Negative. Negative for appetite change, fatigue and unexpected weight change. HENT: Negative. Eyes: Negative. Respiratory: Negative. Negative for shortness of breath. Cardiovascular: Negative. Negative for chest pain, palpitations and leg swelling. Gastrointestinal: Negative. Negative for abdominal pain, anal bleeding, blood in stool and nausea. Endocrine: Negative. Genitourinary: Negative. Negative for hematuria. Musculoskeletal: Positive for arthralgias. Skin: Negative. Negative for rash. Allergic/Immunologic: Negative. Neurological: Negative. Negative for dizziness, syncope, light-headedness and numbness. Hematological: Negative. Does not bruise/bleed easily. Psychiatric/Behavioral: Negative. All other systems reviewed and are negative. Prior to Visit Medications    Medication Sig Taking? Authorizing Provider   levothyroxine (SYNTHROID) 75 MCG tablet TAKE ONE TABLET BY MOUTH DAILY  ADORE Martinez CNP   potassium chloride (KLOR-CON) 10 MEQ extended release tablet Take 1 tablet by mouth 2 times daily TAKE ONE TABLET BY MOUTH DAILY  Eugene Stubbs MD   pramipexole (MIRAPEX) 1 MG tablet TAKE ONE TABLET BY MOUTH ONCE NIGHTLY  ADORE Estrella CNP   venlafaxine (EFFEXOR) 75 MG tablet Take 1 tablet by mouth twice daily  ADORE Martinez CNP   omeprazole (PRILOSEC) 40 MG delayed release capsule TAKE ONE CAPSULE BY MOUTH DAILY  ADORE Martinez CNP   atorvastatin (LIPITOR) 40 MG tablet Take 1 tablet by mouth daily  Eugene Stubbs MD   clopidogrel (PLAVIX) 75 MG tablet Take 1 tablet by mouth daily  Eugene Stubbs MD   amLODIPine (NORVASC) 10 MG tablet Susan Simmons MD   triamterene-hydrochlorothiazide (MAXZIDE-25) 37.5-25 MG per tablet Susan Simmons MD   doxazosin (CARDURA) 8 MG tablet TAKE ONE TABLET BY MOUTH TWICE A DAY  ADORE Estrella CNP   nitroGLYCERIN (NITROSTAT) 0.4 MG SL tablet Place 1 tablet under the tongue every 5 minutes as needed for Chest pain up to max of 3 total doses. If no relief after 1 dose, call 911.   Freedom Garcia, APRN - CNP   triamcinolone (KENALOG) 0.1 % cream Apply topically every 7 days  ADORE Boswell CNP   methocarbamol (ROBAXIN) 500 MG tablet Take 1 tablet by mouth 3 times daily as needed (spasm)  ADORE Boswell CNP   aspirin EC 81 MG EC tablet Take 1 tablet by mouth daily  Maria D Marroquin MD   magnesium gluconate (MAGONATE) 500 MG tablet Take 500 mg by mouth daily   Historical Provider, MD   Vitamin D (CHOLECALCIFEROL) 1000 UNITS CAPS capsule Take 1,000 Units by mouth daily. Historical Provider, MD   Calcium Carbonate-Vitamin D (CALCIUM + D) 600-200 MG-UNIT TABS Take 600 mg by mouth daily.   Historical Provider, MD       Social History     Tobacco Use    Smoking status: Former Smoker     Packs/day: 1.50     Years: 17.00     Pack years: 25.50     Types: Cigarettes     Last attempt to quit: 1974     Years since quittin.5    Smokeless tobacco: Never Used   Substance Use Topics    Alcohol use: No     Alcohol/week: 0.0 standard drinks    Drug use: No        Allergies   Allergen Reactions    Percocet [Oxycodone-Acetaminophen] Itching    Ace Inhibitors Other (See Comments)     Cough    Oxybutynin      nightmares    Simvastatin      Did not control cholesterol   ,   Past Medical History:   Diagnosis Date    Arthritis     Back injuries     BPH (benign prostatic hyperplasia)     CPAP (continuous positive airway pressure) dependence     Diverticulosis     colonoscopy 2015    Esophageal dysmotility     GERD (gastroesophageal reflux disease)     Hiatal hernia     History of colon polyps     seen on colonoscopy again 2015    Hyperlipidemia     Hypertension     ILD (interstitial lung disease) ([facility] [address] 2013    Internal hemorrhoid     colonoscopy     Lichen sclerosus of penis     Dr Homer Tenorio Mild cognitive impairment 2016    [facility] neurology    COLLIN (obstructive sleep apnea)     Renal insufficiency     Restless legs syndrome     Rosacea     Schatzki's ring     last dilated     Scrotal pruritus 2018    Sleep apnea    ,   Past Surgical History:   Procedure Laterality Date    CIRCUMCISION      COLONOSCOPY      COLONOSCOPY  2015    CORONARY ANGIOPLASTY  10/15/2019    CYSTOSCOPY  16    CYSTOSCOPY  2017    CYSTOSCOPY     CYSTOSCOPY  2017    ENDOSCOPY, COLON, DIAGNOSTIC  10/11/2017    esoph. stricture    FRACTURE SURGERY      right leg    FRACTURE SURGERY      wrist   right    HERNIA REPAIR      PROSTATE SURGERY      TOE SURGERY      TURP  2017    UPPER GASTROINTESTINAL ENDOSCOPY  2016    UPPER GASTROINTESTINAL ENDOSCOPY  10/11/2017    esophageal stricture    UPPER GASTROINTESTINAL ENDOSCOPY  2020   ,   Social History     Tobacco Use    Smoking status: Former Smoker     Packs/day: 1.50     Years: 17.00     Pack years: 25.50     Types: Cigarettes     Last attempt to quit: 1974     Years since quittin.5    Smokeless tobacco: Never Used   Substance Use Topics    Alcohol use: No     Alcohol/week: 0.0 standard drinks    Drug use: No   ,   Family History   Problem Relation Age of Onset    Stroke Father     Cancer Father         prostate    Other Father         colitis    Diabetes Sister     Diabetes Brother     Cancer Brother         skin    High Blood Pressure Brother     High Cholesterol Brother     COPD Mother     Asthma Neg Hx     Emphysema Neg Hx     Heart Failure Neg Hx     Hypertension Neg Hx    ,   Immunization History   Administered Date(s) Administered    Influenza Vaccine, unspecified formulation 10/13/2016, 2017, 2018    Influenza Virus Vaccine 10/29/2015    Influenza, Piyush Feast, IM, (6 mo and older Fluzone, Flulaval, Fluarix and 3 yrs and older Afluria) 10/13/2016, 2017, 2018    Influenza, Quadv, IM, PF (6 mo and older Fluzone, Flulaval, Fluarix, and 3 yrs and older Afluria) 10/23/2019    Pneumococcal Conjugate 13-valent (Catherine Cramadhav) 04/29/2015    Pneumococcal Polysaccharide ([id number]) 07/13/2016    Td, unspecified formulation 06/09/2012    Tdap (Boostrix, Adacel) 06/20/2017   ,   Health Maintenance   Topic Date Due    Shingles Vaccine (1 of 2) 09/18/2020 (Originally 12/25/1991)    Flu vaccine (1) 09/01/2020    TSH testing  09/18/2020    Annual Wellness Visit (AWV)  11/21/2020    Lipid screen  05/04/2021    Potassium monitoring  05/04/2021    Creatinine monitoring  05/04/2021    DTaP/Tdap/Td vaccine (2 - Td) 06/20/2027    Pneumococcal 65+ years Vaccine  Completed    Hepatitis A vaccine  Aged Out    Hepatitis B vaccine  Aged Out    Hib vaccine  Aged Out    Meningococcal (ACWY) vaccine  Aged Out       PHYSICAL EXAMINATION:  Not applicable due to telephone visit    ASSESSMENT/PLAN:  1. Paralysis agitans ([facility] [address]  Stable, continue with neurology    2. Chronic diastolic congestive heart failure ([facility] [address]  Denies s/s  Continue with cardiology    3. Recurrent major depressive disorder, in partial remission ([facility] [address]  Educated if patient develops SI/HI/vipul to call 911 or go to ER. Discussed use, benefit, risks and side effects of prescribed medications. Barriers to compliance discussed. All patient questions answered. Pt voiced understanding. 4. Claudication ([address]  stable    5. Class 2 severe obesity due to excess calories with serious comorbidity and body mass index (BMI) of 36.0 to 36.9 in adult [facility])   Discussed healthy lifestyle choices to attempt to incorporate into daily routine to attempt to obtain and maintain a healthy weight. General weight loss/lifestyle modification strategies discussed (elicit support from others; identify saboteurs; non-food rewards, etc). Discussed healthy nutritional options as well. Diet interventions: moderate (500 kCal/d) deficit diet. Disscussed trying to incorporate routine physical activity into daily regimen.  Informal exercise measures discussed, e.g. taking stairs instead of elevator. Regular aerobic exercise program discussed. Surgical Procedure: was not discussed  Behavioral treatment: discussed commercial programs (Weight Watchers, Nutrisystem,etc), Overeaters Anonymous, Slim Fast, stress management and TOPS. Discussed consequences of obesity in terms of medical conditions that may develop in the future. Patient verbalized understanding. 6. Essential hypertension  Condition appears stable with current medication regimen. Continue current medications. No reported or noted side effects of medication. Will continue to monitor at routine intervals as appropriate. 7. Hypothyroidism, unspecified type  Condition appears stable with current medication regimen. Continue current medications. No reported or noted side effects of medication. Will continue to monitor at routine intervals as appropriate. 8. Restless legs syndrome  Increased   - pramipexole (MIRAPEX) 1 MG tablet; Take 1 tablet by mouth 2 times daily  Dispense: 180 tablet; Refill: 1    9. Hyperlipidemia, unspecified hyperlipidemia type     1. Start/Continue dietary measures. Reduce saturated fat, \"trans\" monounsaturated fatty acids, and cholesterol  Consume a diet that emphasizes vegetables, fruits, whole grains, low-fat diary, poultry, fish, legumes, nuts and limited in sweets, sugar-sweetened beverages, and red meat. Increase soluble fiber  Consider Plant sterols 2g/d (e.g. Benecol)  2. Start/Continue regular exercise. Advised to engage in regular physical activity with frequency goal being  3-4 times a week  Discussed healthy lifestyle changes to improve lipids. Stressed the importance of weight control to improve cholesterol. 10. Vitamin D deficiency  Discussed with patient that we make vitamin D from the sun and get it from some food sources, but it is very common to be deficient.   Discussed the need for vitamin D replacement because low vitamin D can cause fatigue, joint aches and has been

## 2020-07-08 ENCOUNTER — OFFICE VISIT (OUTPATIENT)
Dept: FAMILY MEDICINE CLINIC | Age: 80
End: 2020-07-08
Payer: MEDICARE

## 2020-07-08 VITALS
WEIGHT: 257.6 LBS | OXYGEN SATURATION: 97 % | DIASTOLIC BLOOD PRESSURE: 74 MMHG | HEIGHT: 68 IN | HEART RATE: 75 BPM | TEMPERATURE: 97.9 F | SYSTOLIC BLOOD PRESSURE: 132 MMHG | BODY MASS INDEX: 39.04 KG/M2

## 2020-07-08 PROCEDURE — 99213 OFFICE O/P EST LOW 20 MIN: CPT | Performed by: NURSE PRACTITIONER

## 2020-07-08 ASSESSMENT — ENCOUNTER SYMPTOMS
GASTROINTESTINAL NEGATIVE: 1
ABDOMINAL PAIN: 0
NAUSEA: 0
BLOOD IN STOOL: 0
SHORTNESS OF BREATH: 0
ALLERGIC/IMMUNOLOGIC NEGATIVE: 1
EYES NEGATIVE: 1
RESPIRATORY NEGATIVE: 1
ANAL BLEEDING: 0

## 2020-07-08 NOTE — PATIENT INSTRUCTIONS
Patient Education   Patient Education        Elbow Bursitis: Exercises  Introduction  Here are some examples of exercises for you to try. The exercises may be suggested for a condition or for rehabilitation. Start each exercise slowly. Ease off the exercises if you start to have pain. You will be told when to start these exercises and which ones will work best for you. How to do the exercises  Elbow flexion stretch   1. Lift the arm that bothers you, and bend the elbow. Your palm should face toward you. 2. With your other hand, gently push on the back of your affected forearm. Press your hand toward your shoulder until you feel a stretch in the back of your upper arm. 3. Hold for at least 15 to 30 seconds. 4. Repeat 2 to 4 times. Elbow extension stretch   1. Extend your affected arm in front of you with your palm facing away from you. 2. Bend back your wrist, pointing your hand up toward the ceiling. 3. With your other hand, gently bend your wrist farther until you feel a mild to moderate stretch in your forearm. 4. Hold for at least 15 to 30 seconds. 5. Repeat 2 to 4 times. 6. Repeat steps 1 through 5. But this time extend your affected arm in front of you with your palm facing up. Then bend back your wrist, pointing your hand toward the floor. Pronation and supination stretch   1. Keep your affected elbow at your side, bent at about 90 degrees. Grasp a pen, pencil, or stick, and wrap your hand around it. If you don't have something to hold on to, make a fist instead. 2. Slowly turn your forearm as far as you can back and forth in each direction. Your hand should face up and then down. 3. Hold each position for about 6 seconds. 4. Relax for up to 10 seconds between repetitions. 5. Repeat 8 to 12 times. Hand flips   1. While seated, place your affected forearm on your thigh. Your palm should face down. 2. Flip your hand over so the back of your hand rests on your thigh and your palm is up. Alternate between palm up and palm down while keeping your forearm on your thigh. 3. Repeat 8 to 12 times. Follow-up care is a key part of your treatment and safety. Be sure to make and go to all appointments, and call your doctor if you are having problems. It's also a good idea to know your test results and keep a list of the medicines you take. Where can you learn more? Go to https://chpepiceweb."[employer]. org and sign in to your [employer] account. Enter N810 in the apta.me box to learn more about \"Elbow Bursitis: Exercises. \"     If you do not have an account, please click on the \"Sign Up Now\" link. Current as of: March 2, 2020               Content Version: 12.5  © 1209-3633 Healthwise, Incorporated. Care instructions adapted under license by [facility] ([facility]). If you have questions about a medical condition or this instruction, always ask your healthcare professional. [place] [address] any warranty or liability for your use of this information. Bursitis of the Elbow: Care Instructions  Your Care Instructions  Bursitis is pain and swelling of the bursae. These are sacs of fluid that help your joints move smoothly. Olecranon bursitis is a type of bursitis that affects the back of the elbow. This is sometimes called Alvarado elbow because the bump that develops looks like the cartoon character Alvarado's elbow. Injury, overuse, or prolonged pressure on your elbow can cause this form of bursitis. Sometimes it happens when people have arthritis. It also can occur for unknown reasons. Treatment may include draining fluid from the bursa with a needle. If your doctor thought there was infection, he or she may have prescribed antibiotics. You also may get shots of medicine into the bursa to help the swelling go down. Your elbow should get better in a few days or weeks. Follow-up care is a key part of your treatment and safety.  Be sure to make and go to all appointments, and call your doctor if you are having problems. It's also a good idea to know your test results and keep a list of the medicines you take. How can you care for yourself at home? · Take pain medicines exactly as directed. ? If the doctor gave you a prescription medicine for pain, take it as prescribed. ? If you are not taking a prescription pain medicine, ask your doctor if you can take an over-the-counter medicine. ? Do not take two or more pain medicines at the same time unless the doctor told you to. Many pain medicines have acetaminophen, which is Tylenol. Too much acetaminophen (Tylenol) can be harmful. · If your doctor prescribed antibiotics, take them as directed. Do not stop taking them just because you feel better. You need to take the full course of antibiotics. · If your doctor gave you a sling, an elastic bandage, or a compression sleeve, wear it exactly as instructed. · Put ice or a cold pack on your elbow for 10 to 20 minutes at a time. Try to do this every 1 to 2 hours for the next 3 days (when you are awake) or until the swelling goes down. Put a thin cloth between the ice and your skin. · After 3 days, you can try heat, or alternate heat and ice. · Rest your elbow. Try to stop or reduce any activity that causes pain. · Wear elbow pads during physical activity to prevent injury. · Do not lean your elbows on tables or armrests. When should you call for help? Call your doctor now or seek immediate medical care if:  · You have new or worse symptoms of infection, such as:  ? Increased pain, swelling, warmth, or redness. ? Red streaks leading from the area. ? Pus draining from the area. ? A fever. Watch closely for changes in your health, and be sure to contact your doctor if:  · You do not get better as expected. Where can you learn more? Go to https://lauraeb.Klick[employer]Contact. org and sign in to your [employer] account.  Enter  in the [facility] box to learn more about \"Bursitis of the Elbow: Care Instructions. \"     If you do not have an account, please click on the \"Sign Up Now\" link. Current as of: March 2, 2020               Content Version: 12.5  © 6928-0396 Healthwise, Incorporated. Care instructions adapted under license by [facility] ([facility]). If you have questions about a medical condition or this instruction, always ask your healthcare professional. [address] any warranty or liability for your use of this information.

## 2020-07-08 NOTE — PROGRESS NOTES
syndrome     Rosacea     Schatzki's ring     last dilated 2002    Scrotal pruritus 2/8/2018    Sleep apnea          Review of Systems   Constitutional: Negative. Negative for appetite change, fatigue and unexpected weight change. HENT: Negative. Eyes: Negative. Respiratory: Negative. Negative for shortness of breath. Cardiovascular: Negative. Negative for chest pain, palpitations and leg swelling. Gastrointestinal: Negative. Negative for abdominal pain, anal bleeding, blood in stool and nausea. Endocrine: Negative. Genitourinary: Negative. Negative for hematuria. Musculoskeletal: Positive for joint swelling. Skin: Negative. Negative for rash. Allergic/Immunologic: Negative. Neurological: Negative. Negative for dizziness, syncope, light-headedness and numbness. Hematological: Negative. Does not bruise/bleed easily. Psychiatric/Behavioral: Negative. All other systems reviewed and are negative.        Past Medical History:   Diagnosis Date    Arthritis     Back injuries     BPH (benign prostatic hyperplasia)     CPAP (continuous positive airway pressure) dependence     Diverticulosis     colonoscopy 9/2015    Esophageal dysmotility     GERD (gastroesophageal reflux disease)     Hiatal hernia     History of colon polyps     seen on colonoscopy again 9/2015    Hyperlipidemia     Hypertension     ILD (interstitial lung disease) ([facility] [address] 7/30/2013    Internal hemorrhoid     colonoscopy 8/45/19    Lichen sclerosus of penis 2017    Dr Javid Garcia Mild cognitive impairment 08/2016    [facility] neurology    COLLIN (obstructive sleep apnea)     Renal insufficiency     Restless legs syndrome     Rosacea     Schatzki's ring     last dilated 2002    Scrotal pruritus 2/8/2018    Sleep apnea      Family History   Problem Relation Age of Onset    Stroke Father     Cancer Father         prostate    Other Father         colitis    Diabetes Sister     Diabetes Brother  Cancer Brother         skin    High Blood Pressure Brother     High Cholesterol Brother     COPD Mother     Asthma Neg Hx     Emphysema Neg Hx     Heart Failure Neg Hx     Hypertension Neg Hx      Past Surgical History:   Procedure Laterality Date    CIRCUMCISION      COLONOSCOPY      COLONOSCOPY  2015    CORONARY ANGIOPLASTY  10/15/2019    CYSTOSCOPY  16    CYSTOSCOPY  2017    CYSTOSCOPY     CYSTOSCOPY  2017    ENDOSCOPY, COLON, DIAGNOSTIC  10/11/2017    esoph. stricture    FRACTURE SURGERY      right leg    FRACTURE SURGERY      wrist   right    HERNIA REPAIR      PROSTATE SURGERY      TOE SURGERY      TURP  2017    UPPER GASTROINTESTINAL ENDOSCOPY  2016    UPPER GASTROINTESTINAL ENDOSCOPY  10/11/2017    esophageal stricture    UPPER GASTROINTESTINAL ENDOSCOPY  2020     Social History     Socioeconomic History    Marital status:      Spouse name: Not on file    Number of children: Not on file    Years of education: Not on file    Highest education level: Not on file   Occupational History    Not on file   Social Needs    Financial resource strain: Not on file    Food insecurity     Worry: Not on file     Inability: Not on file   [employer] needs     Medical: Not on file     Non-medical: Not on file   Tobacco Use    Smoking status: Former Smoker     Packs/day: 1.50     Years: 17.00     Pack years: 25.50     Types: Cigarettes     Last attempt to quit: 1974     Years since quittin.5    Smokeless tobacco: Never Used   Substance and Sexual Activity    Alcohol use: No     Alcohol/week: 0.0 standard drinks    Drug use: No    Sexual activity: Not Currently   Lifestyle    Physical activity     Days per week: Not on file     Minutes per session: Not on file    Stress: Not on file   Relationships    Social connections     Talks on phone: Not on file     Gets together: Not on file     Attends [employer] service: Not on file     Active member of club or organization: Not on file     Attends meetings of clubs or organizations: Not on file     Relationship status: Not on file    Intimate partner violence     Fear of current or ex partner: Not on file     Emotionally abused: Not on file     Physically abused: Not on file     Forced sexual activity: Not on file   Other Topics Concern    Not on file   Social History Narrative    Not on file     Current Outpatient Medications   Medication Sig Dispense Refill    pramipexole (MIRAPEX) 1 MG tablet Take 1 tablet by mouth 2 times daily 180 tablet 1    levothyroxine (SYNTHROID) 75 MCG tablet TAKE ONE TABLET BY MOUTH DAILY 90 tablet 0    potassium chloride (KLOR-CON) 10 MEQ extended release tablet Take 1 tablet by mouth 2 times daily TAKE ONE TABLET BY MOUTH DAILY 180 tablet 3    venlafaxine (EFFEXOR) 75 MG tablet Take 1 tablet by mouth twice daily 180 tablet 0    omeprazole (PRILOSEC) 40 MG delayed release capsule TAKE ONE CAPSULE BY MOUTH DAILY (Patient taking differently: 40 mg ) 30 capsule 5    atorvastatin (LIPITOR) 40 MG tablet Take 1 tablet by mouth daily 90 tablet 3    clopidogrel (PLAVIX) 75 MG tablet Take 1 tablet by mouth daily 90 tablet 3    amLODIPine (NORVASC) 10 MG tablet TAKE ONE TABLET BY MOUTH DAILY 90 tablet 3    triamterene-hydrochlorothiazide (MAXZIDE-25) 37.5-25 MG per tablet TAKE ONE TABLET BY MOUTH DAILY 90 tablet 3    doxazosin (CARDURA) 8 MG tablet TAKE ONE TABLET BY MOUTH TWICE A  tablet 2    nitroGLYCERIN (NITROSTAT) 0.4 MG SL tablet Place 1 tablet under the tongue every 5 minutes as needed for Chest pain up to max of 3 total doses.  If no relief after 1 dose, call 911. 25 tablet 3    triamcinolone (KENALOG) 0.1 % cream Apply topically every 7 days 80 g 5    methocarbamol (ROBAXIN) 500 MG tablet Take 1 tablet by mouth 3 times daily as needed (spasm) 90 tablet 5    aspirin EC 81 MG EC tablet Take 1 tablet by mouth daily 30 tablet 3    magnesium gluconate (MAGONATE) 500 MG tablet Take 500 mg by mouth daily       Vitamin D (CHOLECALCIFEROL) 1000 UNITS CAPS capsule Take 1,000 Units by mouth daily.  Calcium Carbonate-Vitamin D (CALCIUM + D) 600-200 MG-UNIT TABS Take 600 mg by mouth daily. No current facility-administered medications for this visit. No changes in past medical history, past surgical history, social history, orfamily history were noted during the patient encounter unless specifically listed above. All updates of past medical history, past surgical history, social history, or family history were reviewed personally by me duringthe office visit. All problems listed in the assessment are stable unless noted otherwise. Medication profile reviewed personally by me during the office visit. Medication side effects and possible impairments frommedications were discussed as applicable. Objective:     Physical Exam  Constitutional:       General: He is not in acute distress. Appearance: Normal appearance. He is well-developed. He is not toxic-appearing. HENT:      Head: Normocephalic and atraumatic. Right Ear: Hearing, tympanic membrane and ear canal normal.      Left Ear: Hearing, tympanic membrane and ear canal normal.      Nose: Nose normal.      Mouth/Throat:      Pharynx: Uvula midline. Eyes:      General: Lids are normal.      Conjunctiva/sclera: Conjunctivae normal.   Neck:      Musculoskeletal: Neck supple. Cardiovascular:      Rate and Rhythm: Normal rate and regular rhythm. Pulses: Normal pulses. Heart sounds: Normal heart sounds. Pulmonary:      Effort: Pulmonary effort is normal. No accessory muscle usage or respiratory distress. Breath sounds: Normal breath sounds. Abdominal:      Palpations: Abdomen is soft. Tenderness: There is no abdominal tenderness. Musculoskeletal:      Left elbow: He exhibits swelling. He exhibits normal range of motion. Tenderness found.  Olecranon few days after elbow,  No pain, discrete mass or decreased ROM  Will see if improves with treatment above    Restless legs syndrome  -     Handicap Placard MISC; by Does not apply route Duration -five year      Patient has been instructed call the office immediately with new symptoms, change in symptoms or worseningof symptoms. If this is not feasible, patient is instructed to report to the emergency room. Medication profile reviewed. Medication side effects and possible impairments from medications were discussed as applicable. Allergies were reviewed. Health maintenance was reviewed and updated as appropriate. Return if symptoms worsen or fail to improve. (Comment: Please note this report has been produced using a combination of typing and speech recognition software and may contain errors related to that system including errors in grammar, punctuation, and spelling, as well as words and phrases that may be inappropriate.  If there are any questions or concerns please feel free to contact the dictating provider for clarification.)

## 2020-07-20 ENCOUNTER — VIRTUAL VISIT (OUTPATIENT)
Dept: PULMONOLOGY | Age: 80
End: 2020-07-20
Payer: MEDICARE

## 2020-07-20 ENCOUNTER — TELEPHONE (OUTPATIENT)
Dept: PULMONOLOGY | Age: 80
End: 2020-07-20

## 2020-07-20 PROCEDURE — 99442 PR PHYS/QHP TELEPHONE EVALUATION 11-20 MIN: CPT | Performed by: INTERNAL MEDICINE

## 2020-07-20 RX ORDER — ALBUTEROL SULFATE 90 UG/1
2 AEROSOL, METERED RESPIRATORY (INHALATION) EVERY 6 HOURS PRN
Qty: 1 INHALER | Refills: 5 | Status: SHIPPED | OUTPATIENT
Start: 2020-07-20 | End: 2020-11-23 | Stop reason: SDUPTHER

## 2020-07-20 NOTE — PROGRESS NOTES
pfts  -Trial of prednisone 10 mg daily for 6 weeks did not help         COLLIN on CPAP   Patient has been on PAP for long time.    - Patient complained of pressure being too high and leakage  - CPAP 14 CWP  - orders to DME for new supplies as needed  - No driving if sleepy, groggy or fatigue       Orders  - f/u 4 months  - albuterol refill MONIKA Martines

## 2020-07-20 NOTE — TELEPHONE ENCOUNTER
Within this Telehealth Consent, the terms you and yours refer to the person using the Telehealth Service (Service), or in the case of a use of the Service by or on behalf of a minor, you and yours refer to and include (i) the parent or legal guardian who provides consent to the use of the Service by such minor or uses the Service on behalf of such minor, and (ii) the minor for whom consent is being provided or on whose behalf the Service is being utilized. When using Service, you will be consulting with your health care providers via the use of Telehealth.   Telehealth involves the delivery of healthcare services using electronic communications, information technology or other means between a healthcare provider and a patient who are not in the same physical location. Telehealth may be used for diagnosis, treatment, follow-up and/or patient education, and may include, but is not limited to, one or more of the following:    Electronic transmission of medical records, photo images, personal health information or other data between a patient and a healthcare provider    Interactions between a patient and healthcare provider via audio, video and/or data communications    Use of output data from medical devices, sound and video files    Anticipated Benefits   The use of Telehealth by your Provider(s) through the Service may have the following possible benefits:    Making it easier and more efficient for you to access medical care and treatment for the conditions treated by such Provider(s) utilizing the Service    Allowing you to obtain medical care and treatment by Provider(s) at times that are convenient for you    Enabling you to interact with Provider(s) without the necessity of an in-office appointment     Possible Risks   While the use of Telehealth can provide potential benefits for you, there are also potential risks associated with the use of Telehealth.  These risks include, but may not be the terms described in the Terms of Service and this Telehealth Consent. The patient was read the following statement and has consented to the visit as of 7/20/20. The patient has been scheduled for their first telehealth visit on 7/20/20 with Kee Camarena.

## 2020-08-11 ENCOUNTER — HOSPITAL ENCOUNTER (OUTPATIENT)
Age: 80
Discharge: HOME | End: 2020-08-11
Payer: MEDICARE

## 2020-08-11 LAB
A/G RATIO: 1.5 (ref 1.1–2.2)
ALBUMIN SERPL-MCNC: 4.3 G/DL (ref 3.4–5)
ALP BLD-CCNC: 73 U/L (ref 40–129)
ALT SERPL-CCNC: 26 U/L (ref 10–40)
ANION GAP SERPL CALCULATED.3IONS-SCNC: 8 MMOL/L (ref 3–16)
AST SERPL-CCNC: 21 U/L (ref 15–37)
BASOPHILS ABSOLUTE: 0.1 K/UL (ref 0–0.2)
BASOPHILS RELATIVE PERCENT: 1.2 %
BILIRUB SERPL-MCNC: 0.5 MG/DL (ref 0–1)
BUN BLDV-MCNC: 20 MG/DL (ref 7–20)
CALCIUM SERPL-MCNC: 9.3 MG/DL (ref 8.3–10.6)
CHLORIDE BLD-SCNC: 102 MMOL/L (ref 99–110)
CO2: 31 MMOL/L (ref 21–32)
CREAT SERPL-MCNC: 1 MG/DL (ref 0.8–1.3)
EOSINOPHILS ABSOLUTE: 0.4 K/UL (ref 0–0.6)
EOSINOPHILS RELATIVE PERCENT: 5.1 %
GFR AFRICAN AMERICAN: >60
GFR NON-AFRICAN AMERICAN: >60
GLOBULIN: 2.9 G/DL
GLUCOSE BLD-MCNC: 125 MG/DL (ref 70–99)
HCT VFR BLD CALC: 41.4 % (ref 40.5–52.5)
HEMOGLOBIN: 14 G/DL (ref 13.5–17.5)
INR BLD: 1.18 (ref 0.86–1.14)
LYMPHOCYTES ABSOLUTE: 1.7 K/UL (ref 1–5.1)
LYMPHOCYTES RELATIVE PERCENT: 18.9 %
MCH RBC QN AUTO: 30.4 PG (ref 26–34)
MCHC RBC AUTO-ENTMCNC: 33.8 G/DL (ref 31–36)
MCV RBC AUTO: 89.9 FL (ref 80–100)
MONOCYTES ABSOLUTE: 0.6 K/UL (ref 0–1.3)
MONOCYTES RELATIVE PERCENT: 6.3 %
NEUTROPHILS ABSOLUTE: 6 K/UL (ref 1.7–7.7)
NEUTROPHILS RELATIVE PERCENT: 68.5 %
PDW BLD-RTO: 13.6 % (ref 12.4–15.4)
PLATELET # BLD: 269 K/UL (ref 135–450)
PMV BLD AUTO: 6.6 FL (ref 5–10.5)
POTASSIUM SERPL-SCNC: 3.6 MMOL/L (ref 3.5–5.1)
PROTHROMBIN TIME: 13.6 SEC (ref 10–13.2)
RBC # BLD: 4.6 M/UL (ref 4.2–5.9)
SODIUM BLD-SCNC: 141 MMOL/L (ref 136–145)
TOTAL PROTEIN: 7.2 G/DL (ref 6.4–8.2)
WBC # BLD: 8.8 K/UL (ref 4–11)

## 2020-08-11 PROCEDURE — 36415 COLL VENOUS BLD VENIPUNCTURE: CPT

## 2020-08-11 PROCEDURE — 82728 ASSAY OF FERRITIN: CPT

## 2020-08-11 PROCEDURE — 82746 ASSAY OF FOLIC ACID SERUM: CPT

## 2020-08-11 PROCEDURE — 82607 VITAMIN B-12: CPT

## 2020-08-11 PROCEDURE — 80053 COMPREHEN METABOLIC PANEL: CPT

## 2020-08-11 PROCEDURE — 83540 ASSAY OF IRON: CPT

## 2020-08-11 PROCEDURE — 83550 IRON BINDING TEST: CPT

## 2020-08-11 PROCEDURE — 85025 COMPLETE CBC W/AUTO DIFF WBC: CPT

## 2020-08-11 PROCEDURE — 85610 PROTHROMBIN TIME: CPT

## 2020-08-12 LAB
FERRITIN: 50.3 NG/ML (ref 30–400)
FOLATE: >20 NG/ML (ref 4.78–24.2)
IRON SATURATION: 17 % (ref 20–50)
IRON: 59 UG/DL (ref 59–158)
TOTAL IRON BINDING CAPACITY: 348 UG/DL (ref 260–445)
VITAMIN B-12: 498 PG/ML (ref 211–911)

## 2020-09-08 RX ORDER — LEVOTHYROXINE SODIUM 0.07 MG/1
TABLET ORAL
Qty: 90 TABLET | Refills: 0 | Status: SHIPPED | OUTPATIENT
Start: 2020-09-08 | End: 2020-12-02

## 2020-09-08 RX ORDER — DOXAZOSIN 8 MG/1
TABLET ORAL
Qty: 180 TABLET | Refills: 1 | Status: SHIPPED | OUTPATIENT
Start: 2020-09-08 | End: 2021-03-08

## 2020-10-08 RX ORDER — OMEPRAZOLE 40 MG/1
CAPSULE, DELAYED RELEASE ORAL
Qty: 30 CAPSULE | Refills: 4 | Status: SHIPPED | OUTPATIENT
Start: 2020-10-08 | End: 2021-03-08

## 2020-10-12 ENCOUNTER — TELEPHONE (OUTPATIENT)
Dept: FAMILY MEDICINE CLINIC | Age: 80
End: 2020-10-12

## 2020-10-12 NOTE — TELEPHONE ENCOUNTER
Follow up with pulmonology regarding chronic cough  Other wise can do a virtual visit since I do not have any in office visits available anytime soon

## 2020-10-15 ENCOUNTER — APPOINTMENT (OUTPATIENT)
Dept: GENERAL RADIOLOGY | Age: 80
End: 2020-10-15
Payer: MEDICARE

## 2020-10-15 ENCOUNTER — HOSPITAL ENCOUNTER (EMERGENCY)
Age: 80
Discharge: HOME | End: 2020-10-15
Attending: EMERGENCY MEDICINE
Payer: MEDICARE

## 2020-10-15 VITALS
RESPIRATION RATE: 16 BRPM | BODY MASS INDEX: 38.19 KG/M2 | HEART RATE: 59 BPM | WEIGHT: 252 LBS | OXYGEN SATURATION: 98 % | SYSTOLIC BLOOD PRESSURE: 150 MMHG | DIASTOLIC BLOOD PRESSURE: 71 MMHG | TEMPERATURE: 97 F | HEIGHT: 68 IN

## 2020-10-15 LAB
ANION GAP SERPL CALCULATED.3IONS-SCNC: 10 MMOL/L (ref 3–16)
BASOPHILS ABSOLUTE: 0 K/UL (ref 0–0.2)
BASOPHILS RELATIVE PERCENT: 0.5 %
BUN BLDV-MCNC: 20 MG/DL (ref 7–20)
CALCIUM SERPL-MCNC: 9.1 MG/DL (ref 8.3–10.6)
CHLORIDE BLD-SCNC: 100 MMOL/L (ref 99–110)
CO2: 29 MMOL/L (ref 21–32)
CREAT SERPL-MCNC: 1 MG/DL (ref 0.8–1.3)
D DIMER: 319 NG/ML DDU (ref 0–229)
EOSINOPHILS ABSOLUTE: 0.5 K/UL (ref 0–0.6)
EOSINOPHILS RELATIVE PERCENT: 6.5 %
GFR AFRICAN AMERICAN: >60
GFR NON-AFRICAN AMERICAN: >60
GLUCOSE BLD-MCNC: 191 MG/DL (ref 70–99)
HCT VFR BLD CALC: 40.6 % (ref 40.5–52.5)
HEMOGLOBIN: 13.8 G/DL (ref 13.5–17.5)
LYMPHOCYTES ABSOLUTE: 1.3 K/UL (ref 1–5.1)
LYMPHOCYTES RELATIVE PERCENT: 16.5 %
MCH RBC QN AUTO: 30.8 PG (ref 26–34)
MCHC RBC AUTO-ENTMCNC: 34 G/DL (ref 31–36)
MCV RBC AUTO: 90.4 FL (ref 80–100)
MONOCYTES ABSOLUTE: 0.5 K/UL (ref 0–1.3)
MONOCYTES RELATIVE PERCENT: 6.3 %
NEUTROPHILS ABSOLUTE: 5.6 K/UL (ref 1.7–7.7)
NEUTROPHILS RELATIVE PERCENT: 70.2 %
PDW BLD-RTO: 14.1 % (ref 12.4–15.4)
PLATELET # BLD: 197 K/UL (ref 135–450)
PMV BLD AUTO: 6.6 FL (ref 5–10.5)
POTASSIUM SERPL-SCNC: 3.7 MMOL/L (ref 3.5–5.1)
RBC # BLD: 4.48 M/UL (ref 4.2–5.9)
SODIUM BLD-SCNC: 139 MMOL/L (ref 136–145)
WBC # BLD: 7.9 K/UL (ref 4–11)

## 2020-10-15 PROCEDURE — 85025 COMPLETE CBC W/AUTO DIFF WBC: CPT

## 2020-10-15 PROCEDURE — 73560 X-RAY EXAM OF KNEE 1 OR 2: CPT

## 2020-10-15 PROCEDURE — 85379 FIBRIN DEGRADATION QUANT: CPT

## 2020-10-15 PROCEDURE — 93971 EXTREMITY STUDY: CPT

## 2020-10-15 PROCEDURE — 80048 BASIC METABOLIC PNL TOTAL CA: CPT

## 2020-10-15 PROCEDURE — 99284 EMERGENCY DEPT VISIT MOD MDM: CPT

## 2020-10-15 PROCEDURE — 36415 COLL VENOUS BLD VENIPUNCTURE: CPT

## 2020-10-15 RX ORDER — CEPHALEXIN 500 MG/1
500 CAPSULE ORAL 3 TIMES DAILY
Qty: 21 CAPSULE | Refills: 0 | Status: SHIPPED | OUTPATIENT
Start: 2020-10-15 | End: 2020-10-22

## 2020-10-15 RX ORDER — CEPHALEXIN 500 MG/1
500 CAPSULE ORAL 3 TIMES DAILY
Qty: 21 CAPSULE | Refills: 0 | Status: SHIPPED | OUTPATIENT
Start: 2020-10-15 | End: 2020-10-15 | Stop reason: SDUPTHER

## 2020-10-15 ASSESSMENT — ENCOUNTER SYMPTOMS
COLOR CHANGE: 0
VOMITING: 0
FACIAL SWELLING: 0
BACK PAIN: 0
NAUSEA: 0
PHOTOPHOBIA: 0
STRIDOR: 0
WHEEZING: 0
VOICE CHANGE: 0
BLOOD IN STOOL: 0
TROUBLE SWALLOWING: 0
SHORTNESS OF BREATH: 0
ABDOMINAL PAIN: 0

## 2020-10-15 NOTE — ED PROVIDER NOTES
neck pain. Skin: Negative for color change and wound. Neurological: Negative for seizures, syncope and speech difficulty. Psychiatric/Behavioral: Negative for self-injury and suicidal ideas. Except as noted above the remainder of the review of systems was reviewed and negative.        PAST MEDICAL HISTORY     Past Medical History:   Diagnosis Date    Arthritis     Back injuries     BPH (benign prostatic hyperplasia)     CPAP (continuous positive airway pressure) dependence     Diverticulosis     colonoscopy 9/2015    Esophageal dysmotility     GERD (gastroesophageal reflux disease)     Hiatal hernia     History of colon polyps     seen on colonoscopy again 9/2015    Hyperlipidemia     Hypertension     ILD (interstitial lung disease) ([facility] [address] 7/30/2013    Internal hemorrhoid     colonoscopy 0/71/34    Lichen sclerosus of penis 2017    Dr Markel Olmedo Mild cognitive impairment 08/2016    [facility] neurology    COLLIN (obstructive sleep apnea)     Renal insufficiency     Restless legs syndrome     Rosacea     Schatzki's ring     last dilated 2002    Scrotal pruritus 2/8/2018    Sleep apnea          SURGICAL HISTORY       Past Surgical History:   Procedure Laterality Date    CIRCUMCISION      COLONOSCOPY  2010    COLONOSCOPY  9/14/2015    CORONARY ANGIOPLASTY  10/15/2019    CYSTOSCOPY  5/13/16    CYSTOSCOPY  05/19/2017    CYSTOSCOPY     CYSTOSCOPY  07/14/2017    ENDOSCOPY, COLON, DIAGNOSTIC  10/11/2017    esoph. stricture    FRACTURE SURGERY      right leg    FRACTURE SURGERY      wrist   right    HERNIA REPAIR      PROSTATE SURGERY      TOE SURGERY      TURP  07/14/2017    UPPER GASTROINTESTINAL ENDOSCOPY  09/12/2016    UPPER GASTROINTESTINAL ENDOSCOPY  10/11/2017    esophageal stricture    UPPER GASTROINTESTINAL ENDOSCOPY  01/28/2020         CURRENT MEDICATIONS       Discharge Medication List as of 10/15/2020  4:53 PM      CONTINUE these medications which have NOT CHANGED Details   omeprazole (PRILOSEC) 40 MG delayed release capsule TAKE ONE CAPSULE BY MOUTH DAILY, Disp-30 capsule,R-4Normal      levothyroxine (SYNTHROID) 75 MCG tablet TAKE ONE TABLET BY MOUTH DAILY, Disp-90 tablet,R-0Normal      doxazosin (CARDURA) 8 MG tablet TAKE ONE TABLET BY MOUTH TWICE A DAY, Disp-180 tablet,R-1Normal      venlafaxine (EFFEXOR) 75 MG tablet Take 1 tablet by mouth twice daily, Disp-180 tablet,R-1Normal      albuterol sulfate HFA (VENTOLIN HFA) 108 (90 Base) MCG/ACT inhaler Inhale 2 puffs into the lungs every 6 hours as needed for Wheezing or Shortness of Breath, Disp-1 Inhaler,R-5Normal      Handicap Placard MIS Starting Wed 7/8/2020, Disp-1 each, R-0, PrintDuration -five year      pramipexole (MIRAPEX) 1 MG tablet Take 1 tablet by mouth 2 times daily, Disp-180 tablet, R-1Normal      potassium chloride (KLOR-CON) 10 MEQ extended release tablet Take 1 tablet by mouth 2 times daily TAKE ONE TABLET BY MOUTH DAILY, Disp-180 tablet, R-3Normal      atorvastatin (LIPITOR) 40 MG tablet Take 1 tablet by mouth daily, Disp-90 tablet, R-3Normal      clopidogrel (PLAVIX) 75 MG tablet Take 1 tablet by mouth daily, Disp-90 tablet, R-3Normal      amLODIPine (NORVASC) 10 MG tablet TAKE ONE TABLET BY MOUTH DAILY, Disp-90 tablet, R-3Normal      triamterene-hydrochlorothiazide (MAXZIDE-25) 37.5-25 MG per tablet TAKE ONE TABLET BY MOUTH DAILY, Disp-90 tablet, R-3Normal      nitroGLYCERIN (NITROSTAT) 0.4 MG SL tablet Place 1 tablet under the tongue every 5 minutes as needed for Chest pain up to max of 3 total doses.  If no relief after 1 dose, call 911., Disp-25 tablet, R-3Normal      triamcinolone (KENALOG) 0.1 % cream Apply topically every 7 days, Topical, EVERY 7 DAYS Starting Fri 2/8/2019, Disp-80 g, R-5, Normal      methocarbamol (ROBAXIN) 500 MG tablet Take 1 tablet by mouth 3 times daily as needed (spasm), Disp-90 tablet, R-5Normal      aspirin EC 81 MG EC tablet Take 1 tablet by mouth daily, Disp-30 tablet, None    Intimate partner violence     Fear of current or ex partner: None     Emotionally abused: None     Physically abused: None     Forced sexual activity: None   Other Topics Concern    None   Social History Narrative    None         PHYSICAL EXAM    (up to 7 for level 4, 8 or more for level 5)     ED Triage Vitals [10/15/20 1111]   BP Temp Temp Source Pulse Resp SpO2 Height Weight   (!) 151/75 98 °F (36.7 °C) Oral 98 20 99 % 5' 8\" (1.727 m) 252 lb (114.3 kg)       Physical Exam  Vitals signs and nursing note reviewed. Constitutional:       General: He is not in acute distress. Appearance: He is well-developed. HENT:      Head: Normocephalic and atraumatic. Right Ear: External ear normal.      Left Ear: External ear normal.   Eyes:      Conjunctiva/sclera: Conjunctivae normal.   Neck:      Musculoskeletal: Neck supple. Vascular: No JVD. Trachea: No tracheal deviation. Cardiovascular:      Rate and Rhythm: Normal rate. Pulses: Normal pulses. Comments: 2+ popliteal, DP, and PT pulses to left lower extremity  Pulmonary:      Effort: Pulmonary effort is normal. No respiratory distress. Breath sounds: Normal breath sounds. No wheezing. Abdominal:      General: There is no distension. Palpations: Abdomen is soft. Tenderness: There is no abdominal tenderness. There is no guarding or rebound. Musculoskeletal: Normal range of motion. General: Swelling and tenderness present. Comments: Mild 1+ pitting edema to the right lower extremity with no tenderness. 3+ pitting edema to left lower extremity with anterior erythema, redness, and warmth. Full range of motion of knee joint. No palpable posterior venous cords. Skin:     General: Skin is warm and dry. Neurological:      General: No focal deficit present. Mental Status: He is alert and oriented to person, place, and time. Cranial Nerves: No cranial nerve deficit.       Comments: Sensation intact in all nerve distributions of left lower extremity. DIAGNOSTIC RESULTS         RADIOLOGY:     Interpretation per the Radiologist below, if available at the time of this note:    VL Extremity Venous Left   Final Result      XR KNEE LEFT (1-2 VIEWS)   Final Result   Negative for fracture         XR KNEE LEFT (1-2 VIEWS)   Final Result   Prepatellar soft tissue swelling without discrete fracture. Due to the   significant degree of anterior knee swelling, consider sunrise view to better   evaluate the patella. Small knee effusion. Background diffuse subcutaneous edema and mild degenerative and enthesopathic   changes. LABS:  Labs Reviewed   BASIC METABOLIC PANEL - Abnormal; Notable for the following components:       Result Value    Glucose 191 (*)     All other components within normal limits    Narrative:     Performed at:  [facility]. [facility]  [address] [place] [address]. [employer] [facility] [employer]   Phone [phone]   D-DIMER, QUANTITATIVE - Abnormal; Notable for the following components:    D-Dimer, Quant 319 (*)     All other components within normal limits    Narrative:     Performed at:  [facility]. [facility]  [address] [place] [address]. [employer]   Phone [phone]   CBC WITH AUTO DIFFERENTIAL    Narrative:     Performed at:  [facility]. [facility]  [address] [place] [address]. [employer]   Phone [phone]       All otherlabs were within normal range or not returned as of this dictation.     EMERGENCY DEPARTMENT COURSE and DIFFERENTIAL DIAGNOSIS/MDM:   Vitals:    Vitals:    10/15/20 1111 10/15/20 1511 10/15/20 1512 10/15/20 1651   BP: (!) 151/75 (!) 149/71  (!) 150/71   Pulse: 98 65 65 59   Resp: 20 16  16   Temp: 98 °F (36.7 °C) 97 °F (36.1 °C)     TempSrc: Oral Oral     SpO2: 99% 99%  98%   Weight: 252 lb (114.3 kg)      Height: 5' 8\" (1.727 m) MDM  Patient is afebrile, nontoxic-appearing, no acute distress. He is neurovascular intact in the left lower extremity although he does have erythema, warmth, and swelling to the leg. Laboratory evaluation shows an elevated D-dimer but does not show any other emergent abnormalities. Plain films are negative for acute fracture or dislocation but does show mild soft tissue swelling and a small knee effusion. Patient is prescribed Keflex given concern for possible cellulitis although I do not suspect sepsis or necrotizing soft tissue infection. He is transferred to [facility] for Doppler ultrasound to further evaluate for DVT. Procedures    FINAL IMPRESSION      1. Pain and swelling of left lower leg          DISPOSITION/PLAN   DISPOSITION  Discharge      PATIENT REFERRED TO:  [facility]  [address] [facility]  [address]    If symptoms worsen    Dank Pen, APRN - CNP  [id number] [facility]  [phone]    Go on 10/16/2020  For your scheduled appointment        (Please note that portions of this note were completed with a voice recognition program.  Efforts were made to edit the dictations but occasionally words aremis-transcribed. )    Frank Briseno MD (electronically signed)  Attending Emergency Physician       Frank Briseno MD  10/15/20 [phone]

## 2020-10-15 NOTE — ED PROVIDER NOTES
Patient is a 80-year-old male with a past medical history of hypertension, hyperlipidemia, coronary artery disease who presents with left lower extremity pain and swelling. Patient had a fall 2 weeks ago and injured his left knee. Left knee was bruised but that is healing now but now his lower leg is red and swollen. Patient was initially evaluated at [place] emergency department and was subsequently transferred to [facility] emergency department for ultrasound to rule out DVT. On physical exam patient is resting comfortably in no acute distress. Respirations are even and unlabored with fine crackles in the bases consistent with his history of pulmonary fibrosis. Heart is regular rate and rhythm. Left lower extremity is swollen compared to the right. There is some erythema and warmth to the anterior shin. Patient remains neurovascularly intact. Lab work-up notable for white blood cell count within normal limits, electrolytes within normal limits, D-dimer elevated. Ultrasound is negative for DVT. Advised patient to take the antibiotic as prescribed until it is gone. He has follow-up scheduled with his primary care provider tomorrow to discuss other issues. Advised him of at home care instructions as well as return precautions and he voices understanding. Patient discharged home.     1. Pain and swelling of left lower leg           Chris Durham MD  10/15/20 5620

## 2020-10-16 ENCOUNTER — VIRTUAL VISIT (OUTPATIENT)
Dept: FAMILY MEDICINE CLINIC | Age: 80
End: 2020-10-16
Payer: MEDICARE

## 2020-10-16 PROCEDURE — 99442 PR PHYS/QHP TELEPHONE EVALUATION 11-20 MIN: CPT | Performed by: NURSE PRACTITIONER

## 2020-10-16 RX ORDER — MONTELUKAST SODIUM 10 MG/1
10 TABLET ORAL DAILY
Qty: 30 TABLET | Refills: 5 | Status: SHIPPED | OUTPATIENT
Start: 2020-10-16 | End: 2021-02-22 | Stop reason: SDUPTHER

## 2020-10-16 RX ORDER — FERROUS SULFATE 325(65) MG
325 TABLET ORAL
Qty: 30 TABLET | Refills: 5 | Status: SHIPPED | OUTPATIENT
Start: 2020-10-16 | End: 2021-04-23 | Stop reason: SDUPTHER

## 2020-10-16 RX ORDER — CETIRIZINE HYDROCHLORIDE 10 MG/1
10 TABLET ORAL NIGHTLY
Qty: 30 TABLET | Refills: 5 | COMMUNITY
Start: 2020-10-16 | End: 2021-04-23 | Stop reason: SDUPTHER

## 2020-10-16 ASSESSMENT — ENCOUNTER SYMPTOMS
COUGH: 1
BLOOD IN STOOL: 0
ABDOMINAL PAIN: 0
EYES NEGATIVE: 1
SHORTNESS OF BREATH: 1
ANAL BLEEDING: 0
GASTROINTESTINAL NEGATIVE: 1
ALLERGIC/IMMUNOLOGIC NEGATIVE: 1
NAUSEA: 0

## 2020-10-16 NOTE — PROGRESS NOTES
[address] [facility]  [address] [place] [id number]  Dept: [phone]  Dept Fax: [phone]  Loc: [address] Jeny Roberts is a 67 y.o. male evaluated via telephone on 10/16/2020. Consent:  He and/or health care decision maker is aware that that he may receive a bill for this telephone service, depending on his insurance coverage, and has provided verbal consent to proceed: Yes    Suman Sheridan is a 67 y.o. male who presents today for his medical conditions/complaints as noted below. Suman Sheridan is c/o of Edema (left leg pt went to the ER yesterday ); Back Pain; and Cough (chronic cough)        [facility]  [facility]  [address] [place] [id number]  Dept: [phone]  Dept Fax: [phone]  Loc: [address] José Luis is a 67 y.o. male who presents today for his medical conditions/complaints as noted below. Suman Sheridan is c/o of Edema (left leg pt went to the ER yesterday ); Back Pain; and Cough (chronic cough)       Subjective:     Chief Complaint   Patient presents with    Edema     left leg pt went to the ER yesterday     Back Pain    Cough     chronic cough       HPI   Patient is being seen for ER follow up on LLE injury and swelling. The patient states that 2 weeks ago he fell and landed on left knee and was having swelling and bruising in his left knee. Didn't hurt to walk on it. It was warm to the touch. Patient states that it then started swelling all the way down his left leg into the foot. Patient states he has \"bruising in left leg\". He complains of a red rash like area on left shin region. The patient went to ER last night for evaluation of the problem. Patient had venous doppler in ER that was negative for DVT. Patient was discharged on antibiotic/ keflex tid x 7 days and started antibiotic today. Has had some slight swelling in RLE also.   This is not new and goes down mostly with elevation. 8/11/2020 iron levels very low normal.    Patient is not taking iron. He has chronic dyspnea but feels it is slightly worse than normal since about 7/2020     Cough, chronic  Has been evaluated multiple times and last saw pulmo 7/2020  Most likely secondary to post-nasal drip per patient and pulmo.   - no benefit from flonase/astelin in 2013 and 2014  - tessalon perles prn didn't help much  -albuterol prn given obstruction on pfts per pulmi  -Trial of prednisone 10 mg daily for 6 weeks did not help  Is not taking any allergy medications and would like to retry a nasal spray. Past Medical History:   Diagnosis Date    Arthritis     Back injuries     BPH (benign prostatic hyperplasia)     CPAP (continuous positive airway pressure) dependence     Diverticulosis     colonoscopy 9/2015    Esophageal dysmotility     GERD (gastroesophageal reflux disease)     Hiatal hernia     History of colon polyps     seen on colonoscopy again 9/2015    Hyperlipidemia     Hypertension     ILD (interstitial lung disease) ([facility] [address] 7/30/2013    Internal hemorrhoid     colonoscopy 5/94/99    Lichen sclerosus of penis 2017    Dr Jeane Palma Mild cognitive impairment 08/2016    [facility] neurology    COLLIN (obstructive sleep apnea)     Renal insufficiency     Restless legs syndrome     Rosacea     Schatzki's ring     last dilated 2002    Scrotal pruritus 2/8/2018    Sleep apnea          Review of Systems   Constitutional: Negative. Negative for appetite change, fatigue and unexpected weight change. HENT: Negative. Eyes: Negative. Respiratory: Positive for cough (chronic) and shortness of breath (chronic). Cardiovascular: Positive for leg swelling. Negative for chest pain and palpitations. Gastrointestinal: Negative. Negative for abdominal pain, anal bleeding, blood in stool and nausea. Endocrine: Negative. Genitourinary: Negative. Negative for hematuria.    Musculoskeletal: Negative. Skin: Negative. Negative for rash. Allergic/Immunologic: Negative. Neurological: Negative. Negative for dizziness, syncope, light-headedness and numbness. Hematological: Negative. Does not bruise/bleed easily. Psychiatric/Behavioral: Negative. All other systems reviewed and are negative.        Past Medical History:   Diagnosis Date    Arthritis     Back injuries     BPH (benign prostatic hyperplasia)     CPAP (continuous positive airway pressure) dependence     Diverticulosis     colonoscopy 9/2015    Esophageal dysmotility     GERD (gastroesophageal reflux disease)     Hiatal hernia     History of colon polyps     seen on colonoscopy again 9/2015    Hyperlipidemia     Hypertension     ILD (interstitial lung disease) ([facility] [address] 7/30/2013    Internal hemorrhoid     colonoscopy 3/54/90    Lichen sclerosus of penis 2017    Dr Dorina Hamman Mild cognitive impairment 08/2016    [facility] neurology    COLLIN (obstructive sleep apnea)     Renal insufficiency     Restless legs syndrome     Rosacea     Schatzki's ring     last dilated 2002    Scrotal pruritus 2/8/2018    Sleep apnea      Family History   Problem Relation Age of Onset    Stroke Father     Cancer Father         prostate    Other Father         colitis    Diabetes Sister     Diabetes Brother     Cancer Brother         skin    High Blood Pressure Brother     High Cholesterol Brother     COPD Mother     Asthma Neg Hx     Emphysema Neg Hx     Heart Failure Neg Hx     Hypertension Neg Hx      Past Surgical History:   Procedure Laterality Date    CIRCUMCISION      COLONOSCOPY  2010    COLONOSCOPY  9/14/2015    CORONARY ANGIOPLASTY  10/15/2019    CYSTOSCOPY  5/13/16    CYSTOSCOPY  05/19/2017    CYSTOSCOPY     CYSTOSCOPY  07/14/2017    ENDOSCOPY, COLON, DIAGNOSTIC  10/11/2017    esoph. stricture    FRACTURE SURGERY      right leg    FRACTURE SURGERY      wrist   right    HERNIA REPAIR      PROSTATE SURGERY      TOE SURGERY      TURP  2017    UPPER GASTROINTESTINAL ENDOSCOPY  2016    UPPER GASTROINTESTINAL ENDOSCOPY  10/11/2017    esophageal stricture    UPPER GASTROINTESTINAL ENDOSCOPY  2020     Social History     Socioeconomic History    Marital status:      Spouse name: Not on file    Number of children: Not on file    Years of education: Not on file    Highest education level: Not on file   Occupational History    Not on file   Social Needs    Financial resource strain: Not on file    Food insecurity     Worry: Not on file     Inability: Not on file   [employer] needs     Medical: Not on file     Non-medical: Not on file   Tobacco Use    Smoking status: Former Smoker     Packs/day: 1.50     Years: 17.00     Pack years: 25.50     Types: Cigarettes     Last attempt to quit: 1974     Years since quittin.8    Smokeless tobacco: Never Used   Substance and Sexual Activity    Alcohol use: No     Alcohol/week: 0.0 standard drinks    Drug use: No    Sexual activity: Not Currently   Lifestyle    Physical activity     Days per week: Not on file     Minutes per session: Not on file    Stress: Not on file   Relationships    Social connections     Talks on phone: Not on file     Gets together: Not on file     Attends [employer] service: Not on file     Active member of club or organization: Not on file     Attends meetings of clubs or organizations: Not on file     Relationship status: Not on file    Intimate partner violence     Fear of current or ex partner: Not on file     Emotionally abused: Not on file     Physically abused: Not on file     Forced sexual activity: Not on file   Other Topics Concern    Not on file   Social History Narrative    Not on file     Current Outpatient Medications   Medication Sig Dispense Refill    cephALEXin (KEFLEX) 500 MG capsule Take 1 capsule by mouth 3 times daily for 7 days 21 capsule 0    omeprazole (PRILOSEC) 40 MG delayed release capsule TAKE ONE CAPSULE BY MOUTH DAILY 30 capsule 4    levothyroxine (SYNTHROID) 75 MCG tablet TAKE ONE TABLET BY MOUTH DAILY 90 tablet 0    doxazosin (CARDURA) 8 MG tablet TAKE ONE TABLET BY MOUTH TWICE A  tablet 1    venlafaxine (EFFEXOR) 75 MG tablet Take 1 tablet by mouth twice daily 180 tablet 1    albuterol sulfate HFA (VENTOLIN HFA) 108 (90 Base) MCG/ACT inhaler Inhale 2 puffs into the lungs every 6 hours as needed for Wheezing or Shortness of Breath 1 Inhaler 5    pramipexole (MIRAPEX) 1 MG tablet Take 1 tablet by mouth 2 times daily 180 tablet 1    potassium chloride (KLOR-CON) 10 MEQ extended release tablet Take 1 tablet by mouth 2 times daily TAKE ONE TABLET BY MOUTH DAILY 180 tablet 3    atorvastatin (LIPITOR) 40 MG tablet Take 1 tablet by mouth daily 90 tablet 3    clopidogrel (PLAVIX) 75 MG tablet Take 1 tablet by mouth daily 90 tablet 3    amLODIPine (NORVASC) 10 MG tablet TAKE ONE TABLET BY MOUTH DAILY 90 tablet 3    triamterene-hydrochlorothiazide (MAXZIDE-25) 37.5-25 MG per tablet TAKE ONE TABLET BY MOUTH DAILY 90 tablet 3    nitroGLYCERIN (NITROSTAT) 0.4 MG SL tablet Place 1 tablet under the tongue every 5 minutes as needed for Chest pain up to max of 3 total doses. If no relief after 1 dose, call 911. 25 tablet 3    triamcinolone (KENALOG) 0.1 % cream Apply topically every 7 days 80 g 5    methocarbamol (ROBAXIN) 500 MG tablet Take 1 tablet by mouth 3 times daily as needed (spasm) 90 tablet 5    aspirin EC 81 MG EC tablet Take 1 tablet by mouth daily 30 tablet 3    magnesium gluconate (MAGONATE) 500 MG tablet Take 500 mg by mouth daily       Vitamin D (CHOLECALCIFEROL) 1000 UNITS CAPS capsule Take 1,000 Units by mouth daily.  Calcium Carbonate-Vitamin D (CALCIUM + D) 600-200 MG-UNIT TABS Take 600 mg by mouth daily.       Handicap Placard MISC by Does not apply route Duration -five year 1 each 0     No current facility-administered medications for this visit. No changes in past medical history, past surgical history, social history, orfamily history were noted during the patient encounter unless specifically listed above. All updates of past medical history, past surgical history, social history, or family history were reviewed personally by me duringthe office visit. All problems listed in the assessment are stable unless noted otherwise. Medication profile reviewed personally by me during the office visit. Medication side effects and possible impairments frommedications were discussed as applicable. Objective:     Physical Exam  Not applicable due to telephone visit    There were no vitals taken for this visit. There is no height or weight on file to calculate BMI.     BP Readings from Last 2 Encounters:   10/15/20 (!) 150/71   07/08/20 132/74       Wt Readings from Last 3 Encounters:   10/15/20 252 lb (114.3 kg)   07/08/20 257 lb 9.6 oz (116.8 kg)   05/18/20 250 lb (113.4 kg)       Lab Review   Admission on 10/15/2020, Discharged on 10/15/2020   Component Date Value    WBC 10/15/2020 7.9     RBC 10/15/2020 4.48     Hemoglobin 10/15/2020 13.8     Hematocrit 10/15/2020 40.6     MCV 10/15/2020 90.4     MCH 10/15/2020 30.8     MCHC 10/15/2020 34.0     RDW 10/15/2020 14.1     Platelets 93/35/7197 197     MPV 10/15/2020 6.6     Neutrophils % 10/15/2020 70.2     Lymphocytes % 10/15/2020 16.5     Monocytes % 10/15/2020 6.3     Eosinophils % 10/15/2020 6.5     Basophils % 10/15/2020 0.5     Neutrophils Absolute 10/15/2020 5.6     Lymphocytes Absolute 10/15/2020 1.3     Monocytes Absolute 10/15/2020 0.5     Eosinophils Absolute 10/15/2020 0.5     Basophils Absolute 10/15/2020 0.0     Sodium 10/15/2020 139     Potassium 10/15/2020 3.7     Chloride 10/15/2020 100     CO2 10/15/2020 29     Anion Gap 10/15/2020 10     Glucose 10/15/2020 191*    BUN 10/15/2020 20     CREATININE 10/15/2020 1.0     GFR Non- 10/15/2020 >60     GFR  10/15/2020 >60     Calcium 10/15/2020 9.1     D-Dimer, Quant 10/15/2020 319*       No results found for this visit on 10/16/20. Assessment:       1. Pain and swelling of left lower leg    2. Iron deficiency anemia, unspecified iron deficiency anemia type    3. Chronic cough        No results found for this visit on 10/16/20. Plan:       Bjorn Markham was seen today for edema, back pain and cough. Diagnoses and all orders for this visit:    Pain and swelling of left lower leg  Complete atb as prescribed and follow up 1 week if no improvement, sooner for new or worsening symptoms    Iron deficiency anemia, unspecified iron deficiency anemia type  Start   -     ferrous sulfate (IRON 325) 325 (65 Fe) MG tablet; Take 1 tablet by mouth daily (with breakfast)  Educated patient this would not resolve his dyspnea but may improve dyspnea if it is related to low iron levels  Recheck labs in 3-6 months    Chronic cough  Trial of   -     budesonide (RHINOCORT AQUA) 32 MCG/ACT nasal spray; 1 spray by Each Nostril route 2 times daily  -     cetirizine (ZYRTEC ALLERGY) 10 MG tablet; Take 1 tablet by mouth nightly  -     montelukast (SINGULAIR) 10 MG tablet; Take 1 tablet by mouth daily        Patient has been instructed call the office immediately with new symptoms, change in symptoms or worseningof symptoms. If this is not feasible, patient is instructed to report to the emergency room. Medication profile reviewed. Medication side effects and possible impairments from medications were discussed as applicable. Allergies were reviewed. Health maintenance was reviewed and updated as appropriate. No follow-ups on file. (Comment: Please note this report has been produced using a combination of typing and speech recognition software and may contain errors related to that system including errors in grammar, punctuation, and spelling, as well as words and phrases that may be inappropriate.  If

## 2020-10-16 NOTE — PROGRESS NOTES
Bill Marsh is a 67 y.o. male evaluated via telephone on 10/16/2020.       Consent:  He and/or health care decision maker is aware that that he may receive a bill for this telephone service, depending on his insurance coverage, and has provided verbal consent to proceed: Yes      Kailey Cool

## 2020-11-11 ENCOUNTER — OFFICE VISIT (OUTPATIENT)
Dept: FAMILY MEDICINE CLINIC | Age: 80
End: 2020-11-11
Payer: MEDICARE

## 2020-11-11 ENCOUNTER — HOSPITAL ENCOUNTER (OUTPATIENT)
Dept: GENERAL RADIOLOGY | Age: 80
Discharge: HOME | End: 2020-11-11
Payer: MEDICARE

## 2020-11-11 ENCOUNTER — HOSPITAL ENCOUNTER (OUTPATIENT)
Age: 80
Discharge: HOME | End: 2020-11-11
Payer: MEDICARE

## 2020-11-11 VITALS
HEART RATE: 85 BPM | HEIGHT: 68 IN | SYSTOLIC BLOOD PRESSURE: 159 MMHG | WEIGHT: 256 LBS | DIASTOLIC BLOOD PRESSURE: 76 MMHG | TEMPERATURE: 97.5 F | OXYGEN SATURATION: 96 % | BODY MASS INDEX: 38.8 KG/M2

## 2020-11-11 PROCEDURE — 73521 X-RAY EXAM HIPS BI 2 VIEWS: CPT

## 2020-11-11 PROCEDURE — 90686 IIV4 VACC NO PRSV 0.5 ML IM: CPT | Performed by: NURSE PRACTITIONER

## 2020-11-11 PROCEDURE — 99213 OFFICE O/P EST LOW 20 MIN: CPT | Performed by: NURSE PRACTITIONER

## 2020-11-11 PROCEDURE — G0008 ADMIN INFLUENZA VIRUS VAC: HCPCS | Performed by: NURSE PRACTITIONER

## 2020-11-11 PROCEDURE — 73590 X-RAY EXAM OF LOWER LEG: CPT

## 2020-11-11 PROCEDURE — 72100 X-RAY EXAM L-S SPINE 2/3 VWS: CPT

## 2020-11-11 RX ORDER — METHYLPREDNISOLONE 4 MG/1
TABLET ORAL
Qty: 1 KIT | Refills: 0 | Status: SHIPPED | OUTPATIENT
Start: 2020-11-11 | End: 2020-11-17

## 2020-11-11 RX ORDER — CEPHALEXIN 500 MG/1
500 CAPSULE ORAL 3 TIMES DAILY
Qty: 30 CAPSULE | Refills: 0 | Status: SHIPPED | OUTPATIENT
Start: 2020-11-11 | End: 2020-11-21

## 2020-11-11 ASSESSMENT — PATIENT HEALTH QUESTIONNAIRE - PHQ9
1. LITTLE INTEREST OR PLEASURE IN DOING THINGS: 0
SUM OF ALL RESPONSES TO PHQ QUESTIONS 1-9: 0
SUM OF ALL RESPONSES TO PHQ9 QUESTIONS 1 & 2: 0
2. FEELING DOWN, DEPRESSED OR HOPELESS: 0
SUM OF ALL RESPONSES TO PHQ QUESTIONS 1-9: 0
SUM OF ALL RESPONSES TO PHQ QUESTIONS 1-9: 0

## 2020-11-11 ASSESSMENT — ENCOUNTER SYMPTOMS
GASTROINTESTINAL NEGATIVE: 1
ANAL BLEEDING: 0
COUGH: 1
NAUSEA: 0
BACK PAIN: 1
SHORTNESS OF BREATH: 1
EYES NEGATIVE: 1
BLOOD IN STOOL: 0
ALLERGIC/IMMUNOLOGIC NEGATIVE: 1
ABDOMINAL PAIN: 0

## 2020-11-11 NOTE — PROGRESS NOTES
significant degree of anterior knee swelling, consider sunrise view to better    evaluate the patella.         Small knee effusion.         Background diffuse subcutaneous edema and mild degenerative and enthesopathic    changes.                10/15/2020 left knee sunrise view result as follows  FINDINGS:    No evidence of patellar fracture.  Mild patellofemoral osteoarthritis noted. Soft tissue swelling present      10/15/2020 LLE venous doppler negative for DVT      Past Medical History:   Diagnosis Date    Arthritis     Back injuries     BPH (benign prostatic hyperplasia)     CPAP (continuous positive airway pressure) dependence     Diverticulosis     colonoscopy 9/2015    Esophageal dysmotility     GERD (gastroesophageal reflux disease)     Hiatal hernia     History of colon polyps     seen on colonoscopy again 9/2015    Hyperlipidemia     Hypertension     ILD (interstitial lung disease) ([facility] [address] 7/30/2013    Internal hemorrhoid     colonoscopy 9/78/41    Lichen sclerosus of penis 2017    Dr Berna Yadav Mild cognitive impairment 08/2016    [facility] neurology    COLLIN (obstructive sleep apnea)     Renal insufficiency     Restless legs syndrome     Rosacea     Schatzki's ring     last dilated 2002    Scrotal pruritus 2/8/2018    Sleep apnea          Review of Systems   Constitutional: Negative. Negative for appetite change, fatigue and unexpected weight change. HENT: Negative. Eyes: Negative. Respiratory: Positive for cough (Chronic and unchanged) and shortness of breath (Chronic and unchanged). Cardiovascular: Positive for leg swelling. Negative for chest pain and palpitations. Gastrointestinal: Negative. Negative for abdominal pain, anal bleeding, blood in stool and nausea. Endocrine: Negative. Genitourinary: Negative. Negative for hematuria. Musculoskeletal: Positive for arthralgias, back pain, gait problem and joint swelling (Left knee). Skin: Negative. 2017    UPPER GASTROINTESTINAL ENDOSCOPY  2016    UPPER GASTROINTESTINAL ENDOSCOPY  10/11/2017    esophageal stricture    UPPER GASTROINTESTINAL ENDOSCOPY  2020     Social History     Socioeconomic History    Marital status:      Spouse name: Not on file    Number of children: Not on file    Years of education: Not on file    Highest education level: Not on file   Occupational History    Not on file   Social Needs    Financial resource strain: Not on file    Food insecurity     Worry: Not on file     Inability: Not on file    Transportation needs     Medical: Not on file     Non-medical: Not on file   Tobacco Use    Smoking status: Former Smoker     Packs/day: 1.50     Years: 17.00     Pack years: 25.50     Types: Cigarettes     Last attempt to quit: 1974     Years since quittin.8    Smokeless tobacco: Never Used   Substance and Sexual Activity    Alcohol use: No     Alcohol/week: 0.0 standard drinks    Drug use: No    Sexual activity: Not Currently   Lifestyle    Physical activity     Days per week: Not on file     Minutes per session: Not on file    Stress: Not on file   Relationships    Social connections     Talks on phone: Not on file     Gets together: Not on file     Attends Hindu service: Not on file     Active member of club or organization: Not on file     Attends meetings of clubs or organizations: Not on file     Relationship status: Not on file    Intimate partner violence     Fear of current or ex partner: Not on file     Emotionally abused: Not on file     Physically abused: Not on file     Forced sexual activity: Not on file   Other Topics Concern    Not on file   Social History Narrative    Not on file     Current Outpatient Medications   Medication Sig Dispense Refill    ferrous sulfate (IRON 325) 325 (65 Fe) MG tablet Take 1 tablet by mouth daily (with breakfast) 30 tablet 5    budesonide (RHINOCORT AQUA) 32 MCG/ACT nasal spray 1 spray  Vitamin D (CHOLECALCIFEROL) 1000 UNITS CAPS capsule Take 1,000 Units by mouth daily.  Calcium Carbonate-Vitamin D (CALCIUM + D) 600-200 MG-UNIT TABS Take 600 mg by mouth daily. No current facility-administered medications for this visit. No changes in past medical history, past surgical history, social history, orfamily history were noted during the patient encounter unless specifically listed above. All updates of past medical history, past surgical history, social history, or family history were reviewed personally by me duringthe office visit. All problems listed in the assessment are stable unless noted otherwise. Medication profile reviewed personally by me during the office visit. Medication side effects and possible impairments frommedications were discussed as applicable. Objective:     Physical Exam  Vitals signs and nursing note reviewed. Constitutional:       General: He is not in acute distress. Appearance: Normal appearance. He is well-developed. HENT:      Head: Normocephalic and atraumatic. Right Ear: Tympanic membrane, ear canal and external ear normal.      Left Ear: Tympanic membrane, ear canal and external ear normal.      Nose: Nose normal.   Eyes:      General: Lids are normal.      Conjunctiva/sclera: Conjunctivae normal.      Pupils: Pupils are equal, round, and reactive to light. Neck:      Musculoskeletal: Normal range of motion and neck supple. Thyroid: No thyromegaly. Vascular: No carotid bruit or JVD. Cardiovascular:      Rate and Rhythm: Normal rate and regular rhythm. Pulses: Normal pulses. Radial pulses are 2+ on the right side and 2+ on the left side. Dorsalis pedis pulses are 2+ on the right side and 2+ on the left side. Posterior tibial pulses are 2+ on the right side and 2+ on the left side. Heart sounds: Normal heart sounds. No murmur. No friction rub. No gallop.     Pulmonary: 10/15/2020 14.1     Platelets 64/83/1645 197     MPV 10/15/2020 6.6     Neutrophils % 10/15/2020 70.2     Lymphocytes % 10/15/2020 16.5     Monocytes % 10/15/2020 6.3     Eosinophils % 10/15/2020 6.5     Basophils % 10/15/2020 0.5     Neutrophils Absolute 10/15/2020 5.6     Lymphocytes Absolute 10/15/2020 1.3     Monocytes Absolute 10/15/2020 0.5     Eosinophils Absolute 10/15/2020 0.5     Basophils Absolute 10/15/2020 0.0     Sodium 10/15/2020 139     Potassium 10/15/2020 3.7     Chloride 10/15/2020 100     CO2 10/15/2020 29     Anion Gap 10/15/2020 10     Glucose 10/15/2020 191*    BUN 10/15/2020 20     CREATININE 10/15/2020 1.0     GFR Non- 10/15/2020 >60     GFR  10/15/2020 >60     Calcium 10/15/2020 9.1     D-Dimer, Quant 10/15/2020 319*       No results found for this visit on 11/11/20. Assessment:       1. Fall, subsequent encounter    2. Bilateral hip pain    3. Left leg swelling    4. At high risk for falls    5. Flu vaccine need        No results found for this visit on 11/11/20. Plan:       Yuridia Denis was seen today for hip pain. Diagnoses and all orders for this visit:    Fall, subsequent encounter  -     XR HIP BILATERAL W AP PELVIS (2 VIEWS); Future  -     XR LUMBAR SPINE (2-3 VIEWS); Future    Bilateral hip pain  -     XR HIP BILATERAL W AP PELVIS (2 VIEWS); Future  -     XR LUMBAR SPINE (2-3 VIEWS); Future    Left leg swelling  -     XR TIBIA FIBULA LEFT (2 VIEWS); Future  Consider antibiotic therapy  At high risk for falls  On the basis of positive falls risk screening, assessment and plan is as follows: home safety tips provided. Flu vaccine need  -     INFLUENZA, QUADV, 3 YRS AND OLDER, IM PF, PREFILL SYR OR SDV, 0.5ML (AFLURIA QUADV, PF)        Patient has been instructed call the office immediately with new symptoms, change in symptoms or worseningof symptoms.  If this is not feasible, patient is instructed to report to the

## 2020-11-23 ENCOUNTER — VIRTUAL VISIT (OUTPATIENT)
Dept: PULMONOLOGY | Age: 80
End: 2020-11-23
Payer: MEDICARE

## 2020-11-23 PROCEDURE — 99442 PR PHYS/QHP TELEPHONE EVALUATION 11-20 MIN: CPT | Performed by: INTERNAL MEDICINE

## 2020-11-23 RX ORDER — ALBUTEROL SULFATE 90 UG/1
2 AEROSOL, METERED RESPIRATORY (INHALATION) EVERY 6 HOURS PRN
Qty: 1 INHALER | Refills: 5 | Status: SHIPPED | OUTPATIENT
Start: 2020-11-23 | End: 2021-04-23 | Stop reason: SDUPTHER

## 2020-11-23 ASSESSMENT — SLEEP AND FATIGUE QUESTIONNAIRES
HOW LIKELY ARE YOU TO NOD OFF OR FALL ASLEEP IN A CAR, WHILE STOPPED FOR A FEW MINUTES IN TRAFFIC: 0
ESS TOTAL SCORE: 5
HOW LIKELY ARE YOU TO NOD OFF OR FALL ASLEEP WHILE LYING DOWN TO REST IN THE AFTERNOON WHEN CIRCUMSTANCES PERMIT: 2
HOW LIKELY ARE YOU TO NOD OFF OR FALL ASLEEP WHILE SITTING INACTIVE IN A PUBLIC PLACE: 0
HOW LIKELY ARE YOU TO NOD OFF OR FALL ASLEEP WHEN YOU ARE A PASSENGER IN A CAR FOR AN HOUR WITHOUT A BREAK: 1
HOW LIKELY ARE YOU TO NOD OFF OR FALL ASLEEP WHILE WATCHING TV: 1
HOW LIKELY ARE YOU TO NOD OFF OR FALL ASLEEP WHILE SITTING AND TALKING TO SOMEONE: 0
HOW LIKELY ARE YOU TO NOD OFF OR FALL ASLEEP WHILE SITTING QUIETLY AFTER LUNCH WITHOUT ALCOHOL: 0
HOW LIKELY ARE YOU TO NOD OFF OR FALL ASLEEP WHILE SITTING AND READING: 1

## 2020-11-23 NOTE — PATIENT INSTRUCTIONS
Remember to bring a list of pulmonary medications and any CPAP or BiPAP machines to your next appointment with the office. Please keep all of your future appointments scheduled by [facility] office. Out of respect for other patients and providers, you may be asked to reschedule your appointment if you arrive later than your scheduled appointment time. Appointments cancelled less than 24hrs in advance will be considered a no show. Patients with three missed appointments within 1 year or four missed appointments within 2 years can be dismissed from the practice. You may receive a survey regarding the care you received during your visit. Your input is valuable to us. We encourage you to complete and return your survey. We hope you will choose us in the future for your healthcare needs. Pt instructed of all future appointment dates & times, including radiology, labs, procedures & referrals. If procedures were scheduled preparation instructions provided. Instructions on future appointments with [facility] Pul[facility] were given.

## 2020-12-02 RX ORDER — LEVOTHYROXINE SODIUM 0.07 MG/1
TABLET ORAL
Qty: 90 TABLET | Refills: 0 | Status: SHIPPED | OUTPATIENT
Start: 2020-12-02 | End: 2021-03-08

## 2020-12-02 NOTE — TELEPHONE ENCOUNTER
Last office visit was 11/11/20  Future Appointments   Date Time Provider Madelin Stallworth   12/3/2020 11:40 AM ADORE Padilla CNP [facility]   3/29/2021 11:15 AM [facility]

## 2020-12-03 ENCOUNTER — VIRTUAL VISIT (OUTPATIENT)
Dept: FAMILY MEDICINE CLINIC | Age: 80
End: 2020-12-03
Payer: MEDICARE

## 2020-12-03 PROCEDURE — 99441 PR PHYS/QHP TELEPHONE EVALUATION 5-10 MIN: CPT | Performed by: NURSE PRACTITIONER

## 2020-12-03 ASSESSMENT — ENCOUNTER SYMPTOMS
ANAL BLEEDING: 0
NAUSEA: 0
RESPIRATORY NEGATIVE: 1
SHORTNESS OF BREATH: 0
GASTROINTESTINAL NEGATIVE: 1
ABDOMINAL PAIN: 0
BLOOD IN STOOL: 0
BACK PAIN: 0
EYES NEGATIVE: 1
ALLERGIC/IMMUNOLOGIC NEGATIVE: 1

## 2020-12-03 NOTE — PROGRESS NOTES
Raegan Joe is a 67 y.o. male evaluated via telephone on 12/3/2020. Consent:  He and/or health care decision maker is aware that that he may receive a bill for this telephone service, depending on his insurance coverage, and has provided verbal consent to proceed: Yes      Documentation:  I communicated with the patient and/or health care decision maker about chronic conditions. Details of this discussion including any medical advice provided: n/a      I affirm this is a Patient Initiated Episode with a Patient who has not had a related appointment within my department in the past 7 days or scheduled within the next 24 hours.     Patient identification was verified at the start of the visit: Yes    Total Time: minutes: 5-10 minutes    Note: not billable if this call serves to triage the patient into an appointment for the relevant concern      Dm Whitaker

## 2020-12-03 NOTE — PROGRESS NOTES
[address] [place] [address]  [address] [place] [id number]  Dept: [phone]  Dept Fax: [phone]  Loc: [address] Vin Gambino is a 67 y.o. male evaluated via telephone on 12/3/2020. Consent:  He and/or health care decision maker is aware that that he may receive a bill for this telephone service, depending on his insurance coverage, and has provided verbal consent to proceed: Yes    Savi Renee is a 67 y.o. male who presents today for his medical conditions/complaints as noted below. Savi Renee is c/o of Hypertension (pt does check bp at home occasionally. pt states the top number has been higher recently. pt does not not feel bad at all. pt takes medication every day. ); Hyperlipidemia (pt last had a lipid in May and was ok. pt takes medication every day. ); and Gastroesophageal Reflux (pt takes medication every day. pt has been doing well since being on medication. )        [address] [address]  Dept: [phone]  Dept Fax: [phone]  Loc: [address] [facility] José Luis is a 67 y.o. male who presents today for his medical conditions/complaints as noted below. Savi Renee is c/o of Hip Pain (follow up on hip pain, leg swelling and cellulitis)       Subjective:     Chief Complaint   Patient presents with    Hip Pain     follow up on hip pain, leg swelling and cellulitis       HPI   The patient is being evaluated today for follow up from office visit of 11/11/20. The patient was seen in ER on 10/15/20 after falling 2 weeks prior when he landed on left knee. The patient was evaluated in the ER and was determined that it may be cellulitis and was placed on Keflex 3 times daily for 7 days   Still with erythema and swelling in left LE  Completed keflex x 10 days total and erythema improved but still present  No heat and swelling improved as well.    RLE swelling has resolved    The patient was also complaining of bilateral hip pain   It is mostly in his bilateral buttocks region. It is worse on the right more than the left. He also states that he does have pain in his right lateral hip region. He states that this is been ongoing for approximately 1 month and appears to be worsening. He states he has a sharp pain in bilateral buttock when moving/walking. He denies pain in his groin or down his leg  He denies any numbness or tingling in his lower extremities or genital region  xrays of the lumbar spine and  Bilateral hips and pelvis did not show any abnormality other than arthritis  Still having a lot of pain in hips with standing and walking and is not improving. Using cane eases the pain. Denies any low back pain at this time. Past Medical History:   Diagnosis Date    Arthritis     Back injuries     BPH (benign prostatic hyperplasia)     CPAP (continuous positive airway pressure) dependence     Diverticulosis     colonoscopy 9/2015    Esophageal dysmotility     GERD (gastroesophageal reflux disease)     Hiatal hernia     History of colon polyps     seen on colonoscopy again 9/2015    Hyperlipidemia     Hypertension     ILD (interstitial lung disease) ([facility] [address] 7/30/2013    Internal hemorrhoid     colonoscopy 8/29/31    Lichen sclerosus of penis 2017    Dr Dolores Beckham Mild cognitive impairment 08/2016    [facility] neurology    COLLIN (obstructive sleep apnea)     Renal insufficiency     Restless legs syndrome     Rosacea     Schatzki's ring     last dilated 2002    Scrotal pruritus 2/8/2018    Sleep apnea          Review of Systems   Constitutional: Negative. Negative for appetite change, fatigue and unexpected weight change. HENT: Negative. Eyes: Negative. Respiratory: Negative. Negative for shortness of breath. Cardiovascular: Positive for leg swelling (LLE intermittently when dependent). Negative for chest pain and palpitations. Gastrointestinal: Negative. Negative for abdominal pain, anal bleeding, blood in stool and nausea. Endocrine: Negative. Genitourinary: Negative. Negative for hematuria. Musculoskeletal: Positive for arthralgias. Negative for back pain and gait problem. Skin: Negative. Negative for rash. Allergic/Immunologic: Negative. Neurological: Negative. Negative for dizziness, syncope, light-headedness and numbness. Hematological: Negative. Does not bruise/bleed easily. Psychiatric/Behavioral: Negative. All other systems reviewed and are negative.        Past Medical History:   Diagnosis Date    Arthritis     Back injuries     BPH (benign prostatic hyperplasia)     CPAP (continuous positive airway pressure) dependence     Diverticulosis     colonoscopy 9/2015    Esophageal dysmotility     GERD (gastroesophageal reflux disease)     Hiatal hernia     History of colon polyps     seen on colonoscopy again 9/2015    Hyperlipidemia     Hypertension     ILD (interstitial lung disease) ([facility] [address] 7/30/2013    Internal hemorrhoid     colonoscopy 6/36/06    Lichen sclerosus of penis 2017    Dr Jorge A Hdz Mild cognitive impairment 08/2016    [facility] neurolog[facility]    COLLIN (obstructive sleep apnea)     Renal insufficiency     Restless legs syndrome     Rosacea     Schatzki's ring     last dilated 2002    Scrotal pruritus 2/8/2018    Sleep apnea      Family History   Problem Relation Age of Onset    Stroke Father     Cancer Father         prostate    Other Father         colitis    Diabetes Sister     Diabetes Brother     Cancer Brother         skin    High Blood Pressure Brother     High Cholesterol Brother     COPD Mother     Asthma Neg Hx     Emphysema Neg Hx     Heart Failure Neg Hx     Hypertension Neg Hx      Past Surgical History:   Procedure Laterality Date    CIRCUMCISION      COLONOSCOPY  2010    COLONOSCOPY  9/14/2015    CORONARY ANGIOPLASTY  10/15/2019    CYSTOSCOPY 16    CYSTOSCOPY  2017    CYSTOSCOPY     CYSTOSCOPY  2017    ENDOSCOPY, COLON, DIAGNOSTIC  10/11/2017    esoph. stricture    FRACTURE SURGERY      right leg    FRACTURE SURGERY      wrist   right    HERNIA REPAIR      PROSTATE SURGERY      TOE SURGERY      TURP  2017    UPPER GASTROINTESTINAL ENDOSCOPY  2016    UPPER GASTROINTESTINAL ENDOSCOPY  10/11/2017    esophageal stricture    UPPER GASTROINTESTINAL ENDOSCOPY  2020     Social History     Socioeconomic History    Marital status:      Spouse name: Not on file    Number of children: Not on file    Years of education: Not on file    Highest education level: Not on file   Occupational History    Not on file   Social Needs    Financial resource strain: Not on file    Food insecurity     Worry: Not on file     Inability: Not on file   [employer] needs     Medical: Not on file     Non-medical: Not on file   Tobacco Use    Smoking status: Former Smoker     Packs/day: 1.50     Years: 17.00     Pack years: 25.50     Types: Cigarettes     Last attempt to quit: 1974     Years since quittin.9    Smokeless tobacco: Never Used   Substance and Sexual Activity    Alcohol use: No     Alcohol/week: 0.0 standard drinks    Drug use: No    Sexual activity: Not Currently   Lifestyle    Physical activity     Days per week: Not on file     Minutes per session: Not on file    Stress: Not on file   Relationships    Social connections     Talks on phone: Not on file     Gets together: Not on file     Attends [employer] service: Not on file     Active member of club or organization: Not on file     Attends meetings of clubs or organizations: Not on file     Relationship status: Not on file    Intimate partner violence     Fear of current or ex partner: Not on file     Emotionally abused: Not on file     Physically abused: Not on file     Forced sexual activity: Not on file   Other Topics Concern    Not on 5. 6     Lymphocytes Absolute 10/15/2020 1.3     Monocytes Absolute 10/15/2020 0.5     Eosinophils Absolute 10/15/2020 0.5     Basophils Absolute 10/15/2020 0.0     Sodium 10/15/2020 139     Potassium 10/15/2020 3.7     Chloride 10/15/2020 100     CO2 10/15/2020 29     Anion Gap 10/15/2020 10     Glucose 10/15/2020 191*    BUN 10/15/2020 20     CREATININE 10/15/2020 1.0     GFR Non- 10/15/2020 >60     GFR  10/15/2020 >60     Calcium 10/15/2020 9.1     D-Dimer, Quant 10/15/2020 319*       No results found for this visit on 12/03/20. Assessment:       1. Bilateral hip pain    2. Fall, subsequent encounter    3. Left leg swelling        No results found for this visit on 12/03/20. Plan:       Annika Ambrose was seen today for hip pain. Diagnoses and all orders for this visit:    Bilateral hip pain  Referral to  -     Gracie Teixeira MD, Orthopedic Surgery (General), [facility]    Fall, subsequent encounter  -     Gracie Teixeira MD, Orthopedic Surgery (General), [facility]    Left leg swelling  improved  Recommendations: decrease sodium in the diet, elevate feet above the level of the heart whenever possible, increase physical activity, use of compression stockings and weight loss. The patient was also instructed to call IMMEDIATELY (i.e., day or night) if any cardiopulmonary symptoms occur, especially chest pain, shortness of breath, dyspnea on exertion, paroxysmal nocturnal dyspnea, or orthopnea, and these were explained. Patient has been instructed call the office immediately with new symptoms, change in symptoms or worseningof symptoms. If this is not feasible, patient is instructed to report to the emergency room. Medication profile reviewed. Medication side effects and possible impairments from medications were discussed as applicable. Allergies were reviewed. Health maintenance was reviewed and updated as appropriate. Return in about 2 months (around 2/3/2021), or if symptoms worsen or fail to improve, for [facility]. (Comment: Please note this report has been produced using a combination of typing and speech recognition software and may contain errors related to that system including errors in grammar, punctuation, and spelling, as well as words and phrases that may be inappropriate. If there are any questions or concerns please feel free to contact the dictating provider for clarification.)            Documentation:  I communicated with the patient and/or health care decision maker about above conditions. Details of this discussion including any medical advice provided: as noted above      I affirm this is a Patient Initiated Episode with a Patient who has not had a related appointment within my department in the past 7 days or scheduled within the next 24 hours.     Patient identification was verified at the start of the visit: Yes    Total Time: minutes: 5-10 minutes    Note: not billable if this call serves to triage the patient into an appointment for the relevant concern      Rhys Zaragoza

## 2020-12-10 ENCOUNTER — OFFICE VISIT (OUTPATIENT)
Dept: ORTHOPEDIC SURGERY | Age: 80
End: 2020-12-10
Payer: MEDICARE

## 2020-12-10 VITALS — BODY MASS INDEX: 37.59 KG/M2 | WEIGHT: 248 LBS | HEIGHT: 68 IN

## 2020-12-10 PROBLEM — M54.31 BILATERAL SCIATICA: Status: ACTIVE | Noted: 2020-12-10

## 2020-12-10 PROBLEM — M48.061 SPINAL STENOSIS OF LUMBAR REGION: Status: ACTIVE | Noted: 2020-12-10

## 2020-12-10 PROBLEM — M54.32 BILATERAL SCIATICA: Status: ACTIVE | Noted: 2020-12-10

## 2020-12-10 PROCEDURE — 99203 OFFICE O/P NEW LOW 30 MIN: CPT | Performed by: ORTHOPAEDIC SURGERY

## 2020-12-10 RX ORDER — PREDNISONE 1 MG/1
TABLET ORAL
Qty: 55 TABLET | Refills: 0 | Status: SHIPPED | OUTPATIENT
Start: 2020-12-10 | End: 2021-02-19 | Stop reason: ALTCHOICE

## 2020-12-10 NOTE — PROGRESS NOTES
NEW PATIENT VISIT       HISTORY OF PRESENT ILLNESS    Bill Marsh is a 67 y.o. male who presents for evaluation of his hips. Over the last several weeks he has had increasing pain in both hips that radiates from his posterior hips down the back of his hips into his buttocks. He does have a history of back issues. He was sent by Bib Sandoval CNP for consultation in regards to this issue. He grades pain 6/10 at worst and has difficulty walking any distance. He says that he needs a motorized vehicles when he goes shopping and complains of pain at night also. He denies any groin pain or lateral side hip pain. He does give a history of these been told 8 years ago that he had disc issues with compression in several areas. ROS    Well-documented patient history form dated 12/10/2020  All other ROS negative except for above.     Past Surgical history    Past Surgical History:   Procedure Laterality Date    CIRCUMCISION      COLONOSCOPY  2010    COLONOSCOPY  9/14/2015    CORONARY ANGIOPLASTY  10/15/2019    CYSTOSCOPY  5/13/16    CYSTOSCOPY  05/19/2017    CYSTOSCOPY     CYSTOSCOPY  07/14/2017    ENDOSCOPY, COLON, DIAGNOSTIC  10/11/2017    esoph. stricture    FRACTURE SURGERY      right leg    FRACTURE SURGERY      wrist   right    HERNIA REPAIR      PROSTATE SURGERY      TOE SURGERY      TURP  07/14/2017    UPPER GASTROINTESTINAL ENDOSCOPY  09/12/2016    UPPER GASTROINTESTINAL ENDOSCOPY  10/11/2017    esophageal stricture    UPPER GASTROINTESTINAL ENDOSCOPY  01/28/2020       PAST MEDICAL    Past Medical History:   Diagnosis Date    Arthritis     Back injuries     BPH (benign prostatic hyperplasia)     CPAP (continuous positive airway pressure) dependence     Diverticulosis     colonoscopy 9/2015    Esophageal dysmotility     GERD (gastroesophageal reflux disease)     Hiatal hernia     History of colon polyps     seen on colonoscopy again 9/2015    Hyperlipidemia     Hypertension     ILD (interstitial lung disease) ([facility] [address] 7/30/2013    Internal hemorrhoid     colonoscopy 6/78/96    Lichen sclerosus of penis 2017    Dr Shiva Wagoner Mild cognitive impairment 08/2016    [facility] neurolog[facility]    COLLIN (obstructive sleep apnea)     Renal insufficiency     Restless legs syndrome     Rosacea     Elmertzki's ring     last dilated 2002    Scrotal pruritus 2/8/2018    Sleep apnea        Allergies    Allergies   Allergen Reactions    Percocet [Oxycodone-Acetaminophen] Itching    Ace Inhibitors Other (See Comments)     Cough    Oxybutynin      nightmares    Simvastatin      Did not control cholesterol       Meds    Current Outpatient Medications   Medication Sig Dispense Refill    predniSONE (DELTASONE) 5 MG tablet SIG TEN TABLETS THE FIRST DAY THEN DECREASE ONE TABLET DAILY UNTIL FINISHED 55 tablet 0    levothyroxine (SYNTHROID) 75 MCG tablet TAKE ONE TABLET BY MOUTH DAILY 90 tablet 0    albuterol sulfate HFA (VENTOLIN HFA) 108 (90 Base) MCG/ACT inhaler Inhale 2 puffs into the lungs every 6 hours as needed for Wheezing or Shortness of Breath 1 Inhaler 5    ferrous sulfate (IRON 325) 325 (65 Fe) MG tablet Take 1 tablet by mouth daily (with breakfast) 30 tablet 5    budesonide (RHINOCORT AQUA) 32 MCG/ACT nasal spray 1 spray by Each Nostril route 2 times daily 1 Bottle 5    cetirizine (ZYRTEC ALLERGY) 10 MG tablet Take 1 tablet by mouth nightly 30 tablet 5    montelukast (SINGULAIR) 10 MG tablet Take 1 tablet by mouth daily 30 tablet 5    omeprazole (PRILOSEC) 40 MG delayed release capsule TAKE ONE CAPSULE BY MOUTH DAILY 30 capsule 4    doxazosin (CARDURA) 8 MG tablet TAKE ONE TABLET BY MOUTH TWICE A  tablet 1    venlafaxine (EFFEXOR) 75 MG tablet Take 1 tablet by mouth twice daily 180 tablet 1    pramipexole (MIRAPEX) 1 MG tablet Take 1 tablet by mouth 2 times daily 180 tablet 1    potassium chloride (KLOR-CON) 10 MEQ extended release tablet Take 1 tablet by mouth 2 times daily TAKE ONE TABLET BY MOUTH DAILY 180 tablet 3    atorvastatin (LIPITOR) 40 MG tablet Take 1 tablet by mouth daily 90 tablet 3    clopidogrel (PLAVIX) 75 MG tablet Take 1 tablet by mouth daily 90 tablet 3    amLODIPine (NORVASC) 10 MG tablet TAKE ONE TABLET BY MOUTH DAILY 90 tablet 3    triamterene-hydrochlorothiazide (MAXZIDE-25) 37.5-25 MG per tablet TAKE ONE TABLET BY MOUTH DAILY 90 tablet 3    nitroGLYCERIN (NITROSTAT) 0.4 MG SL tablet Place 1 tablet under the tongue every 5 minutes as needed for Chest pain up to max of 3 total doses. If no relief after 1 dose, call 911. 25 tablet 3    triamcinolone (KENALOG) 0.1 % cream Apply topically every 7 days 80 g 5    methocarbamol (ROBAXIN) 500 MG tablet Take 1 tablet by mouth 3 times daily as needed (spasm) 90 tablet 5    aspirin EC 81 MG EC tablet Take 1 tablet by mouth daily 30 tablet 3    magnesium gluconate (MAGONATE) 500 MG tablet Take 500 mg by mouth daily       Vitamin D (CHOLECALCIFEROL) 1000 UNITS CAPS capsule Take 1,000 Units by mouth daily.  Calcium Carbonate-Vitamin D (CALCIUM + D) 600-200 MG-UNIT TABS Take 600 mg by mouth daily. No current facility-administered medications for this visit.         Social    Social History     Socioeconomic History    Marital status:      Spouse name: Not on file    Number of children: Not on file    Years of education: Not on file    Highest education level: Not on file   Occupational History    Not on file   Social Needs    Financial resource strain: Not on file    Food insecurity     Worry: Not on file     Inability: Not on file    Transportation needs     Medical: Not on file     Non-medical: Not on file   Tobacco Use    Smoking status: Former Smoker     Packs/day: 1.50     Years: 17.00     Pack years: 25.50     Types: Cigarettes     Last attempt to quit: 1974     Years since quittin.9    Smokeless tobacco: Never Used   Substance and Sexual Activity    Alcohol use: No     Alcohol/week: 0.0 standard drinks    Drug use: No    Sexual activity: Not Currently   Lifestyle    Physical activity     Days per week: Not on file     Minutes per session: Not on file    Stress: Not on file   Relationships    Social connections     Talks on phone: Not on file     Gets together: Not on file     Attends [employer] service: Not on file     Active member of club or organization: Not on file     Attends meetings of clubs or organizations: Not on file     Relationship status: Not on file    Intimate partner violence     Fear of current or ex partner: Not on file     Emotionally abused: Not on file     Physically abused: Not on file     Forced sexual activity: Not on file   Other Topics Concern    Not on file   Social History Narrative    Not on file       Family HISTORY    Family History   Problem Relation Age of Onset    Stroke Father     Cancer Father         prostate    Other Father         colitis    Diabetes Sister     Diabetes Brother     Cancer Brother         skin    High Blood Pressure Brother     High Cholesterol Brother     COPD Mother     Asthma Neg Hx     Emphysema Neg Hx     Heart Failure Neg Hx     Hypertension Neg Hx        PHYSICAL EXAM    Vital Signs:  Ht 5' 8\" (1.727 m)   Wt 248 lb (112.5 kg)   BMI 37.71 kg/m²   General Appearance:  Normal body habitus. Alert and oriented to person, place, and time. Affect:  Normal.   Gait: Antalgic with fatigability even short distances. History is consistent with neurogenic claudication no limp. Good balance and coordination. Able to heel and toe walk without apparent weakness. Head and Neck:  Normal alignment. No masses. Full range of motion. Nontender. Normal strength and tone. Good stability. Spine:  Normal gross sagittal and coronal contours. Unable to bend forward at the waist with fingertips to the mid-shin. No instability. Normal strength and tone. Tenderness:  No point tenderness on back.  No tenderness over the greater trochanters. He does have pain in both sciatic notches to palpation. Skin:  No rashes, sores, abrasions, or erythema on the back or upper and lower extremities. No surgical scars. Tension Signs: Positive straight leg raise test with positive Lasegue's bilaterally left worse than right. Negative femoral nerve stretch test.   Joints:  Gentle bilateral hip, knee and ankle range of motion is normal and painless. No instability. Motor:  4+/5 motor strength in the bilateral lower extremities. Sensation:  Sensation slightly diminished to light touch bilateral lower extremities. Reflexes:  Patellar tendon and Achilles tendon reflexes are diminished but symmetrical. No pathological reflexes. No clonus. Vascular:  2+ dorsalis pedis pulse bilaterally. No pitting edema. IMAGING STUDIES    X-rays 2 views of his pelvis reveals lumbar spondylitic change but his hips appear unremarkable. Those x-rays were taken prior to this visit    IMPRESSION    Lumbar spondylosis with bilateral sciatica    PLAN    1. Conservative care options including physical therapy, NSAIDs, bracing, chiropractic care, and activity modification were discussed. 2.  The indications for therapeutic injections such as epidural steroid injections were discussed. 3.  The indications for additional imaging studies were discussed. 4.  After considering the various options discussed, the patient elected to pursue a course that includes proceeding with an MRI lumbar spine because of the severity of his pain and his neurologic deficits.

## 2020-12-11 ENCOUNTER — TELEPHONE (OUTPATIENT)
Dept: ORTHOPEDIC SURGERY | Age: 80
End: 2020-12-11

## 2020-12-11 NOTE — TELEPHONE ENCOUNTER
SPOKE WITH PATIENT'S WIFE AND LET HER KNOW [facility]'S MRI LUMBAR SPINE WAS AUTHORIZED .  THEY WILL SCHEDULE THAT AT THEIR CONVENIENCE AND CALL OUR OFFICE BACK AFTERWARDS TO SEE IF [facility] WANTS TO REFER THEM TO SPINE PENDING MRI RESULTS

## 2020-12-21 ENCOUNTER — HOSPITAL ENCOUNTER (OUTPATIENT)
Dept: MRI IMAGING | Age: 80
Discharge: HOME | End: 2020-12-21
Payer: MEDICARE

## 2020-12-21 PROCEDURE — 72148 MRI LUMBAR SPINE W/O DYE: CPT

## 2021-01-25 ENCOUNTER — TELEPHONE (OUTPATIENT)
Dept: FAMILY MEDICINE CLINIC | Age: 81
End: 2021-01-25

## 2021-02-01 NOTE — TELEPHONE ENCOUNTER
It appears the patient's singulair is no longer covered by his insurance. There was no alternatives listed.   I recommend that the patient call her insurance company to see what alternatives(if any) are covered and then let me know

## 2021-02-02 RX ORDER — CLOPIDOGREL BISULFATE 75 MG/1
TABLET ORAL
Qty: 90 TABLET | Refills: 0 | Status: ON HOLD | OUTPATIENT
Start: 2021-02-02 | End: 2021-03-04 | Stop reason: SDUPTHER

## 2021-02-03 ENCOUNTER — TELEPHONE (OUTPATIENT)
Dept: FAMILY MEDICINE CLINIC | Age: 81
End: 2021-02-03

## 2021-02-03 NOTE — TELEPHONE ENCOUNTER
[facility] received a call from:    Name of Caller: jenifer    Relationship to patient: n/a  Organization name: [employer]    Best contact number:[phone]    Reason for call: prior authorization needed more information needed for diagnois code and     criteria need some answer  For the medication: montelukast mg 10 mg tablet

## 2021-02-03 NOTE — TELEPHONE ENCOUNTER
Received denial from prior authorization for Singulair.   Inform patient that it is not covered by his insurance company but he can pay cash for his prescription he can also check [employer] to see how much it would cost for cash pay with a discount coupon

## 2021-02-04 ENCOUNTER — OFFICE VISIT (OUTPATIENT)
Dept: ORTHOPEDIC SURGERY | Age: 81
End: 2021-02-04
Payer: MEDICARE

## 2021-02-04 VITALS — WEIGHT: 248 LBS | HEIGHT: 68 IN | BODY MASS INDEX: 37.59 KG/M2

## 2021-02-04 DIAGNOSIS — M48.061 SPINAL STENOSIS OF LUMBAR REGION, UNSPECIFIED WHETHER NEUROGENIC CLAUDICATION PRESENT: Primary | ICD-10-CM

## 2021-02-04 PROCEDURE — 99212 OFFICE O/P EST SF 10 MIN: CPT | Performed by: PHYSICIAN ASSISTANT

## 2021-02-04 NOTE — PROGRESS NOTES
New Patient: LUMBAR SPINE    Referring Provider: Dr. Laisha Brody:    Chief Complaint   Patient presents with    Back Pain     Patient is referred by Dr. Paris Grigsby. Patient states that his pain began in mid December. He was given oral steroids and this has helped his pain. He currently has no pain. When initial symptoms occurred he has bilateral hip and bilateral posterior leg pain to the knees. HISTORY OF PRESENT ILLNESS:       Mr. Joanna Dietrich  is a pleasant 79 y.o. male referred by Dr. Gregg Lynch for consultation of low back pain and bilateral leg pain. Patient states he began experiencing bilateral buttock and posterior thigh pain which began in mid December. Patient felt that it was coming from his hips and was seen by Dr. Gregg Lynch who evaluated him and felt that he had a lumbar radiculopathy. He placed him on an oral steroid pack and did order an MRI of his lumbar spine. Patient states that by the time the MRI was approved all his buttock and leg pain had significantly improved secondary to the steroid pack. At the present time he states that he has no pain in his low back, buttock, or posterior legs. He denies any increase with cough, sneeze, or strain. Patient states that he is incontinent to bowel and bladder but states that this is been present for greater than a year and he has been seen for both these issues by her urologist and his primary care physician. Patient has a significant history for pulmonary fibrosis and is very short of breath but is not on home oxygen. Pain Assessment  Location of Pain: Back  Location Modifiers: Right, Left  Severity of Pain: 0  Quality of Pain: Aching  Duration of Pain: Other (Comment)  Frequency of Pain: Intermittent  Aggravating Factors:  Other (Comment)  Limiting Behavior: Some  Relieving Factors: Rest  Result of Injury: No  Work-Related Injury: No  Are there other pain locations you wish to document?: No] Current/Past Treatment:   · Physical Therapy: None  · Chiropractic: None  · Injection: None  · Medications: Recent steroid pack    Past Medical History:   Past Medical History:   Diagnosis Date    Arthritis     Back injuries     BPH (benign prostatic hyperplasia)     CPAP (continuous positive airway pressure) dependence     Diverticulosis     colonoscopy 9/2015    Esophageal dysmotility     GERD (gastroesophageal reflux disease)     Hiatal hernia     History of colon polyps     seen on colonoscopy again 9/2015    Hyperlipidemia     Hypertension     ILD (interstitial lung disease) ([facility] [address] 7/30/2013    Internal hemorrhoid     colonoscopy 4/22/09    Lichen sclerosus of penis 2017    Dr Frankey Countess Mild cognitive impairment 08/2016    [facility] neurology    COLLIN (obstructive sleep apnea)     Renal insufficiency     Restless legs syndrome     Rosacea     Schatzki's ring     last dilated 2002    Scrotal pruritus 2/8/2018    Sleep apnea         Past Surgical History:     Past Surgical History:   Procedure Laterality Date    CIRCUMCISION      COLONOSCOPY  2010    COLONOSCOPY  9/14/2015    CORONARY ANGIOPLASTY  10/15/2019    CYSTOSCOPY  5/13/16    CYSTOSCOPY  05/19/2017    CYSTOSCOPY     CYSTOSCOPY  07/14/2017    ENDOSCOPY, COLON, DIAGNOSTIC  10/11/2017    esoph. stricture    FRACTURE SURGERY      right leg    FRACTURE SURGERY      wrist   right    HERNIA REPAIR      PROSTATE SURGERY      TOE SURGERY      TURP  07/14/2017    UPPER GASTROINTESTINAL ENDOSCOPY  09/12/2016    UPPER GASTROINTESTINAL ENDOSCOPY  10/11/2017    esophageal stricture    UPPER GASTROINTESTINAL ENDOSCOPY  01/28/2020       Current Medications:     Current Outpatient Medications:     clopidogrel (PLAVIX) 75 MG tablet, TAKE ONE TABLET BY MOUTH DAILY, Disp: 90 tablet, Rfl: 0    predniSONE (DELTASONE) 5 MG tablet, SIG TEN TABLETS THE FIRST DAY THEN DECREASE ONE TABLET DAILY UNTIL FINISHED, Disp: 55 tablet, Rfl: 0   levothyroxine (SYNTHROID) 75 MCG tablet, TAKE ONE TABLET BY MOUTH DAILY, Disp: 90 tablet, Rfl: 0    albuterol sulfate HFA (VENTOLIN HFA) 108 (90 Base) MCG/ACT inhaler, Inhale 2 puffs into the lungs every 6 hours as needed for Wheezing or Shortness of Breath, Disp: 1 Inhaler, Rfl: 5    ferrous sulfate (IRON 325) 325 (65 Fe) MG tablet, Take 1 tablet by mouth daily (with breakfast), Disp: 30 tablet, Rfl: 5    budesonide (RHINOCORT AQUA) 32 MCG/ACT nasal spray, 1 spray by Each Nostril route 2 times daily, Disp: 1 Bottle, Rfl: 5    cetirizine (ZYRTEC ALLERGY) 10 MG tablet, Take 1 tablet by mouth nightly, Disp: 30 tablet, Rfl: 5    montelukast (SINGULAIR) 10 MG tablet, Take 1 tablet by mouth daily, Disp: 30 tablet, Rfl: 5    omeprazole (PRILOSEC) 40 MG delayed release capsule, TAKE ONE CAPSULE BY MOUTH DAILY, Disp: 30 capsule, Rfl: 4    doxazosin (CARDURA) 8 MG tablet, TAKE ONE TABLET BY MOUTH TWICE A DAY, Disp: 180 tablet, Rfl: 1    venlafaxine (EFFEXOR) 75 MG tablet, Take 1 tablet by mouth twice daily, Disp: 180 tablet, Rfl: 1    pramipexole (MIRAPEX) 1 MG tablet, Take 1 tablet by mouth 2 times daily, Disp: 180 tablet, Rfl: 1    potassium chloride (KLOR-CON) 10 MEQ extended release tablet, Take 1 tablet by mouth 2 times daily TAKE ONE TABLET BY MOUTH DAILY, Disp: 180 tablet, Rfl: 3    atorvastatin (LIPITOR) 40 MG tablet, Take 1 tablet by mouth daily, Disp: 90 tablet, Rfl: 3    amLODIPine (NORVASC) 10 MG tablet, TAKE ONE TABLET BY MOUTH DAILY, Disp: 90 tablet, Rfl: 3    triamterene-hydrochlorothiazide (MAXZIDE-25) 37.5-25 MG per tablet, TAKE ONE TABLET BY MOUTH DAILY, Disp: 90 tablet, Rfl: 3    nitroGLYCERIN (NITROSTAT) 0.4 MG SL tablet, Place 1 tablet under the tongue every 5 minutes as needed for Chest pain up to max of 3 total doses.  If no relief after 1 dose, call 911., Disp: 25 tablet, Rfl: 3    triamcinolone (KENALOG) 0.1 % cream, Apply topically every 7 days, Disp: 80 g, Rfl: 5 · General Apparence: Patient is adequately groomed with no evidence of malnutrition. · Orientation: The patient is oriented to time, place and person. · Mood & Affect:The patient's mood and affect are appropriate. · Vascular: Examination reveals no swelling tenderness in upper or lower extremities. Good capillary refill. · Lymphatic: The lymphatic examination bilaterally reveals all areas to be without enlargement or induration  · Sensation: Sensation is intact without deficit  · Coordination/Balance: Good coordination. Tandem walking normal.     LUMBAR/SACRAL EXAMINATION:  · Inspection: Local inspection shows no step-off or bruising. Lumbar alignment is normal.  Sagittal and Coronal balance is neutral.      · Palpation:   No evidence of tenderness at the midline. No tenderness bilaterally at the paraspinal or trochanters. There is no step-off or paraspinal spasm. · Range of Motion: Lumbar flexion, extension and rotation are mildly limited due to pain. · Strength:   Strength testing is 5/5 in all muscle groups tested. · Special Tests:   Straight leg raise and crossed SLR negative. Leg length and pelvis level. · Skin: There are no rashes, ulcerations or lesions. · Reflexes: Reflexes are symmetrically 2+ at the patellar and ankle tendons. Clonus absent bilaterally at the feet. · Gait & station: normal, patient ambulates without assistance    · Additional Examinations:   · RIGHT LOWER EXTREMITY: Inspection/examination of the right lower extremity does not show any tenderness, deformity or injury. Range of motion is unremarkable. There is no gross instability. There are no rashes, ulcerations or lesions. Strength and tone are normal.  · LEFT LOWER EXTREMITY:  Inspection/examination of the left lower extremity does not show any tenderness, deformity or injury. Range of motion is unremarkable. There is no gross instability. There are no rashes, ulcerations or lesions.   Strength and tone are normal. Diagnostic Testing:    MRI of the lumbar spine from 12/21/2020 was reviewed with the patient and his wife today    Impression   Central disc herniation at L4-5 with extrusion of disc material posterior to   the L4 vertebral body.  Effacement of the central zone subarticular zone and   foraminal zone left greater than right.  Potential for impingement descending   nerve roots cannot be excluded.             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 He wishes to proceed with continued conservative care on his own and if he has a flareup of his buttock and leg pain in the future he will contact the office for another round of oral steroids. .    · Follow up - Follow up PRN. Old records were reviewed. Patient examined and note dictated by Lopez Zuniga PA-C. Patient also seen and examined by Dr. Shelley Lyle.

## 2021-02-08 RX ORDER — VENLAFAXINE 75 MG/1
TABLET ORAL
Qty: 180 TABLET | Refills: 0 | Status: SHIPPED | OUTPATIENT
Start: 2021-02-08 | End: 2021-05-05

## 2021-02-18 NOTE — PROGRESS NOTES
[address] [facility]   Cardiovascular Evaluation    PATIENT: Annette Waddell  DATE: 2021  MRN: [id number]  CSN: [id number]  : 1940    Primary Care Doctor: ADORE Espinosa - CNP    Subjective: Mr. Marques Wallace is here today for follow up CAD, HTN, HLD; today he c/o worsening SOB     PMH:   Annette Waddell is a 79 y.o. male who presents for cardiac f/u CAD s/p LAD DANTE 10/19, HTN, HLD, ILD, chronic cough, and COLLIN (uses bipap). He sees Dr. Génesis Mcdonald for ILD. Most recent ECHO 2015 with EF of 55-60%. Grade I DD (no change from  study). Note: Carri Feldman 2015 with no evidence of stress induced reversible ischemia, EF of 62%. Most recent LE arterial doppler CLOVER's >1 and no arterial insufficiency. At [address] he c/o fatigue, SOB, CP. He LHC 9/10/18  LM <20%, LAD 50-60% mid. FFR 0.83, Cx/OM 30%, RCA 30%, LVEF 60%, Normal right heart pressures (no significant change from  study). At [address] 10/9/19 c/o random chest heaviness and given abnl EKG (ST change anterolateral leads is new compared to  EKG) he underwent LHC 10/15/19 w/ PCTA to mid LAD with 3.0 x 18 ADNTE 3.25 x 8; LM <20%; LAD 50% prox, 70% mid. FFR 0.79; Cx 40% distal;OM1 40% prox; RCA 30% mid. Note EKG 10/16/19 SB 54 bpm; 1st degree A-V block; LVH; nonspecific ST abnormality. He saw Dr. Génesis Mcdonald in 3/20 and dx with acute bronchitis/PNA and given abx with steroid taper. Due to BERTRAND at May 2020 OV I ordered most recent [place] which was normal.       History of present illness    Today he reports feeling OK but he remains SOB with exertion and believes it's getting worse. He states just walking from his house to the barn causes SOB. He also c/o pressure in the middle of his chest.  He states that the pressure subsides once his breathing gets under control. Patient with no c/o palpitations, dizziness, edema, or orthopnea/PND.       Patient Active Problem List   Diagnosis    Bradycardia    Chronic cough    COLLIN on CPAP    Abnormal PFT    ILD (interstitial lung disease) (HCC)    Hyperlipidemia    Restless legs syndrome    Essential and other specified forms of tremor    Paralysis agitans (HCC)    Chronic ischemic heart disease    Essential hypertension    Restless legs syndrome (RLS)    Obstructive sleep apnea    Depression    Acute bronchitis    Shortness of breath    Anxiety    COLLIN treated with BiPAP    Chronic diastolic congestive heart failure (HCC)    Anginal equivalent (HCC)    Hypothyroidism    Rosacea    Vitamin D deficiency    Lichen sclerosus of penis    Scrotal pruritus    GERD (gastroesophageal reflux disease)    Coronary artery disease involving native coronary artery of native heart with unstable angina pectoris (HCC)    Chest pain    Angina, class III ([facility])    Recurrent major depressive disorder, in partial remission ([address]    CAD in native artery    Acute bronchitis due to Streptococcus    Claudication ([facility])    Class 2 severe obesity due to excess calories with serious comorbidity and body mass index (BMI) of 36.0 to 36.9 in adult [facility])    Spinal stenosis of lumbar region    Bilateral sciatica       Past Medical History:   has a past medical history of Arthritis, Back injuries, BPH (benign prostatic hyperplasia), CPAP (continuous positive airway pressure) dependence, Diverticulosis, Esophageal dysmotility, GERD (gastroesophageal reflux disease), Hiatal hernia, History of colon polyps, Hyperlipidemia, Hypertension, ILD (interstitial lung disease) ([address] Internal hemorrhoid, Lichen sclerosus of penis, Mild cognitive impairment, COLLIN (obstructive sleep apnea), Renal insufficiency, Restless legs syndrome, Rosacea, Schatzki's ring, Scrotal pruritus, and Sleep apnea. Surgical History:   has a past surgical history that includes Circumcision; fracture surgery; fracture surgery; Toe Surgery; hernia repair; Colonoscopy (2010); Colonoscopy (9/14/2015); Cystocopy (5/13/16);  Upper Soft, non-tender, bowel sounds active all four quadrants,  no masses, no organomegaly           Extremities: Extremities normal, atraumatic, no cyanosis 1+ edema   Pulses: 2+ and symmetric   Skin: Skin color, texture, turgor normal, no rashes or lesions   Pysch: Normal mood and affect   Neurologic: Normal gross motor and sensory exam.         Labs    Lab Results   Component Value Date    CHOL 114 05/04/2020    CHOL 148 10/15/2019    CHOL 144 09/18/2019     Lab Results   Component Value Date    TRIG 170 (H) 05/04/2020    TRIG 129 10/15/2019    TRIG 125 09/18/2019     Lab Results   Component Value Date    HDL 38 (L) 05/04/2020    HDL 48 10/15/2019    HDL 47 09/18/2019     Lab Results   Component Value Date    LDLCALC 42 05/04/2020    [address] 10/15/2019    LDLCALC 72 09/18/2019     Lab Results   Component Value Date    LABVLDL 34 05/04/2020    LABVLDL 26 10/15/2019    LABVLDL 25 09/18/2019       Assessment:    79 y.o. patient with:    1. BERTRAND/CP/hx CAD:  At [address] 10/9/19 c/o random chest heaviness and given abnl EKG he underwent LHC 10/15/19 w/ PCTA to mid LAD with 3.0 x 18 DANTE 3.25 x 8.    ost recent [facility] 6/1/20 which was normal.            GIven worsening BERTRAND and exertional CP despite normal nuc imaging study and hx CAD based on these findings I recommend left and right heart cath for definitive evaluation of coronary arteries and right heart/pulm artery pressures. Risks, benefits, expectations, and alternative treatments were discussed. Questions appropriately answered. Makenzie Redman agrees to proceed and verbalized understanding. 2.  Hypertension:  Well controlled and will continue current medical regimen. ~BP: 120/60     3. Hyperlipidemia: Most recent labs 5/4/20 see results above I personally reviewed. Well controlled and at goal and will continue current medical regimen. 4. Interstitial lung disease: Dr. Abdon Erwin dx him with early ILD and lungs should not cause significant symptoms.   PFTs 5/2/16 FVC 3.24 (82%) FEV1 2.68 (94%) FEV1/FVC ratio 83% TLC 5.51 (83%) DLCO 17.92 (66%)    5. COLLIN   ~wears bipap    Plan:  1. See #1 above. Recommed LHC and RHC to evaluate heart for blockages and heart pressure    ~You will need a    ~If you require a stent placed, you will stay overnight.    ~No medications need held the morning of the procedure. Cost of prescription medications and patient compliance have been reviewed with patient. All questions answered. All questions and concerns were addressed to the patient/family. Alternatives to my treatment were discussed. The note was completed using EMR. Every effort was made to ensure accuracy; however, inadvertent computerized transcription errors may be presen    This note was scribed in the presence of Dr.Meyers YODER by Jana Jernigan RN.     I, Dr. Ania White, personally performed the services described in this documentation, as scribed by the above signed scribe in my presence. It is both accurate and complete to my knowledge.  I agree with the details independently gathered by the clinical support staff, while the remaining scribed note accurately describes my personal service to the patient    Ania White MD  2/19/2021  12:24 PM

## 2021-02-19 ENCOUNTER — TELEPHONE (OUTPATIENT)
Dept: CARDIOLOGY CLINIC | Age: 81
End: 2021-02-19

## 2021-02-19 ENCOUNTER — OFFICE VISIT (OUTPATIENT)
Dept: CARDIOLOGY CLINIC | Age: 81
End: 2021-02-19
Payer: MEDICARE

## 2021-02-19 VITALS
SYSTOLIC BLOOD PRESSURE: 120 MMHG | BODY MASS INDEX: 37.89 KG/M2 | HEIGHT: 68 IN | OXYGEN SATURATION: 98 % | HEART RATE: 88 BPM | WEIGHT: 250 LBS | DIASTOLIC BLOOD PRESSURE: 60 MMHG

## 2021-02-19 DIAGNOSIS — E78.2 MIXED HYPERLIPIDEMIA: ICD-10-CM

## 2021-02-19 DIAGNOSIS — R06.02 SHORTNESS OF BREATH: ICD-10-CM

## 2021-02-19 DIAGNOSIS — Z87.891 HISTORY OF TOBACCO ABUSE: ICD-10-CM

## 2021-02-19 DIAGNOSIS — Z13.9 ENCOUNTER FOR SCREENING, UNSPECIFIED: Primary | ICD-10-CM

## 2021-02-19 DIAGNOSIS — I25.10 CAD IN NATIVE ARTERY: Primary | ICD-10-CM

## 2021-02-19 DIAGNOSIS — I10 ESSENTIAL HYPERTENSION: ICD-10-CM

## 2021-02-19 DIAGNOSIS — J84.9 ILD (INTERSTITIAL LUNG DISEASE) (HCC): ICD-10-CM

## 2021-02-19 PROCEDURE — 99214 OFFICE O/P EST MOD 30 MIN: CPT | Performed by: INTERNAL MEDICINE

## 2021-02-19 NOTE — TELEPHONE ENCOUNTER
Spoke with patient's wife. Patient is scheduled with Dr. Génesis Ding for Right and Left Heart Cath on 3/4/21 at 12pm [facility], arrival time of 10:30am to the Cath Lab. Please have patient arrive to the main entrance of [facility] and check in with the registration desk. Remind patient to be NPO after midnight (8 hours prior). Do not apply lotions/creams on skin the day of procedure. COVID testing - please put order in chart for COVID test and patient can get at [facility] between 2/26 - 3/1. Please notify them when order is in.

## 2021-02-19 NOTE — PATIENT INSTRUCTIONS
Plan:  1. Recommed LHC and RHC to evaluate heart for blockages and heart pressure    ~You will need a    ~If you require a stent placed, you will stay overnight.    ~No medications need held the morning of the procedure.

## 2021-02-19 NOTE — LETTER
has a past surgical history that includes Circumcision; fracture surgery; fracture surgery; Toe Surgery; hernia repair; Colonoscopy (2010); Colonoscopy (9/14/2015); Cystocopy (5/13/16); Upper gastrointestinal endoscopy (09/12/2016); Cystoscopy (05/19/2017); TURP (07/14/2017); Cystoscopy (07/14/2017); Endoscopy, colon, diagnostic (10/11/2017); Upper gastrointestinal endoscopy (10/11/2017); Coronary angioplasty (10/15/2019); Prostate surgery; and Upper gastrointestinal endoscopy (01/28/2020). Social History:   reports that he quit smoking about 47 years ago. His smoking use included cigarettes. He has a 25.50 pack-year smoking history. He has never used smokeless tobacco. He reports that he does not drink alcohol or use drugs. Family History:  No evidence for sudden cardiac death or premature CAD with parents    Medications:  Reviewed and are listed in nursing record. and/or listed below  Outpatient Medications: Allergies:  Percocet [oxycodone-acetaminophen], Ace inhibitors, Oxybutynin, and Simvastatin     Review of Systems:   All 12 point review of symptoms completed. Pertinent positives identified in the HPI, all other review of symptoms negative as below.     Physical Examination:    Vitals:    02/19/21 1216   BP: 120/60   Pulse: 88   SpO2: 98%    Weight: 250 lb (113.4 kg)     Wt Readings from Last 3 Encounters:   02/19/21 250 lb (113.4 kg)   02/04/21 248 lb (112.5 kg)   12/10/20 248 lb (112.5 kg)     No intake or output data in the 24 hours ending 02/19/21 1224    General Appearance:  Alert, cooperative, no distress, appears stated age   Head:  Normocephalic, without obvious abnormality, atraumatic   Eyes:  PERRL, conjunctiva/corneas clear       Nose: Nares normal, no drainage or sinus tenderness   Throat: Lips, mucosa, and tongue normal   Neck: Supple, symmetrical, trachea midline, no adenopathy, thyroid: not enlarged, symmetric, no tenderness/mass/nodules, no carotid bruit or JVD Lungs:   Clear, soft crackles at bases ; respirations unlabored   Chest Wall:  No tenderness or deformity   Heart:  Regular rhythm and normal rate; S1, S2 are normal Soft ANGELA; no rub or gallop   Abdomen:   Soft, non-tender, bowel sounds active all four quadrants,  no masses, no organomegaly           Extremities: Extremities normal, atraumatic, no cyanosis 1+ edema   Pulses: 2+ and symmetric   Skin: Skin color, texture, turgor normal, no rashes or lesions   Pysch: Normal mood and affect   Neurologic: Normal gross motor and sensory exam.         Labs    Lab Results   Component Value Date    CHOL 114 05/04/2020    CHOL 148 10/15/2019    CHOL 144 09/18/2019     Lab Results   Component Value Date    TRIG 170 (H) 05/04/2020    TRIG 129 10/15/2019    TRIG 125 09/18/2019     Lab Results   Component Value Date    HDL 38 (L) 05/04/2020    HDL 48 10/15/2019    HDL 47 09/18/2019     Lab Results   Component Value Date    LDLCALC 42 05/04/2020    LDLCALC 74 10/15/2019    LDLCALC 72 09/18/2019     Lab Results   Component Value Date    LABVLDL 34 05/04/2020    LABVLDL 26 10/15/2019    LABVLDL 25 09/18/2019       Assessment:    79 y.o. patient with:    1. BERTRAND/CP/hx CAD:  At [address] 10/9/19 c/o random chest heaviness and given abnl EKG he underwent LHC 10/15/19 w/ PCTA to mid LAD with 3.0 x 18 DANTE 3.25 x 8.    ost recent Lexiscan 6/1/20 which was normal.            GIven worsening BERTRAND and exertional CP despite normal nuc imaging study and hx CAD based on these findings I recommend left and right heart cath for definitive evaluation of coronary arteries and right heart/pulm artery pressures. Risks, benefits, expectations, and alternative treatments were discussed. Questions appropriately answered. Karyn Mcfarlane agrees to proceed and verbalized understanding. 2.  Hypertension:  Well controlled and will continue current medical regimen.    ~BP: 120/60 3. Hyperlipidemia: Most recent labs 5/4/20 see results above I personally reviewed. Well controlled and at goal and will continue current medical regimen. 4. Interstitial lung disease: Dr. Mark Miranda dx him with early ILD and lungs should not cause significant symptoms. PFTs 5/2/16 FVC 3.24 (82%) FEV1 2.68 (94%) FEV1/FVC ratio 83% TLC 5.51 (83%) DLCO 17.92 (66%)    5. COLLIN   ~wears bipap    Plan:  1. See #1 above. Recommed LHC and RHC to evaluate heart for blockages and heart pressure    ~You will need a    ~If you require a stent placed, you will stay overnight.    ~No medications need held the morning of the procedure. Cost of prescription medications and patient compliance have been reviewed with patient. All questions answered. All questions and concerns were addressed to the patient/family. Alternatives to my treatment were discussed. The note was completed using EMR. Every effort was made to ensure accuracy; however, inadvertent computerized transcription errors may be presen    This note was scribed in the presence of Dr.Meyers YODER by Gulshan Gonzales RN.     I, Dr. Jayshree Simpson, personally performed the services described in this documentation, as scribed by the above signed scribe in my presence. It is both accurate and complete to my knowledge.  I agree with the details independently gathered by the clinical support staff, while the remaining scribed note accurately describes my personal service to the patient    Jayshree Simpson MD  2/19/2021  12:24 PM

## 2021-02-22 DIAGNOSIS — R05.3 CHRONIC COUGH: ICD-10-CM

## 2021-02-22 RX ORDER — MONTELUKAST SODIUM 10 MG/1
10 TABLET ORAL DAILY
Qty: 30 TABLET | Refills: 11 | Status: SHIPPED | OUTPATIENT
Start: 2021-02-22 | End: 2022-03-17

## 2021-02-26 ENCOUNTER — HOSPITAL ENCOUNTER (OUTPATIENT)
Age: 81
Discharge: HOME | End: 2021-02-26
Payer: MEDICARE

## 2021-02-26 PROCEDURE — U0003 INFECTIOUS AGENT DETECTION BY NUCLEIC ACID (DNA OR RNA); SEVERE ACUTE RESPIRATORY SYNDROME CORONAVIRUS 2 (SARS-COV-2) (CORONAVIRUS DISEASE [COVID-19]), AMPLIFIED PROBE TECHNIQUE, MAKING USE OF HIGH THROUGHPUT TECHNOLOGIES AS DESCRIBED BY CMS-2020-01-R: HCPCS

## 2021-02-27 LAB — SARS-COV-2, PCR: NOT DETECTED

## 2021-03-04 ENCOUNTER — HOSPITAL ENCOUNTER (OUTPATIENT)
Dept: CARDIAC CATH/INVASIVE PROCEDURES | Age: 81
Discharge: HOME | End: 2021-03-04
Attending: INTERNAL MEDICINE | Admitting: INTERNAL MEDICINE
Payer: MEDICARE

## 2021-03-04 VITALS
OXYGEN SATURATION: 96 % | TEMPERATURE: 98.2 F | HEIGHT: 68 IN | WEIGHT: 256 LBS | SYSTOLIC BLOOD PRESSURE: 149 MMHG | RESPIRATION RATE: 18 BRPM | HEART RATE: 62 BPM | BODY MASS INDEX: 38.8 KG/M2 | DIASTOLIC BLOOD PRESSURE: 71 MMHG

## 2021-03-04 LAB
A/G RATIO: 1.4 (ref 1.1–2.2)
ALBUMIN SERPL-MCNC: 4.6 G/DL (ref 3.4–5)
ALP BLD-CCNC: 100 U/L (ref 40–129)
ALT SERPL-CCNC: 31 U/L (ref 10–40)
ANION GAP SERPL CALCULATED.3IONS-SCNC: 10 MMOL/L (ref 3–16)
AST SERPL-CCNC: 25 U/L (ref 15–37)
BILIRUB SERPL-MCNC: 0.6 MG/DL (ref 0–1)
BUN BLDV-MCNC: 15 MG/DL (ref 7–20)
CALCIUM SERPL-MCNC: 9.6 MG/DL (ref 8.3–10.6)
CHLORIDE BLD-SCNC: 100 MMOL/L (ref 99–110)
CHOLESTEROL, TOTAL: 113 MG/DL (ref 0–199)
CO2: 29 MMOL/L (ref 21–32)
CREAT SERPL-MCNC: 1 MG/DL (ref 0.8–1.3)
EKG ATRIAL RATE: 55 BPM
EKG DIAGNOSIS: NORMAL
EKG P AXIS: 42 DEGREES
EKG P-R INTERVAL: 214 MS
EKG Q-T INTERVAL: 412 MS
EKG QRS DURATION: 104 MS
EKG QTC CALCULATION (BAZETT): 394 MS
EKG R AXIS: -41 DEGREES
EKG T AXIS: 112 DEGREES
EKG VENTRICULAR RATE: 55 BPM
GFR AFRICAN AMERICAN: >60
GFR NON-AFRICAN AMERICAN: >60
GLOBULIN: 3.2 G/DL
GLUCOSE BLD-MCNC: 120 MG/DL (ref 70–99)
HCT VFR BLD CALC: 41.9 % (ref 40.5–52.5)
HDLC SERPL-MCNC: 39 MG/DL (ref 40–60)
HEMOGLOBIN: 14.3 G/DL (ref 13.5–17.5)
INR BLD: 1.18 (ref 0.86–1.14)
LDL CHOLESTEROL CALCULATED: 54 MG/DL
MCH RBC QN AUTO: 30.7 PG (ref 26–34)
MCHC RBC AUTO-ENTMCNC: 34.2 G/DL (ref 31–36)
MCV RBC AUTO: 90 FL (ref 80–100)
PDW BLD-RTO: 13.8 % (ref 12.4–15.4)
PLATELET # BLD: 227 K/UL (ref 135–450)
PMV BLD AUTO: 6.6 FL (ref 5–10.5)
POC ACT LR: 188 SEC
POTASSIUM SERPL-SCNC: 3.5 MMOL/L (ref 3.5–5.1)
PROTHROMBIN TIME: 13.7 SEC (ref 10–13.2)
RBC # BLD: 4.65 M/UL (ref 4.2–5.9)
SODIUM BLD-SCNC: 139 MMOL/L (ref 136–145)
TOTAL PROTEIN: 7.8 G/DL (ref 6.4–8.2)
TRIGL SERPL-MCNC: 98 MG/DL (ref 0–150)
VLDLC SERPL CALC-MCNC: 20 MG/DL
WBC # BLD: 8.8 K/UL (ref 4–11)

## 2021-03-04 PROCEDURE — 85347 COAGULATION TIME ACTIVATED: CPT

## 2021-03-04 PROCEDURE — 93571 IV DOP VEL&/PRESS C FLO 1ST: CPT | Performed by: INTERNAL MEDICINE

## 2021-03-04 PROCEDURE — 99153 MOD SED SAME PHYS/QHP EA: CPT

## 2021-03-04 PROCEDURE — 85027 COMPLETE CBC AUTOMATED: CPT

## 2021-03-04 PROCEDURE — 80053 COMPREHEN METABOLIC PANEL: CPT

## 2021-03-04 PROCEDURE — C1894 INTRO/SHEATH, NON-LASER: HCPCS

## 2021-03-04 PROCEDURE — 6370000000 HC RX 637 (ALT 250 FOR IP)

## 2021-03-04 PROCEDURE — C1887 CATHETER, GUIDING: HCPCS

## 2021-03-04 PROCEDURE — 6360000004 HC RX CONTRAST MEDICATION

## 2021-03-04 PROCEDURE — 93005 ELECTROCARDIOGRAM TRACING: CPT | Performed by: INTERNAL MEDICINE

## 2021-03-04 PROCEDURE — 2720000010 HC SURG SUPPLY STERILE

## 2021-03-04 PROCEDURE — 93456 R HRT CORONARY ARTERY ANGIO: CPT

## 2021-03-04 PROCEDURE — C1769 GUIDE WIRE: HCPCS

## 2021-03-04 PROCEDURE — 93571 IV DOP VEL&/PRESS C FLO 1ST: CPT

## 2021-03-04 PROCEDURE — 93456 R HRT CORONARY ARTERY ANGIO: CPT | Performed by: INTERNAL MEDICINE

## 2021-03-04 PROCEDURE — 85610 PROTHROMBIN TIME: CPT

## 2021-03-04 PROCEDURE — 80061 LIPID PANEL: CPT

## 2021-03-04 PROCEDURE — 99152 MOD SED SAME PHYS/QHP 5/>YRS: CPT

## 2021-03-04 PROCEDURE — 93010 ELECTROCARDIOGRAM REPORT: CPT | Performed by: INTERNAL MEDICINE

## 2021-03-04 PROCEDURE — 2709999900 HC NON-CHARGEABLE SUPPLY

## 2021-03-04 PROCEDURE — 6360000002 HC RX W HCPCS

## 2021-03-04 RX ORDER — SODIUM CHLORIDE 0.9 % (FLUSH) 0.9 %
10 SYRINGE (ML) INJECTION EVERY 12 HOURS SCHEDULED
Status: DISCONTINUED | OUTPATIENT
Start: 2021-03-04 | End: 2021-03-05 | Stop reason: HOSPADM

## 2021-03-04 RX ORDER — SODIUM CHLORIDE 0.9 % (FLUSH) 0.9 %
10 SYRINGE (ML) INJECTION PRN
Status: DISCONTINUED | OUTPATIENT
Start: 2021-03-04 | End: 2021-03-05 | Stop reason: HOSPADM

## 2021-03-04 RX ORDER — ASPIRIN 81 MG/1
81 TABLET, CHEWABLE ORAL DAILY
Status: DISCONTINUED | OUTPATIENT
Start: 2021-03-04 | End: 2021-03-05 | Stop reason: HOSPADM

## 2021-03-04 RX ORDER — CLOPIDOGREL BISULFATE 75 MG/1
75 TABLET ORAL DAILY
Qty: 90 TABLET | Refills: 1 | Status: SHIPPED | OUTPATIENT
Start: 2021-03-04 | End: 2021-11-16

## 2021-03-04 RX ORDER — HEPARIN SODIUM 5000 [USP'U]/ML
INJECTION, SOLUTION INTRAVENOUS; SUBCUTANEOUS
Status: COMPLETED | OUTPATIENT
Start: 2021-03-04 | End: 2021-03-04

## 2021-03-04 RX ORDER — MIDAZOLAM HYDROCHLORIDE 5 MG/ML
INJECTION INTRAMUSCULAR; INTRAVENOUS
Status: COMPLETED | OUTPATIENT
Start: 2021-03-04 | End: 2021-03-04

## 2021-03-04 RX ORDER — CLOPIDOGREL BISULFATE 75 MG/1
75 TABLET ORAL DAILY
Status: DISCONTINUED | OUTPATIENT
Start: 2021-03-04 | End: 2021-03-05 | Stop reason: HOSPADM

## 2021-03-04 RX ORDER — SODIUM CHLORIDE 9 MG/ML
1000 INJECTION, SOLUTION INTRAVENOUS CONTINUOUS
Status: DISCONTINUED | OUTPATIENT
Start: 2021-03-04 | End: 2021-03-05 | Stop reason: HOSPADM

## 2021-03-04 RX ORDER — FENTANYL CITRATE 50 UG/ML
INJECTION, SOLUTION INTRAMUSCULAR; INTRAVENOUS
Status: COMPLETED | OUTPATIENT
Start: 2021-03-04 | End: 2021-03-04

## 2021-03-04 RX ADMIN — MIDAZOLAM HYDROCHLORIDE 2 MG: 5 INJECTION INTRAMUSCULAR; INTRAVENOUS at 12:15

## 2021-03-04 RX ADMIN — ASPIRIN 81 MG: 81 TABLET, CHEWABLE ORAL at 11:03

## 2021-03-04 RX ADMIN — FENTANYL CITRATE 25 MCG: 50 INJECTION, SOLUTION INTRAMUSCULAR; INTRAVENOUS at 12:32

## 2021-03-04 RX ADMIN — CLOPIDOGREL BISULFATE 75 MG: 75 TABLET ORAL at 11:03

## 2021-03-04 RX ADMIN — MIDAZOLAM HYDROCHLORIDE 1 MG: 5 INJECTION INTRAMUSCULAR; INTRAVENOUS at 12:31

## 2021-03-04 RX ADMIN — FENTANYL CITRATE 50 MCG: 50 INJECTION, SOLUTION INTRAMUSCULAR; INTRAVENOUS at 12:27

## 2021-03-04 RX ADMIN — HEPARIN SODIUM 10000 UNITS: 5000 INJECTION, SOLUTION INTRAVENOUS; SUBCUTANEOUS at 13:14

## 2021-03-04 ASSESSMENT — PAIN SCALES - GENERAL
PAINLEVEL_OUTOF10: 0
PAINLEVEL_OUTOF10: 0

## 2021-03-04 NOTE — PROCEDURES
CARDIAC CATHETERIZATION REPORT    Date of Procedure: 3/4/2021  : Joseph Villarreal DO  Primary Indication: Chest pain, exertional dyspnea, CAD with previous PCI to mid-LAD, diastolic dysfunction    Procedures Performed:  1. Coronary angiography  2. Right heart catheterization  3. FFR of proximal LAD  4. Ultrasound-guided right femoral artery access  5. Ultrasound-guided right femoral vein access  6. Right femoral angiography  7. Moderate conscious sedation    Procedural Details:  1. Access:  A 5F sheath was placed in the right brachial vein via wire exchange of an existing IV. However, a 5F [place] catheter could not be successfully advanced to the SVC from a brachial approach, so access was transitioned to a femoral approach. Local anesthetic was given and access was obtained in the right femoral artery using a micropuncture technique and ultrasound guidance and a 6F sheath was placed without difficulty. Access was also obtained in the right femoral vein using a micropuncture technique and ultrasound guidance and a 5F sheath was placed without difficulty. 2. Diagnostic: A 5F Maryl Lluvia catheter was used to perform the right heart catheterization. 5F JR4 and 5F JL4 catheters were used to perform selective right and left coronary angiography, respectively. 3. FFR of proximal LAD: Therapeutic ACT was achieved with the administration of IV heparin. Using a 6F XB3.5 guide catheter, a PressureWire X was equalized and then advanced across the proximal LAD lesion and into the mid-distal vessel. Baseline Pd/Pa was 0.91 and after maximal hyperemia with IV adenosine at 140 mcg/kg/min x2 minutes, the FFR was 0.81. No significant drift was observed on pull-back of the wire into the guide catheter. 4. Hemostasis: At the end of the procedure, both the femoral arterial and femoral venous sheaths were removed and manual pressure applied to maintain hemostasis. Findings:  1. Hemodynamics:  A.  Right heart posterolateral branches have mild disease. 3. Right femoral angiography  A. The right common femoral artery is patent and has a high bifurcation into the right superficial femoral artery and right profunda femoris artery over the middle-third of the femoral head. The sheath enters the right common femoral artery above the bifurcation over the middle-third of the femoral head. FFR of proximal LAD:  Baseline Pd/Pa: 0.91  Hyperemic FFR: 0.81    Technical Factors:  Complications: None. Estimated blood loss: Minimal.  Radiation: Air kerma 2,433 mGy and 10.5 minutes of fluoroscopy  Sedation: Moderate conscious sedation was administered by qaulified nursing personnel under continuous hemodynamic monitoring, starting at 12:20 PM and ending at 1:24 PM.  Medications: 3 mg IV Versed, 75 mcg IV Fentanyl, 200 mcg IC nitroglycerin, 10.000 units IV heparin  Contrast: 115 cc of Optiray    Impression:  1. Non-obstructive CAD with patent mid-LAD stent. 2. There is a moderate proximal LAD stenosis that was not physiologically significant by FFR at 0.81.  3. Normal left-sided cardiac filliing pressure. 4. Mildly elevated right-sided cardiac filling pressure. 5. Normal pulmonary artery pressure. 6. Preserved Cynthia cardiac output and index. Plan:  1. Continue dual antiplatelet therapy with aspirin 81 mg daily and Plavix 75 mg daily. 2. Continue atorvastatin 40 mg daily, amlodipine 10 mg daily, and triamterene-HCTZ 37.5-25 mg daily. 3. Needs close follow-up with pulmonology for further monitoring and treatment of interstitial lung disease. 4. Follow-up with [facility] in 2 weeks.       Sushil Hutchinson, [address]

## 2021-03-04 NOTE — H&P
 Hyperlipidemia     Hypertension     ILD (interstitial lung disease) ([facility] [address] 07/30/2013    Internal hemorrhoid     colonoscopy 8/98/35    Lichen sclerosus of penis 2017    Dr Meme Vickers Mild cognitive impairment 08/2016    [facility] neurology    COLLIN (obstructive sleep apnea)     Renal insufficiency     Restless legs syndrome     Rosacea     Schatzki's ring     last dilated 2002    Scrotal pruritus 02/08/2018    Sleep apnea          Surgical History:  Past Surgical History:   Procedure Laterality Date    CARDIAC CATHETERIZATION  03/04/2021    Non Obs CAD, medical management    CIRCUMCISION      COLONOSCOPY  2010    COLONOSCOPY  09/14/2015    CORONARY ANGIOPLASTY  10/15/2019    CYSTOSCOPY  05/13/2016    CYSTOSCOPY  05/19/2017    CYSTOSCOPY     CYSTOSCOPY  07/14/2017    ENDOSCOPY, COLON, DIAGNOSTIC  10/11/2017    esoph. stricture    FRACTURE SURGERY      right leg    FRACTURE SURGERY      wrist   right    HERNIA REPAIR      PROSTATE SURGERY      TOE SURGERY      TURP  07/14/2017    UPPER GASTROINTESTINAL ENDOSCOPY  09/12/2016    UPPER GASTROINTESTINAL ENDOSCOPY  10/11/2017    esophageal stricture    UPPER GASTROINTESTINAL ENDOSCOPY  01/28/2020         Medications:  Current Facility-Administered Medications   Medication Dose Route Frequency Provider Last Rate Last Admin    0.9 % sodium chloride infusion  1,000 mL Intravenous Continuous Flako Haddox, DO        aspirin chewable tablet 81 mg  81 mg Oral Daily Flako Haddox, DO   81 mg at 03/04/21 1103    clopidogrel (PLAVIX) tablet 75 mg  75 mg Oral Daily Flako Haddox, DO   75 mg at 03/04/21 1103    sodium chloride flush 0.9 % injection 10 mL  10 mL Intravenous 2 times per day Flako Haddox, DO        sodium chloride flush 0.9 % injection 10 mL  10 mL Intravenous PRN Flako Haddox, DO               Pre-Sedation:    Pre-Sedation Documentation and Exam:

## 2021-03-05 LAB
POC ACT LR: 244 SEC
POC ACT LR: 296 SEC

## 2021-03-05 NOTE — PROGRESS NOTES
Pt discharged to wife in stable condition. There is no bleeding, swelling or hematoma noted to right brachial or femoral sites. Pt and wife state they have discharge instructions provided to them by PACU RN. Pt and wife deny any further questions or concerns at this time.

## 2021-03-06 DIAGNOSIS — I10 ESSENTIAL HYPERTENSION: ICD-10-CM

## 2021-03-06 DIAGNOSIS — E03.9 HYPOTHYROIDISM, UNSPECIFIED TYPE: ICD-10-CM

## 2021-03-08 PROBLEM — I25.119 CORONARY ARTERY DISEASE INVOLVING NATIVE CORONARY ARTERY OF NATIVE HEART WITH ANGINA PECTORIS (HCC): Status: ACTIVE | Noted: 2018-09-04

## 2021-03-08 RX ORDER — AMLODIPINE BESYLATE 10 MG/1
TABLET ORAL
Qty: 90 TABLET | Refills: 2 | Status: SHIPPED | OUTPATIENT
Start: 2021-03-08 | End: 2021-11-30

## 2021-03-08 RX ORDER — LEVOTHYROXINE SODIUM 0.07 MG/1
TABLET ORAL
Qty: 90 TABLET | Refills: 0 | Status: SHIPPED | OUTPATIENT
Start: 2021-03-08 | End: 2021-06-01

## 2021-03-08 RX ORDER — OMEPRAZOLE 40 MG/1
CAPSULE, DELAYED RELEASE ORAL
Qty: 30 CAPSULE | Refills: 3 | Status: SHIPPED | OUTPATIENT
Start: 2021-03-08 | End: 2021-04-23 | Stop reason: SDUPTHER

## 2021-03-08 RX ORDER — DOXAZOSIN 8 MG/1
TABLET ORAL
Qty: 180 TABLET | Refills: 0 | Status: SHIPPED | OUTPATIENT
Start: 2021-03-08 | End: 2021-06-01

## 2021-03-10 DIAGNOSIS — G25.81 RESTLESS LEGS SYNDROME: ICD-10-CM

## 2021-03-10 DIAGNOSIS — I10 ESSENTIAL HYPERTENSION: ICD-10-CM

## 2021-03-10 RX ORDER — TRIAMTERENE AND HYDROCHLOROTHIAZIDE 37.5; 25 MG/1; MG/1
TABLET ORAL
Qty: 90 TABLET | Refills: 2 | Status: SHIPPED | OUTPATIENT
Start: 2021-03-10 | End: 2021-12-08

## 2021-03-10 RX ORDER — PRAMIPEXOLE DIHYDROCHLORIDE 1 MG/1
TABLET ORAL
Qty: 180 TABLET | Refills: 0 | Status: SHIPPED | OUTPATIENT
Start: 2021-03-10 | End: 2021-07-06

## 2021-03-10 NOTE — TELEPHONE ENCOUNTER
Last office visit was 12/3/20  Future Appointments   Date Time Provider Madelin Federica   3/16/2021  2:15 PM MD OLGA LIDIA [facility]   3/29/2021 11:15 AM MD DALI [facility]   4/23/2021  1:00 PM ADORE Wright - CNP [place]

## 2021-03-23 ENCOUNTER — TELEPHONE (OUTPATIENT)
Dept: PULMONOLOGY | Age: 81
End: 2021-03-23

## 2021-03-23 NOTE — TELEPHONE ENCOUNTER
Patient cancelled appointment on 3/29/21  with Dr. Marquis James for 4 month ILD;COLLIN  f/u. Reason: pt prefers in office    Patient did reschedule appointment. Appointment rescheduled for 7/30/21. Last OV 11/23/20      ASSESSMENT AND PLAN:  interstitial lung disease     Based on the information available thus far, crackles on physical, and previous CT scan results, I favor a diagnosis of early ILD. Etiology of ILD is unknown; however age and prior smoking hx make Idiopathic Pulmonary Fibrosis or familial pulmonary fibrosis most likely. Findings on CT are not definitive.  ILD serologies were wnl.      -per my last review the CT changes had not progressed much over the last several years.  PFTs were also relatively stable  - offered pulmonary rehab, patient has not been interested yet     Cough, chronic  Most likely secondary to post-nasal drip. - no benefit from flonase/astelin   - tessalon perles prn  -albuterol prn given obstruction on pfts  -Trial of prednisone 10 mg daily for 6 weeks did not help  - omeprazole     COLLIN on CPAP   Patient has been on PAP for long time.    - Patient complained of pressure being too high and leakage  - CPAP 14 CWP  - orders to DME for new supplies as needed  - No driving if sleepy, groggy or fatigue        Orders  - f/u 4 months  - albuterol refill MONIKA Martines

## 2021-03-24 ENCOUNTER — TELEPHONE (OUTPATIENT)
Dept: FAMILY MEDICINE CLINIC | Age: 81
End: 2021-03-24

## 2021-03-24 NOTE — TELEPHONE ENCOUNTER
Pt called needing to get an updated handicap placard Rx. Pt will be at a  today, so he will call tomorrow to see if it's ready.

## 2021-03-24 NOTE — LETTER
[address] [facility]  [address] [place] [address] [address] [place] [address]  Phone: [phone]  Fax: [phone]    ADORE Gupta CNP        March 24, 2021     Patient: Hal Merida   YOB: 1940   Date of Visit: 3/24/2021       To Whom It May Concern: It is my medical opinion that Madelin Castillo requires a disability parking placard for the following reasons:  He cannot walk 200 feet without stopping to rest.  He has limited walking ability due to a neurologic condition. Duration of need: 5 years    If you have any questions or concerns, please don't hesitate to call.     Sincerely,        ADORE Gupta CNP

## 2021-04-19 RX ORDER — ATORVASTATIN CALCIUM 40 MG/1
TABLET, FILM COATED ORAL
Qty: 90 TABLET | Refills: 2 | Status: SHIPPED | OUTPATIENT
Start: 2021-04-19 | End: 2022-01-13 | Stop reason: SDUPTHER

## 2021-04-23 ENCOUNTER — OFFICE VISIT (OUTPATIENT)
Dept: FAMILY MEDICINE CLINIC | Age: 81
End: 2021-04-23
Payer: MEDICARE

## 2021-04-23 VITALS
TEMPERATURE: 97.3 F | OXYGEN SATURATION: 96 % | HEIGHT: 68 IN | HEART RATE: 64 BPM | DIASTOLIC BLOOD PRESSURE: 64 MMHG | SYSTOLIC BLOOD PRESSURE: 138 MMHG | BODY MASS INDEX: 38.95 KG/M2 | WEIGHT: 257 LBS

## 2021-04-23 DIAGNOSIS — E03.9 HYPOTHYROIDISM, UNSPECIFIED TYPE: ICD-10-CM

## 2021-04-23 DIAGNOSIS — R05.3 CHRONIC COUGH: ICD-10-CM

## 2021-04-23 DIAGNOSIS — F41.9 ANXIETY: ICD-10-CM

## 2021-04-23 DIAGNOSIS — K21.9 GASTROESOPHAGEAL REFLUX DISEASE WITHOUT ESOPHAGITIS: ICD-10-CM

## 2021-04-23 DIAGNOSIS — G25.81 RESTLESS LEGS SYNDROME: ICD-10-CM

## 2021-04-23 DIAGNOSIS — N48.0 LICHEN SCLEROSUS OF PENIS: ICD-10-CM

## 2021-04-23 DIAGNOSIS — E78.5 HYPERLIPIDEMIA, UNSPECIFIED HYPERLIPIDEMIA TYPE: ICD-10-CM

## 2021-04-23 DIAGNOSIS — I10 ESSENTIAL HYPERTENSION: ICD-10-CM

## 2021-04-23 DIAGNOSIS — Z11.59 ENCOUNTER FOR HEPATITIS C SCREENING TEST FOR LOW RISK PATIENT: ICD-10-CM

## 2021-04-23 DIAGNOSIS — F33.41 RECURRENT MAJOR DEPRESSIVE DISORDER, IN PARTIAL REMISSION (HCC): Primary | ICD-10-CM

## 2021-04-23 DIAGNOSIS — E55.9 VITAMIN D DEFICIENCY: ICD-10-CM

## 2021-04-23 DIAGNOSIS — D50.9 IRON DEFICIENCY ANEMIA, UNSPECIFIED IRON DEFICIENCY ANEMIA TYPE: ICD-10-CM

## 2021-04-23 DIAGNOSIS — R73.9 HYPERGLYCEMIA: ICD-10-CM

## 2021-04-23 PROCEDURE — 36415 COLL VENOUS BLD VENIPUNCTURE: CPT | Performed by: NURSE PRACTITIONER

## 2021-04-23 PROCEDURE — 99214 OFFICE O/P EST MOD 30 MIN: CPT | Performed by: NURSE PRACTITIONER

## 2021-04-23 RX ORDER — FERROUS SULFATE 325(65) MG
325 TABLET ORAL
Qty: 90 TABLET | Refills: 1 | Status: SHIPPED | OUTPATIENT
Start: 2021-04-23 | End: 2021-11-19

## 2021-04-23 RX ORDER — TRIAMCINOLONE ACETONIDE 1 MG/G
CREAM TOPICAL
Qty: 80 G | Refills: 5 | Status: SHIPPED | OUTPATIENT
Start: 2021-04-23 | End: 2022-09-01

## 2021-04-23 RX ORDER — CETIRIZINE HYDROCHLORIDE 10 MG/1
10 TABLET ORAL NIGHTLY
Qty: 90 TABLET | Refills: 1 | Status: SHIPPED | OUTPATIENT
Start: 2021-04-23 | End: 2021-10-27

## 2021-04-23 RX ORDER — OMEPRAZOLE 40 MG/1
CAPSULE, DELAYED RELEASE ORAL
Qty: 90 CAPSULE | Refills: 1 | Status: SHIPPED | OUTPATIENT
Start: 2021-04-23 | End: 2022-01-06

## 2021-04-23 RX ORDER — ALBUTEROL SULFATE 90 UG/1
2 AEROSOL, METERED RESPIRATORY (INHALATION) EVERY 6 HOURS PRN
Qty: 1 INHALER | Refills: 5 | Status: SHIPPED | OUTPATIENT
Start: 2021-04-23

## 2021-04-23 RX ORDER — ZINC GLUCONATE 50 MG
50 TABLET ORAL DAILY
COMMUNITY

## 2021-04-23 ASSESSMENT — ENCOUNTER SYMPTOMS
ANAL BLEEDING: 0
NAUSEA: 0
GASTROINTESTINAL NEGATIVE: 1
COUGH: 1
ABDOMINAL PAIN: 0
EYES NEGATIVE: 1
ALLERGIC/IMMUNOLOGIC NEGATIVE: 1
SHORTNESS OF BREATH: 0
BLOOD IN STOOL: 0

## 2021-04-23 ASSESSMENT — PATIENT HEALTH QUESTIONNAIRE - PHQ9
SUM OF ALL RESPONSES TO PHQ QUESTIONS 1-9: 6
4. FEELING TIRED OR HAVING LITTLE ENERGY: 3
2. FEELING DOWN, DEPRESSED OR HOPELESS: 0
10. IF YOU CHECKED OFF ANY PROBLEMS, HOW DIFFICULT HAVE THESE PROBLEMS MADE IT FOR YOU TO DO YOUR WORK, TAKE CARE OF THINGS AT HOME, OR GET ALONG WITH OTHER PEOPLE: 1
SUM OF ALL RESPONSES TO PHQ9 QUESTIONS 1 & 2: 2
6. FEELING BAD ABOUT YOURSELF - OR THAT YOU ARE A FAILURE OR HAVE LET YOURSELF OR YOUR FAMILY DOWN: 0
9. THOUGHTS THAT YOU WOULD BE BETTER OFF DEAD, OR OF HURTING YOURSELF: 0

## 2021-04-23 NOTE — PROGRESS NOTES
Subjective:     Patient Name: Gabriella Triana is a 79 y.o. male. Chief Complaint   Patient presents with    Depression    Hypertension    Hyperlipidemia    Anxiety    Other     vit d     Gastroesophageal Reflux       GERD  Paitent is here for follow up of heartburn. Symptoms have been present for awhile and is a chronic condition. Currently treated with proton pump inhibitor: Prilosec 40 mg, which has been effective. He has been using this therapy daily. Patient denies  cough, hoarseness, chest pain, unintentional weight loss, N/V, bloating, early satiety, abdominal pain or melena, hematochezia, hematemesis, and coffee ground emesis. Continues with GI for management and EGD    Hypothyroidism: Recent symptoms: none and dysphagia. He denies fatigue, weight gain, weight loss, cold intolerance, heat intolerance, hair loss, dry skin, constipation, diarrhea, edema, anxiety, tremor, palpitations. Patient is  taking his medication consistently on an empty stomach. Lab Results       Component                Value               Date                       TSHREFLEX                4.03                01/10/2017                 TSHREFLEX                4.05                03/23/2016                 TSHREFLEX                4.14                12/15/2015            Lab Results       Component                Value               Date                       TSH                      3.02                09/18/2019                 TSH                      2.26                02/08/2019                 TSH                      2.17                08/15/2018              Restless Leg Syndrome  Patient has been complaining of cramps and spasms in the lower extremities, especially at rest and at night. He reports waking up several times a night due to pain and cramping in the lower extremities that is relieves with walking. He denies any complaint during activities.   These symptoms have been going on for years and is not impaired memory. Symptoms/signs of vipul: none. External stressors: nothing new. Current treatment includes: Effexor-75 mg twice daily. Medication side effects: none. PHQ-9  4/23/2021 11/11/2020 7/2/2020 11/21/2019 9/18/2019 6/13/2019 2/8/2019   Little interest or pleasure in doing things 2 0 1 0 1 1 1   Feeling down, depressed, or hopeless 0 0 0 0 0 0 1   Trouble falling or staying asleep, or sleeping too much 0 - 0 - 2 3 1   Feeling tired or having little energy 3 - 3 - 1 2 1   Poor appetite or overeating 1 - 3 - 1 1 0   Feeling bad about yourself - or that you are a failure or have let yourself or your family down 0 - 0 - 0 0 0   Trouble concentrating on things, such as reading the newspaper or watching television 0 - 0 - 0 0 0   Moving or speaking so slowly that other people could have noticed. Or the opposite - being so fidgety or restless that you have been moving around a lot more than usual 0 - 0 - 0 0 0   Thoughts that you would be better off dead, or of hurting yourself in some way 0 - 0 - 0 0 0   PHQ-2 Score 2 0 1 0 1 1 2   PHQ-9 Total Score 6 0 7 0 5 7 4   If you checked off any problems, how difficult have these problems made it for you to do your work, take care of things at home, or get along with other people? 1 - 1 - 1 1 1     Interpretation of Total Score Total Score Depression Severity: 1-4 = Minimal depression, 5-9 = Mild depression, 10-14 = Moderate depression, 15-19 = Moderately severe depression, 20-27 = Severe depression        Review of Systems   Constitutional: Negative. Negative for appetite change, fatigue and unexpected weight change. HENT: Negative. Eyes: Negative. Respiratory: Positive for cough. Negative for shortness of breath. Cardiovascular: Negative. Negative for chest pain, palpitations and leg swelling. Gastrointestinal: Negative. Negative for abdominal pain, anal bleeding, blood in stool and nausea. Endocrine: Negative. Genitourinary: Negative. Negative for hematuria. Musculoskeletal: Negative. Skin: Negative. Negative for rash. Allergic/Immunologic: Negative. Neurological: Negative. Negative for dizziness, syncope, light-headedness and numbness. Hematological: Negative. Does not bruise/bleed easily. Psychiatric/Behavioral: Negative. All other systems reviewed and are negative.        Past Medical History:   Diagnosis Date    Arthritis     Back injuries     BPH (benign prostatic hyperplasia)     Chest pain 03/2021    CPAP (continuous positive airway pressure) dependence     Diverticulosis     colonoscopy 9/2015    Esophageal dysmotility     GERD (gastroesophageal reflux disease)     Hiatal hernia     History of colon polyps     seen on colonoscopy again 9/2015    Hyperlipidemia     Hypertension     ILD (interstitial lung disease) ([facility] [address] 07/30/2013    Internal hemorrhoid     colonoscopy 7/89/46    Lichen sclerosus of penis 2017    Dr Adi Hagen Mild cognitive impairment 08/2016    [facility] neurology    COLLIN (obstructive sleep apnea)     Renal insufficiency     Restless legs syndrome     Rosacea     Schatzki's ring     last dilated 2002    Scrotal pruritus 02/08/2018    Sleep apnea      Family History   Problem Relation Age of Onset    Stroke Father     Cancer Father         prostate    Other Father         colitis    Diabetes Sister     Diabetes Brother     Cancer Brother         skin    High Blood Pressure Brother     High Cholesterol Brother     COPD Mother     Asthma Neg Hx     Emphysema Neg Hx     Heart Failure Neg Hx     Hypertension Neg Hx      Past Surgical History:   Procedure Laterality Date    CARDIAC CATHETERIZATION  03/04/2021    Non Obs CAD, medical management    CIRCUMCISION      COLONOSCOPY  2010    COLONOSCOPY  09/14/2015    CORONARY ANGIOPLASTY  10/15/2019    CYSTOSCOPY  05/13/2016    CYSTOSCOPY  05/19/2017    CYSTOSCOPY     CYSTOSCOPY  07/14/2017    ENDOSCOPY, COLON, Refill    zinc gluconate 50 MG tablet Take 50 mg by mouth daily      atorvastatin (LIPITOR) 40 MG tablet TAKE ONE TABLET BY MOUTH DAILY 90 tablet 2    triamterene-hydroCHLOROthiazide (MAXZIDE-25) 37.5-25 MG per tablet TAKE ONE TABLET BY MOUTH DAILY 90 tablet 2    pramipexole (MIRAPEX) 1 MG tablet TAKE ONE TABLET BY MOUTH TWICE A  tablet 0    doxazosin (CARDURA) 8 MG tablet TAKE ONE TABLET BY MOUTH TWICE A  tablet 0    amLODIPine (NORVASC) 10 MG tablet TAKE ONE TABLET BY MOUTH DAILY 90 tablet 2    omeprazole (PRILOSEC) 40 MG delayed release capsule TAKE ONE CAPSULE BY MOUTH DAILY 30 capsule 3    levothyroxine (SYNTHROID) 75 MCG tablet TAKE ONE TABLET BY MOUTH DAILY 90 tablet 0    clopidogrel (PLAVIX) 75 MG tablet Take 1 tablet by mouth daily 90 tablet 1    montelukast (SINGULAIR) 10 MG tablet Take 1 tablet by mouth daily 30 tablet 11    venlafaxine (EFFEXOR) 75 MG tablet Take 1 tablet by mouth twice daily 180 tablet 0    albuterol sulfate HFA (VENTOLIN HFA) 108 (90 Base) MCG/ACT inhaler Inhale 2 puffs into the lungs every 6 hours as needed for Wheezing or Shortness of Breath 1 Inhaler 5    ferrous sulfate (IRON 325) 325 (65 Fe) MG tablet Take 1 tablet by mouth daily (with breakfast) 30 tablet 5    cetirizine (ZYRTEC ALLERGY) 10 MG tablet Take 1 tablet by mouth nightly 30 tablet 5    potassium chloride (KLOR-CON) 10 MEQ extended release tablet Take 1 tablet by mouth 2 times daily TAKE ONE TABLET BY MOUTH DAILY 180 tablet 3    nitroGLYCERIN (NITROSTAT) 0.4 MG SL tablet Place 1 tablet under the tongue every 5 minutes as needed for Chest pain up to max of 3 total doses.  If no relief after 1 dose, call 911. 25 tablet 3    triamcinolone (KENALOG) 0.1 % cream Apply topically every 7 days 80 g 5    methocarbamol (ROBAXIN) 500 MG tablet Take 1 tablet by mouth 3 times daily as needed (spasm) 90 tablet 5    aspirin EC 81 MG EC tablet Take 1 tablet by mouth daily 30 tablet 3    magnesium Cardiovascular:      Rate and Rhythm: Normal rate and regular rhythm. Pulses: Normal pulses. Radial pulses are 2+ on the right side and 2+ on the left side. Dorsalis pedis pulses are 2+ on the right side and 2+ on the left side. Posterior tibial pulses are 2+ on the right side and 2+ on the left side. Heart sounds: Normal heart sounds. No murmur. No friction rub. No gallop. Pulmonary:      Effort: Pulmonary effort is normal.      Breath sounds: Normal breath sounds. Abdominal:      General: Bowel sounds are normal.      Palpations: Abdomen is soft. There is no mass. Tenderness: There is no abdominal tenderness. Musculoskeletal: Normal range of motion. Lymphadenopathy:      Head:      Right side of head: No submandibular adenopathy. Left side of head: No submandibular adenopathy. Cervical: No cervical adenopathy. Skin:     General: Skin is warm and dry. Findings: No lesion or rash. Neurological:      Mental Status: He is alert and oriented to person, place, and time. Gait: Gait normal.   Psychiatric:         Speech: Speech normal.         Behavior: Behavior normal.         Thought Content:  Thought content normal.         Judgment: Judgment normal.         Lab Review   Admission on 03/04/2021, Discharged on 03/04/2021   Component Date Value    WBC 03/04/2021 8.8     RBC 03/04/2021 4.65     Hemoglobin 03/04/2021 14.3     Hematocrit 03/04/2021 41.9     MCV 03/04/2021 90.0     MCH 03/04/2021 30.7     MCHC 03/04/2021 34.2     RDW 03/04/2021 13.8     Platelets 35/46/8243 227     MPV 03/04/2021 6.6     Sodium 03/04/2021 139     Potassium 03/04/2021 3.5     Chloride 03/04/2021 100     CO2 03/04/2021 29     Anion Gap 03/04/2021 10     Glucose 03/04/2021 120*    BUN 03/04/2021 15     CREATININE 03/04/2021 1.0     GFR Non- 03/04/2021 >60     GFR  03/04/2021 >60     Calcium 03/04/2021 9.6     Total Protein 03/04/2021 7.8     Albumin 03/04/2021 4.6     Albumin/Globulin Ratio 03/04/2021 1.4     Total Bilirubin 03/04/2021 0.6     Alkaline Phosphatase 03/04/2021 100     ALT 03/04/2021 31     AST 03/04/2021 25     Globulin 03/04/2021 3.2     Ventricular Rate 03/04/2021 55     Atrial Rate 03/04/2021 55     P-R Interval 03/04/2021 214     QRS Duration 03/04/2021 104     Q-T Interval 03/04/2021 412     QTc Calculation (Bazett) 03/04/2021 394     P Axis 03/04/2021 42     R Axis 03/04/2021 -41     T Axis 03/04/2021 112     Diagnosis 03/04/2021 Sinus bradycardia with 1st degree A-V blockLeft axis deviationLeft ventricular hypertrophy with repolarization abnormalityU-waves present, possible Hypokalemia. Abnormal ECGWhen compared with ECG of 16-OCT-2019 07:29,No significant change was foundConfirmed by Trip Wells ([id number]) on 3/4/2021 4:28:52 PM     Cholesterol, Total 03/04/2021 113     Triglycerides 03/04/2021 98     HDL 03/04/2021 39*    LDL Calculated 03/04/2021 54     VLDL Cholesterol Calcula* 03/04/2021 20     Protime 03/04/2021 13.7*    INR 03/04/2021 1.18*    POC ACT LR 03/04/2021 188     POC ACT LR 03/04/2021 296     POC ACT LR 03/04/2021 [address] [facility] Outpatient Visit on 02/26/2021   Component Date Value    SARS-CoV-2, PCR 02/26/2021 Not Detected        No results found for this visit on 04/23/21. Assessment:       1. Recurrent major depressive disorder, in partial remission ([address]    2. Restless legs syndrome    3. Hypothyroidism, unspecified type    4. Essential hypertension    5. Hyperlipidemia, unspecified hyperlipidemia type    6. Iron deficiency anemia, unspecified iron deficiency anemia type    7. Anxiety    8. Gastroesophageal reflux disease without esophagitis    9. Vitamin D deficiency    10. Hyperglycemia    11. Lichen sclerosus of penis    12. Chronic cough    13.  Encounter for hepatitis C screening test for low risk patient        No results found for this visit on 04/23/21. Plan:       Avel Everett was seen today for depression, hypertension, hyperlipidemia, anxiety, other and gastroesophageal reflux. Diagnoses and all orders for this visit:    Recurrent major depressive disorder, in partial remission ([facility] [address]  Condition appears stable with current medication regimen. Continue current medications. No reported or noted side effects of medication. Will continue to monitor at routine intervals as appropriate. Restless legs syndrome  Condition appears stable with current medication regimen. Continue current medications. No reported or noted side effects of medication. Will continue to monitor at routine intervals as appropriate. Hypothyroidism, unspecified type  -     TSH with Reflex  Condition appears stable with current medication regimen. Continue current medications. No reported or noted side effects of medication. Will continue to monitor at routine intervals as appropriate. Essential hypertension  Hypertension, Blood pressure is  well controlled on current medication regimen. Medication: no change. Dietary sodium restriction. Regular aerobic exercise. Check blood pressures monthly and record. Hyperlipidemia, unspecified hyperlipidemia type  The patient is asked to make an attempt to improve diet and exercise patterns to aid in medical management of this problem. Iron deficiency anemia, unspecified iron deficiency anemia type  Condition appears stable with current medication regimen. No reported or noted side effects of medication. Will continue to monitor at routine intervals as appropriate. Continue current medications as follows:  -     ferrous sulfate (IRON 325) 325 (65 Fe) MG tablet; Take 1 tablet by mouth daily (with breakfast)    Anxiety  Condition appears stable with current medication regimen. Continue current medications. No reported or noted side effects of medication. Will continue to monitor at routine intervals as appropriate. Gastroesophageal reflux disease without esophagitis  Condition appears stable with current medication regimen. No reported or noted side effects of medication. Will continue to monitor at routine intervals as appropriate. Continue current medications as follows:  -     omeprazole (PRILOSEC) 40 MG delayed release capsule; TAKE ONE CAPSULE BY MOUTH DAILY  Continue following with GI    Vitamin D deficiency  -     Vitamin D 25 Hydroxy  Discussed with patient that we make vitamin D from the sun and get it from some food sources, but it is very common to be deficient. Discussed the need for vitamin D replacement because low vitamin D can cause fatigue, joint aches and has been implicated in heart disease, bone disease like osteoporosis, and some other chronic illnesses. Patient will start/continue vitamin D supplement as per order. Recommend vitamin D to be rechecked in 6 months. Hyperglycemia  -     Hemoglobin J8K    Lichen sclerosus of penis  -     triamcinolone (KENALOG) 0.1 % cream; Apply topically every 7 days    Chronic cough  -     albuterol sulfate HFA (VENTOLIN HFA) 108 (90 Base) MCG/ACT inhaler; Inhale 2 puffs into the lungs every 6 hours as needed for Wheezing or Shortness of Breath  -     cetirizine (ZYRTEC ALLERGY) 10 MG tablet; Take 1 tablet by mouth nightly    Encounter for hepatitis C screening test for low risk patient  -     HEPATITIS C ANTIBODY      Patient has been instructed call the office immediately with new symptoms, change in symptoms or worseningof symptoms. If this is not feasible, patient is instructed to report to the emergency room. Medication profile reviewed. Medication side effects and possible impairments from medications were discussed as applicable. Allergies were reviewed. Health maintenance was reviewed and updated as appropriate. Return in about 6 months (around 10/23/2021) for [facility].

## 2021-04-24 LAB
ESTIMATED AVERAGE GLUCOSE: 128.4 MG/DL
HBA1C MFR BLD: 6.1 %
HEPATITIS C ANTIBODY INTERPRETATION: NORMAL
T4 FREE: 1.2 NG/DL (ref 0.9–1.8)
TSH REFLEX: 4.97 UIU/ML (ref 0.27–4.2)
VITAMIN D 25-HYDROXY: 33.5 NG/ML

## 2021-05-05 RX ORDER — VENLAFAXINE 75 MG/1
TABLET ORAL
Qty: 180 TABLET | Refills: 0 | Status: SHIPPED | OUTPATIENT
Start: 2021-05-05 | End: 2021-08-03

## 2021-05-13 ENCOUNTER — TELEPHONE (OUTPATIENT)
Dept: FAMILY MEDICINE CLINIC | Age: 81
End: 2021-05-13

## 2021-06-14 RX ORDER — POTASSIUM CHLORIDE 750 MG/1
TABLET, FILM COATED, EXTENDED RELEASE ORAL
Qty: 180 TABLET | Refills: 5 | Status: SHIPPED | OUTPATIENT
Start: 2021-06-14 | End: 2022-01-13 | Stop reason: SDUPTHER

## 2021-07-06 DIAGNOSIS — G25.81 RESTLESS LEGS SYNDROME: ICD-10-CM

## 2021-07-06 RX ORDER — PRAMIPEXOLE DIHYDROCHLORIDE 1 MG/1
TABLET ORAL
Qty: 180 TABLET | Refills: 0 | Status: SHIPPED | OUTPATIENT
Start: 2021-07-06 | End: 2021-07-08

## 2021-07-06 NOTE — TELEPHONE ENCOUNTER
Last OV 4/23/21  Future Appointments   Date Time Provider Madelin Stallworth   7/30/2021 11:30 AM MD DALI Monahan

## 2021-07-07 DIAGNOSIS — G25.81 RESTLESS LEGS SYNDROME: ICD-10-CM

## 2021-07-08 RX ORDER — PRAMIPEXOLE DIHYDROCHLORIDE 1 MG/1
TABLET ORAL
Qty: 180 TABLET | Refills: 0 | Status: SHIPPED | OUTPATIENT
Start: 2021-07-08 | End: 2021-11-04 | Stop reason: SDUPTHER

## 2021-07-30 ENCOUNTER — OFFICE VISIT (OUTPATIENT)
Dept: PULMONOLOGY | Age: 81
End: 2021-07-30
Payer: MEDICARE

## 2021-07-30 VITALS
BODY MASS INDEX: 39.56 KG/M2 | WEIGHT: 261 LBS | HEART RATE: 68 BPM | OXYGEN SATURATION: 97 % | SYSTOLIC BLOOD PRESSURE: 140 MMHG | RESPIRATION RATE: 18 BRPM | TEMPERATURE: 97.5 F | DIASTOLIC BLOOD PRESSURE: 82 MMHG | HEIGHT: 68 IN

## 2021-07-30 DIAGNOSIS — J84.9 ILD (INTERSTITIAL LUNG DISEASE) (HCC): Primary | ICD-10-CM

## 2021-07-30 DIAGNOSIS — R05.3 CHRONIC COUGH: ICD-10-CM

## 2021-07-30 DIAGNOSIS — Z99.89 OSA ON CPAP: ICD-10-CM

## 2021-07-30 DIAGNOSIS — G47.33 OSA ON CPAP: ICD-10-CM

## 2021-07-30 PROCEDURE — 99214 OFFICE O/P EST MOD 30 MIN: CPT | Performed by: INTERNAL MEDICINE

## 2021-07-30 ASSESSMENT — SLEEP AND FATIGUE QUESTIONNAIRES
HOW LIKELY ARE YOU TO NOD OFF OR FALL ASLEEP WHILE WATCHING TV: 2
HOW LIKELY ARE YOU TO NOD OFF OR FALL ASLEEP WHILE SITTING AND TALKING TO SOMEONE: 0
HOW LIKELY ARE YOU TO NOD OFF OR FALL ASLEEP WHILE SITTING AND READING: 2
HOW LIKELY ARE YOU TO NOD OFF OR FALL ASLEEP WHILE SITTING QUIETLY AFTER LUNCH WITHOUT ALCOHOL: 0
HOW LIKELY ARE YOU TO NOD OFF OR FALL ASLEEP IN A CAR, WHILE STOPPED FOR A FEW MINUTES IN TRAFFIC: 0
HOW LIKELY ARE YOU TO NOD OFF OR FALL ASLEEP WHILE LYING DOWN TO REST IN THE AFTERNOON WHEN CIRCUMSTANCES PERMIT: 0
HOW LIKELY ARE YOU TO NOD OFF OR FALL ASLEEP WHEN YOU ARE A PASSENGER IN A CAR FOR AN HOUR WITHOUT A BREAK: 0
ESS TOTAL SCORE: 4
HOW LIKELY ARE YOU TO NOD OFF OR FALL ASLEEP WHILE SITTING INACTIVE IN A PUBLIC PLACE: 0
NECK CIRCUMFERENCE (INCHES): 16.5

## 2021-07-30 NOTE — PROGRESS NOTES
CHIEF COMPLAINT: Dyspnea and cough    Consulting provider: Ml Patterson NP    HPI:   Presenting hx: The patient is a 78 yo man with hx of former tobacco abuse, GERD, COLLIN on CPAP who presents for evaluation of chronic dyspnea and cough. Patient complains of shortness of breath. Symptoms include drainage from nose, dyspnea on exertion, post nasal drip and productive cough. Symptoms began a few years ago, gradually worsening since that time. The dyspnea occurs with strenuous activity. Patient denies hemoptysis . Aggravating factors include exertion, exercise and climbing stairs. The patient reports an exercise tolerance of approximately several blocks on the flat and 2 flights of stairs, limited primarily by dyspnea. He is a former smoker for 15 years x 1 ppd, quit 1974. Denies any birds in home; hx of CTD. He had two cousins die from pulmonary fibrosis. Since last clinic visit, patient has been doing ok. He has gained some weight. He is using his CPAP nightly. He has not had covid vaccine and is not going to get it.      Past Medical History:   Diagnosis Date    Arthritis     Back injuries     BPH (benign prostatic hyperplasia)     Chest pain 03/2021    CPAP (continuous positive airway pressure) dependence     Diverticulosis     colonoscopy 9/2015    Esophageal dysmotility     GERD (gastroesophageal reflux disease)     Hiatal hernia     History of colon polyps     seen on colonoscopy again 9/2015    Hyperlipidemia     Hypertension     ILD (interstitial lung disease) ([facility] [address] 07/30/2013    Internal hemorrhoid     colonoscopy 4/33/93    Lichen sclerosus of penis 2017    Dr Dolores Beckham Mild cognitive impairment 08/2016    [facility] neurolog[facility]    COLLIN (obstructive sleep apnea)     Renal insufficiency     Restless legs syndrome     Rosacea     Schatzki's ring     last dilated 2002    Scrotal pruritus 02/08/2018    Sleep apnea        Past Surgical History:        Procedure Laterality Date    CARDIAC CATHETERIZATION  03/04/2021    Non Obs CAD, medical management    CIRCUMCISION      COLONOSCOPY  2010    COLONOSCOPY  09/14/2015    CORONARY ANGIOPLASTY  10/15/2019    CYSTOSCOPY  05/13/2016    CYSTOSCOPY  05/19/2017    CYSTOSCOPY     CYSTOSCOPY  07/14/2017    ENDOSCOPY, COLON, DIAGNOSTIC  10/11/2017    esoph. stricture    FRACTURE SURGERY      right leg    FRACTURE SURGERY      wrist   right    HERNIA REPAIR      PROSTATE SURGERY      TOE SURGERY      TURP  07/14/2017    UPPER GASTROINTESTINAL ENDOSCOPY  09/12/2016    UPPER GASTROINTESTINAL ENDOSCOPY  10/11/2017    esophageal stricture    UPPER GASTROINTESTINAL ENDOSCOPY  01/28/2020       Allergies:  is allergic to percocet [oxycodone-acetaminophen], ace inhibitors, oxybutynin, and simvastatin. Social History:    TOBACCO:   reports that he quit smoking about 47 years ago. His smoking use included cigarettes. He has a 25.50 pack-year smoking history. He has never used smokeless tobacco.  ETOH:   reports no history of alcohol use.   Patient currently lives independently  Environmental/chemical exposure: none       Family History:       Problem Relation Age of Onset    Stroke Father     Cancer Father         prostate    Other Father         colitis    Diabetes Sister     Diabetes Brother     Cancer Brother         skin    High Blood Pressure Brother     High Cholesterol Brother     COPD Mother     Asthma Neg Hx     Emphysema Neg Hx     Heart Failure Neg Hx     Hypertension Neg Hx        Current Medications:    Current Outpatient Medications:     pramipexole (MIRAPEX) 1 MG tablet, TAKE ONE TABLET BY MOUTH TWICE A DAY, Disp: 180 tablet, Rfl: 0    potassium chloride (KLOR-CON) 10 MEQ extended release tablet, TAKE ONE TABLET BY MOUTH TWICE A DAY, Disp: 180 tablet, Rfl: 5    doxazosin (CARDURA) 8 MG tablet, TAKE ONE TABLET BY MOUTH TWICE A DAY, Disp: 180 tablet, Rfl: 1    levothyroxine (SYNTHROID) 75 MCG tablet, TAKE ONE TABLET BY MOUTH DAILY, Disp: 90 tablet, Rfl: 1    venlafaxine (EFFEXOR) 75 MG tablet, Take 1 tablet by mouth twice daily, Disp: 180 tablet, Rfl: 0    zinc gluconate 50 MG tablet, Take 50 mg by mouth daily, Disp: , Rfl:     triamcinolone (KENALOG) 0.1 % cream, Apply topically every 7 days, Disp: 80 g, Rfl: 5    albuterol sulfate HFA (VENTOLIN HFA) 108 (90 Base) MCG/ACT inhaler, Inhale 2 puffs into the lungs every 6 hours as needed for Wheezing or Shortness of Breath, Disp: 1 Inhaler, Rfl: 5    omeprazole (PRILOSEC) 40 MG delayed release capsule, TAKE ONE CAPSULE BY MOUTH DAILY, Disp: 90 capsule, Rfl: 1    ferrous sulfate (IRON 325) 325 (65 Fe) MG tablet, Take 1 tablet by mouth daily (with breakfast), Disp: 90 tablet, Rfl: 1    cetirizine (ZYRTEC ALLERGY) 10 MG tablet, Take 1 tablet by mouth nightly, Disp: 90 tablet, Rfl: 1    atorvastatin (LIPITOR) 40 MG tablet, TAKE ONE TABLET BY MOUTH DAILY, Disp: 90 tablet, Rfl: 2    triamterene-hydroCHLOROthiazide (MAXZIDE-25) 37.5-25 MG per tablet, TAKE ONE TABLET BY MOUTH DAILY, Disp: 90 tablet, Rfl: 2    amLODIPine (NORVASC) 10 MG tablet, TAKE ONE TABLET BY MOUTH DAILY, Disp: 90 tablet, Rfl: 2    clopidogrel (PLAVIX) 75 MG tablet, Take 1 tablet by mouth daily, Disp: 90 tablet, Rfl: 1    montelukast (SINGULAIR) 10 MG tablet, Take 1 tablet by mouth daily, Disp: 30 tablet, Rfl: 11    aspirin EC 81 MG EC tablet, Take 1 tablet by mouth daily, Disp: 30 tablet, Rfl: 3    magnesium gluconate (MAGONATE) 500 MG tablet, Take 500 mg by mouth daily , Disp: , Rfl:     Vitamin D (CHOLECALCIFEROL) 1000 UNITS CAPS capsule, Take 1,000 Units by mouth daily. , Disp: , Rfl:     Calcium Carbonate-Vitamin D (CALCIUM + D) 600-200 MG-UNIT TABS, Take 600 mg by mouth daily. , Disp: , Rfl:     nitroGLYCERIN (NITROSTAT) 0.4 MG SL tablet, Place 1 tablet under the tongue every 5 minutes as needed for Chest pain up to max of 3 total doses. If no relief after 1 dose, call 911.  (Patient not taking: Reported on 7/30/2021), Disp: 25 tablet, Rfl: 3    methocarbamol (ROBAXIN) 500 MG tablet, Take 1 tablet by mouth 3 times daily as needed (spasm) (Patient not taking: Reported on 7/30/2021), Disp: 90 tablet, Rfl: 5      Objective:   PHYSICAL EXAM:        VITALS:    BP (!) 140/82   Pulse 68   Temp 97.5 °F (36.4 °C)   Resp 18   Ht 5' 8\" (1.727 m)   Wt 261 lb (118.4 kg)   SpO2 97% Comment: with RA  BMI 39.68 kg/m²     Constitutional: In no acute distress. Appears stated age. Obese. Eyes: PERRL. No sclera icterus. No conjunctival injection. ENT: No ocular or auricular discharge or visible masses. Oropharynx clear. Neck: Trachea midline. Resp: No accessory muscle use. bilateral basilar crackles. No wheezes. No rhonchi. No dullness on percussion. CV: Regular rate. Regular rhythm. No murmur or rub. Normal S1 and S2. No edema  GI: Abdomen non-tender. Non-distended. No masses. Skin: Warm and dry. No nodules on exposed extremities. No rash on exposed extremities. M/S: No cyanosis. No synovitis or joint deformity. No clubbing. Neuro: Cranial nerves are grossly intact. Moving all extremities. Motor and sensation grossly intact. Psych: Oriented x 3. No anxiety or agitation.        DATA:      LABS:    PFTs 2/12/13:  FVC 3.41 (84%) FEV1 2.75 (93%) FEV1/FVC ratio 80%  TLC 5.59 (84%) DLCO 18.51 (67%)   PFTs (7-1-2013):  FVC 3.36 (83%) FEV1 2.67 (90%) FEV1/FVC ratio 80% TLC 5.46 (82%) DLCO 18.75 (68%)  6 Minute Walk Test (7-1-2013)- 1400 feet, SpO2 low 94%  PFTs 12/2/13   FVC 3.39 (84%) FEV1 2.75 (93%) FEV1/FVC ratio  81%  TLC 5.78 (87%) DLCO 21.27 (77%)  PFTs 6/11/14  FVC 3.40 (85%) FEV1 2.67 (91%) FEV1/FVC ratio 79%   TLC 5.66 (85%) DLCO 21.31 (78%)  PFTs 6/18/15  FVC 3.33 (84%) FEV1 2.58 (89%) FEV1/FVC ratio 78%   TLC 5.82 (88%) DLCO 20.05 (74%)   6MWT 1120 feet, low sat 97%  PFTs 12/28/15  FVC 3.11 (79%) FEV1 2.46 (86%) FEV1/FVC ratio  79%  TLC 5.12 (78%) DLCO 15.69 (%)   6mwt 1120 feet, low sat 94%  PFTs 5/2/16  FVC 3.24 (82%) FEV1 2.68 (94%) FEV1/FVC ratio  83% TLC 5.51 (83%) DLCO 17.92 (66%)   PFTs 10/31/16  FVC 3.26 (83%) FEV1 2.67 (94%) FEV1/FVC ratio 82%  TLC 5.64 (86%) DLCO 17.64 (65%)   PFTs 5/8/17  FVC 3.76 (96%) FEV1 2.20 (78%) FEV1/FVC ratio  58%  TLC 5.67 (86%) DLCO 19.69 (73%)       PFTs 12/27/19  FVC 3.22 (85%) FEV1 2.58 (96%) FEV1/FVC ratio  80%  TLC 4.81 (74%) DLCO 19.20 (73%)  6mwt 1120 feet, low sat 95%       CLAY, RF, SSA, SSB all wnl  7/13    IMAGING:        Chest HRCT 12/27/19: mild bilateral patchy subpleural reticular changes without significant traction bronchiectasis. Few areas on the left side of focal honeycomb changes. CXR 12/6/19: Bilateral basilar reticular opacities increased compared with 7 years previous, without focal airspace consolidation    HR Chest CT 7/24/13: mild bilateral basilar predominant subpleural reticulation without associated traction bronchiectasis or honeycomb change    Chest CT  (dated 6/11/12): There are bilateral subpleural reticular and ground glass opacities. No evident pneumothorax is seen, particularly on the left. No dominant mass or focal area of consolidation. There is a moderate amount of calcification within the thoracic aorta and coronary arteries. No pericardial effusion. Small sliding hiatal hernia is noted. ASSESSMENT AND PLAN:  interstitial lung disease     Based on the information available thus far, crackles on physical, and previous CT scan results, I favor a diagnosis of early ILD. Etiology of ILD is unknown; however age and prior smoking hx make Idiopathic Pulmonary Fibrosis or familial pulmonary fibrosis most likely. Findings on CT are not definitive.  ILD serologies were wnl.      -per my last review the CT changes had not progressed much over the last several years.  PFTs were also relatively stable  - offered pulmonary rehab, patient has not been interested      Cough, chronic  Most likely secondary to post-nasal drip.    - no benefit from

## 2021-08-03 RX ORDER — VENLAFAXINE 75 MG/1
TABLET ORAL
Qty: 180 TABLET | Refills: 0 | Status: SHIPPED | OUTPATIENT
Start: 2021-08-03 | End: 2021-11-04 | Stop reason: SDUPTHER

## 2021-10-25 DIAGNOSIS — R05.3 CHRONIC COUGH: ICD-10-CM

## 2021-10-27 RX ORDER — CETIRIZINE HYDROCHLORIDE 10 MG/1
TABLET ORAL
Qty: 90 TABLET | Refills: 1 | Status: SHIPPED | OUTPATIENT
Start: 2021-10-27

## 2021-11-04 ENCOUNTER — OFFICE VISIT (OUTPATIENT)
Dept: FAMILY MEDICINE CLINIC | Age: 81
End: 2021-11-04
Payer: MEDICARE

## 2021-11-04 VITALS
OXYGEN SATURATION: 98 % | WEIGHT: 238 LBS | DIASTOLIC BLOOD PRESSURE: 82 MMHG | HEART RATE: 72 BPM | BODY MASS INDEX: 36.07 KG/M2 | SYSTOLIC BLOOD PRESSURE: 138 MMHG | HEIGHT: 68 IN

## 2021-11-04 DIAGNOSIS — Z23 NEED FOR PROPHYLACTIC VACCINATION AND INOCULATION AGAINST VARICELLA: ICD-10-CM

## 2021-11-04 DIAGNOSIS — K21.9 GASTROESOPHAGEAL REFLUX DISEASE WITHOUT ESOPHAGITIS: ICD-10-CM

## 2021-11-04 DIAGNOSIS — F33.41 RECURRENT MAJOR DEPRESSIVE DISORDER, IN PARTIAL REMISSION (HCC): Primary | ICD-10-CM

## 2021-11-04 DIAGNOSIS — F41.9 ANXIETY: ICD-10-CM

## 2021-11-04 DIAGNOSIS — Z23 FLU VACCINE NEED: ICD-10-CM

## 2021-11-04 DIAGNOSIS — I10 ESSENTIAL HYPERTENSION: ICD-10-CM

## 2021-11-04 DIAGNOSIS — E55.9 VITAMIN D DEFICIENCY: ICD-10-CM

## 2021-11-04 DIAGNOSIS — E03.9 HYPOTHYROIDISM, UNSPECIFIED TYPE: ICD-10-CM

## 2021-11-04 DIAGNOSIS — G25.81 RESTLESS LEGS SYNDROME: ICD-10-CM

## 2021-11-04 DIAGNOSIS — E78.2 MIXED HYPERLIPIDEMIA: ICD-10-CM

## 2021-11-04 PROCEDURE — 90674 CCIIV4 VAC NO PRSV 0.5 ML IM: CPT | Performed by: NURSE PRACTITIONER

## 2021-11-04 PROCEDURE — G0008 ADMIN INFLUENZA VIRUS VAC: HCPCS | Performed by: NURSE PRACTITIONER

## 2021-11-04 PROCEDURE — 99214 OFFICE O/P EST MOD 30 MIN: CPT | Performed by: NURSE PRACTITIONER

## 2021-11-04 RX ORDER — PRAMIPEXOLE DIHYDROCHLORIDE 1 MG/1
TABLET ORAL
Qty: 180 TABLET | Refills: 0 | Status: CANCELLED | OUTPATIENT
Start: 2021-11-04

## 2021-11-04 RX ORDER — VENLAFAXINE 75 MG/1
TABLET ORAL
Qty: 180 TABLET | Refills: 1 | Status: SHIPPED | OUTPATIENT
Start: 2021-11-04 | End: 2022-05-09

## 2021-11-04 RX ORDER — LEVOTHYROXINE SODIUM 0.07 MG/1
TABLET ORAL
Qty: 90 TABLET | Refills: 1 | Status: SHIPPED | OUTPATIENT
Start: 2021-11-04 | End: 2021-11-05 | Stop reason: SDUPTHER

## 2021-11-04 RX ORDER — PRAMIPEXOLE DIHYDROCHLORIDE 1 MG/1
TABLET ORAL
Qty: 180 TABLET | Refills: 3 | Status: SHIPPED | OUTPATIENT
Start: 2021-11-04

## 2021-11-04 SDOH — ECONOMIC STABILITY: FOOD INSECURITY: WITHIN THE PAST 12 MONTHS, YOU WORRIED THAT YOUR FOOD WOULD RUN OUT BEFORE YOU GOT MONEY TO BUY MORE.: NEVER TRUE

## 2021-11-04 SDOH — ECONOMIC STABILITY: FOOD INSECURITY: WITHIN THE PAST 12 MONTHS, THE FOOD YOU BOUGHT JUST DIDN'T LAST AND YOU DIDN'T HAVE MONEY TO GET MORE.: NEVER TRUE

## 2021-11-04 ASSESSMENT — SOCIAL DETERMINANTS OF HEALTH (SDOH): HOW HARD IS IT FOR YOU TO PAY FOR THE VERY BASICS LIKE FOOD, HOUSING, MEDICAL CARE, AND HEATING?: NOT HARD AT ALL

## 2021-11-04 NOTE — PROGRESS NOTES
Subjective:     Patient Name: Alessandra Garcia is a 79 y.o. male. Chief Complaint   Patient presents with    Hypertension     Patient has not been checking BP at home Denies any chest pain, edema or SOB    Hyperlipidemia    Anxiety       HPI  Hypertension:  Home blood pressure monitoring: No.  He is adherent to a low sodium diet. Patient denies chest pain, headache, lightheadedness, blurred vision, peripheral edema, palpitations, dry cough and fatigue. Antihypertensive medication side effects: no medication side effects noted. Sodium (mmol/L)   Date Value   03/04/2021 139    BUN (mg/dL)   Date Value   03/04/2021 15    Glucose (mg/dL)   Date Value   03/04/2021 120 (H)      Potassium (mmol/L)   Date Value   03/04/2021 3.5     Potassium reflex Magnesium (mmol/L)   Date Value   10/15/2019 3.6    CREATININE (mg/dL)   Date Value   03/04/2021 1.0         BP Readings from Last 3 Encounters:   07/30/21 (!) 140/82   04/23/21 138/64   03/04/21 (!) 149/71     Hypothyroidism: Recent symptoms: none. He denies fatigue, weight gain, weight loss, cold intolerance, heat intolerance, hair loss, dry skin, constipation, diarrhea, edema, anxiety, tremor, palpitations and dysphagia. Patient is  taking his medication consistently on an empty stomach. Lab Results   Component Value Date    TSHREFLEX 4.97 04/23/2021    TSHREFLEX 4.03 01/10/2017    TSHREFLEX 4.05 03/23/2016     Lab Results   Component Value Date    TSH 3.02 09/18/2019    TSH 2.26 02/08/2019    TSH 2.17 08/15/2018     GERD  Paitent is here for follow up of heartburn. Symptoms have been present for awhile and is a chronic condition. Currently treated with proton pump inhibitor: Omeprazole 40 mg daily, which has been effective. Patient denies dysphagia, cough, hoarseness, chest pain, unintentional weight loss, N/V, bloating, early satiety, abdominal pain or melena, hematochezia, hematemesis, and coffee ground emesis. Ame Sharma Hyperlipidemia:  No new myalgias or GI upset on atorvastatin (Lipitor). Medication compliance: compliant most of the time. Patient is  following a low fat, low cholesterol diet. He is not exercising regularly. Lab Results   Component Value Date    CHOL 113 03/04/2021    TRIG 98 03/04/2021    HDL 39 (L) 03/04/2021    LDLCALC 54 03/04/2021     Lab Results   Component Value Date    ALT 31 03/04/2021    AST 25 03/04/2021      The ASCVD Risk score (Oscar Degroot., et al., 2013) failed to calculate for the following reasons: The valid total cholesterol range is 130 to 320 mg/dL    Vitamin D Deficiency  Patient with vitamin d deficiency and currently on supplement without adverse reactions. Lab Results   Component Value Date    VITD25 33.5 04/23/2021     Restless Leg Syndrome  Patient has been complaining of cramps and spasms in the lower extremities, especially at rest and at night. He reports waking up several times a night due to pain and cramping in the lower extremities that is relieves with walking. He denies any complaint during activities. These symptoms have been going on for years and is not improving. Currently on Mirapex 1 mg twice daily    Mood Disorder:  Patient presents for follow-up of depression and anxiety disorder. Current complaints include: See PHQ9 below . He denies tearfulness, decreased libido, irritability, excessive worry, panic attacks, obsessive thoughts, compulsive behaviors, increased use of drugs or alcohol, suicidal thoughts or behavior, and impaired memory. Symptoms/signs of vipul: none. External stressors: nothing new. Current treatment includes: Effexor-75 mg twice daily. Medication side effects: none.     PHQ-9  4/23/2021 11/11/2020 7/2/2020 11/21/2019 9/18/2019 6/13/2019 2/8/2019   Little interest or pleasure in doing things 2 0 1 0 1 1 1   Feeling down, depressed, or hopeless 0 0 0 0 0 0 1   Trouble falling or staying asleep, or sleeping too much 0 - 0 - 2 3 1 Feeling tired or having little energy 3 - 3 - 1 2 1   Poor appetite or overeating 1 - 3 - 1 1 0   Feeling bad about yourself - or that you are a failure or have let yourself or your family down 0 - 0 - 0 0 0   Trouble concentrating on things, such as reading the newspaper or watching television 0 - 0 - 0 0 0   Moving or speaking so slowly that other people could have noticed. Or the opposite - being so fidgety or restless that you have been moving around a lot more than usual 0 - 0 - 0 0 0   Thoughts that you would be better off dead, or of hurting yourself in some way 0 - 0 - 0 0 0   PHQ-2 Score 2 0 1 0 1 1 2   PHQ-9 Total Score 6 0 7 0 5 7 4   If you checked off any problems, how difficult have these problems made it for you to do your work, take care of things at home, or get along with other people? 1 - 1 - 1 1 1     Interpretation of Total Score Total Score Depression Severity: 1-4 = Minimal depression, 5-9 = Mild depression, 10-14 = Moderate depression, 15-19 = Moderately severe depression, 20-27 = Severe depression    Review of Systems   Constitutional: Negative. Negative for appetite change, fatigue and unexpected weight change. HENT: Negative. Eyes: Negative. Respiratory: Negative. Negative for shortness of breath. Cardiovascular: Negative. Negative for chest pain, palpitations and leg swelling. Gastrointestinal: Negative. Negative for abdominal pain, anal bleeding, blood in stool and nausea. Endocrine: Negative. Genitourinary: Negative. Negative for hematuria. Musculoskeletal: Negative. Skin: Negative. Negative for rash. Allergic/Immunologic: Negative. Neurological: Negative. Negative for dizziness, syncope, light-headedness and numbness. Hematological: Negative. Does not bruise/bleed easily. Psychiatric/Behavioral: Negative. All other systems reviewed and are negative.        Past Medical History:   Diagnosis Date    Arthritis     Back injuries     BPH (benign prostatic hyperplasia)     Chest pain 03/2021    CPAP (continuous positive airway pressure) dependence     Diverticulosis     colonoscopy 9/2015    Esophageal dysmotility     GERD (gastroesophageal reflux disease)     Hiatal hernia     History of colon polyps     seen on colonoscopy again 9/2015    Hyperlipidemia     Hypertension     ILD (interstitial lung disease) ([facility] [address] 07/30/2013    Internal hemorrhoid     colonoscopy 3/90/36    Lichen sclerosus of penis 2017    Dr Juventino Alcantar Mild cognitive impairment 08/2016    [facility]    COLLIN (obstructive sleep apnea)     Renal insufficiency     Restless legs syndrome     Rosacea     Schatzki's ring     last dilated 2002    Scrotal pruritus 02/08/2018    Sleep apnea      Family History   Problem Relation Age of Onset    Stroke Father     Cancer Father         prostate    Other Father         colitis    Diabetes Sister     Diabetes Brother     Cancer Brother         skin    High Blood Pressure Brother     High Cholesterol Brother     COPD Mother     Asthma Neg Hx     Emphysema Neg Hx     Heart Failure Neg Hx     Hypertension Neg Hx      Past Surgical History:   Procedure Laterality Date    CARDIAC CATHETERIZATION  03/04/2021    Non Obs CAD, medical management    CIRCUMCISION      COLONOSCOPY  2010    COLONOSCOPY  09/14/2015    CORONARY ANGIOPLASTY  10/15/2019    CYSTOSCOPY  05/13/2016    CYSTOSCOPY  05/19/2017    CYSTOSCOPY     CYSTOSCOPY  07/14/2017    ENDOSCOPY, COLON, DIAGNOSTIC  10/11/2017    esoph. stricture    FRACTURE SURGERY      right leg    FRACTURE SURGERY      wrist   right    HERNIA REPAIR      PROSTATE SURGERY      TOE SURGERY      TURP  07/14/2017    UPPER GASTROINTESTINAL ENDOSCOPY  09/12/2016    UPPER GASTROINTESTINAL ENDOSCOPY  10/11/2017    esophageal stricture    UPPER GASTROINTESTINAL ENDOSCOPY  01/28/2020     Social History     Socioeconomic History    Marital status:      Spouse name: Not on file    Number of children: Not on file    Years of education: Not on file    Highest education level: Not on file   Occupational History    Not on file   Tobacco Use    Smoking status: Former Smoker     Packs/day: 1.50     Years: 17.00     Pack years: 25.50     Types: Cigarettes     Quit date: 1974     Years since quittin.8    Smokeless tobacco: Never Used   Vaping Use    Vaping Use: Never used   Substance and Sexual Activity    Alcohol use: No     Alcohol/week: 0.0 standard drinks    Drug use: No    Sexual activity: Not Currently   Other Topics Concern    Not on file   Social History Narrative    Not on file     Social Determinants of Health     Financial Resource Strain:     Difficulty of Paying Living Expenses:    Food Insecurity:     Worried About Running Out of Food in the Last Year:     Ran Out of Food in the Last Year:    Transportation Needs:     Lack of Transportation (Medical):      Lack of Transportation (Non-Medical):    Physical Activity:     Days of Exercise per Week:     Minutes of Exercise per Session:    Stress:     Feeling of Stress :    Social Connections:     Frequency of Communication with Friends and Family:     Frequency of Social Gatherings with Friends and Family:     Attends [employer]oration[employer] Services:     Active Member of Clubs or Organizations:     Attends Club or Organization Meetings:     Marital Status:    Intimate Partner Violence:     Fear of Current or Ex-Partner:     Emotionally Abused:     Physically Abused:     Sexually Abused:      Current Outpatient Medications   Medication Sig Dispense Refill    cetirizine (ZYRTEC) 10 MG tablet TAKE ONE TABLET BY MOUTH ONCE NIGHTLY 90 tablet 1    venlafaxine (EFFEXOR) 75 MG tablet Take 1 tablet by mouth twice daily 180 tablet 0    pramipexole (MIRAPEX) 1 MG tablet TAKE ONE TABLET BY MOUTH TWICE A  tablet 0    potassium chloride (KLOR-CON) 10 MEQ extended release tablet TAKE ONE TABLET BY MOUTH TWICE A  tablet 5    doxazosin (CARDURA) 8 MG tablet TAKE ONE TABLET BY MOUTH TWICE A  tablet 1    levothyroxine (SYNTHROID) 75 MCG tablet TAKE ONE TABLET BY MOUTH DAILY 90 tablet 1    zinc gluconate 50 MG tablet Take 50 mg by mouth daily      triamcinolone (KENALOG) 0.1 % cream Apply topically every 7 days 80 g 5    albuterol sulfate HFA (VENTOLIN HFA) 108 (90 Base) MCG/ACT inhaler Inhale 2 puffs into the lungs every 6 hours as needed for Wheezing or Shortness of Breath 1 Inhaler 5    omeprazole (PRILOSEC) 40 MG delayed release capsule TAKE ONE CAPSULE BY MOUTH DAILY 90 capsule 1    ferrous sulfate (IRON 325) 325 (65 Fe) MG tablet Take 1 tablet by mouth daily (with breakfast) 90 tablet 1    atorvastatin (LIPITOR) 40 MG tablet TAKE ONE TABLET BY MOUTH DAILY 90 tablet 2    triamterene-hydroCHLOROthiazide (MAXZIDE-25) 37.5-25 MG per tablet TAKE ONE TABLET BY MOUTH DAILY 90 tablet 2    amLODIPine (NORVASC) 10 MG tablet TAKE ONE TABLET BY MOUTH DAILY 90 tablet 2    clopidogrel (PLAVIX) 75 MG tablet Take 1 tablet by mouth daily 90 tablet 1    montelukast (SINGULAIR) 10 MG tablet Take 1 tablet by mouth daily 30 tablet 11    nitroGLYCERIN (NITROSTAT) 0.4 MG SL tablet Place 1 tablet under the tongue every 5 minutes as needed for Chest pain up to max of 3 total doses. If no relief after 1 dose, call 911. (Patient not taking: Reported on 7/30/2021) 25 tablet 3    methocarbamol (ROBAXIN) 500 MG tablet Take 1 tablet by mouth 3 times daily as needed (spasm) (Patient not taking: Reported on 7/30/2021) 90 tablet 5    aspirin EC 81 MG EC tablet Take 1 tablet by mouth daily 30 tablet 3    magnesium gluconate (MAGONATE) 500 MG tablet Take 500 mg by mouth daily       Vitamin D (CHOLECALCIFEROL) 1000 UNITS CAPS capsule Take 1,000 Units by mouth daily.  Calcium Carbonate-Vitamin D (CALCIUM + D) 600-200 MG-UNIT TABS Take 600 mg by mouth daily.        No current facility-administered medications for this visit. No changes in past medical history, past surgical history, social history, orfamily history were noted during the patient encounter unless specifically listed above. All updates of past medical history, past surgical history, social history, or family history were reviewed personally by me duringthe office visit. All problems listed in the assessment are stable unless noted otherwise. Medication profile reviewed personally by me during the office visit. Medication side effects and possible impairments frommedications were discussed as applicable. Objective: There were no vitals taken for this visit. There is no height or weight on file to calculate BMI. Wt Readings from Last 3 Encounters:   07/30/21 261 lb (118.4 kg)   04/23/21 257 lb (116.6 kg)   03/04/21 256 lb (116.1 kg)       Physical Exam  Vitals and nursing note reviewed. Constitutional:       General: He is not in acute distress. Appearance: Normal appearance. He is well-developed. HENT:      Head: Normocephalic and atraumatic. Right Ear: Tympanic membrane, ear canal and external ear normal.      Left Ear: Tympanic membrane, ear canal and external ear normal.      Nose: Nose normal.   Eyes:      General: Lids are normal.      Conjunctiva/sclera: Conjunctivae normal.      Pupils: Pupils are equal, round, and reactive to light. Neck:      Thyroid: No thyromegaly. Vascular: No carotid bruit or JVD. Cardiovascular:      Rate and Rhythm: Normal rate and regular rhythm. Pulses: Normal pulses. Radial pulses are 2+ on the right side and 2+ on the left side. Dorsalis pedis pulses are 2+ on the right side and 2+ on the left side. Posterior tibial pulses are 2+ on the right side and 2+ on the left side. Heart sounds: Normal heart sounds. No murmur heard. No friction rub. No gallop. Pulmonary:      Effort: Pulmonary effort is normal.      Breath sounds: Normal breath sounds. Abdominal:      General: Bowel sounds are normal.      Palpations: Abdomen is soft. There is no mass. Tenderness: There is no abdominal tenderness. Musculoskeletal:         General: Normal range of motion. Cervical back: Normal range of motion and neck supple. Lymphadenopathy:      Head:      Right side of head: No submandibular adenopathy. Left side of head: No submandibular adenopathy. Cervical: No cervical adenopathy. Skin:     General: Skin is warm and dry. Findings: No lesion or rash. Neurological:      Mental Status: He is alert and oriented to person, place, and time. Gait: Gait normal.   Psychiatric:         Speech: Speech normal.         Behavior: Behavior normal.         Thought Content: Thought content normal.         Judgment: Judgment normal.         Lab Review   No visits with results within 2 Month(s) from this visit. Latest known visit with results is:   Office Visit on 04/23/2021   Component Date Value    Hemoglobin A1C 04/23/2021 6.1     eAG 04/23/2021 128.4     Vit D, 25-Hydroxy 04/23/2021 33.5     TSH 04/23/2021 4.97*    Hep C Ab Interp 04/23/2021 Non-reactive     T4 Free 04/23/2021 1.2        No results found for this visit on 11/04/21. Assessment:       1. Recurrent major depressive disorder, in partial remission ([address]    2. Hypothyroidism, unspecified type    3. Essential hypertension    4. Mixed hyperlipidemia    5. Gastroesophageal reflux disease without esophagitis    6. Anxiety    7. Vitamin D deficiency    8. Restless legs syndrome    9. Flu vaccine need    10. Need for prophylactic vaccination and inoculation against varicella        No results found for this visit on 11/04/21. Plan:       [facility] for hypertension, hyperlipidemia and anxiety. Diagnoses and all orders for this visit:    Recurrent major depressive disorder, in partial remission ([address]  Condition appears stable with current medication regimen.  No reported or noted side effects of medication. Will continue to monitor at routine intervals as appropriate. Continue current medications as follows:  -     venlafaxine (EFFEXOR) 75 MG tablet; Take 1 tablet by mouth twice daily  Educated if patient develops SI/HI/vipul to call 911 or go to ER. Discussed use, benefit, risks and side effects of prescribed medications. Barriers to compliance discussed. All patient questions answered. Pt voiced understanding. Hypothyroidism, unspecified type  Condition appears stable with current medication regimen. No reported or noted side effects of medication. Will continue to monitor at routine intervals as appropriate. Continue current medications as follows:    -      levothyroxine (SYNTHROID) 75 MCG tablet; TAKE ONE TABLET BY MOUTH DAILY    Essential hypertension  You should monitor your blood pressure closely at home, then call or send a joiz message in 1-2 weeks to report the results. Mixed hyperlipidemia  The patient is asked to make an attempt to improve diet and exercise patterns to aid in medical management of this problem. Gastroesophageal reflux disease without esophagitis  Condition appears stable with current medication regimen. Continue current medications. No reported or noted side effects of medication. Will continue to monitor at routine intervals as appropriate. Anxiety  Condition appears stable with current medication regimen. Continue current medications. No reported or noted side effects of medication. Will continue to monitor at routine intervals as appropriate. Vitamin D deficiency  Discussed with patient that we make vitamin D from the sun and get it from some food sources, but it is very common to be deficient. Discussed the need for vitamin D replacement because low vitamin D can cause fatigue, joint aches and has been implicated in heart disease, bone disease like osteoporosis, and some other chronic illnesses.     Patient will start/continue vitamin D supplement as per order. Recommend vitamin D to be rechecked in 6 months. Restless legs syndrome  Condition appears stable with current medication regimen. No reported or noted side effects of medication. Will continue to monitor at routine intervals as appropriate. Continue current medications as follows:  -     pramipexole (MIRAPEX) 1 MG tablet; TAKE ONE TABLET BY MOUTH TWICE A DAY  Flu vaccine need  -     INFLUENZA, MDCK QUADV, 2 YRS AND OLDER, IM, PF, PREFILL SYR OR SDV, 0.5ML (FLUCELVAX QUADV, PF)  Need for prophylactic vaccination and inoculation against varicella  -     zoster recombinant adjuvanted vaccine (SHINGRIX) 50 MCG/0.5ML SUSR injection; 50 MCG IM then repeat 2-6 months. Patient has been instructed call the office immediately with new symptoms, change in symptoms or worseningof symptoms. If this is not feasible, patient is instructed to report to the emergency room. Medication profile reviewed. Medication side effects and possible impairments from medications were discussed as applicable. Allergies were reviewed. Health maintenance was reviewed and updated as appropriate.

## 2021-11-05 DIAGNOSIS — E03.9 HYPOTHYROIDISM, UNSPECIFIED TYPE: ICD-10-CM

## 2021-11-05 RX ORDER — LEVOTHYROXINE SODIUM 0.07 MG/1
TABLET ORAL
Qty: 90 TABLET | Refills: 1 | Status: SHIPPED | OUTPATIENT
Start: 2021-11-05 | End: 2022-06-02

## 2021-11-06 ASSESSMENT — ENCOUNTER SYMPTOMS
ABDOMINAL PAIN: 0
ANAL BLEEDING: 0
EYES NEGATIVE: 1
ALLERGIC/IMMUNOLOGIC NEGATIVE: 1
GASTROINTESTINAL NEGATIVE: 1
RESPIRATORY NEGATIVE: 1
BLOOD IN STOOL: 0
NAUSEA: 0
SHORTNESS OF BREATH: 0

## 2021-11-12 ENCOUNTER — TELEPHONE (OUTPATIENT)
Dept: FAMILY MEDICINE CLINIC | Age: 81
End: 2021-11-12

## 2021-11-12 ENCOUNTER — HOSPITAL ENCOUNTER (OUTPATIENT)
Age: 81
Discharge: HOME | End: 2021-11-12
Payer: MEDICARE

## 2021-11-12 DIAGNOSIS — Z12.5 SCREENING FOR PROSTATE CANCER: ICD-10-CM

## 2021-11-12 DIAGNOSIS — E78.2 MIXED HYPERLIPIDEMIA: ICD-10-CM

## 2021-11-12 DIAGNOSIS — D50.9 IRON DEFICIENCY ANEMIA, UNSPECIFIED IRON DEFICIENCY ANEMIA TYPE: ICD-10-CM

## 2021-11-12 DIAGNOSIS — E55.9 VITAMIN D DEFICIENCY: ICD-10-CM

## 2021-11-12 DIAGNOSIS — I10 ESSENTIAL HYPERTENSION: ICD-10-CM

## 2021-11-12 DIAGNOSIS — E03.9 HYPOTHYROIDISM, UNSPECIFIED TYPE: Primary | ICD-10-CM

## 2021-11-12 DIAGNOSIS — R73.9 HYPERGLYCEMIA: ICD-10-CM

## 2021-11-12 NOTE — TELEPHONE ENCOUNTER
Recommended repeat testing on 4/2021 lab result but it looks like no one put them in.   Orders placed

## 2021-11-13 ENCOUNTER — HOSPITAL ENCOUNTER (OUTPATIENT)
Age: 81
Discharge: HOME | End: 2021-11-13
Payer: MEDICARE

## 2021-11-13 DIAGNOSIS — R73.9 HYPERGLYCEMIA: ICD-10-CM

## 2021-11-13 DIAGNOSIS — E03.9 HYPOTHYROIDISM, UNSPECIFIED TYPE: ICD-10-CM

## 2021-11-13 DIAGNOSIS — Z12.5 SCREENING FOR PROSTATE CANCER: ICD-10-CM

## 2021-11-13 DIAGNOSIS — D50.9 IRON DEFICIENCY ANEMIA, UNSPECIFIED IRON DEFICIENCY ANEMIA TYPE: ICD-10-CM

## 2021-11-13 DIAGNOSIS — E55.9 VITAMIN D DEFICIENCY: ICD-10-CM

## 2021-11-13 DIAGNOSIS — I10 ESSENTIAL HYPERTENSION: ICD-10-CM

## 2021-11-13 DIAGNOSIS — E78.2 MIXED HYPERLIPIDEMIA: ICD-10-CM

## 2021-11-13 LAB
A/G RATIO: 1.5 (ref 1.1–2.2)
ALBUMIN SERPL-MCNC: 4.4 G/DL (ref 3.4–5)
ALP BLD-CCNC: 88 U/L (ref 40–129)
ALT SERPL-CCNC: 23 U/L (ref 10–40)
ANION GAP SERPL CALCULATED.3IONS-SCNC: 10 MMOL/L (ref 3–16)
AST SERPL-CCNC: 19 U/L (ref 15–37)
BASOPHILS ABSOLUTE: 0.1 K/UL (ref 0–0.2)
BASOPHILS RELATIVE PERCENT: 1.1 %
BILIRUB SERPL-MCNC: 0.9 MG/DL (ref 0–1)
BUN BLDV-MCNC: 14 MG/DL (ref 7–20)
CALCIUM SERPL-MCNC: 9.5 MG/DL (ref 8.3–10.6)
CHLORIDE BLD-SCNC: 101 MMOL/L (ref 99–110)
CO2: 29 MMOL/L (ref 21–32)
CREAT SERPL-MCNC: 0.9 MG/DL (ref 0.8–1.3)
EOSINOPHILS ABSOLUTE: 0.3 K/UL (ref 0–0.6)
EOSINOPHILS RELATIVE PERCENT: 3.4 %
GFR AFRICAN AMERICAN: >60
GFR NON-AFRICAN AMERICAN: >60
GLUCOSE BLD-MCNC: 117 MG/DL (ref 70–99)
HCT VFR BLD CALC: 44.6 % (ref 40.5–52.5)
HEMOGLOBIN: 15.4 G/DL (ref 13.5–17.5)
LYMPHOCYTES ABSOLUTE: 1.5 K/UL (ref 1–5.1)
LYMPHOCYTES RELATIVE PERCENT: 14.8 %
MCH RBC QN AUTO: 31.4 PG (ref 26–34)
MCHC RBC AUTO-ENTMCNC: 34.5 G/DL (ref 31–36)
MCV RBC AUTO: 91.1 FL (ref 80–100)
MONOCYTES ABSOLUTE: 0.7 K/UL (ref 0–1.3)
MONOCYTES RELATIVE PERCENT: 6.9 %
NEUTROPHILS ABSOLUTE: 7.4 K/UL (ref 1.7–7.7)
NEUTROPHILS RELATIVE PERCENT: 73.8 %
PDW BLD-RTO: 13.5 % (ref 12.4–15.4)
PLATELET # BLD: 205 K/UL (ref 135–450)
PMV BLD AUTO: 6.7 FL (ref 5–10.5)
POTASSIUM SERPL-SCNC: 3.5 MMOL/L (ref 3.5–5.1)
RBC # BLD: 4.9 M/UL (ref 4.2–5.9)
SODIUM BLD-SCNC: 140 MMOL/L (ref 136–145)
TOTAL PROTEIN: 7.4 G/DL (ref 6.4–8.2)
WBC # BLD: 10.1 K/UL (ref 4–11)

## 2021-11-13 PROCEDURE — 83540 ASSAY OF IRON: CPT

## 2021-11-13 PROCEDURE — 82306 VITAMIN D 25 HYDROXY: CPT

## 2021-11-13 PROCEDURE — 83036 HEMOGLOBIN GLYCOSYLATED A1C: CPT

## 2021-11-13 PROCEDURE — 82728 ASSAY OF FERRITIN: CPT

## 2021-11-13 PROCEDURE — 84443 ASSAY THYROID STIM HORMONE: CPT

## 2021-11-13 PROCEDURE — 36415 COLL VENOUS BLD VENIPUNCTURE: CPT

## 2021-11-13 PROCEDURE — 84153 ASSAY OF PSA TOTAL: CPT

## 2021-11-13 PROCEDURE — 85025 COMPLETE CBC W/AUTO DIFF WBC: CPT

## 2021-11-13 PROCEDURE — 80053 COMPREHEN METABOLIC PANEL: CPT

## 2021-11-13 PROCEDURE — 80061 LIPID PANEL: CPT

## 2021-11-13 PROCEDURE — 83550 IRON BINDING TEST: CPT

## 2021-11-14 LAB
CHOLESTEROL, TOTAL: 105 MG/DL (ref 0–199)
ESTIMATED AVERAGE GLUCOSE: 114 MG/DL
FERRITIN: 105.1 NG/ML (ref 30–400)
HBA1C MFR BLD: 5.6 %
HDLC SERPL-MCNC: 36 MG/DL (ref 40–60)
IRON SATURATION: 19 % (ref 20–50)
IRON: 55 UG/DL (ref 59–158)
LDL CHOLESTEROL CALCULATED: 48 MG/DL
PROSTATE SPECIFIC ANTIGEN: 2.57 NG/ML (ref 0–4)
TOTAL IRON BINDING CAPACITY: 285 UG/DL (ref 260–445)
TRIGL SERPL-MCNC: 106 MG/DL (ref 0–150)
TSH SERPL DL<=0.05 MIU/L-ACNC: 3.2 UIU/ML (ref 0.27–4.2)
VITAMIN D 25-HYDROXY: 46.2 NG/ML
VLDLC SERPL CALC-MCNC: 21 MG/DL

## 2021-11-16 RX ORDER — CLOPIDOGREL BISULFATE 75 MG/1
TABLET ORAL
Qty: 90 TABLET | Refills: 1 | Status: SHIPPED | OUTPATIENT
Start: 2021-11-16 | End: 2022-01-13 | Stop reason: SDUPTHER

## 2021-11-19 DIAGNOSIS — D50.9 IRON DEFICIENCY ANEMIA, UNSPECIFIED IRON DEFICIENCY ANEMIA TYPE: ICD-10-CM

## 2021-11-19 RX ORDER — FERROUS SULFATE 325(65) MG
TABLET ORAL
Qty: 90 TABLET | Refills: 1 | Status: SHIPPED | OUTPATIENT
Start: 2021-11-19 | End: 2022-04-21

## 2021-11-29 DIAGNOSIS — I10 ESSENTIAL HYPERTENSION: ICD-10-CM

## 2021-11-30 RX ORDER — AMLODIPINE BESYLATE 10 MG/1
TABLET ORAL
Qty: 90 TABLET | Refills: 5 | Status: SHIPPED | OUTPATIENT
Start: 2021-11-30

## 2021-11-30 RX ORDER — DOXAZOSIN 8 MG/1
TABLET ORAL
Qty: 180 TABLET | Refills: 1 | Status: SHIPPED | OUTPATIENT
Start: 2021-11-30 | End: 2022-01-13 | Stop reason: SDUPTHER

## 2021-12-08 DIAGNOSIS — I10 ESSENTIAL HYPERTENSION: ICD-10-CM

## 2021-12-08 RX ORDER — TRIAMTERENE AND HYDROCHLOROTHIAZIDE 37.5; 25 MG/1; MG/1
TABLET ORAL
Qty: 30 TABLET | Refills: 0 | Status: SHIPPED | OUTPATIENT
Start: 2021-12-08 | End: 2022-01-13 | Stop reason: SDUPTHER

## 2022-01-03 ENCOUNTER — TELEPHONE (OUTPATIENT)
Dept: FAMILY MEDICINE CLINIC | Age: 82
End: 2022-01-03

## 2022-01-03 NOTE — TELEPHONE ENCOUNTER
Pt called stating that he got \"called\" for jury duty and wanting to know if PCP would write a letter to excuse him from jury duty due to medical conditions. Pt would like to  letter.  Call back [phone]

## 2022-01-03 NOTE — LETTER
[address] [facility]  [address] [place] [address] [address] [place] [address]  Phone: [phone]  Fax: [phone]    ADORE Alex CNP        January 3, 2022     Patient: Pee Denis   YOB: 1940   Date of Visit: 1/3/2022       To Whom It May Concern: It is my medical opinion that Alfie Bhatia is unable to perform jury duty at this time. If you have any questions or concerns, please don't hesitate to call.     Sincerely,            ADORE Alex CNP

## 2022-01-03 NOTE — TELEPHONE ENCOUNTER
Letter printed. Attempted to notify patient but there was no VM set up the phone kept ringing.  Unable to LM

## 2022-01-06 DIAGNOSIS — K21.9 GASTROESOPHAGEAL REFLUX DISEASE WITHOUT ESOPHAGITIS: ICD-10-CM

## 2022-01-06 RX ORDER — OMEPRAZOLE 40 MG/1
CAPSULE, DELAYED RELEASE ORAL
Qty: 90 CAPSULE | Refills: 1 | Status: SHIPPED | OUTPATIENT
Start: 2022-01-06 | End: 2022-06-29

## 2022-01-10 NOTE — PROGRESS NOTES
[address] [facility]   Cardiovascular Evaluation    PATIENT: Jay Chester  DATE: 2022  MRN: [id number]  CSN: [id number]  : 1940    Primary Care Doctor: ADORE Cotton - CNP    Subjective: Mr. Josiah Paulson is here today for cardiology follow up; No cardiac complaints today. History of Present Illness    Jay Chester is a [de-identified] y.o. male who presents for cardiac f/u CAD s/p LAD DANTE 10/19, HTN, HLD, ILD, chronic cough, and COLLIN (uses bipap). Prior saw Dr. Amira Qureshi for ILD. Most recent ECHO 2015 EF of 55-60%. Grade I DD (no change from  study). Note: [facility] 2015 with no evidence of stress induced reversible ischemia, EF of 62%. Most recent LE arterial doppler CLOVER's >1 and no arterial insufficiency. At [address] 18 he c/o fatigue, SOB, CP. LHC 10/15/19 w/ PCTA to mid LAD with 3.0 x 18 DANTE 3.25 x 8; LM <20%; LAD 50% prox, 70% mid. FFR 0.79; Cx 40% distal;OM1 40% prox; RCA 30% mid. Note EKG 10/16/19 SB 54 bpm; 1st degree A-V block; LVH; nonspecific ST abnormality. Due to BERTRAND at May 2020 OV I ordered most recent [place] 20 which was normal.  Most recent LHC/RHC 3/4/21 showed non-obstructive CAD with patent mid-LAD stent; 50-60% prox LAD; 20-30% stenoses of CCx and RCA; mildly elevated right heart pressures; wedge=13. Today, he reports chronic BERTRAND unchanged. Overall does well in daily activities. He reports he is trying to lose weight and his goal is be below 200#. Patient denies current edema, chest pain, sob, palpitations, dizziness or syncope. Patient is taking all cardiac medications as prescribed and tolerates them well. Patient denies current edema, chest pain, sob, palpitations, dizziness or syncope. Weight today is 234# (down 27# from 2021)    Patient is not vaccinated.   Vaccine is postponed until 2022    Past Medical History:   has a past medical history of Arthritis, Back injuries, BPH (benign prostatic hyperplasia), Chest pain, CPAP (continuous positive airway pressure) dependence, Diverticulosis, Esophageal dysmotility, GERD (gastroesophageal reflux disease), Hiatal hernia, History of colon polyps, Hyperlipidemia, Hypertension, ILD (interstitial lung disease) ([facility] [address] Internal hemorrhoid, Lichen sclerosus of penis, Mild cognitive impairment, COLLIN (obstructive sleep apnea), Renal insufficiency, Restless legs syndrome, Rosacea, Schatzki's ring, Scrotal pruritus, and Sleep apnea. Surgical History:   has a past surgical history that includes Circumcision; fracture surgery; fracture surgery; Toe Surgery; hernia repair; Colonoscopy (2010); Colonoscopy (09/14/2015); Cystocopy (05/13/2016); Upper gastrointestinal endoscopy (09/12/2016); Cystoscopy (05/19/2017); TURP (07/14/2017); Cystoscopy (07/14/2017); Endoscopy, colon, diagnostic (10/11/2017); Upper gastrointestinal endoscopy (10/11/2017); Coronary angioplasty (10/15/2019); Prostate surgery; Upper gastrointestinal endoscopy (01/28/2020); and Cardiac catheterization (03/04/2021). Social History:   reports that he quit smoking about 48 years ago. His smoking use included cigarettes. He has a 25.50 pack-year smoking history. He has never used smokeless tobacco. He reports that he does not drink alcohol and does not use drugs. Family History:  No evidence for sudden cardiac death or premature CAD with parents    Medications:  Reviewed and are listed in nursing record. and/or listed below  Outpatient Medications: Allergies:  Percocet [oxycodone-acetaminophen], Ace inhibitors, Oxybutynin, and Simvastatin     Review of Systems:   All 12 point review of symptoms completed. Pertinent positives identified in the HPI, all other review of symptoms negative as below.     Physical Examination:    Vitals:    02/19/21 1216   BP: 120/60   Pulse: 88   SpO2: 98%      No intake or output data in the 24 hours ending 01/13/22 1342    General Appearance:  Alert, cooperative, no distress, appears stated age Head:  Normocephalic, without obvious abnormality, atraumatic   Eyes:  PERRL, conjunctiva/corneas clear       Nose: Nares normal, no drainage or sinus tenderness   Throat: Lips, mucosa, and tongue normal   Neck: Supple, symmetrical, trachea midline, no adenopathy, thyroid: not enlarged, symmetric, no tenderness/mass/nodules, no carotid bruit or JVD       Lungs:   Clear, soft dry crackles at bases ; respirations unlabored   Chest Wall:  No tenderness or deformity   Heart:  Regular rhythm and normal rate; S1, S2 are normal Soft ANGELA; no rub or gallop   Abdomen:   Soft, non-tender, bowel sounds active all four quadrants,  no masses, no organomegaly           Extremities: Extremities normal, atraumatic, no cyanosis No edema   Pulses: 2+ and symmetric   Skin: Skin color, texture, turgor normal, no rashes or lesions   Pysch: Normal mood and affect   Neurologic: Normal gross motor and sensory exam.         Labs    Lab Results   Component Value Date    CHOL 105 11/13/2021    CHOL 113 03/04/2021    CHOL 114 05/04/2020     Lab Results   Component Value Date    TRIG 106 11/13/2021    TRIG 98 03/04/2021    TRIG 170 (H) 05/04/2020     Lab Results   Component Value Date    HDL 36 (L) 11/13/2021    HDL 39 (L) 03/04/2021    HDL 38 (L) 05/04/2020     Lab Results   Component Value Date    LDLCALC 48 11/13/2021    LDLCALC 54 03/04/2021    LDLCALC 42 05/04/2020     Lab Results   Component Value Date    LABVLDL 21 11/13/2021    LABVLDL 20 03/04/2021    LABVLDL 34 05/04/2020     I have personally reviewed all labs including lipids 11/13/21    Assessment:    [de-identified] y.o. patient with:    1. BERTRAND/CP/hx CAD:  At [address] 10/9/19 c/o random chest heaviness and given abnl EKG he underwent LHC 10/15/19 w/ PCTA/DANTE mid LAD. Most recent LHC/RHC 3/4/21 showed non-obstructive CAD with patent mid-LAD stent; 50-60% prox LAD; 20-30% stenoses of CCx and RCA; mildly elevated right heart pressures. There are no concerning symptoms for angina currently.       2. Hypertension:  Well controlled and will continue current medical regimen. ~BP: 120/60     3. Hyperlipidemia: Most recent labs 11/13/21 see results above I personally reviewed. Well controlled and at goal and will continue current medical regimen. 4. Interstitial lung disease: Dr. Taryn Jenkins dx him with early ILD and lungs should not cause significant symptoms. PFTs 5/2/16 FVC 3.24 (82%) FEV1 2.68 (94%) FEV1/FVC ratio 83% TLC 5.51 (83%) DLCO 17.92 (66%)    5. COLLIN   ~wears bipap    Plan:  1. Current medications reviewed. No changes at this time. Refills given as warranted. 2. No cardiac testing at this time. 3. Stop taking Aspirin 81 mg daily; DAPT in patients 81yo or > has higher bleeding risk and no definite indication for DAPT now. 4. Continue on Plavix indefinitely  5. Repeat blood work the end of 2022 before you see me in January 2022    Follow up with me in 1 year    Cost of prescription medications and patient compliance have been reviewed with patient. All questions answered. All questions and concerns were addressed to the patient/family. Alternatives to my treatment were discussed. The note was completed using EMR. Every effort was made to ensure accuracy; however, inadvertent computerized transcription errors may be presen    This note is scribed in the presence of Dr. Hay Beth. Sol Voodoo by Fabienne Manzo RN.    I, Dr. Tanner Slater, personally performed the services described in this documentation, as scribed by the above signed scribe in my presence. It is both accurate and complete to my knowledge. I agree with the details independently gathered by the clinical support staff, while the remaining scribed note accurately describes my personal service to the patient.     Tanner Slater MD  1/13/2022  1:42 PM

## 2022-01-13 ENCOUNTER — OFFICE VISIT (OUTPATIENT)
Dept: CARDIOLOGY CLINIC | Age: 82
End: 2022-01-13
Payer: MEDICARE

## 2022-01-13 VITALS
BODY MASS INDEX: 35.46 KG/M2 | OXYGEN SATURATION: 98 % | HEART RATE: 72 BPM | HEIGHT: 68 IN | DIASTOLIC BLOOD PRESSURE: 60 MMHG | WEIGHT: 234 LBS | SYSTOLIC BLOOD PRESSURE: 126 MMHG

## 2022-01-13 DIAGNOSIS — E78.2 MIXED HYPERLIPIDEMIA: Primary | ICD-10-CM

## 2022-01-13 DIAGNOSIS — I10 ESSENTIAL HYPERTENSION: ICD-10-CM

## 2022-01-13 DIAGNOSIS — Z87.891 HISTORY OF TOBACCO ABUSE: ICD-10-CM

## 2022-01-13 DIAGNOSIS — I50.32 CHRONIC DIASTOLIC CONGESTIVE HEART FAILURE (HCC): ICD-10-CM

## 2022-01-13 DIAGNOSIS — I25.119 CORONARY ARTERY DISEASE INVOLVING NATIVE CORONARY ARTERY OF NATIVE HEART WITH ANGINA PECTORIS (HCC): ICD-10-CM

## 2022-01-13 DIAGNOSIS — Z79.899 MEDICATION MANAGEMENT: ICD-10-CM

## 2022-01-13 PROCEDURE — 99214 OFFICE O/P EST MOD 30 MIN: CPT | Performed by: INTERNAL MEDICINE

## 2022-01-13 RX ORDER — NITROGLYCERIN 0.4 MG/1
0.4 TABLET SUBLINGUAL EVERY 5 MIN PRN
Qty: 25 TABLET | Refills: 3 | Status: SHIPPED | OUTPATIENT
Start: 2022-01-13

## 2022-01-13 RX ORDER — CLOPIDOGREL BISULFATE 75 MG/1
TABLET ORAL
Qty: 90 TABLET | Refills: 3 | Status: SHIPPED | OUTPATIENT
Start: 2022-01-13

## 2022-01-13 RX ORDER — DOXAZOSIN 8 MG/1
TABLET ORAL
Qty: 180 TABLET | Refills: 3 | Status: SHIPPED | OUTPATIENT
Start: 2022-01-13

## 2022-01-13 RX ORDER — POTASSIUM CHLORIDE 750 MG/1
TABLET, FILM COATED, EXTENDED RELEASE ORAL
Qty: 180 TABLET | Refills: 3 | Status: SHIPPED | OUTPATIENT
Start: 2022-01-13 | End: 2022-09-14 | Stop reason: SDUPTHER

## 2022-01-13 RX ORDER — ATORVASTATIN CALCIUM 40 MG/1
TABLET, FILM COATED ORAL
Qty: 90 TABLET | Refills: 3 | Status: SHIPPED | OUTPATIENT
Start: 2022-01-13

## 2022-01-13 RX ORDER — TRIAMTERENE AND HYDROCHLOROTHIAZIDE 37.5; 25 MG/1; MG/1
TABLET ORAL
Qty: 90 TABLET | Refills: 3 | Status: SHIPPED | OUTPATIENT
Start: 2022-01-13

## 2022-01-13 NOTE — LETTER
[address] [place]  [address] [place] [address] [address] [address]  Phone: [phone]  Fax: [phone]    Brady Stephenson MD    January 21, 2022     Bailey Corrales, APRN - CNP  [address] [facility]    Patient: Heather Stark   MR Number: [id number]   YOB: 1940   Date of Visit: 1/13/2022       Dear Bailey Corrales: Thank you for referring Sofia Covarrubias to me for evaluation/treatment. Below are the relevant portions of my assessment and plan of care. If you have questions, please do not hesitate to call me. I look forward to following Sadie Schulte along with you.     Sincerely,      Brady Stephenson MD

## 2022-01-13 NOTE — PATIENT INSTRUCTIONS
Plan:  1. Current medications reviewed. No changes at this time. Refills given as warranted. 2. No cardiac testing at this time. 3. Stop taking Aspirin 81 mg daily  4. Continue on Plavix indefinitely  5.  Repeat blood work the end of 2022 before you see me in January 2022        Follow up with me in 1 year

## 2022-01-16 NOTE — TELEPHONE ENCOUNTER
Hospital Medicine History & Physical      PCP: Eddi Dinero    Date of Admission: 1/16/2022    Date of Service: Pt seen/examined on 01/16/2022 and Admitted to Inpatient with expected LOS greater than two midnights due to medical therapy. Chief Complaint:  SYNCOPE, SEIZURES       History Of Present Illness:      33 y.o. female who presented to [facility] with what looks like altered mental status, apparently patient with seizure disorder, initially her mom went to the restroom found her unresponsive then became responsive came to emergency department work-up for seizure was undergone, then patient had what looks like syncopal episodes for less than a minute, patient at this time stating that she is having mild shortness of breath some cough nausea but no vomiting, minimal headache no blurry or double vision. Tested positive for COVID in emergency department. Past Medical History:          Diagnosis Date    Diabetes ([facility] [address]     Seizures ([facility] [address]     Stroke [facility])        Past Surgical History:          Procedure Laterality Date    BRAIN SURGERY      shunt       Medications Prior to Admission:      Prior to Admission medications    Medication Sig Start Date End Date Taking? Authorizing Provider   levETIRAcetam (KEPPRA) 500 MG tablet Take 500 mg by mouth 2 times daily   Yes Historical Provider, MD   SITagliptin (JANUVIA) 25 MG tablet Take 25 mg by mouth daily   Yes Historical Provider, MD   glimepiride (AMARYL) 4 MG tablet Take 4 mg by mouth every morning (before breakfast)   Yes Historical Provider, MD       Allergies: Ancef [cefazolin]    Social History:      The patient currently lives at home    TOBACCO:   reports that she has never smoked. She has never used smokeless tobacco.  ETOH:   reports no history of alcohol use.   E-Cigarettes/Vaping Use     Questions Responses    E-Cigarette/Vaping Use     Start Date     Passive Exposure     Quit Date     Counseling Given     Comments             Family Last ov 2/19/21. Pt no showed 3/16/21 S/P CATH appt.  Please advise, thank you History:      Reviewed in detail and negative for DM, CAD    History reviewed. No pertinent family history. REVIEW OF SYSTEMS COMPLETED:   Pertinent positives as noted in the HPI. All other systems reviewed and negative. PHYSICAL EXAM PERFORMED:    BP (!) 151/87   Pulse 94   Temp 98 °F (36.7 °C) (Oral)   Resp 23   Ht 5' 7\" (1.702 m)   Wt 227 lb 4.8 oz (103.1 kg)   LMP 12/26/2021 (Approximate)   SpO2 99%   BMI 35.60 kg/m²     General appearance:  No apparent distress  HEENT:  Normal cephalic  Neck: Supple  Respiratory:  Normal respiratory effort. Clear to auscultation, bilaterally without Rales/Wheezes/Rhonchi. Cardiovascular:  Regular rate and rhythm with normal S1/S2 without murmurs, rubs or gallops. Abdomen: Soft, non-tender, non-distended   Musculoskeletal:  No clubbing, cyanosis   Skin: Skin color, texture, turgor normal.  No rashes or lesions. Neurologic:  No focal weakness   Psychiatric:  Alert and oriented  Capillary Refill: Brisk,3 seconds, normal  Peripheral Pulses: +2 palpable, equal bilaterally       Labs:     Recent Labs     01/16/22  1219   WBC 4.1   HGB 12.6   HCT 38.0        Recent Labs     01/16/22  1219   *   K 3.7      CO2 21   BUN 7   CREATININE 0.7   CALCIUM 8.7     Recent Labs     01/16/22  1219   AST 40*   ALT 48*   BILITOT <0.2   ALKPHOS 70     No results for input(s): INR in the last 72 hours. No results for input(s): [place] in the last 72 hours. Urinalysis:      Lab Results   Component Value Date    NITRU Negative 09/05/2017    WBCUA 14 09/05/2017    BACTERIA 2+ 09/05/2017    RBCUA 0-2 09/05/2017    BLOODU Negative 09/05/2017    SPECGRAV 1.022 09/05/2017    GLUCOSEU 250 09/05/2017       Radiology:         CT HEAD WO CONTRAST   Final Result   Head-      No acute intracranial abnormality. Right frontal ventriculostomy catheter is in stable position, with stable   decompression of the ventricles.       Minimal inflammatory disease of the paranasal sinuses, including the   sphenoids. ---      Cervical spine-      Straightening of the cervical spine. No acute osseous abnormality or significant spondylosis. CT CERVICAL SPINE WO CONTRAST   Final Result   Head-      No acute intracranial abnormality. Right frontal ventriculostomy catheter is in stable position, with stable   decompression of the ventricles. Minimal inflammatory disease of the paranasal sinuses, including the   sphenoids. ---      Cervical spine-      Straightening of the cervical spine. No acute osseous abnormality or significant spondylosis. XR SHUNT SERIES PLACEMENT (<4 VIEWS)   Final Result   Right frontal ventriculostomy catheter appears intact. Portion of the mid   chest is difficult to visualize, related to large body habitus. RECOMMENDATION:             ASSESSMENT:    Active Hospital Problems    Diagnosis Date Noted    Syncope and collapse [R55] 01/16/2022    Seizure ([address] [R56.9] 01/16/2022    COVID-19 [U07.1] 01/16/2022    Seizures ([address] [R56.9] 01/16/2022         PLAN:    1. Seizure disorder with possible seizure activity, and differential certainly syncopal episode, patient did not have apparently a witnessed grand mal seizure but her mom found her in the bathroom in emergency department she had what looked more like a syncopal episode. At this time patient will be admitted to the hospital, neurology consulted, cardiology consulted, telemetry monitoring, will continue to monitor for now plan of care discussed with patient in details, all questions answered. 2.  Possible syncopal episode, cardiology consulted I will order echo. 3.  COVID-19 infection, likely contributing to current presentation, currently patient not hypoxic, continue to monitor with supportive measures.   4.  Diabetes mellitus, sliding scale    DVT Prophylaxis: Lovenox  Diet: No diet orders on file  Code Status: No Order    PT/OT Eval Status: Ordered    Dispo -inpatient 2 to 3 days       Darion Roque MD    Thank you Bonnie Santiago for the opportunity to be involved in this patient's care. If you have any questions or concerns please feel free to contact me at [phone].

## 2022-02-02 ENCOUNTER — TELEPHONE (OUTPATIENT)
Dept: PULMONOLOGY | Age: 82
End: 2022-02-02

## 2022-02-02 DIAGNOSIS — J84.9 ILD (INTERSTITIAL LUNG DISEASE) (HCC): Primary | ICD-10-CM

## 2022-02-02 NOTE — TELEPHONE ENCOUNTER
Patient did not show for 6mo COLLIN fu appointment  with Dr Ceasar Estrada on 2/2/22  Former Dr. Margarette Boxer pt. Same Day Cancellation: No    Patient rescheduled:  No    New appointment:     Patient was also no show on: na      LOV  07/30/21  ASSESSMENT AND PLAN:  interstitial lung disease     Based on the information available thus far, crackles on physical, and previous CT scan results, I favor a diagnosis of early ILD. Etiology of ILD is unknown; however age and prior smoking hx make Idiopathic Pulmonary Fibrosis or familial pulmonary fibrosis most likely. Findings on CT are not definitive.   ILD serologies were wnl.      -per my last review the CT changes had not progressed much over the last several years.  PFTs were also relatively stable  - offered pulmonary rehab, patient has not been interested

## 2022-02-02 NOTE — TELEPHONE ENCOUNTER
Okay to put pt on wait list for appt with Dr. Mary Jane Ureña when schedule opens? (former Dr. Lawrence Juarez pt, 6 month COLLIN/ILD)

## 2022-02-03 NOTE — TELEPHONE ENCOUNTER
Referral faxed, scanned confirmation. Will follow up with Omid Antonio to ensure patient get scheduled.

## 2022-02-08 NOTE — TELEPHONE ENCOUNTER
Jose Pereira from [address] [place] [address],[address] called to let office know that she spoke with pt offering appt, and pt refuses to schedule, stating that its too far to drive.

## 2022-02-09 NOTE — TELEPHONE ENCOUNTER
Spoke with patient whom states he does not want to travel that far and knows what's wrong with his lungs and doesn't want to have additional testing.

## 2022-03-03 ENCOUNTER — TELEPHONE (OUTPATIENT)
Dept: CARDIOLOGY CLINIC | Age: 82
End: 2022-03-03

## 2022-03-03 NOTE — TELEPHONE ENCOUNTER
[facility]     Clearance form received for pt to have oral sx done. Signed and faxed back to [phone]. Conformation received. Scanned and attach encounter.

## 2022-03-17 DIAGNOSIS — R05.3 CHRONIC COUGH: ICD-10-CM

## 2022-03-17 RX ORDER — MONTELUKAST SODIUM 10 MG/1
TABLET ORAL
Qty: 90 TABLET | Refills: 0 | Status: SHIPPED | OUTPATIENT
Start: 2022-03-17

## 2022-03-25 ENCOUNTER — TELEMEDICINE (OUTPATIENT)
Dept: FAMILY MEDICINE CLINIC | Age: 82
End: 2022-03-25
Payer: MEDICARE

## 2022-03-25 DIAGNOSIS — Z13.6 SCREENING FOR CARDIOVASCULAR CONDITION: ICD-10-CM

## 2022-03-25 DIAGNOSIS — Z00.00 MEDICARE ANNUAL WELLNESS VISIT, SUBSEQUENT: Primary | ICD-10-CM

## 2022-03-25 PROCEDURE — G0446 INTENS BEHAVE THER CARDIO DX: HCPCS | Performed by: NURSE PRACTITIONER

## 2022-03-25 PROCEDURE — G0447 BEHAVIOR COUNSEL OBESITY 15M: HCPCS | Performed by: NURSE PRACTITIONER

## 2022-03-25 PROCEDURE — G0439 PPPS, SUBSEQ VISIT: HCPCS | Performed by: NURSE PRACTITIONER

## 2022-03-25 RX ORDER — METHOCARBAMOL 500 MG/1
500 TABLET, FILM COATED ORAL 3 TIMES DAILY PRN
Qty: 90 TABLET | Refills: 0 | Status: SHIPPED | OUTPATIENT
Start: 2022-03-25

## 2022-03-25 ASSESSMENT — PATIENT HEALTH QUESTIONNAIRE - PHQ9
SUM OF ALL RESPONSES TO PHQ QUESTIONS 1-9: 4
8. MOVING OR SPEAKING SO SLOWLY THAT OTHER PEOPLE COULD HAVE NOTICED. OR THE OPPOSITE, BEING SO FIGETY OR RESTLESS THAT YOU HAVE BEEN MOVING AROUND A LOT MORE THAN USUAL: 0
7. TROUBLE CONCENTRATING ON THINGS, SUCH AS READING THE NEWSPAPER OR WATCHING TELEVISION: 0
5. POOR APPETITE OR OVEREATING: 0
SUM OF ALL RESPONSES TO PHQ QUESTIONS 1-9: 4
9. THOUGHTS THAT YOU WOULD BE BETTER OFF DEAD, OR OF HURTING YOURSELF: 0
2. FEELING DOWN, DEPRESSED OR HOPELESS: 0
3. TROUBLE FALLING OR STAYING ASLEEP: 0
SUM OF ALL RESPONSES TO PHQ9 QUESTIONS 1 & 2: 2
SUM OF ALL RESPONSES TO PHQ QUESTIONS 1-9: 4
4. FEELING TIRED OR HAVING LITTLE ENERGY: 2
10. IF YOU CHECKED OFF ANY PROBLEMS, HOW DIFFICULT HAVE THESE PROBLEMS MADE IT FOR YOU TO DO YOUR WORK, TAKE CARE OF THINGS AT HOME, OR GET ALONG WITH OTHER PEOPLE: 1
SUM OF ALL RESPONSES TO PHQ QUESTIONS 1-9: 4
1. LITTLE INTEREST OR PLEASURE IN DOING THINGS: 2
6. FEELING BAD ABOUT YOURSELF - OR THAT YOU ARE A FAILURE OR HAVE LET YOURSELF OR YOUR FAMILY DOWN: 0

## 2022-03-25 ASSESSMENT — LIFESTYLE VARIABLES: HOW OFTEN DO YOU HAVE A DRINK CONTAINING ALCOHOL: NEVER

## 2022-03-25 NOTE — PATIENT INSTRUCTIONS
Body Mass Index: Care Instructions  Your Care Instructions     Body mass index (BMI) can help you see if your weight is raising your risk for health problems. It uses a formula to compare how much you weigh with how tall you are. · A BMI lower than 18.5 is considered underweight. · A BMI between 18.5 and 24.9 is considered healthy. · A BMI between 25 and 29.9 is considered overweight. A BMI of 30 or higher is considered obese. If your BMI is in the normal range, it means that you have a lower risk for weight-related health problems. If your BMI is in the overweight or obese range, you may be at increased risk for weight-related health problems, such as high blood pressure, heart disease, stroke, arthritis or joint pain, and diabetes. If your BMI is in the underweight range, you may be at increased risk for health problems such as fatigue, lower protection (immunity) against illness, muscle loss, bone loss, hair loss, and hormone problems. BMI is just one measure of your risk for weight-related health problems. You may be at higher risk for health problems if you are not active, you eat an unhealthy diet, or you drink too much alcohol or use tobacco products. Follow-up care is a key part of your treatment and safety. Be sure to make and go to all appointments, and call your doctor if you are having problems. It's also a good idea to know your test results and keep a list of the medicines you take. How can you care for yourself at home? · Practice healthy eating habits. This includes eating plenty of fruits, vegetables, whole grains, lean protein, and low-fat dairy. · If your doctor recommends it, get more exercise. Walking is a good choice. Bit by bit, increase the amount you walk every day. Try for at least 30 minutes on most days of the week. · Do not smoke. Smoking can increase your risk for health problems. If you need help quitting, talk to your doctor about stop-smoking programs and medicines. These can increase your chances of quitting for good. · Limit alcohol to 2 drinks a day for men and 1 drink a day for women. Too much alcohol can cause health problems. If you have a BMI higher than 25  · Your doctor may do other tests to check your risk for weight-related health problems. This may include measuring the distance around your waist. A waist measurement of more than 40 inches in men or 35 inches in women can increase the risk of weight-related health problems. · Talk with your doctor about steps you can take to stay healthy or improve your health. You may need to make lifestyle changes to lose weight and stay healthy, such as changing your diet and getting regular exercise. If you have a BMI lower than 18.5  · Your doctor may do other tests to check your risk for health problems. · Talk with your doctor about steps you can take to stay healthy or improve your health. You may need to make lifestyle changes to gain or maintain weight and stay healthy, such as getting more healthy foods in your diet and doing exercises to build muscle. Where can you learn more? Go to https://HiphunterspeEccentex CorporationyairTripvisto.[employer]. org and sign in to your Adknowledge account. Enter S176 in the [employer] box to learn more about \"Body Mass Index: Care Instructions. \"     If you do not have an account, please click on the \"Sign Up Now\" link. Current as of: March 17, 2021               Content Version: 13.1  © 1240-5551 Healthwise, Incorporated. Care instructions adapted under license by [facility] ([facility]). If you have questions about a medical condition or this instruction, always ask your healthcare professional. [place] [id number] any warranty or liability for your use of this information. Learning About Cutting Calories  How do calories affect your weight? Food gives your body energy. Energy from the food you eat is measured in calories.  This energy keeps your heart beating, your brain active, and your muscles working. Your body needs a certain number of calories each day. After your body uses the calories it needs, it stores extra calories as fat. To lose weight safely, you have to eat fewer calories while eating in a healthy way. How many calories do you need each day? The more active you are, the more calories you need. When you are less active, you need fewer calories. How many calories you need each day also depends on several things, including your age and whether you are male or female. Here are some general guidelines for adults:  · Less active women and older adults need 1,600 to 2,000 calories each day. · Active women and less active men need 2,000 to 2,400 calories each day. · Active men need 2,400 to 3,000 calories each day. How can you cut calories and eat healthy meals? Whole grains, vegetables and fruits, and dried beans are good lower-calorie foods. They give you lots of nutrients and fiber. And they fill you up. Sweets, energy drinks, and soda pop are high in calories. They give you few nutrients and no fiber. Try to limit soda pop, fruit juice, and energy drinks. Drink water instead. Some fats can be part of a healthy diet. But cutting back on fats from highly processed foods like fast foods and many snack foods is a good way to lower the calories in your diet. Also, use smaller amounts of fats like butter, margarine, salad dressing, and mayonnaise. Add fresh garlic, lemon, or herbs to your meals to add flavor without adding fat. Meats and dairy products can be a big source of hidden fats. Try to choose lean or low-fat versions of these products. Fat-free cookies, candies, chips, and frozen treats can still be high in sugar and calories. Some fat-free foods have more calories than regular ones. Eat fat-free treats in moderation, as you would other foods. If your favorite foods are high in fat, salt, sugar, or calories, limit how often you eat them.  Eat smaller servings, or look for healthy substitutes. Fill up on fruits, vegetables, and whole grains. Eating at home  · Use meat as a side dish instead of as the main part of your meal.  · Try main dishes that use whole wheat pasta, brown rice, dried beans, or vegetables. · Find ways to cook with little or no fat, such as broiling, steaming, or grilling. · Use cooking spray instead of oil. If you use oil, use a monounsaturated oil, such as canola or olive oil. · Trim fat from meats before you cook them. · Drain off fat after you brown the meat or while you roast it. · Chill soups and stews after you cook them. Then skim the fat off the top after it hardens. Eating out  · Order foods that are broiled or poached rather than fried or breaded. · Cut back on the amount of butter or margarine that you use on bread. · Order sauces, gravies, and salad dressings on the side, and use only a little. · When you order pasta, choose tomato sauce rather than cream sauce. · Ask for salsa with your baked potato instead of sour cream, butter, cheese, or oglesby. · Order meals in a small size instead of upgrading to a large. · Share an entree, or take part of your food home to eat as another meal.  · Share appetizers and desserts. Where can you learn more? Go to https://Lightstorm Networkstammy.healthAuthorBee. org and sign in to your [employer] account. Enter W871 in the [facility] box to learn more about \"Learning About Cutting Calories. \"     If you do not have an account, please click on the \"Sign Up Now\" link. Current as of: September 8, 2021               Content Version: 13.1  © 7675-8224 Healthwise, [employer]. Care instructions adapted under license by [facility] ([facility]). If you have questions about a medical condition or this instruction, always ask your healthcare professional. [address]  any warranty or liability for your use of this information.            Learning About Healthy Weight  What is a healthy weight? A healthy weight is the weight at which you feel good about yourself and have energy for work and play. It's also one that lowers your risk for health problems. What can you do to stay at a healthy weight? It can be hard to stay at a healthy weight, especially when fast food, vending-machine snacks, and processed foods are so easy to find. And with your busy lifestyle, activity may be low on your list of things to do. But staying at a healthy weight may be easier than you think. Here are some dos and don'ts for staying at a healthy weight. Do eat healthy foods  The kinds of foods you eat have a big impact on both your weight and your health. Reaching and staying at a healthy weight is not about going on a diet. It's about making healthier food choices every day and changing your diet for good. Healthy eating means eating a variety of foods so that you get all the nutrients you need. Your body needs protein, carbohydrate, and fats for energy. They keep your heart beating, your brain active, and your muscles working. On most days, try to eat from each food group. This means eating a variety of:  · Whole grains, such as whole wheat breads and pastas. · Fruits and vegetables. · Dairy products, such as low-fat milk, yogurt, and cheese. · Lean proteins, such as all types of fish, chicken without the skin, and beans. Don't have too much or too little of one thing. All foods, if eaten in moderation, can be part of healthy eating. Even sweets can be okay. If your favorite foods are high in fat, salt, sugar, or calories, limit how often you eat them. Eat smaller servings, or look for healthy substitutes. Do watch what you eat  Many people eat more than their bodies need. Part of staying at a healthy weight means learning how much food you really need from day to day and not eating more than that. Even with healthy foods, eating too much can make you gain weight.   Having a well-balanced diet means that you eat enough, but not too much, and that your food gives you the nutrients you need to stay healthy. So listen to your body. Eat when you're hungry. Stop when you feel satisfied. It's a good idea to have healthy snacks ready for when you get hungry. Keep healthy snacks with you at work, in your car, and at home. If you have a healthy snack easily available, you'll be less likely to pick a candy bar or bag of chips from a vending machine instead. Some healthy snacks you might want to keep on hand are fruit, low-fat yogurt, string cheese, low-fat microwave popcorn, raisins and other dried fruit, nuts, whole wheat crackers, pretzels, carrots, celery sticks, and broccoli. Do some physical activity  A big part of reaching and staying at a healthy weight is being active. When you're active, you burn calories. This makes it easier to reach and stay at a healthy weight. When you're active on a regular basis, your body burns more calories, even when you're at rest. Being active helps you lose fat and build lean muscle. Try to be active for at least 1 hour every day. This may sound like a lot, but it's okay to be active in smaller blocks of time that add up to 1 hour a day. Any activity that makes your heart beat faster and keeps it there for a while counts. A brisk walk, run, or swim will get your heart beating faster. So will climbing stairs, shooting baskets, or cycling. Even some household chores like vacuuming and mowing the lawn will get your heart rate up. Pick activities that you enjoyones that make your heart beat faster, your muscles stronger, and your muscles and joints more flexible. If you find more than one thing you like doing, do them all. You don't have to do the same thing every day. Don't diet  Diets don't work. Diets are temporary. Because you give up so much when you diet, you may be hungry and think about food all the time.  And after you stop dieting, you also may overeat to make up for what you missed. Most people who diet end up gaining back the pounds they lostand more. Remember that healthy bodies come in lots of shapes and sizes. Everyone can get healthier by eating better and being more active. Where can you learn more? Go to https://chtammy.[employer]. org and sign in to your Poll Everywhere account. Enter 832 [id number] in the [employer] box to learn more about \"Learning About Healthy Weight. \"     If you do not have an account, please click on the \"Sign Up Now\" link. Current as of: March 17, 2021               Content Version: 13.1  © 5755-5914 Healthwise, [employer]. Care instructions adapted under license by [facility] ([facility]). If you have questions about a medical condition or this instruction, always ask your healthcare professional. [address] any warranty or liability for your use of this information. Personalized Preventive Plan for David Mckinnon - 3/25/2022  Medicare offers a range of preventive health benefits. Some of the tests and screenings are paid in full while other may be subject to a deductible, co-insurance, and/or copay. Some of these benefits include a comprehensive review of your medical history including lifestyle, illnesses that may run in your family, and various assessments and screenings as appropriate. After reviewing your medical record and screening and assessments performed today your provider may have ordered immunizations, labs, imaging, and/or referrals for you. A list of these orders (if applicable) as well as your Preventive Care list are included within your After Visit Summary for your review. Other Preventive Recommendations:    · A preventive eye exam performed by an eye specialist is recommended every 1-2 years to screen for glaucoma; cataracts, macular degeneration, and other eye disorders. · A preventive dental visit is recommended every 6 months.   · Try to get at least 150 minutes of exercise per week or 10,000 steps per day on a pedometer . · Order or download the FREE \"Exercise & Physical Activity: Your Everyday Guide\" from [employer]. Call 0-959.870.1356 or search [employer] online. · You need 6079-7200 mg of calcium and 5484-6259 IU of vitamin D per day. It is possible to meet your calcium requirement with diet alone, but a vitamin D supplement is usually necessary to meet this goal.  · When exposed to the sun, use a sunscreen that protects against both UVA and UVB radiation with an SPF of 30 or greater. Reapply every 2 to 3 hours or after sweating, drying off with a towel, or swimming. · Always wear a seat belt when traveling in a car. Always wear a helmet when riding a bicycle or motorcycle. Preventing Osteoporosis: After Your Visit  Your Care Instructions  Osteoporosis means the bones are weak and thin enough that they can break easily. The older you are, the more likely you are to get osteoporosis. But with plenty of calcium, vitamin D, and exercise, you can help prevent osteoporosis. The preteen and teen years are a key time for bone building. With the help of calcium, vitamin D, and exercise in those early years and beyond, the bones reach their peak density and strength by age 27. After age 27, your bones naturally start to thin and weaken. The stronger your bones are at around age 27, the lower your risk for osteoporosis. But no matter what your age and risk are, your bones still need calcium, vitamin D, and exercise to stay strong. Also avoid smoking, and limit alcohol. Smoking and heavy alcohol use can make your bones thinner. Talk to your doctor about any special risks you might have, such as having a close relative with osteoporosis or taking a medicine that can weaken bones. Your doctor can tell you the best ways to protect your bones from thinning. Follow-up care is a key part of your treatment and safety.  Be sure to make and go to all appointments, and call your doctor if you are having problems. It's also a good idea to know your test results and keep a list of the medicines you take. How can you care for yourself at home? Get enough calcium and vitamin D. The [place] of Medicine recommends adults younger than age 46 need 1,000 mg of calcium and 600 IU of vitamin D each day. Women ages 46 to 79 need 1,200 mg of calcium and 600 IU of vitamin D each day. Men ages 46 to 79 need 1,000 mg of calcium and 600 IU of vitamin D each day. Adults 71 and older need 1,200 mg of calcium and 800 IU of vitamin D each day. Eat foods rich in calcium, like yogurt, cheese, milk, and dark green vegetables. Eat foods rich in vitamin D, like eggs, fatty fish, cereal, and fortified milk. Get some sunshine. Your body uses sunshine to make its own vitamin D. The safest time to be out in the sun is before 10 a.m. or after 3 p.m. Avoid getting sunburned. Sunburn can increase your risk of skin cancer. Talk to your doctor about taking a calcium plus vitamin D supplement. Ask about what type of calcium is right for you, and how much to take at a time. Adults ages 23 to 48 should not get more than 2,500 mg of calcium and 4,000 IU of vitamin D each day, whether it is from supplements and/or food. Adults ages 46 and older should not get more than 2,000 mg of calcium and 4,000 IU of vitamin D each day from supplements and/or food. Get regular bone-building exercise. Weight-bearing and resistance exercises keep bones healthy by working the muscles and bones against gravity. Start out at an exercise level that feels right for you. Add a little at a time until you can do the following:  Do 30 minutes of weight-bearing exercise on most days of the week. Walking, jogging, stair climbing, and dancing are good choices. Do resistance exercises with weights or elastic bands 2 to 3 days a week. Limit alcohol. Drink no more than 1 alcohol drink a day if you are a woman.  Drink no more than 2 alcohol drinks a day if you are a man. Do not smoke. Smoking can make bones thin faster. If you need help quitting, talk to your doctor about stop-smoking programs and medicines. These can increase your chances of quitting for good. When should you call for help? Watch closely for changes in your health, and be sure to contact your doctor if:  You need help with a healthy eating plan. You need help with an exercise plan    © 4288-1381 [employer]io. Care instructions adapted under license by [facility]. This care instruction is for use with your licensed healthcare professional. If you have questions about a medical condition or this instruction, always ask your healthcare professional. [address] any warranty or liability for your use of this information. Content Version: 9.4.52119; Last Revised: June 20, 2011              ·     Keep Your Memory Jodi Bares       Many factors can affect your ability to remembera hectic lifestyle, aging, stress, chronic disease, and certain medicines. But, there are steps you can take to sharpen your mind and help preserve your memory. Challenge Your Brain   Regularly challenging your mind may help keeps it in top shape. Good mental exercises include:   Crossword puzzlesUse a dictionary if you need it; you will learn more that way. Brainteasers Try some! Crafts, such as wood working and sewing   Hobbies, such as gardening and building model airplanes   SocializingVisit old friends or join groups to meet new ones.    Reading   Learning a new language   Taking a class, whether it be art history or jenny chi   TravelingExperience the food, history, and culture of your destination   Learning to use a computer   Going to museums, the theater, or thought-provoking movies   Changing things in your daily life, such as reversing your pattern in the grocery store or brushing your teeth using your nondominant hand   Use Memory Aids important when your mind is cluttered. Make time for relaxation. Choose activities that calm you down, and make it routine. Manage Chronic Conditions    Side effects of high blood pressure , diabetes, and heart disease can interfere with mental function. Many of the lifestyle steps discussed here can help manage these conditions. Strive to eat a healthy diet, exercise regularly, get stress under control, and follow your doctor's advice for your condition. Minimize Medications    Talk to your doctor about the medicines that you take. Some may be unnecessary. Also, healthy lifestyle habits may lower the need for certain drugs. Last Reviewed: April 2010 Camille Connolly MD   Updated: 4/13/2010   ·     [address]       As we get older, changes in balance, gait, strength, vision, hearing, and cognition make even the most youthful senior more prone to accidents. Falls are one of the leading health risks for older people. This increased risk of falling is related to:   Aging process (eg, decreased muscle strength, slowed reflexes)   Higher incidence of chronic health problems (eg, arthritis, diabetes) that may limit mobility, agility or sensory awareness   Side effects of medicine (eg, dizziness, blurred vision)especially medicines like prescription pain medicines and drugs used to treat mental health conditions   Depending on the brittleness of your bones, the consequences of a fall can be serious and long lasting. Home Life   Research by the Association of Aging [facility]) shows that some home accidents among older adults can be prevented by making simple lifestyle changes and basic modifications and repairs to the home environment. Here are some lifestyle changes that experts recommend:   Have your hearing and vision checked regularly. Be sure to wear prescription glasses that are right for you. Speak to your doctor or pharmacist about the possible side effects of your medicines.  A number of medicines can cause dizziness. If you have problems with sleep, talk to your doctor. Limit your intake of alcohol. If necessary, use a cane or walker to help maintain your balance. Wear supportive, rubber-soled shoes, even at home. If you live in a region that gets wintry weather, you may want to put special cleats on your shoes to prevent you from slipping on the snow and ice. Exercise regularly to help maintain muscle tone, agility, and balance. Always hold the banister when going up or down stairs. Also, use  bars when getting in or out of the bath or shower, or using the toilet. To avoid dizziness, get up slowly from a lying down position. Sit up first, dangling your legs for a minute or two before rising to a standing position. Overall Home Safety Check   According to the Consumer Product Safety Commision's \"Older Consumer Home Safety Checklist,\" it is important to check for potential hazards in each room. And remember, proper lighting is an essential factor in home safety. If you cannot see clearly, you are more likely to fall. Important questions to ask yourself include:   Are lamp, electric, extension, and telephone cords placed out of the flow of traffic and maintained in good condition? Have frayed cords been replaced? Are all small rugs and runners slip resistant? If not, you can secure them to the floor with a special double-sided carpet tape. Are smoke detectors properly locatedone on every floor of your home and one outside of every sleeping area? Are they in good working order? Are batteries replaced at least once a year? Do you have a well-maintained carbon monoxide detector outside every sleeping are in your home? Does your furniture layout leave plenty of space to maneuver between and around chairs, tables, beds, and sofas? Are hallways, stairs and passages between rooms well lit? Can you reach a lamp without getting out of bed? Are floor surfaces well maintained? Shag rugs, high-pile carpeting, tile floors, and polished wood floors can be particularly slippery. Stairs should always have handrails and be carpeted or fitted with a non-skid tread. Is your telephone easily reachable. Is the cord safely tucked away? Room by Room   According to the Association of Aging, bathrooms and jigna are the two most potentially hazardous rooms in your home. In the Kitchen    Be sure your stove is in proper working order and always make sure burners and the oven are off before you go out or go to sleep. Keep pots on the back burners, turn handles away from the front of the stove, and keep stove clean and free of grease build-up. Kitchen ventilation systems and range exhausts should be working properly. Keep flammable objects such as towels and pot holders away from the cooking area except when in use. Make sure kitchen curtains are tied back. Move cords and appliances away from the sink and hot surfaces. If extension cords are needed, install wiring guides so they do not hang over the sink, range, or working areas. Look for coffee pots, kettles and toaster ovens with automatic shut-offs. Keep a mop handy in the kitchen so you can wipe up spills instantly. You should also have a small fire extinguisher. Arrange your kitchen with frequently used items on lower shelves to avoid the need to stand on a stepstool to reach them. Make sure countertops are well-lit to avoid injuries while cutting and preparing food. In the Bathroom    Use a non-slip mat or decals in the tub and shower, since wet, soapy tile or porcelain surfaces are extremely slippery. Make sure bathroom rugs are non-skid or tape them firmly to the floor. Bathtubs should have at least one, preferably two, grab bars, firmly attached to structural supports in the wall.  (Do not use built-in soap holders or glass shower doors as grab bars.)    Tub seats fitted with non-slip material on the legs allow you to wash sitting down. For people with limited mobility, bathtub transfer benches allow you to slide safely into the tub. Raised toilet seats and toilet safety rails are helpful for those with knee or hip problems. In the [facility]    Make sure you use a nightlight and that the area around your bed is clear of potential obstacles. Be careful with electric blankets and never go to sleep with a heating pad, which can cause serious burns even if on a low setting. Use fire-resistant mattress covers and pillows, and NEVER smoke in bed. Keep a phone next to the bed that is programmed to dial 911 at the push of a button. If you have a chronic condition, you may want to sign on with an automatic call-in service. Typically the system includes a small pendant that connects directly to an emergency medical voice-response system. You should also make arrangements to stay in contact with someonefriend, neighbor, family memberon a regular schedule. Fire Prevention   According to the [employer] (Smoke Alarms for Every) [address] [place] [address], senior citizens are one of the two highest risk groups for death and serious injuries due to residential fires. When cooking, wear short-sleeved items, never a bulky long-sleeved robe. The [facility]'s Safety Checklist for Older Consumers emphasizes the importance of checking basements, garages, workshops and storage areas for fire hazards, such as volatile liquids, piles of old rags or clothing and overloaded circuits. Never smoke in bed or when lying down on a couch or recliner chair. Small portable electric or kerosene heaters are responsible for many home fires and should be used cautiously if at all. If you do use one, be sure to keep them away from flammable materials. In case of fire, make sure you have a pre-established emergency exit plan. Have a professional check your fireplace and other fuel-burning appliances yearly.     Helping Hands   Baby boomers entering the kelly years will continue to see the development of new products to help older adults live safely and independently in spite of age-related changes. Making Life More Livable  , by [employer], lists over 1,000 products for \"living well in the mature years,\" such as bathing and mobility aids, household security devices, ergonomically designed knives and peelers, and faucet valves and knobs for temperature control. Medical supply stores and organizations are good sources of information about products that improve your quality of life and insure your safety.      Last Reviewed: November 2009 eDvyn Castorena MD   Updated: 3/7/2011     ·

## 2022-04-21 DIAGNOSIS — D50.9 IRON DEFICIENCY ANEMIA, UNSPECIFIED IRON DEFICIENCY ANEMIA TYPE: ICD-10-CM

## 2022-04-21 RX ORDER — FERROUS SULFATE 325(65) MG
TABLET ORAL
Qty: 90 TABLET | Refills: 1 | Status: SHIPPED | OUTPATIENT
Start: 2022-04-21 | End: 2022-10-31

## 2022-05-03 ENCOUNTER — APPOINTMENT (OUTPATIENT)
Dept: CT IMAGING | Age: 82
DRG: 871 | End: 2022-05-03
Payer: MEDICARE

## 2022-05-03 ENCOUNTER — APPOINTMENT (OUTPATIENT)
Dept: GENERAL RADIOLOGY | Age: 82
DRG: 871 | End: 2022-05-03
Payer: MEDICARE

## 2022-05-03 ENCOUNTER — HOSPITAL ENCOUNTER (INPATIENT)
Age: 82
LOS: 2 days | Discharge: HOME | DRG: 871 | End: 2022-05-05
Attending: STUDENT IN AN ORGANIZED HEALTH CARE EDUCATION/TRAINING PROGRAM | Admitting: HOSPITALIST
Payer: MEDICARE

## 2022-05-03 DIAGNOSIS — K81.0 ACUTE CHOLECYSTITIS: Primary | ICD-10-CM

## 2022-05-03 LAB
A/G RATIO: 1.4 (ref 1.1–2.2)
ALBUMIN SERPL-MCNC: 3.7 G/DL (ref 3.4–5)
ALP BLD-CCNC: 202 U/L (ref 40–129)
ALT SERPL-CCNC: 148 U/L (ref 10–40)
ANION GAP SERPL CALCULATED.3IONS-SCNC: 14 MMOL/L (ref 3–16)
APTT: 30.9 SEC (ref 26.2–38.6)
AST SERPL-CCNC: 75 U/L (ref 15–37)
BACTERIA: ABNORMAL /HPF
BASOPHILS ABSOLUTE: 0 K/UL (ref 0–0.2)
BASOPHILS RELATIVE PERCENT: 0.3 %
BILIRUB SERPL-MCNC: 1.9 MG/DL (ref 0–1)
BILIRUBIN DIRECT: 1.1 MG/DL (ref 0–0.3)
BILIRUBIN URINE: NEGATIVE
BILIRUBIN, INDIRECT: 0.8 MG/DL (ref 0–1)
BLOOD, URINE: ABNORMAL
BUN BLDV-MCNC: 20 MG/DL (ref 7–20)
CALCIUM SERPL-MCNC: 8.7 MG/DL (ref 8.3–10.6)
CHLORIDE BLD-SCNC: 91 MMOL/L (ref 99–110)
CLARITY: CLEAR
CO2: 24 MMOL/L (ref 21–32)
COLOR: YELLOW
CREAT SERPL-MCNC: 1.1 MG/DL (ref 0.8–1.3)
EKG ATRIAL RATE: 107 BPM
EKG DIAGNOSIS: NORMAL
EKG P AXIS: 31 DEGREES
EKG P-R INTERVAL: 200 MS
EKG Q-T INTERVAL: 336 MS
EKG QRS DURATION: 104 MS
EKG QTC CALCULATION (BAZETT): 448 MS
EKG R AXIS: -48 DEGREES
EKG T AXIS: 66 DEGREES
EKG VENTRICULAR RATE: 107 BPM
EOSINOPHILS ABSOLUTE: 0.1 K/UL (ref 0–0.6)
EOSINOPHILS RELATIVE PERCENT: 1.4 %
EPITHELIAL CELLS, UA: ABNORMAL /HPF (ref 0–5)
GFR AFRICAN AMERICAN: >60
GFR NON-AFRICAN AMERICAN: >60
GLUCOSE BLD-MCNC: 145 MG/DL (ref 70–99)
GLUCOSE BLD-MCNC: 151 MG/DL (ref 70–99)
GLUCOSE URINE: NEGATIVE MG/DL
HCT VFR BLD CALC: 40.1 % (ref 40.5–52.5)
HEMOGLOBIN: 13.9 G/DL (ref 13.5–17.5)
INFLUENZA A: NOT DETECTED
INFLUENZA B: NOT DETECTED
INR BLD: 1.33 (ref 0.88–1.12)
KETONES, URINE: NEGATIVE MG/DL
LACTIC ACID, SEPSIS: 1.6 MMOL/L (ref 0.4–1.9)
LEUKOCYTE ESTERASE, URINE: NEGATIVE
LYMPHOCYTES ABSOLUTE: 0.5 K/UL (ref 1–5.1)
LYMPHOCYTES RELATIVE PERCENT: 6.6 %
MAGNESIUM: 2 MG/DL (ref 1.8–2.4)
MCH RBC QN AUTO: 30.8 PG (ref 26–34)
MCHC RBC AUTO-ENTMCNC: 34.8 G/DL (ref 31–36)
MCV RBC AUTO: 88.7 FL (ref 80–100)
MICROSCOPIC EXAMINATION: YES
MONOCYTES ABSOLUTE: 0.3 K/UL (ref 0–1.3)
MONOCYTES RELATIVE PERCENT: 3.9 %
NEUTROPHILS ABSOLUTE: 6.4 K/UL (ref 1.7–7.7)
NEUTROPHILS RELATIVE PERCENT: 87.8 %
NITRITE, URINE: NEGATIVE
OTHER OBSERVATIONS UA: ABNORMAL
PDW BLD-RTO: 13.7 % (ref 12.4–15.4)
PERFORMED ON: ABNORMAL
PH UA: 6 (ref 5–8)
PLATELET # BLD: 188 K/UL (ref 135–450)
PMV BLD AUTO: 7.1 FL (ref 5–10.5)
POTASSIUM REFLEX MAGNESIUM: 3.5 MMOL/L (ref 3.5–5.1)
PROCALCITONIN: 1.09 NG/ML (ref 0–0.15)
PROTEIN UA: 30 MG/DL
PROTHROMBIN TIME: 15.2 SEC (ref 9.9–12.7)
RBC # BLD: 4.52 M/UL (ref 4.2–5.9)
RBC UA: ABNORMAL /HPF (ref 0–4)
SARS-COV-2 RNA, RT PCR: NOT DETECTED
SODIUM BLD-SCNC: 129 MMOL/L (ref 136–145)
SPECIFIC GRAVITY UA: 1.01 (ref 1–1.03)
TOTAL PROTEIN: 6.4 G/DL (ref 6.4–8.2)
TROPONIN: <0.01 NG/ML
URINE TYPE: ABNORMAL
UROBILINOGEN, URINE: 1 E.U./DL
WBC # BLD: 7.2 K/UL (ref 4–11)
WBC UA: ABNORMAL /HPF (ref 0–5)

## 2022-05-03 PROCEDURE — 6370000000 HC RX 637 (ALT 250 FOR IP): Performed by: STUDENT IN AN ORGANIZED HEALTH CARE EDUCATION/TRAINING PROGRAM

## 2022-05-03 PROCEDURE — 6360000004 HC RX CONTRAST MEDICATION: Performed by: STUDENT IN AN ORGANIZED HEALTH CARE EDUCATION/TRAINING PROGRAM

## 2022-05-03 PROCEDURE — 74177 CT ABD & PELVIS W/CONTRAST: CPT

## 2022-05-03 PROCEDURE — 84484 ASSAY OF TROPONIN QUANT: CPT

## 2022-05-03 PROCEDURE — 87040 BLOOD CULTURE FOR BACTERIA: CPT

## 2022-05-03 PROCEDURE — 81001 URINALYSIS AUTO W/SCOPE: CPT

## 2022-05-03 PROCEDURE — 80053 COMPREHEN METABOLIC PANEL: CPT

## 2022-05-03 PROCEDURE — 82248 BILIRUBIN DIRECT: CPT

## 2022-05-03 PROCEDURE — 1200000000 HC SEMI PRIVATE

## 2022-05-03 PROCEDURE — G0378 HOSPITAL OBSERVATION PER HR: HCPCS

## 2022-05-03 PROCEDURE — 71045 X-RAY EXAM CHEST 1 VIEW: CPT

## 2022-05-03 PROCEDURE — 96375 TX/PRO/DX INJ NEW DRUG ADDON: CPT

## 2022-05-03 PROCEDURE — 85610 PROTHROMBIN TIME: CPT

## 2022-05-03 PROCEDURE — 70450 CT HEAD/BRAIN W/O DYE: CPT

## 2022-05-03 PROCEDURE — 84145 PROCALCITONIN (PCT): CPT

## 2022-05-03 PROCEDURE — 85025 COMPLETE CBC W/AUTO DIFF WBC: CPT

## 2022-05-03 PROCEDURE — 93005 ELECTROCARDIOGRAM TRACING: CPT | Performed by: STUDENT IN AN ORGANIZED HEALTH CARE EDUCATION/TRAINING PROGRAM

## 2022-05-03 PROCEDURE — 36415 COLL VENOUS BLD VENIPUNCTURE: CPT

## 2022-05-03 PROCEDURE — 6360000002 HC RX W HCPCS: Performed by: STUDENT IN AN ORGANIZED HEALTH CARE EDUCATION/TRAINING PROGRAM

## 2022-05-03 PROCEDURE — 87086 URINE CULTURE/COLONY COUNT: CPT

## 2022-05-03 PROCEDURE — 6370000000 HC RX 637 (ALT 250 FOR IP): Performed by: INTERNAL MEDICINE

## 2022-05-03 PROCEDURE — 96365 THER/PROPH/DIAG IV INF INIT: CPT

## 2022-05-03 PROCEDURE — 2580000003 HC RX 258: Performed by: STUDENT IN AN ORGANIZED HEALTH CARE EDUCATION/TRAINING PROGRAM

## 2022-05-03 PROCEDURE — 99285 EMERGENCY DEPT VISIT HI MDM: CPT

## 2022-05-03 PROCEDURE — 87636 SARSCOV2 & INF A&B AMP PRB: CPT

## 2022-05-03 PROCEDURE — 85730 THROMBOPLASTIN TIME PARTIAL: CPT

## 2022-05-03 PROCEDURE — 83605 ASSAY OF LACTIC ACID: CPT

## 2022-05-03 PROCEDURE — 83735 ASSAY OF MAGNESIUM: CPT

## 2022-05-03 PROCEDURE — 2500000003 HC RX 250 WO HCPCS: Performed by: STUDENT IN AN ORGANIZED HEALTH CARE EDUCATION/TRAINING PROGRAM

## 2022-05-03 RX ORDER — PRAMIPEXOLE DIHYDROCHLORIDE 1 MG/1
1 TABLET ORAL 2 TIMES DAILY
Status: DISCONTINUED | OUTPATIENT
Start: 2022-05-03 | End: 2022-05-05 | Stop reason: HOSPADM

## 2022-05-03 RX ORDER — MORPHINE SULFATE 2 MG/ML
2 INJECTION, SOLUTION INTRAMUSCULAR; INTRAVENOUS EVERY 4 HOURS PRN
Status: DISCONTINUED | OUTPATIENT
Start: 2022-05-03 | End: 2022-05-05 | Stop reason: HOSPADM

## 2022-05-03 RX ORDER — LEVOTHYROXINE SODIUM 0.05 MG/1
75 TABLET ORAL DAILY
Status: DISCONTINUED | OUTPATIENT
Start: 2022-05-04 | End: 2022-05-05 | Stop reason: HOSPADM

## 2022-05-03 RX ORDER — ACETAMINOPHEN 325 MG/1
650 TABLET ORAL ONCE
Status: COMPLETED | OUTPATIENT
Start: 2022-05-03 | End: 2022-05-03

## 2022-05-03 RX ORDER — ASPIRIN 81 MG/1
81 TABLET ORAL DAILY
Status: DISCONTINUED | OUTPATIENT
Start: 2022-05-04 | End: 2022-05-05 | Stop reason: HOSPADM

## 2022-05-03 RX ORDER — ONDANSETRON 2 MG/ML
4 INJECTION INTRAMUSCULAR; INTRAVENOUS EVERY 6 HOURS PRN
Status: DISCONTINUED | OUTPATIENT
Start: 2022-05-03 | End: 2022-05-05 | Stop reason: HOSPADM

## 2022-05-03 RX ORDER — SODIUM CHLORIDE 9 MG/ML
INJECTION, SOLUTION INTRAVENOUS CONTINUOUS
Status: DISCONTINUED | OUTPATIENT
Start: 2022-05-03 | End: 2022-05-05

## 2022-05-03 RX ORDER — AMLODIPINE BESYLATE 5 MG/1
10 TABLET ORAL DAILY
Status: DISCONTINUED | OUTPATIENT
Start: 2022-05-04 | End: 2022-05-05 | Stop reason: HOSPADM

## 2022-05-03 RX ORDER — POLYETHYLENE GLYCOL 3350 17 G/17G
17 POWDER, FOR SOLUTION ORAL DAILY PRN
Status: DISCONTINUED | OUTPATIENT
Start: 2022-05-03 | End: 2022-05-05 | Stop reason: HOSPADM

## 2022-05-03 RX ORDER — ACETAMINOPHEN 325 MG/1
650 TABLET ORAL EVERY 6 HOURS PRN
Status: DISCONTINUED | OUTPATIENT
Start: 2022-05-03 | End: 2022-05-05 | Stop reason: HOSPADM

## 2022-05-03 RX ORDER — ACETAMINOPHEN 650 MG/1
650 SUPPOSITORY RECTAL EVERY 6 HOURS PRN
Status: DISCONTINUED | OUTPATIENT
Start: 2022-05-03 | End: 2022-05-05 | Stop reason: HOSPADM

## 2022-05-03 RX ORDER — 0.9 % SODIUM CHLORIDE 0.9 %
1000 INTRAVENOUS SOLUTION INTRAVENOUS ONCE
Status: COMPLETED | OUTPATIENT
Start: 2022-05-03 | End: 2022-05-03

## 2022-05-03 RX ORDER — SODIUM CHLORIDE 9 MG/ML
INJECTION, SOLUTION INTRAVENOUS PRN
Status: DISCONTINUED | OUTPATIENT
Start: 2022-05-03 | End: 2022-05-05 | Stop reason: HOSPADM

## 2022-05-03 RX ORDER — ONDANSETRON 4 MG/1
4 TABLET, ORALLY DISINTEGRATING ORAL EVERY 8 HOURS PRN
Status: DISCONTINUED | OUTPATIENT
Start: 2022-05-03 | End: 2022-05-05 | Stop reason: HOSPADM

## 2022-05-03 RX ORDER — SODIUM CHLORIDE 0.9 % (FLUSH) 0.9 %
5-40 SYRINGE (ML) INJECTION PRN
Status: DISCONTINUED | OUTPATIENT
Start: 2022-05-03 | End: 2022-05-05 | Stop reason: HOSPADM

## 2022-05-03 RX ORDER — ATORVASTATIN CALCIUM 40 MG/1
40 TABLET, FILM COATED ORAL DAILY
Status: DISCONTINUED | OUTPATIENT
Start: 2022-05-04 | End: 2022-05-05 | Stop reason: HOSPADM

## 2022-05-03 RX ORDER — ENOXAPARIN SODIUM 100 MG/ML
30 INJECTION SUBCUTANEOUS 2 TIMES DAILY
Status: DISCONTINUED | OUTPATIENT
Start: 2022-05-03 | End: 2022-05-05 | Stop reason: HOSPADM

## 2022-05-03 RX ORDER — METRONIDAZOLE 500 MG/100ML
500 INJECTION, SOLUTION INTRAVENOUS EVERY 8 HOURS
Status: DISCONTINUED | OUTPATIENT
Start: 2022-05-03 | End: 2022-05-03

## 2022-05-03 RX ORDER — ALBUTEROL SULFATE 90 UG/1
2 AEROSOL, METERED RESPIRATORY (INHALATION) EVERY 6 HOURS PRN
Status: DISCONTINUED | OUTPATIENT
Start: 2022-05-03 | End: 2022-05-05 | Stop reason: HOSPADM

## 2022-05-03 RX ORDER — SODIUM CHLORIDE 0.9 % (FLUSH) 0.9 %
5-40 SYRINGE (ML) INJECTION EVERY 12 HOURS SCHEDULED
Status: DISCONTINUED | OUTPATIENT
Start: 2022-05-03 | End: 2022-05-05 | Stop reason: HOSPADM

## 2022-05-03 RX ORDER — PANTOPRAZOLE SODIUM 40 MG/1
40 TABLET, DELAYED RELEASE ORAL
Status: DISCONTINUED | OUTPATIENT
Start: 2022-05-04 | End: 2022-05-05 | Stop reason: HOSPADM

## 2022-05-03 RX ADMIN — ACETAMINOPHEN 650 MG: 325 TABLET ORAL at 16:07

## 2022-05-03 RX ADMIN — CEFEPIME 2000 MG: 2 INJECTION, POWDER, FOR SOLUTION INTRAVENOUS at 19:38

## 2022-05-03 RX ADMIN — METRONIDAZOLE 500 MG: 500 INJECTION, SOLUTION INTRAVENOUS at 18:34

## 2022-05-03 RX ADMIN — IOPAMIDOL 75 ML: 755 INJECTION, SOLUTION INTRAVENOUS at 16:55

## 2022-05-03 RX ADMIN — SODIUM CHLORIDE 1000 ML: 9 INJECTION, SOLUTION INTRAVENOUS at 16:07

## 2022-05-03 ASSESSMENT — ENCOUNTER SYMPTOMS
VOMITING: 0
ABDOMINAL PAIN: 1
NAUSEA: 1
SHORTNESS OF BREATH: 0
COUGH: 0
CONSTIPATION: 0
BACK PAIN: 0
DIARRHEA: 1
SORE THROAT: 0
PHOTOPHOBIA: 0
RHINORRHEA: 0

## 2022-05-03 ASSESSMENT — LIFESTYLE VARIABLES: HOW OFTEN DO YOU HAVE A DRINK CONTAINING ALCOHOL: NEVER

## 2022-05-03 ASSESSMENT — PAIN - FUNCTIONAL ASSESSMENT: PAIN_FUNCTIONAL_ASSESSMENT: NONE - DENIES PAIN

## 2022-05-03 NOTE — ED PROVIDER NOTES
[address] [facility]  EMERGENCY DEPARTMENT ENCOUNTER      Pt Name: Cielo Michael  MRN: [id number]  [place] 1940  Date of evaluation: 5/3/2022  Provider: Susanne Hoffman DO    CHIEF COMPLAINT       Chief Complaint   Patient presents with    Extremity Weakness     Pt's wife states that pt wasn't able to walk and had slurred speech on Saturday. Wife states that he continured to get worse on Sunday. Wife states that everything started to result on Monday. Wife states that weakness and confusion and slurred speech started about 30 minutes PTA. HISTORY OF PRESENT ILLNESS   (Location/Symptom, Timing/Onset, Context/Setting, Quality, Duration, Modifying Factors, Severity)  Note limiting factors. Cielo Michael is a [de-identified] y.o. male who presents to the emergency department complaining of arrives with several day history of generalized weakness fatigue chills. No reported fevers wife reports that patient just looks generally weak and ill. Symptoms began this past Saturday. States that patient just extremely fatigued appearing, was mumbling his speech. Did not have any focal neurologic deficit. I was called emergently to evaluate patient in nursing triage room due to his slurred speech complaint. On arrival patient was found to be tachycardic and febrile. Did not have any focal deficits. He denied chest pain denied shortness of breath cough. Did complain of abdominal pain, states that he recently got over a GI diarrheal illness couple days before his symptoms began. He has had chills and tremors intermittently over the last couple of days. Does have upper abdominal pain, more focal in the right upper quadrant on exam.  Had worsening of his weakness and wife reports that he seems somewhat confused 30 minutes prior to arrival today. Nursing Notes were reviewed.     PAST MEDICAL HISTORY     Past Medical History:   Diagnosis Date    Arthritis     Back injuries     BPH (benign prostatic hyperplasia)     Chest pain 03/2021    CPAP (continuous positive airway pressure) dependence     Diverticulosis     colonoscopy 9/2015    Esophageal dysmotility     GERD (gastroesophageal reflux disease)     Hiatal hernia     History of colon polyps     seen on colonoscopy again 9/2015    Hyperlipidemia     Hypertension     ILD (interstitial lung disease) ([facility] [address] 07/30/2013    Internal hemorrhoid     colonoscopy 2/83/61    Lichen sclerosus of penis 2017    Dr Bao De La Cruz Mild cognitive impairment 08/2016    [facility] neurology    COLLIN (obstructive sleep apnea)     Renal insufficiency     Restless legs syndrome     Rosacea     Schatzki's ring     last dilated 2002    Scrotal pruritus 02/08/2018    Sleep apnea          SURGICAL HISTORY       Past Surgical History:   Procedure Laterality Date    CARDIAC CATHETERIZATION  03/04/2021    Non Obs CAD, medical management    CIRCUMCISION      COLONOSCOPY  2010    COLONOSCOPY  09/14/2015    CORONARY ANGIOPLASTY  10/15/2019    CYSTOSCOPY  05/13/2016    CYSTOSCOPY  05/19/2017    CYSTOSCOPY     CYSTOSCOPY  07/14/2017    ENDOSCOPY, COLON, DIAGNOSTIC  10/11/2017    esoph. stricture    FRACTURE SURGERY      right leg    FRACTURE SURGERY      wrist   right    HERNIA REPAIR      PROSTATE SURGERY      TOE SURGERY      TURP  07/14/2017    UPPER GASTROINTESTINAL ENDOSCOPY  09/12/2016    UPPER GASTROINTESTINAL ENDOSCOPY  10/11/2017    esophageal stricture    UPPER GASTROINTESTINAL ENDOSCOPY  01/28/2020         CURRENT MEDICATIONS       Previous Medications    ALBUTEROL SULFATE HFA (VENTOLIN HFA) 108 (90 BASE) MCG/ACT INHALER    Inhale 2 puffs into the lungs every 6 hours as needed for Wheezing or Shortness of Breath    AMLODIPINE (NORVASC) 10 MG TABLET    TAKE ONE TABLET BY MOUTH DAILY    ASPIRIN EC 81 MG EC TABLET    Take 1 tablet by mouth daily    ATORVASTATIN (LIPITOR) 40 MG TABLET    TAKE ONE TABLET BY MOUTH DAILY    CALCIUM CARBONATE-VITAMIN D (CALCIUM + D) 600-200 MG-UNIT TABS    Take 600 mg by mouth daily. CETIRIZINE (ZYRTEC) 10 MG TABLET    TAKE ONE TABLET BY MOUTH ONCE NIGHTLY    CLOPIDOGREL (PLAVIX) 75 MG TABLET    TAKE ONE TABLET BY MOUTH DAILY    DOXAZOSIN (CARDURA) 8 MG TABLET    Take one tablet by mouth twice a day    FEROSUL 325 (65 FE) MG TABLET    TAKE ONE TABLET BY MOUTH DAILY WITH BREAKFAST    LEVOTHYROXINE (SYNTHROID) 75 MCG TABLET    TAKE ONE TABLET BY MOUTH DAILY    MAGNESIUM GLUCONATE (MAGONATE) 500 MG TABLET    Take 500 mg by mouth daily     METHOCARBAMOL (ROBAXIN) 500 MG TABLET    Take 1 tablet by mouth 3 times daily as needed (spasm)    MONTELUKAST (SINGULAIR) 10 MG TABLET    TAKE ONE TABLET BY MOUTH DAILY    NITROGLYCERIN (NITROSTAT) 0.4 MG SL TABLET    Place 1 tablet under the tongue every 5 minutes as needed for Chest pain up to max of 3 total doses. If no relief after 1 dose, call 911. OMEPRAZOLE (PRILOSEC) 40 MG DELAYED RELEASE CAPSULE    TAKE ONE CAPSULE BY MOUTH DAILY    POTASSIUM CHLORIDE (KLOR-CON) 10 MEQ EXTENDED RELEASE TABLET    TAKE ONE TABLET BY MOUTH TWICE A DAY    PRAMIPEXOLE (MIRAPEX) 1 MG TABLET    TAKE ONE TABLET BY MOUTH TWICE A DAY    TRIAMCINOLONE (KENALOG) 0.1 % CREAM    Apply topically every 7 days    TRIAMTERENE-HYDROCHLOROTHIAZIDE (MAXZIDE-25) 37.5-25 MG PER TABLET    Take 1 tablet by mouth daily    VENLAFAXINE (EFFEXOR) 75 MG TABLET    Take 1 tablet by mouth twice daily    VITAMIN D (CHOLECALCIFEROL) 1000 UNITS CAPS CAPSULE    Take 1,000 Units by mouth daily. ZINC GLUCONATE 50 MG TABLET    Take 50 mg by mouth daily    ZOSTER RECOMBINANT ADJUVANTED VACCINE (SHINGRIX) 50 MCG/0.5ML SUSR INJECTION    50 MCG IM then repeat 2-6 months.        ALLERGIES     Percocet [oxycodone-acetaminophen], Ace inhibitors, Oxybutynin, and Simvastatin    FAMILY HISTORY       Family History   Problem Relation Age of Onset    Stroke Father     Cancer Father         prostate    Other Father         colitis    Diabetes Sister     Diabetes Brother     Cancer Brother         skin    High Blood Pressure Brother     High Cholesterol Brother     COPD Mother     Asthma Neg Hx     Emphysema Neg Hx     Heart Failure Neg Hx     Hypertension Neg Hx           SOCIAL HISTORY       Social History     Socioeconomic History    Marital status:      Spouse name: None    Number of children: None    Years of education: None    Highest education level: None   Occupational History    None   Tobacco Use    Smoking status: Former Smoker     Packs/day: 1.50     Years: 17.00     Pack years: 25.50     Types: Cigarettes     Quit date: 1974     Years since quittin.3    Smokeless tobacco: Never Used   Vaping Use    Vaping Use: Never used   Substance and Sexual Activity    Alcohol use: No     Alcohol/week: 0.0 standard drinks    Drug use: No    Sexual activity: Not Currently   Other Topics Concern    None   Social History Narrative    None     Social Determinants of Health     Financial Resource Strain: Low Risk     Difficulty of Paying Living Expenses: Not hard at all   Food Insecurity: No Food Insecurity    Worried About Running Out of Food in the Last Year: Never true    Jasmin of Food in the Last Year: Never true   Transportation Needs:     Lack of Transportation (Medical): Not on file    Lack of Transportation (Non-Medical):  Not on file   Physical Activity: Inactive    Days of Exercise per Week: 0 days    Minutes of Exercise per Session: 0 min   Stress:     Feeling of Stress : Not on file   Social Connections:     Frequency of Communication with Friends and Family: Not on file    Frequency of Social Gatherings with Friends and Family: Not on file    Attends [employer] Services: Not on file    Active Member of Clubs or Organizations: Not on file    Attends Club or Organization Meetings: Not on file    Marital Status: Not on file   Intimate Partner Violence:     Fear of Current or Ex-Partner: Not on file  Emotionally Abused: Not on file    Physically Abused: Not on file    Sexually Abused: Not on file   Housing Stability:     Unable to Pay for Housing in the Last Year: Not on file    Number of Places Lived in the Last Year: Not on file    Unstable Housing in the Last Year: Not on file       SCREENINGS   NIH Stroke Scale  Interval: Baseline  Level of Consciousness (1a): Alert  LOC Questions (1b): Answers both correctly  LOC Commands (1c): Performs both tasks correctly  Best Gaze (2): Normal  Visual (3): No visual loss  Facial Palsy (4): Normal symmetrical movement  Motor Arm, Left (5a): No drift  Motor Arm, Right (5b): No drift  Motor Leg, Left (6a): No drift  Motor Leg, Right (6b): No drift  Limb Ataxia (7): Absent  Sensory (8): Normal  Best Language (9): No aphasia  Dysarthria (10): Mild to moderate, slurs some words  Extinction and Inattention (11): No abnormality  Total: 1    Colton Coma Scale  Eye Opening: Spontaneous  Best Verbal Response: Oriented  Best Motor Response: Obeys commands  Tracy Coma Scale Score: 15                   REVIEW OF SYSTEMS    (2-9 systems for level 4, 10 or more for level 5)   Review of Systems   Constitutional: Positive for chills and fatigue. Negative for fever. HENT: Negative for congestion, rhinorrhea and sore throat. Eyes: Negative for photophobia and visual disturbance. Respiratory: Negative for cough and shortness of breath. Cardiovascular: Negative for chest pain and palpitations. Gastrointestinal: Positive for abdominal pain, diarrhea and nausea. Negative for constipation and vomiting. Genitourinary: Negative for decreased urine volume. Musculoskeletal: Negative for back pain, neck pain and neck stiffness. Skin: Negative for rash. Neurological: Positive for tremors. Negative for weakness, numbness and headaches. Psychiatric/Behavioral: Positive for confusion.          PHYSICAL EXAM    (up to 7 for level 4, 8 or more for level 5)   RECENT VITALS: Temp: 99.7 °F (37.6 °C),  Pulse: 62, Resp: 28, BP: (!) 123/55, SpO2: 97 %    Physical Exam  Constitutional:       General: He is not in acute distress. Appearance: He is not diaphoretic. HENT:      Head: Normocephalic and atraumatic. Eyes:      General: Scleral icterus present. Pupils: Pupils are equal, round, and reactive to light. Neck:      Trachea: No tracheal deviation. Cardiovascular:      Rate and Rhythm: Normal rate and regular rhythm. Pulmonary:      Effort: Pulmonary effort is normal. No respiratory distress. Breath sounds: No stridor. No wheezing. Abdominal:      General: There is no distension. Palpations: Abdomen is soft. Tenderness: There is abdominal tenderness. Comments: Tenderness right upper quadrant. Musculoskeletal:         General: No deformity. Normal range of motion. Cervical back: Normal range of motion and neck supple. Skin:     General: Skin is warm and dry. Neurological:      Mental Status: He is alert and oriented to person, place, and time. DIAGNOSTIC RESULTS     EKG: All EKG's are interpreted by the Emergency Department Physician who either signs or Co-signs this chart in the absence of a cardiologist.      The Ekg interpreted by me shows  sinus tachycardia, vwep=[id number]  Axis is   Left axis deviation  QTc is  within an acceptable range  Intervals and Durations are unremarkable. ST Segments: no acute change        RADIOLOGY:   Non-plain film images such as CT, Ultrasound and MRI are read by the radiologist. Plain radiographic images are visualized and preliminarily interpreted by the emergency physician. Interpretation per the Radiologist below, if available at the time of this note:    CT ABDOMEN PELVIS W IV CONTRAST Additional Contrast? None   Final Result   Mild hydrops of the gallbladder and findings suspicious for acute   cholecystitis with no obvious gallstones or biliary duct dilatation.    Recommend ultrasound Below (*)     All other components within normal limits   BILIRUBIN TOTAL DIRECT & INDIRECT - Abnormal; Notable for the following components:    Bilirubin, Direct 1.1 (*)     All other components within normal limits   PROCALCITONIN - Abnormal; Notable for the following components:    Procalcitonin 1.09 (*)     All other components within normal limits   POCT GLUCOSE - Abnormal; Notable for the following components:    POC Glucose 151 (*)     All other components within normal limits   COVID-19 & INFLUENZA COMBO   CULTURE, URINE   CULTURE, BLOOD 1   CULTURE, BLOOD 2   LACTATE, SEPSIS   APTT   TROPONIN   MAGNESIUM   LACTATE, SEPSIS       All other labs were within normal range or not returned as of this dictation. EMERGENCY DEPARTMENT COURSE and DIFFERENTIAL DIAGNOSIS/MDM:   Vane Nelson is a [de-identified] y.o. male who presents to the emergency department with the complaint of generalized illness for the last 3+ days. Patient tachycardic and febrile, sepsis labs initiated. On physical exam lung fields are clear no reported cough or congestion. Does have upper abdominal pain more focal in the right upper quadrant. CT concerning for acute cholecystitis without cholelithiasis. Patient started on antibiotic coverage, case was discussed with general surgery plan is for hospitalist admission antibiotics and surgical eval in hospital.  No acute emergent plan for surgery this evening. Heart rate that improved following fluid bolus, temperature downtrending      CRITICAL CARE TIME   Total Critical Care time was 32 minutes, excluding separately reportable procedures. There was a high probability of clinically significant/life threatening deterioration in the patient's condition which required my urgent intervention.      Clinical concern sepsis  Intervention history, physical [place], chart review lab interpretation medication management, discussion with surgery, charting    CONSULTS:  [address] TO HOSPITALIST    PROCEDURES:  Unless otherwise noted below, none     Procedures        FINAL IMPRESSION      1. Acute cholecystitis          DISPOSITION/PLAN   DISPOSITION Decision To Admit 05/03/2022 06:09:33 PM      PATIENT REFERRED TO:  No follow-up provider specified. DISCHARGE MEDICATIONS:  New Prescriptions    No medications on file     Controlled Substances Monitoring:     RX Monitoring 9/18/2019   Attestation -   Periodic Controlled Substance Monitoring Possible medication side effects, risk of tolerance/dependence & alternative treatments discussed. ;No signs of potential drug abuse or diversion identified. ;Assessed functional status. Chronic Pain > 80 MEDD -   Chronic Pain > 120 MEDD Obtained or confirmed \"Medication Contract\" on file.        (Please note that portions of this note were completed with a voice recognition program.  Efforts were made to edit the dictations but occasionally words are mis-transcribed.)    Minerva Qiu DO (electronically signed)  Attending Emergency Physician            Minerva Qiu DO  05/03/22 9899

## 2022-05-03 NOTE — ED NOTES
1811- Vivi served General surgery for a consult. 65- Dr. Gladis Cowan called back and spoke with Dr. Kati Muñoz.       [employer]  05/03/22 [id number]
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
## 2022-05-03 NOTE — PLAN OF CARE
Sepsis  Met encephalopathy  Elevated LFTS   leucocytosis  ?  Acute tono  Hyponatremia    IVF  NPO  IV zosyn  Surgery consult  RUQ US

## 2022-05-04 ENCOUNTER — APPOINTMENT (OUTPATIENT)
Dept: ULTRASOUND IMAGING | Age: 82
DRG: 871 | End: 2022-05-04
Payer: MEDICARE

## 2022-05-04 PROBLEM — R74.8 ELEVATED ALKALINE PHOSPHATASE LEVEL: Status: ACTIVE | Noted: 2022-05-04

## 2022-05-04 PROBLEM — A41.9 SEPSIS (HCC): Status: ACTIVE | Noted: 2022-05-04

## 2022-05-04 PROBLEM — K57.90 DIVERTICULOSIS: Status: ACTIVE | Noted: 2022-05-04

## 2022-05-04 PROBLEM — E87.1 HYPONATREMIA: Status: ACTIVE | Noted: 2022-05-04

## 2022-05-04 PROBLEM — R17 ELEVATED BILIRUBIN: Status: ACTIVE | Noted: 2022-05-04

## 2022-05-04 PROBLEM — N40.0 BENIGN PROSTATIC HYPERPLASIA WITHOUT LOWER URINARY TRACT SYMPTOMS: Status: ACTIVE | Noted: 2022-05-04

## 2022-05-04 PROBLEM — R79.89 ELEVATED LFTS: Status: ACTIVE | Noted: 2022-05-04

## 2022-05-04 LAB
A/G RATIO: 1.2 (ref 1.1–2.2)
ALBUMIN SERPL-MCNC: 3 G/DL (ref 3.4–5)
ALP BLD-CCNC: 163 U/L (ref 40–129)
ALT SERPL-CCNC: 111 U/L (ref 10–40)
ANION GAP SERPL CALCULATED.3IONS-SCNC: 13 MMOL/L (ref 3–16)
AST SERPL-CCNC: 59 U/L (ref 15–37)
BASOPHILS ABSOLUTE: 0 K/UL (ref 0–0.2)
BASOPHILS RELATIVE PERCENT: 0.4 %
BILIRUB SERPL-MCNC: 1.3 MG/DL (ref 0–1)
BUN BLDV-MCNC: 16 MG/DL (ref 7–20)
CALCIUM SERPL-MCNC: 8.1 MG/DL (ref 8.3–10.6)
CHLORIDE BLD-SCNC: 99 MMOL/L (ref 99–110)
CO2: 22 MMOL/L (ref 21–32)
CREAT SERPL-MCNC: 1.1 MG/DL (ref 0.8–1.3)
EOSINOPHILS ABSOLUTE: 0.1 K/UL (ref 0–0.6)
EOSINOPHILS RELATIVE PERCENT: 1.2 %
GFR AFRICAN AMERICAN: >60
GFR NON-AFRICAN AMERICAN: >60
GLUCOSE BLD-MCNC: 105 MG/DL (ref 70–99)
HCT VFR BLD CALC: 35.3 % (ref 40.5–52.5)
HEMOGLOBIN: 12.3 G/DL (ref 13.5–17.5)
LIPASE: 33 U/L (ref 13–60)
LYMPHOCYTES ABSOLUTE: 0.4 K/UL (ref 1–5.1)
LYMPHOCYTES RELATIVE PERCENT: 6.8 %
MAGNESIUM: 2.1 MG/DL (ref 1.8–2.4)
MCH RBC QN AUTO: 30.8 PG (ref 26–34)
MCHC RBC AUTO-ENTMCNC: 35 G/DL (ref 31–36)
MCV RBC AUTO: 88.2 FL (ref 80–100)
MONOCYTES ABSOLUTE: 0.5 K/UL (ref 0–1.3)
MONOCYTES RELATIVE PERCENT: 8.5 %
NEUTROPHILS ABSOLUTE: 4.6 K/UL (ref 1.7–7.7)
NEUTROPHILS RELATIVE PERCENT: 83.1 %
PDW BLD-RTO: 13.8 % (ref 12.4–15.4)
PLATELET # BLD: 181 K/UL (ref 135–450)
PMV BLD AUTO: 7.4 FL (ref 5–10.5)
POTASSIUM REFLEX MAGNESIUM: 3.5 MMOL/L (ref 3.5–5.1)
RBC # BLD: 4 M/UL (ref 4.2–5.9)
SODIUM BLD-SCNC: 134 MMOL/L (ref 136–145)
TOTAL PROTEIN: 5.5 G/DL (ref 6.4–8.2)
URINE CULTURE, ROUTINE: NORMAL
WBC # BLD: 5.6 K/UL (ref 4–11)

## 2022-05-04 PROCEDURE — 6370000000 HC RX 637 (ALT 250 FOR IP): Performed by: INTERNAL MEDICINE

## 2022-05-04 PROCEDURE — 96367 TX/PROPH/DG ADDL SEQ IV INF: CPT

## 2022-05-04 PROCEDURE — G0378 HOSPITAL OBSERVATION PER HR: HCPCS

## 2022-05-04 PROCEDURE — 97530 THERAPEUTIC ACTIVITIES: CPT

## 2022-05-04 PROCEDURE — 1200000000 HC SEMI PRIVATE

## 2022-05-04 PROCEDURE — 99223 1ST HOSP IP/OBS HIGH 75: CPT | Performed by: SURGERY

## 2022-05-04 PROCEDURE — 80053 COMPREHEN METABOLIC PANEL: CPT

## 2022-05-04 PROCEDURE — 97162 PT EVAL MOD COMPLEX 30 MIN: CPT

## 2022-05-04 PROCEDURE — 97166 OT EVAL MOD COMPLEX 45 MIN: CPT

## 2022-05-04 PROCEDURE — 83690 ASSAY OF LIPASE: CPT

## 2022-05-04 PROCEDURE — 6360000002 HC RX W HCPCS: Performed by: INTERNAL MEDICINE

## 2022-05-04 PROCEDURE — 2580000003 HC RX 258: Performed by: INTERNAL MEDICINE

## 2022-05-04 PROCEDURE — 85025 COMPLETE CBC W/AUTO DIFF WBC: CPT

## 2022-05-04 PROCEDURE — 83735 ASSAY OF MAGNESIUM: CPT

## 2022-05-04 PROCEDURE — 97535 SELF CARE MNGMENT TRAINING: CPT

## 2022-05-04 PROCEDURE — 36415 COLL VENOUS BLD VENIPUNCTURE: CPT

## 2022-05-04 PROCEDURE — 99223 1ST HOSP IP/OBS HIGH 75: CPT | Performed by: INTERNAL MEDICINE

## 2022-05-04 PROCEDURE — 96366 THER/PROPH/DIAG IV INF ADDON: CPT

## 2022-05-04 PROCEDURE — 76705 ECHO EXAM OF ABDOMEN: CPT

## 2022-05-04 PROCEDURE — 96372 THER/PROPH/DIAG INJ SC/IM: CPT

## 2022-05-04 RX ADMIN — Medication 10 ML: at 02:21

## 2022-05-04 RX ADMIN — PIPERACILLIN SODIUM,TAZOBACTAM SODIUM 3375 MG: 3; .375 INJECTION, POWDER, FOR SOLUTION INTRAVENOUS at 08:08

## 2022-05-04 RX ADMIN — ONDANSETRON 4 MG: 4 TABLET, ORALLY DISINTEGRATING ORAL at 03:49

## 2022-05-04 RX ADMIN — ENOXAPARIN SODIUM 30 MG: 100 INJECTION SUBCUTANEOUS at 20:11

## 2022-05-04 RX ADMIN — PRAMIPEXOLE DIHYDROCHLORIDE 1 MG: 1 TABLET ORAL at 09:09

## 2022-05-04 RX ADMIN — PIPERACILLIN SODIUM,TAZOBACTAM SODIUM 3375 MG: 3; .375 INJECTION, POWDER, FOR SOLUTION INTRAVENOUS at 02:20

## 2022-05-04 RX ADMIN — AMLODIPINE BESYLATE 10 MG: 5 TABLET ORAL at 11:00

## 2022-05-04 RX ADMIN — PRAMIPEXOLE DIHYDROCHLORIDE 1 MG: 1 TABLET ORAL at 20:10

## 2022-05-04 RX ADMIN — SODIUM CHLORIDE: 9 INJECTION, SOLUTION INTRAVENOUS at 02:17

## 2022-05-04 RX ADMIN — ENOXAPARIN SODIUM 30 MG: 100 INJECTION SUBCUTANEOUS at 02:20

## 2022-05-04 RX ADMIN — ACETAMINOPHEN 650 MG: 325 TABLET ORAL at 03:49

## 2022-05-04 RX ADMIN — ENOXAPARIN SODIUM 30 MG: 100 INJECTION SUBCUTANEOUS at 11:00

## 2022-05-04 RX ADMIN — ATORVASTATIN CALCIUM 40 MG: 40 TABLET, FILM COATED ORAL at 10:59

## 2022-05-04 RX ADMIN — ASPIRIN 81 MG: 81 TABLET, COATED ORAL at 10:59

## 2022-05-04 RX ADMIN — PIPERACILLIN SODIUM,TAZOBACTAM SODIUM 3375 MG: 3; .375 INJECTION, POWDER, FOR SOLUTION INTRAVENOUS at 16:18

## 2022-05-04 ASSESSMENT — PAIN SCALES - GENERAL
PAINLEVEL_OUTOF10: 0
PAINLEVEL_OUTOF10: 0

## 2022-05-04 NOTE — H&P
[facility] Medicine History & Physical      PCP: ADORE Wood - CNP    Date of Admission: 5/3/2022    Date of Service: Pt seen/examined on 5/4/2022      Chief Complaint:    Chief Complaint   Patient presents with    Extremity Weakness     Pt's wife states that pt wasn't able to walk and had slurred speech on Saturday. Wife states that he continured to get worse on Sunday. Wife states that everything started to result on Monday. Wife states that weakness and confusion and slurred speech started about 30 minutes PTA. History Of Present Illness: The patient is a [de-identified] y.o. male with CAD, HTN, HLD, ILD, diverticulosis, Hypothyroidism, GERD, RLS, BPH and COLLIN who presented to [facility] ED with complaint of weakness. Patient states 2 weeks ago he and his wife both had a \"GI bug\" associate with vomiting and diarrhea for approximately 6 days which resolved on its own without antibiotics. Several days after resolution of his episode on Thursday of last week patient states he ate pizza and woke up early Friday morning with terrible gas pains and abdominal distention this also resolved on its own but the following day he endorses severe chills and weakness. His weakness was so severe that he states he fell to the ground and his family members had to help him get back up. He denies presyncope, dizziness or loss of consciousness. His weakness improved over the next day or so but yesterday started to get bad again and his wife made him come to the emergency department. He endorses several days of loss of appetite. He endorses mild abdominal pain that is not worsened by food. He states he has had BMs daily and denies hematochezia or melena. He denies fevers, chest pain, shortness of breath, nausea/vomiting/diarrhea, numbness/tingling/swelling, skin color change or itching. Sepsis criteria was met upon arrival to the ED with T1 100.8 °F, RR 22,  and GI source.   CT of his abdomen was obtained which displayed mild hydrops of the gallbladder and findings suspicious for acute cholecystitis. He was hyponatremic. Procalcitonin was elevated 1.09. An elevated alk phos, bilirubin and transaminitis. He is admitted for further care. I did discuss CODE STATUS with the patient and patient wishes to remain a full code.     Past Medical History:        Diagnosis Date    Arthritis     Back injuries     BPH (benign prostatic hyperplasia)     Chest pain 03/2021    CPAP (continuous positive airway pressure) dependence     Diverticulosis     colonoscopy 9/2015    Esophageal dysmotility     GERD (gastroesophageal reflux disease)     Hiatal hernia     History of colon polyps     seen on colonoscopy again 9/2015    Hyperlipidemia     Hypertension     ILD (interstitial lung disease) ([facility] [address] 07/30/2013    Internal hemorrhoid     colonoscopy 6/14/96    Lichen sclerosus of penis 2017    Dr Betsy Culver Mild cognitive impairment 08/2016    [facility] neurology    COLLIN (obstructive sleep apnea)     Renal insufficiency     Restless legs syndrome     Rosacea     Schatzki's ring     last dilated 2002    Scrotal pruritus 02/08/2018    Sleep apnea        Past Surgical History:        Procedure Laterality Date    CARDIAC CATHETERIZATION  03/04/2021    Non Obs CAD, medical management    CIRCUMCISION      COLONOSCOPY  2010    COLONOSCOPY  09/14/2015    CORONARY ANGIOPLASTY  10/15/2019    CYSTOSCOPY  05/13/2016    CYSTOSCOPY  05/19/2017    CYSTOSCOPY     CYSTOSCOPY  07/14/2017    ENDOSCOPY, COLON, DIAGNOSTIC  10/11/2017    esoph. stricture    FRACTURE SURGERY      right leg    FRACTURE SURGERY      wrist   right    HERNIA REPAIR      PROSTATE SURGERY      TOE SURGERY      TURP  07/14/2017    UPPER GASTROINTESTINAL ENDOSCOPY  09/12/2016    UPPER GASTROINTESTINAL ENDOSCOPY  10/11/2017    esophageal stricture    UPPER GASTROINTESTINAL ENDOSCOPY  01/28/2020       Medications Prior to Admission:    Prior to Admission medications    Medication Sig Start Date End Date Taking? Authorizing Provider   FEROSUL 325 (65 Fe) MG tablet TAKE ONE TABLET BY MOUTH DAILY WITH BREAKFAST 4/21/22   ADORE Padilla CNP   methocarbamol (ROBAXIN) 500 MG tablet Take 1 tablet by mouth 3 times daily as needed (spasm) 3/25/22   ADORE Padilla CNP   montelukast (SINGULAIR) 10 MG tablet TAKE ONE TABLET BY MOUTH DAILY 3/17/22   ADORE Bourgeios CNP   atorvastatin (LIPITOR) 40 MG tablet TAKE ONE TABLET BY MOUTH DAILY 1/13/22   Shelby Pack MD   clopidogrel (PLAVIX) 75 MG tablet TAKE ONE TABLET BY MOUTH DAILY 1/13/22   Shelby Pack MD   potassium chloride (KLOR-CON) 10 MEQ extended release tablet TAKE ONE TABLET BY MOUTH TWICE A DAY 1/13/22   Shelby Pack MD   doxazosin (CARDURA) 8 MG tablet Take one tablet by mouth twice a day 1/13/22   Shelby Pack MD   triamterene-hydroCHLOROthiazide [facility]) 37.5-25 MG per tablet Take 1 tablet by mouth daily 1/13/22   Shelby Pack MD   nitroGLYCERIN (NITROSTAT) 0.4 MG SL tablet Place 1 tablet under the tongue every 5 minutes as needed for Chest pain up to max of 3 total doses. If no relief after 1 dose, call 911. 1/13/22   Shelby Pack MD   omeprazole (PRILOSEC) 40 MG delayed release capsule TAKE ONE CAPSULE BY MOUTH DAILY 1/6/22   ADORE Padilla CNP   amLODIPine (NORVASC) 10 MG tablet TAKE ONE TABLET BY MOUTH DAILY 11/30/21   Shelby Pack MD   levothyroxine (SYNTHROID) 75 MCG tablet TAKE ONE TABLET BY MOUTH DAILY 11/5/21   Darryle Slain, MD   zoster recombinant adjuvanted vaccine [facility]) 50 MCG/0.5ML SUSR injection 50 MCG IM then repeat 2-6 months.  11/4/21 11/4/22  ADORE Padilla CNP   pramipexole (MIRAPEX) 1 MG tablet TAKE ONE TABLET BY MOUTH TWICE A DAY 11/4/21   ADORE Padilla CNP   venlafaxine [facility]) 75 MG tablet Take 1 tablet by mouth twice daily 11/4/21   Novi Comment ADORE Peoples CNP   cetirizine (ZYRTEC) 10 MG tablet TAKE ONE TABLET BY MOUTH ONCE NIGHTLY 10/27/21   ADORE Mg CNP   zinc gluconate 50 MG tablet Take 50 mg by mouth daily    Historical Provider, MD   triamcinolone (KENALOG) 0.1 % cream Apply topically every 7 days 4/23/21   ADORE Mg CNP   albuterol sulfate HFA (VENTOLIN HFA) 108 (90 Base) MCG/ACT inhaler Inhale 2 puffs into the lungs every 6 hours as needed for Wheezing or Shortness of Breath 4/23/21   ADORE Mg CNP   aspirin EC 81 MG EC tablet Take 1 tablet by mouth daily 9/4/18   Carmen Adame MD   magnesium gluconate (MAGONATE) 500 MG tablet Take 500 mg by mouth daily     Historical Provider, MD   Vitamin D (CHOLECALCIFEROL) 1000 UNITS CAPS capsule Take 1,000 Units by mouth daily. Historical Provider, MD   Calcium Carbonate-Vitamin D (CALCIUM + D) 600-200 MG-UNIT TABS Take 600 mg by mouth daily. Historical Provider, MD       Allergies:  Ace inhibitors, Oxybutynin, Percocet [oxycodone-acetaminophen], and Simvastatin    Social History:  The patient currently lives at home with wife    TOBACCO:   reports that he quit smoking about 48 years ago. His smoking use included cigarettes. He has a 25.50 pack-year smoking history. He has never used smokeless tobacco.  ETOH:   reports no history of alcohol use.       Family History:   Positive as follows:        Problem Relation Age of Onset    Stroke Father     Cancer Father         prostate    Other Father         colitis    Diabetes Sister     Diabetes Brother     Cancer Brother         skin    High Blood Pressure Brother     High Cholesterol Brother     COPD Mother     Asthma Neg Hx     Emphysema Neg Hx     Heart Failure Neg Hx     Hypertension Neg Hx        REVIEW OF SYSTEMS:       Constitutional: Negative for fever, + chills, + weakness, + loss of appetite  HENT: Negative for sore throat   Eyes: Negative for redness   Respiratory: Negative  for dyspnea, cough   Cardiovascular: Negative for chest pain   Gastrointestinal: Negative for vomiting, diarrhea, + abdominal pain  Genitourinary: Negative for hematuria   Musculoskeletal: Negative for arthralgias   Skin: Negative for rash   Neurological: Negative for syncope   Hematological: Negative for adenopathy   Psychiatric/Behavorial: Negative for anxiety    PHYSICAL EXAM:    BP (!) 123/58   Pulse 55 Comment: bradycardia  Temp 99.2 °F (37.3 °C) (Oral)   Resp 16   Ht 5' 8\" (1.727 m)   Wt 234 lb 4.8 oz (106.3 kg)   SpO2 98%   BMI 35.63 kg/m²     Gen: No distress. Alert. Eyes: PERRL. + Sclera icterus. No conjunctival injection. ENT: No discharge. Pharynx clear. Neck: No JVD. Trachea midline. Resp: No accessory muscle use. No crackles. No wheezes. No rhonchi. CV: Regular rate. Regular rhythm. No murmur. No rub. No edema. Capillary Refill: Brisk,< 3 seconds   Peripheral Pulses: +2 palpable, equal bilaterally   GI: Abdomen is non-tender to palpation for me today. Non-distended. No masses. No organomegaly. Normal bowel sounds. No hernia. Skin: Warm and dry. No nodule on exposed extremities. No rash on exposed extremities. + Mild jaundice  M/S: No cyanosis. No joint deformity. No clubbing. Neuro: Awake. Grossly nonfocal.  Cranial nerves II through XII intact. Psych: Oriented x 3. No anxiety or agitation. Calderon Woodson MD have reviewed the chart on Cayla Gibbs and personally interviewed and examined patient, reviewed the data (labs and imaging) and after discussion with my PA formulated the plan. Agree with note with the following edits. HPI: An 21-year-old male with a history of coronary disease, hypertension, hypothyroidism presented emergency room with weakness. Has been having abdominal pain and distention for the past few days. Admits to anorexia. Denies any fever. No nausea vomiting or diarrhea. On arrival in the emergency room temp is 100.8.   CT abdomen displayed mild hydrops of the gallbladder and findings suspicious for acute cholecystitis. LFTs were elevated. I reviewed the patient's Past Medical History, Past Surgical History, Medications, and Allergies. Physical exam:    BP (!) 123/58   Pulse 55 Comment: bradycardia  Temp 99.2 °F (37.3 °C) (Oral)   Resp 16   Ht 5' 8\" (1.727 m)   Wt 234 lb 4.8 oz (106.3 kg)   SpO2 98%   BMI 35.63 kg/m²     Gen: No distress. Alert. Eyes: PERRL. + sclera icterus. No conjunctival injection. ENT: No discharge. Pharynx clear. Neck: Trachea midline. Normal thyroid. Resp: No accessory muscle use. No crackles. No wheezes. No rhonchi. No dullness on percussion. CV: Regular rate. Regular rhythm. No murmur or rub. No edema. GI: Non-tender. Non-distended. No masses. No organomegaly. Normal bowel sounds. No hernia. Skin: Warm and dry. No nodule on exposed extremities. No rash on exposed extremities. Lymph: No cervical LAD. No supraclavicular LAD. M/S: No cyanosis. No joint deformity. No clubbing. Neuro: Awake. Moves all 4 extremities, non focal  Psych: Oriented x 3. No anxiety or agitation.            NATHANIEL Ames.      CBC:   Recent Labs     05/03/22  1538   WBC 7.2   HGB 13.9   HCT 40.1*   MCV 88.7        BMP:   Recent Labs     05/03/22  1538 05/04/22  0554   * 134*   K 3.5 3.5   CL 91* 99   CO2 24 22   BUN 20 16   CREATININE 1.1 1.1     LIVER PROFILE:   Recent Labs     05/03/22  1538 05/04/22  0554   AST 75* 59*   * 111*   LIPASE  --  33.0   BILIDIR 1.1*  --    BILITOT 1.9* 1.3*   ALKPHOS 202* 163*     PT/INR:   Recent Labs     05/03/22  1538   PROTIME 15.2*   INR 1.33*     APTT:   Recent Labs     05/03/22  1538   APTT 30.9     UA:  Recent Labs     05/03/22  1634   COLORU Yellow   PHUR 6.0   WBCUA 6-9*   RBCUA 3-4   BACTERIA Rare*   CLARITYU Clear   SPECGRAV 1.010   LEUKOCYTESUR Negative   UROBILINOGEN 1.0   BILIRUBINUR Negative   BLOODU TRACE-INTACT*   GLUCOSEU Negative CARDIAC ENZYMES  Recent Labs     05/03/22  1538   TROPONINI <0.01       U/A:    Lab Results   Component Value Date    NITRITE negative 02/08/2018    COLORU Yellow 05/03/2022    WBCUA 6-9 05/03/2022    RBCUA 3-4 05/03/2022    BACTERIA Rare 05/03/2022    CLARITYU Clear 05/03/2022    SPECGRAV 1.010 05/03/2022    LEUKOCYTESUR Negative 05/03/2022    BLOODU TRACE-INTACT 05/03/2022    GLUCOSEU Negative 05/03/2022       CULTURES  Results for Ish Felder (MRN [id number]) as of 5/4/2022 08:37   Ref. Range 5/3/2022 15:56   INFLUENZA A Latest Ref Range: NOT DETECTED  NOT DETECTED   INFLUENZA B Latest Ref Range: NOT DETECTED  NOT DETECTED   SARS-CoV-2 RNA, RT PCR Latest Ref Range: NOT DETECTED  NOT DETECTED     Blood cx x 2 pending    Urine cx pending    EKG:  I have reviewed the EKG with the following interpretation:   Sinus tachycardia  Left axis deviation  Left ventricular hypertrophy with repolarization abnormality  Poor R wave progression  Abnormal ECG  When compared with ECG of 04-MAR-2021 11:16,  Vent. rate has increased BY 52 BPM  T wave inversion less evident in Lateral leads  NH interval has decreased  Confirmed by Bree Garcia ([id number]) on 5/3/2022 6:09:28 PM    RADIOLOGY  CT ABDOMEN PELVIS W IV CONTRAST Additional Contrast? None   Final Result   Mild hydrops of the gallbladder and findings suspicious for acute   cholecystitis with no obvious gallstones or biliary duct dilatation. Recommend ultrasound follow-up. Mild chronic liver changes and mild splenomegaly which is more prominent. Small hiatal hernia which is more prominent. Bibasilar subpleural interstitial prominence throughout the lung bases which   is increased and may represent early pneumonitis vs progressive fibrosis and   scarring. Suggest follow-up with serial chest x-rays. Scattered diverticula throughout the distal left colon with no pericolonic   inflammation.       Mild prostatic enlargement and diffuse mild bladder wall thickening which   could be due to poor distention. Recommend clinical follow-up. Questionable small inguinal hernias bilaterally with no bowel loops in the   hernias      Mild compression deformities scattered along the lower thoracic spine which   appear chronic. Recommend clinical follow-up of the vertebra. CT HEAD WO CONTRAST   Final Result   No acute intracranial abnormality. XR CHEST PORTABLE   Final Result   Bilateral ill-defined peripheral pulmonary opacities could represent atypical   pneumonia         US GALLBLADDER RUQ    (Results Pending)        Pertinent previous results reviewed   Cardiac CATH 3/2021  Impression:  1. Non-obstructive CAD with patent mid-LAD stent. 2. There is a moderate proximal LAD stenosis that was not physiologically significant by FFR at 0.81.  3. Normal left-sided cardiac filliing pressure. 4. Mildly elevated right-sided cardiac filling pressure. 5. Normal pulmonary artery pressure. 6. Preserved Cynthia cardiac output and index. Plan:  1. Continue dual antiplatelet therapy with aspirin 81 mg daily and Plavix 75 mg daily. 2. Continue atorvastatin 40 mg daily, amlodipine 10 mg daily, and triamterene-HCTZ 37.5-25 mg daily. 3. Needs close follow-up with pulmonology for further monitoring and treatment of interstitial lung disease. 4. Follow-up with [facility] in 2 weeks. ECHO 11/17/15   Summary   Left ventricular systolic function is normal.   Ejection fraction is visually estimated to be 55-60 %. There is reversal of E/A inflow velocities across the mitral valve   suggesting type I diastolic dysfunction. The right atrium is mildly dilated. Aortic valve appears sclerotic but opens adequately.     ASSESSMENT/PLAN:  #Sepsis POA  -T 100.8, RR 22, , GI source   -BP stable, not requiring pressors  -No LA elevation  -IVF bolus given in ED  -Blood cx x 2 pending  -Abx below    #Metabolic encephalopathy - resolved  -Alert and oriented x3 for me today   -Patient has good insight into current presentation  -Patient was thought to have confusion and slurred speech in the emergency department. At that time ED provider did not find focal deficits.  -Neuro exam again nonfocal today and cranial nerves II through XII intact    # possible acute cholecystitis   -CT as above  -Check RUQ US   -NPO  -IV zosyn D #1  -Surgery consult    #Elevated LFTs  #Elevated ALK phos  #Elevated bilirubin  -2/2 above  -Trending down  -Monitor CMP    Pneumonitis versus possible progressive lung fibrosis on abdominal CT. Has h/o lung fibrosis- seen Dr. Radha Liu in the past     #Hyponatremia  -129--134  -Improving with IVF    #Generalized weakness  -PTOT    #CAD  -Cath report above  -Continue ASA and statin  -Plavix held with possible procedure    #ILD  -No daily inhalers   -No hypoxic, no home O2  -Continue albuterol PRN    #HTN  -BP is controlled  -Continue norvasc  -Holding home maxzide with hyponatremia    #HLD  -Continue statin    #Diverticulosis  -Stable, no diverticulitis    #Hypothyroidism  -Continue synthroid    #GERD  -Continue PPI    #RLS   -Continue mirapex    #BPH    -On doxazosin    #COLLIN   -Continue home CPAP    DVT Prophylaxis: Lovenox  Diet: Diet NPO  Code Status: Full Code    Anisha Griffin PA-C  5/4/2022 9:49 AM          NATHANIEL Ames.

## 2022-05-04 NOTE — PLAN OF CARE
Problem: Discharge Planning  Goal: Discharge to home or other facility with appropriate resources  Outcome: Progressing     Problem: Safety - Adult  Goal: Free from fall injury  Outcome: Progressing     Problem: Chronic Conditions and Co-morbidities  Goal: Patient's chronic conditions and co-morbidity symptoms are monitored and maintained or improved  Outcome: Progressing     Problem: Skin/Tissue Integrity  Goal: Absence of new skin breakdown  Description: 1. Monitor for areas of redness and/or skin breakdown  2. Assess vascular access sites hourly  3. Every 4-6 hours minimum:  Change oxygen saturation probe site  4. Every 4-6 hours:  If on nasal continuous positive airway pressure, respiratory therapy assess nares and determine need for appliance change or resting period.   Outcome: Progressing

## 2022-05-04 NOTE — PROGRESS NOTES
Inpatient Occupational Therapy  Evaluation and Treatment    Unit: 2 [place]  Date:  5/4/2022  Patient Name:    Shawn Flor  Admitting diagnosis:  Acute cholecystitis [K81.0]  Admit Date:  5/3/2022  Precautions/Restrictions/WB Status/ Lines/ Wounds/ Oxygen:   Fall risk, Bed/chair alarm, Lines -IV and Telemetry  Treatment Time:  0544-6937   Treatment Number: 1   Timed code treatment minutes 20 minutes   Total Treatment minutes:   30  minutes    Patient Goals for Therapy:  \" not stated  \"      Discharge Recommendations: Home 24 hr supervision and PRN assist   DME needs for discharge: Needs Met       Therapy recommendations for staff:   Assist of 1 with use of rolling walker (RW) for all ambulation within room    History of Present Illness: H&P per Zhanna Ellis MD 5-3-22    [de-identified] y.o. male who presents to the emergency department complaining of arrives with several day history of generalized weakness fatigue chills. No reported fevers wife reports that patient just looks generally weak and ill. Symptoms began this past Saturday. States that patient just extremely fatigued appearing, was mumbling his speech. Did not have any focal neurologic deficit. I was called emergently to evaluate patient in nursing triage room due to his slurred speech complaint. On arrival patient was found to be tachycardic and febrile. Did not have any focal deficits. He denied chest pain denied shortness of breath cough. Did complain of abdominal pain, states that he recently got over a GI diarrheal illness couple days before his symptoms began. He has had chills and tremors intermittently over the last couple of days. Does have upper abdominal pain, more focal in the right upper quadrant on exam.  Had worsening of his weakness and wife reports that he seems somewhat confused 30 minutes prior to arrival today. Home Health S4 Level Recommendation:  NA  AM-PAC Score:21     Preadmission Environment    Pt.  Lives with family (Wife and son )  Home environment:  two story home  Steps to enter first floor: 2 with no railing  steps to enter  Steps to second floor: N/A  Bathroom: tub/shower unit, walk in shower, comfort height toilet and grab bars  Equipment owned: RW, wc (manual ), shower chair/bench, BSC, SPC, reacher and raised toilet    Preadmission Status:  Pt. Able to drive: Yes  Pt Fully independent with ADLs: Yes-however son assists with socks   Pt. Required assistance from family for: Cleaning and Laundry   Pt. independent for transfers and gait and walked with No Device  History of falls No but slid to the floor     Pain  No        Cognition    A&O Person, Place and Situation May 20   Able to follow 2 step commands    Subjective  Patient lying supine in bed with family at bedside. Pt agreeable to this OT eval & tx. Upper Extremity ROM:    WFL    Upper Extremity Strength:    WFL    Upper Extremity Sensation    WFL    Upper Extremity Proprioception:  WFL    Coordination and Tone  WFL    Balance  Functional Sitting Balance:  WFL  Functional Standing Balance:WFL    Bed mobility:    Supine to sit:   Supervision  Sit to supine:   Not Tested  Rolling:    Not Tested  Scooting in sitting:  Independent  Scooting to head of bed:   Not Tested    Bridging:   Not Tested    Transfers:    Sit to stand:  Supervision  Stand to sit:  Supervision  Bed to chair:   SBA  Standard toilet: Not Tested- Pt declined need   Bed to [facility]:  Not Tested    Dressing:      UE:   Not Tested  LE:    Supervision to don socks     Bathing:    UE:  Not Tested  LE:  Not Tested    Eating:   Independent eating ice chips     Toileting:  Not Tested    Activity Tolerance   Pt completed therapy session with No adverse symptoms noted w/activity  Seated EOB: Sp02 96 %  HR 58 /63     Positioning Needs:   Pt up in chair, alarm set, positioned in proper neutral alignment and pressure relief provided.      Exercise / Activities Initiated:   N/A    Patient/Family Education:   Role of OT    Assessment of Deficits: Pt seen for Occupational therapy evaluation in acute care setting. Pt demonstrated decreased Activity tolerance, ADLs, Balance  and Transfers. Pt functioning below baseline and will likely benefit from skilled occupational therapy services to maximize safety and independence. Goal(s) : To be met in 3 Visits:  1). Bed to toilet/BSC: Independent    To be met in 5 Visits:  1). Supine to/from Sit:  Independent  2). Upper Body Bathing:   Independent  3). Lower Body Bathing:   SBA  4). Upper Body Dressing:  Independent  5). Lower Body Dressing:  Supervision  6). Pt to demonstrate UE exs x 15 reps with minimal cues    Rehabilitation Potential:  Good for goals listed above. Strengths for achieving goals include: Pt cooperative  Barriers to achieving goals include:  Complexity of condition     Plan: To be seen 3-5 x/wk while in acute care setting for therapeutic exercises, bed mobility, transfers, dressing, bathing, family/patient education, ADL/IADL retraining, energy conservation training.      Mike Ha OTR/L [id number]          If patient discharges from this facility prior to next visit, this note will serve as the Discharge Summary

## 2022-05-04 NOTE — CONSULTS
Surgery Consult Note     Eleonora Alvino Gist, APRN - CNP, CNP  Pt Name: Rishi Shepherd  MRN: [id number]  YOB: 1940  Date of evaluation: 5/4/2022  Primary Care Physician: Andrzej De Leon, APRN - CNP  Patient evaluated at the request of  Dr. Andriy Dow for evaluation: Acute cholecystitis   IMPRESSIONS:   1. RUQ ultrasound: edematous appearing gallbladder wall with perichoel fluid and gallstones, acute tono suspected  2. CT abd and pelvis: hydrops of the gallbladder with diffuse wall thickening with mild edema and/or perichole fluid and stranding in the adjacent mesentery, no stones, CBD and pancreas are normal   3. ABD: soft, + tenderness RUQ, no N/V, + flatus x 1, last Bm was Sunday and normal  4. T bili 1.9->1.3  5. ALT/AST: 148/75->111/59  6. Leuks normal  7. ILD/COLLIN: sees Dr. Yee Diaz   8. Pt reports he thinks he took his Plavix 1-2 days ago but, pt wife states he was confused at home and she does not know timing of last dose. Pt is s/p LHC on 10/15/19 with PCTA and DANTE mid LAD  9. Pt states \"I feel better now that I am here can I have something to drink? \"  10. does not have any pertinent problems on file. PLANS:   1. Gettign Lovenox BID  2. Zosyn  3. IVF at 75 ml/hr  4. Trial low fat, full liquids  5. Labs in the am  6. PT/OT: ambulate pt  7. Pt with severe acute cholecystitis on Plavix at home who appears improved today. He is AAO x 2. We will trial liquids, if he improves the plan would be to transition to oral antibiotics in the next 1-2 days with plans for outpatient lap tono in 1-2 weeks. If pt fails to improve a percutaneous cholecystostomy tube may be needed: the patient and his family are aware. They agree with the plan of care. SUBJECTIVE:   History of Chief Complaint:    Rishi Shepherd is a [de-identified] y.o. male who presented with mental status changes and weakness. The patient states 2 weeks ago, he and his wife had a virus with N/V/D. It lasted 6 days. They got better.  He states last Thursday, he ate a chicken, oglesby and ranch pizza for dinner. At [address] [place] [address] Friday morning, he woke with severe bloating and gas pains. He took multiple doses of Pepto and TUMs and the pain subsided. On Saturday and Sunday, the patient reports severe chills. He used a heated blanket and could not get warm. He did not feel like he had a fever or take his temperature. Sunday morning, his wife came home from [employer] and he was disoriented. She told him to drink water and couldn't lift the glass to his mouth. He was very weak but, made it to the bathroom. He thought if he could have a Bm he would feel better. He was so weak, he couldn't get off the toilet. His wife and son brought a wheeled chair into the bathroom and wheeled him to the couch. Once at the couch, he fell to the floor. His wife called his PCP who told them he could be having a stroke but, the patient didn't want to go to the hospital. Monday, he was a little better and ate something. The weakness came back and his wife said \"this is it, your going to the hospital.\" He denies pain like this or confusion like this in the past. Pt reports he has been on a diet since August and has lost 55 pounds. In the ER, a CT was performed and revealed the above results. We were consulted to evaluate this patient's abdominal pain. Past Medical History   has a past medical history of Arthritis, Back injuries, BPH (benign prostatic hyperplasia), Chest pain, CPAP (continuous positive airway pressure) dependence, Diverticulosis, Esophageal dysmotility, GERD (gastroesophageal reflux disease), Hiatal hernia, History of colon polyps, Hyperlipidemia, Hypertension, ILD (interstitial lung disease) ([facility] [address] Internal hemorrhoid, Lichen sclerosus of penis, Mild cognitive impairment, COLLIN (obstructive sleep apnea), Renal insufficiency, Restless legs syndrome, Rosacea, Schatzki's ring, Scrotal pruritus, and Sleep apnea.     Past Surgical History   has a past surgical history that includes Circumcision; fracture surgery; fracture surgery; Toe Surgery; hernia repair; Colonoscopy (2010); Colonoscopy (09/14/2015); Cystocopy (05/13/2016); Upper gastrointestinal endoscopy (09/12/2016); Cystoscopy (05/19/2017); TURP (07/14/2017); Cystoscopy (07/14/2017); Endoscopy, colon, diagnostic (10/11/2017); Upper gastrointestinal endoscopy (10/11/2017); Coronary angioplasty (10/15/2019); Prostate surgery; Upper gastrointestinal endoscopy (01/28/2020); and Cardiac catheterization (03/04/2021). Medications  Prior to Admission medications    Medication Sig Start Date End Date Taking? Authorizing Provider   FEROSUL 325 (65 Fe) MG tablet TAKE ONE TABLET BY MOUTH DAILY WITH BREAKFAST 4/21/22   ADORE Baron CNP   methocarbamol (ROBAXIN) 500 MG tablet Take 1 tablet by mouth 3 times daily as needed (spasm) 3/25/22   ADORE Baron CNP   montelukast (SINGULAIR) 10 MG tablet TAKE ONE TABLET BY MOUTH DAILY 3/17/22   ADORE Roblero CNP   atorvastatin (LIPITOR) 40 MG tablet TAKE ONE TABLET BY MOUTH DAILY 1/13/22   Julio César López MD   clopidogrel (PLAVIX) 75 MG tablet TAKE ONE TABLET BY MOUTH DAILY 1/13/22   Julio César López MD   potassium chloride (KLOR-CON) 10 MEQ extended release tablet TAKE ONE TABLET BY MOUTH TWICE A DAY 1/13/22   Julio César López MD   doxazosin (CARDURA) 8 MG tablet Take one tablet by mouth twice a day 1/13/22   Julio César López MD   triamterene-hydroCHLOROthiazide [facility]) 37.5-25 MG per tablet Take 1 tablet by mouth daily 1/13/22   Julio César López MD   nitroGLYCERIN (NITROSTAT) 0.4 MG SL tablet Place 1 tablet under the tongue every 5 minutes as needed for Chest pain up to max of 3 total doses.  If no relief after 1 dose, call 911. 1/13/22   Julio César López MD   omeprazole (PRILOSEC) 40 MG delayed release capsule TAKE ONE CAPSULE BY MOUTH DAILY 1/6/22   ADORE Baron CNP   amLODIPine (NORVASC) 10 MG tablet TAKE ONE TABLET BY MOUTH DAILY 11/30/21   Brian Fernandez MD   levothyroxine (SYNTHROID) 75 MCG tablet TAKE ONE TABLET BY MOUTH DAILY 11/5/21   Sanjeev Kelly MD   zoster recombinant adjuvanted vaccine [facility]) 50 MCG/0.5ML SUSR injection 50 MCG IM then repeat 2-6 months. 11/4/21 11/4/22  ADORE Garzon CNP   pramipexole (MIRAPEX) 1 MG tablet TAKE ONE TABLET BY MOUTH TWICE A DAY 11/4/21   ADORE Garzon CNP   venlafaxine [facility]) 75 MG tablet Take 1 tablet by mouth twice daily 11/4/21   ADORE Garzon CNP   cetirizine (ZYRTEC) 10 MG tablet TAKE ONE TABLET BY MOUTH ONCE NIGHTLY 10/27/21   ADORE Garzon CNP   zinc gluconate 50 MG tablet Take 50 mg by mouth daily    Historical Provider, MD   triamcinolone (KENALOG) 0.1 % cream Apply topically every 7 days 4/23/21   ADORE Garzon CNP   albuterol sulfate HFA (VENTOLIN HFA) 108 (90 Base) MCG/ACT inhaler Inhale 2 puffs into the lungs every 6 hours as needed for Wheezing or Shortness of Breath 4/23/21   ADORE Garzon CNP   aspirin EC 81 MG EC tablet Take 1 tablet by mouth daily 9/4/18   Brian Fernandez MD   magnesium gluconate (MAGONATE) 500 MG tablet Take 500 mg by mouth daily     Historical Provider, MD   Vitamin D (CHOLECALCIFEROL) 1000 UNITS CAPS capsule Take 1,000 Units by mouth daily. Historical Provider, MD   Calcium Carbonate-Vitamin D (CALCIUM + D) 600-200 MG-UNIT TABS Take 600 mg by mouth daily.     Historical Provider, MD    Scheduled Meds:   amLODIPine  10 mg Oral Daily    aspirin EC  81 mg Oral Daily    atorvastatin  40 mg Oral Daily    levothyroxine  75 mcg Oral Daily    pantoprazole  40 mg Oral QAM AC    pramipexole  1 mg Oral BID    sodium chloride flush  5-40 mL IntraVENous 2 times per day    enoxaparin  30 mg SubCUTAneous BID    piperacillin-tazobactam  3,375 mg IntraVENous Q8H     Continuous Infusions:   sodium chloride      sodium chloride 75 mL/hr at 05/04/22 0217     PRN Meds:.albuterol sulfate HFA, sodium chloride flush, sodium chloride, ondansetron **OR** ondansetron, acetaminophen **OR** acetaminophen, polyethylene glycol, morphine    Allergies  is allergic to ace inhibitors, oxybutynin, percocet [oxycodone-acetaminophen], and simvastatin. Family History  family history includes COPD in his mother; Cancer in his brother and father; Diabetes in his brother and sister; High Blood Pressure in his brother; High Cholesterol in his brother; Other in his father; Stroke in his father. Social History   reports that he quit smoking about 48 years ago. His smoking use included cigarettes. He has a 25.50 pack-year smoking history. He has never used smokeless tobacco. He reports that he does not drink alcohol and does not use drugs. Review of Systems:  General positive for  chills, fatigue and weakness  HEENT Denies any diplopia, tinnitus or vertigo  Resp positive for  dyspnea  Cardiac Denies any chest pain, palpitations, claudication or edema  GI Positive for constipation and abdominal pain  Denies any melena, hematochezia, hematemesis or pyrosis   dark urine  Denies any frequency, urgency, hesitancy or incontinence  Heme bleeds easily  Endocrine Denies any history of diabetes or thyroid disease  Neuro Denies any focal motor or sensory deficits    OBJECTIVE:   VITALS:  height is 5' 8\" (1.727 m) and weight is 234 lb 4.8 oz (106.3 kg). His oral temperature is 97.7 °F (36.5 °C). His blood pressure is 120/59 (abnormal) and his pulse is 58. His respiration is 16 and oxygen saturation is 96%. CONSTITUTIONAL: Alert and oriented times 3, no acute distress and cooperative to examination with proper mood and affect. SKIN: Skin color, texture, turgor normal. No rashes. HEENT: Head is normocephalic, atraumatic. EOMI, PERRLA. NECK: supple, symmetrical, trachea midline.   CHEST/LUNGS: chest symmetric with normal A/P diameter, normal respiratory rate and rhythm, lungs clear to auscultation without wheezes, rales or rhonchi but decreased in the BLL. No accessory muscle use. CARDIOVASCULAR: Heart sounds are normal but distant. Regular rate and rhythm without murmur, gallop or rub. ABDOMEN: distended No and Laparoscopic scar(s) present. Abnormal bowel sounds: absent. As above. RECTAL: deferred, not clinically indicated  NEUROLOGIC: There are no focalizing motor or sensory deficits. CN II-XII are grossly intact. Crystal Susan EXTREMITIES: no cyanosis, no clubbing and no edema. LABS:     Recent Labs     05/03/22  1538 05/03/22  1634 05/04/22  0554   WBC 7.2  --  5.6   HGB 13.9  --  12.3*   HCT 40.1*  --  35.3*     --  181   *  --  134*   K 3.5  --  3.5   CL 91*  --  99   CO2 24  --  22   BUN 20  --  16   CREATININE 1.1  --  1.1   MG 2.00  --  2.10   CALCIUM 8.7  --  8.1*   INR 1.33*  --   --    AST 75*  --  59*   *  --  111*   BILITOT 1.9*  --  1.3*   BILIDIR 1.1*  --   --    NITRU  --  Negative  --    COLORU  --  Yellow  --    BACTERIA  --  Rare*  --      Recent Labs     05/03/22  1538 05/03/22  1538 05/04/22  0554   ALKPHOS 202*   < > 163*   *   < > 111*   AST 75*   < > 59*   BILITOT 1.9*   < > 1.3*   BILIDIR 1.1*  --   --    LABALBU 3.7   < > 3.0*   LIPASE  --   --  33.0    < > = values in this interval not displayed.      CBC with Differential:    Lab Results   Component Value Date    WBC 5.6 05/04/2022    RBC 4.00 05/04/2022    HGB 12.3 05/04/2022    HCT 35.3 05/04/2022     05/04/2022    MCV 88.2 05/04/2022    MCH 30.8 05/04/2022    MCHC 35.0 05/04/2022    RDW 13.8 05/04/2022    SEGSPCT 72.3 06/11/2012    LYMPHOPCT 6.8 05/04/2022    MONOPCT 8.5 05/04/2022    EOSPCT 4.7 09/13/2010    BASOPCT 0.4 05/04/2022    MONOSABS 0.5 05/04/2022    LYMPHSABS 0.4 05/04/2022    EOSABS 0.1 05/04/2022    BASOSABS 0.0 05/04/2022    DIFFTYPE Auto 06/11/2012     CMP:    Lab Results   Component Value Date     05/04/2022    K 3.5 05/04/2022    CL 99 05/04/2022    CO2 22 05/04/2022    BUN 16 05/04/2022    CREATININE 1.1 05/04/2022    GFRAA >60 05/04/2022    GFRAA >60 06/11/2012    AGRATIO 1.2 05/04/2022    LABGLOM >60 05/04/2022    GLUCOSE 105 05/04/2022    PROT 5.5 05/04/2022    PROT 7.4 06/11/2012    LABALBU 3.0 05/04/2022    CALCIUM 8.1 05/04/2022    BILITOT 1.3 05/04/2022    ALKPHOS 163 05/04/2022    AST 59 05/04/2022     05/04/2022       RADIOLOGY:   I have personally reviewed the following films:    CT scan abd/pelvis: See radiology report    Thank you for the interesting evaluation. Further recommendations to follow. Electronically signed by ADORE Mendes CNP on 5/4/2022 at 4:32 PM       Patient seen and examined. I agree with the assessment and plan from T[place]O [place]. More than half of the time spent on this encounter was completed by me including the history, physical examination and the entire medical decision making. He has had altered mental status at home past couple of weeks that had progressed. He had fever. He feels better today, and family states he seems better today. CT with edematous GB. Tender RUQ. Labs reviewed. Continue to try and cool off acute process. Antibiotics and trial of liquids. If he continues to improve, then will arrange outpatient cholecystectomy in 1-2 weeks.      Manish Medina MD

## 2022-05-04 NOTE — CARE COORDINATION
Case Management Assessment  Initial Evaluation      Patient Name: Raegan Joe  YOB: 1940  Diagnosis: Acute cholecystitis [K81.0]  Date / Time: 5/3/2022  3:35 PM    Admission status/Date:  05/03/2022  Chart Reviewed: Yes      Patient Interviewed: Yes   Family Interviewed:  No      Hospitalization in the last 30 days:  No    Health Care Decision Maker :   Primary Decision Maker: Maria Del Carmen Bell - Spouse - [phone]    (CM - must 1st enter selection under Navigator - emergency contact- [place] Relationship and pick relationship)   Who do you trust or have selected to make healthcare decisions for you    Current PCP: ADORE Tran    Financial  [employer] Medicare  Precert required for SNF : YES         3 night stay required - NA    ADLS  Support Systems/Care Needs:    Transportation: self    Meal Preparation: self    Housing  Living Arrangements: Lives w/sp  Steps: 3   Intent for return to present living arrangements: Yes  Identified Issues: NA    Home Care Information  Active with [address] : No Agency:(Services)     Passport/Waiver : No  :                      Phone Number:    Passport/Waiver Services: NA          Durable Medical Equiptment   DME Provider: JANIE  Equipment:   Walker___Cane__X-PRN_RTS___ BSC___Shower Chair___Hospital Bed___W/C____Other________  02 at ____Liter(s)---wears(frequency)_______ HHN ___ CPAP___ BiPap___   N/A____      Home O2 Use :  No        Community Service Affiliation  Dialysis:  No    · Agency:  · Location:  · Dialysis Schedule:  · Phone:   · Fax: Other Community Services: (ex:PT/OT,Mental Health,Wound Clinic, Cardio/Pul [address])    DISCHARGE PLAN: Explained Case Management role/services. Chart reviewed. Met with pt at bedside and explained the role of the CM. Plan:  IPTA. Plans to return home. No new needs identified. Please consult CM PRN.  NF

## 2022-05-04 NOTE — PROGRESS NOTES
Handoff report and transfer of care given at bedside to [facility] . Patient in stable condition, denies needs/concerns at this time. Call light within reach.

## 2022-05-04 NOTE — PROGRESS NOTES
Inpatient Physical Therapy Evaluation and Treatment    [facility]  Date:  5/4/2022  Patient Name:    Amol Gruber  Admitting diagnosis:  Acute cholecystitis [K81.0]  Admit Date:  5/3/2022  Precautions/Restrictions/WB Status/ Lines/ Wounds/ Oxygen: Fall risk, Bed/chair alarm, Lines -IV and Telemetry    Treatment Time:  11:45-12:15  Treatment Number:  1   Timed Code Treatment Minutes: 20 minutes  Total Treatment Minutes:  30  minutes    Patient Goals for Therapy: agreeable to today's activities. Discharge Recommendations: Home 24 hr supervision   DME needs for discharge: Needs Met       Therapy recommendation for EMS Transport: can transport by wheelchair    Therapy recommendations for staff:   Stand by assist with use of No AD and gait belt for all transfers and ambulation within room    History of Present Illness: Per Dr. Ashlyn Teixeira ED provider note 5/03: Jovanna Zhao is a [de-identified] y.o. male who presents to the emergency department with the complaint of generalized illness for the last 3+ days. Patient tachycardic and febrile, sepsis labs initiated. On physical exam lung fields are clear no reported cough or congestion. Does have upper abdominal pain more focal in the right upper quadrant. CT concerning for acute cholecystitis without cholelithiasis. Patient started on antibiotic coverage, case was discussed with general surgery plan is for hospitalist admission antibiotics and surgical eval in hospital.  No acute emergent plan for surgery this evening. \"    Home Health S4 Level Recommendation:  NA  AM-PAC Mobility Score       AM-PAC Inpatient Mobility without Stair Climbing Raw Score : 18    Preadmission Environment    Pt.  Lives with family (Wife and son )  Home environment:    two story home  Steps to enter first floor: 2 with no railing  steps to enter  Steps to second floor: N/A  Bathroom: tub/shower unit, walk in shower, comfort height toilet and grab bars  Equipment owned: RW, wc (manual ), shower chair/bench, BSC, SPC, reacher and raised toilet     Preadmission Status:  Pt. Able to drive: Yes  Pt Fully independent with ADLs: Yes  Pt. Required assistance from family for: Cleaning and Laundry   Pt. independent for transfers and gait and walked with No Device  History of falls - Yes, slid to the floor from chair    Pain   No    Cognition    A&O Person, Place and Situation May 20   Able to follow 2 step commands    Subjective  Patient lying supine in bed with multiple visitors in room. Pt agreeable to this PT eval & tx. Upper Extremity ROM/Strength  Please see OT evaluation. Lower Extremity ROM / Strength   AROM WFL: Yes    BLE strength WFL, but not formally assessed with MMT. Lower Extremity Sensation    WFL . H/O restless leg. Lower Extremity Proprioception:   WFL    Coordination and Tone  WFL    Balance  Sitting:  Normal; Independent  Comments: at EOB    Standing: Good - ; SBA  Comments: without AD    Bed Mobility   Supine to Sit:    Modified Independent  Sit to Supine:   Not Tested   (up to chair to end session)  Rolling:   Not Tested  Scooting in sitting: Supervision  Scooting in supine:  Not Tested    Transfer Training     Sit to stand:   SBA  Stand to sit:   SBA  Bed to Chair:   Not Tested with use of N/A    Gait gait completed as indicated below  Distance:      30 ft  Deviations (firm surface/linoleum):  decreased skip and decreased step length bilaterally  Assistive Device Used:    No AD  Level of Assist:    SBA  Comment: Pt self-limited distance. Declined 2nd trial.     Stair Training deferred, pt unsafe/ not appropriate to complete stairs at this time    Activity Tolerance   Pt completed therapy session with No adverse symptoms noted w/activity    Positioning Needs   Pt up in chair, alarm set, positioned in proper neutral alignment and pressure relief provided.    Call light provided and all needs within reach    Exercises Initiated  Remberto deferred secondary to treatment focus on functional mobility  NA    Other  None. Patient/Family Education   Pt educated on role of inpatient PT, POC, importance of continued activity, DC recommendations, transfer techniques and calling for assist with mobility. Assessment  Pt seen for Physical Therapy evaluation in acute care setting. Pt demonstrated decreased Activity tolerance as well as decreased independence with Ambulation and Transfers. Recommending Home 24 hr supervision upon discharge as patient functioning close to baseline level    Goals : To be met in 3 visits:  1). Independent with LE Ex x 10 reps    To be met in 6 visits:  1). Supine to/from sit: Independent  2). Sit to/from stand: Independent  3). Bed to chair: Independent  4). Gait: Ambulate 150 ft.  with  Independent and use of No AD  5). Tolerate B LE exercises 3 sets of 10-15 reps  6). Ascend/descend 2 steps with Supervision with use of hand rail unilateral and No AD    Rehabilitation Potential: Good  Strengths for achieving goals include:   Pt motivated, PLOF, Family Support and Pt cooperative   Barriers to achieving goals include:    No Barriers    Plan    To be seen 3-5 x / week  while in acute care setting for therapeutic exercises, bed mobility, transfers, progressive gait training, balance training, and family/patient education. Signature: Brittni Hurst, PT, DPT    If patient discharges from this facility prior to next visit, this note will serve as the Discharge Summary.

## 2022-05-04 NOTE — ACP (ADVANCE CARE PLANNING)
Advance Care Planning     General Advance Care Planning (ACP) Conversation    Date of Conversation: 5/3/2022  Conducted with: Patient with Decision Making Capacity    Healthcare Decision Maker:    Primary Decision Maker: Anisa Cuevas - Spouse - [phone]  Click here to complete Healthcare Decision Makers including selection of the Healthcare Decision Maker Relationship (ie \"Primary\"). Today we documented Decision Maker(s) consistent with Legal Next of Kin hierarchy. Content/Action Overview:   Has ACP document(s) NOT on file - requested patient to provide  Reviewed DNR/DNI and patient elects Full Code (Attempt Resuscitation)      Length of Voluntary ACP Conversation in minutes:  <16 minutes (Non-Billable)    Efrain Dey RN

## 2022-05-04 NOTE — PLAN OF CARE
Problem: Discharge Planning  Goal: Discharge to home or other facility with appropriate resources  5/4/2022 0941 by Jorge Medrano RN  Outcome: Progressing     Problem: Safety - Adult  Goal: Free from fall injury  5/4/2022 0941 by Jorge Medrano RN  Outcome: Progressing     Problem: Chronic Conditions and Co-morbidities  Goal: Patient's chronic conditions and co-morbidity symptoms are monitored and maintained or improved  5/4/2022 0941 by Jorge Medrano RN  Outcome: Progressing     Problem: Skin/Tissue Integrity  Goal: Absence of new skin breakdown  Description: 1. Monitor for areas of redness and/or skin breakdown  2. Assess vascular access sites hourly  3. Every 4-6 hours minimum:  Change oxygen saturation probe site  4. Every 4-6 hours:  If on nasal continuous positive airway pressure, respiratory therapy assess nares and determine need for appliance change or resting period.   5/4/2022 0941 by Jorge Medrano RN  Outcome: Progressing

## 2022-05-04 NOTE — PROGRESS NOTES
Admission questions and assessment complete; see flow sheet. Scheduled medications administered; See MAR. IV infusing without difficulty. Pt denies   pain at this time. Pt denies any needs at this time.  Pt educated on use of call light and to call out with needs, verbalized understanding, bed in low locked position for pt safety show

## 2022-05-04 NOTE — PROGRESS NOTES
Received report given @ bedside from [facility], for transferral of care. Pt resting in bed. Call light in reach.

## 2022-05-05 VITALS
HEART RATE: 62 BPM | OXYGEN SATURATION: 99 % | SYSTOLIC BLOOD PRESSURE: 139 MMHG | HEIGHT: 68 IN | TEMPERATURE: 97.9 F | BODY MASS INDEX: 35.51 KG/M2 | WEIGHT: 234.3 LBS | RESPIRATION RATE: 18 BRPM | DIASTOLIC BLOOD PRESSURE: 65 MMHG

## 2022-05-05 LAB
A/G RATIO: 1.4 (ref 1.1–2.2)
ALBUMIN SERPL-MCNC: 3.3 G/DL (ref 3.4–5)
ALP BLD-CCNC: 161 U/L (ref 40–129)
ALT SERPL-CCNC: 113 U/L (ref 10–40)
ANION GAP SERPL CALCULATED.3IONS-SCNC: 12 MMOL/L (ref 3–16)
AST SERPL-CCNC: 70 U/L (ref 15–37)
BASOPHILS ABSOLUTE: 0.1 K/UL (ref 0–0.2)
BASOPHILS RELATIVE PERCENT: 0.9 %
BILIRUB SERPL-MCNC: 0.9 MG/DL (ref 0–1)
BUN BLDV-MCNC: 13 MG/DL (ref 7–20)
CALCIUM SERPL-MCNC: 8.2 MG/DL (ref 8.3–10.6)
CHLORIDE BLD-SCNC: 99 MMOL/L (ref 99–110)
CO2: 23 MMOL/L (ref 21–32)
CREAT SERPL-MCNC: 1.1 MG/DL (ref 0.8–1.3)
EOSINOPHILS ABSOLUTE: 0.1 K/UL (ref 0–0.6)
EOSINOPHILS RELATIVE PERCENT: 1.7 %
GFR AFRICAN AMERICAN: >60
GFR NON-AFRICAN AMERICAN: >60
GLUCOSE BLD-MCNC: 146 MG/DL (ref 70–99)
HCT VFR BLD CALC: 34.9 % (ref 40.5–52.5)
HEMOGLOBIN: 12.3 G/DL (ref 13.5–17.5)
LYMPHOCYTES ABSOLUTE: 0.9 K/UL (ref 1–5.1)
LYMPHOCYTES RELATIVE PERCENT: 14.8 %
MAGNESIUM: 2.1 MG/DL (ref 1.8–2.4)
MCH RBC QN AUTO: 30.8 PG (ref 26–34)
MCHC RBC AUTO-ENTMCNC: 35.2 G/DL (ref 31–36)
MCV RBC AUTO: 87.5 FL (ref 80–100)
MONOCYTES ABSOLUTE: 0.8 K/UL (ref 0–1.3)
MONOCYTES RELATIVE PERCENT: 12 %
NEUTROPHILS ABSOLUTE: 4.5 K/UL (ref 1.7–7.7)
NEUTROPHILS RELATIVE PERCENT: 70.6 %
PDW BLD-RTO: 13.9 % (ref 12.4–15.4)
PLATELET # BLD: 205 K/UL (ref 135–450)
PMV BLD AUTO: 6.4 FL (ref 5–10.5)
POTASSIUM REFLEX MAGNESIUM: 3.4 MMOL/L (ref 3.5–5.1)
RBC # BLD: 3.98 M/UL (ref 4.2–5.9)
SODIUM BLD-SCNC: 134 MMOL/L (ref 136–145)
TOTAL PROTEIN: 5.6 G/DL (ref 6.4–8.2)
WBC # BLD: 6.3 K/UL (ref 4–11)

## 2022-05-05 PROCEDURE — 36415 COLL VENOUS BLD VENIPUNCTURE: CPT

## 2022-05-05 PROCEDURE — 2580000003 HC RX 258: Performed by: INTERNAL MEDICINE

## 2022-05-05 PROCEDURE — 96372 THER/PROPH/DIAG INJ SC/IM: CPT

## 2022-05-05 PROCEDURE — 83735 ASSAY OF MAGNESIUM: CPT

## 2022-05-05 PROCEDURE — 6370000000 HC RX 637 (ALT 250 FOR IP): Performed by: INTERNAL MEDICINE

## 2022-05-05 PROCEDURE — 99239 HOSP IP/OBS DSCHRG MGMT >30: CPT | Performed by: INTERNAL MEDICINE

## 2022-05-05 PROCEDURE — 80053 COMPREHEN METABOLIC PANEL: CPT

## 2022-05-05 PROCEDURE — G0378 HOSPITAL OBSERVATION PER HR: HCPCS

## 2022-05-05 PROCEDURE — 99232 SBSQ HOSP IP/OBS MODERATE 35: CPT | Performed by: SURGERY

## 2022-05-05 PROCEDURE — 85025 COMPLETE CBC W/AUTO DIFF WBC: CPT

## 2022-05-05 PROCEDURE — 96366 THER/PROPH/DIAG IV INF ADDON: CPT

## 2022-05-05 PROCEDURE — 6360000002 HC RX W HCPCS: Performed by: INTERNAL MEDICINE

## 2022-05-05 RX ORDER — POTASSIUM CHLORIDE 20 MEQ/1
20 TABLET, EXTENDED RELEASE ORAL ONCE
Status: COMPLETED | OUTPATIENT
Start: 2022-05-05 | End: 2022-05-05

## 2022-05-05 RX ORDER — AMOXICILLIN AND CLAVULANATE POTASSIUM 875; 125 MG/1; MG/1
1 TABLET, FILM COATED ORAL 2 TIMES DAILY
Qty: 14 TABLET | Refills: 0 | Status: SHIPPED | OUTPATIENT
Start: 2022-05-05 | End: 2022-05-12

## 2022-05-05 RX ADMIN — Medication 10 ML: at 08:35

## 2022-05-05 RX ADMIN — LEVOTHYROXINE SODIUM 75 MCG: 50 TABLET ORAL at 07:07

## 2022-05-05 RX ADMIN — PIPERACILLIN SODIUM,TAZOBACTAM SODIUM 3375 MG: 3; .375 INJECTION, POWDER, FOR SOLUTION INTRAVENOUS at 00:30

## 2022-05-05 RX ADMIN — ASPIRIN 81 MG: 81 TABLET, COATED ORAL at 08:34

## 2022-05-05 RX ADMIN — PANTOPRAZOLE SODIUM 40 MG: 40 TABLET, DELAYED RELEASE ORAL at 07:08

## 2022-05-05 RX ADMIN — AMLODIPINE BESYLATE 10 MG: 5 TABLET ORAL at 08:34

## 2022-05-05 RX ADMIN — POTASSIUM CHLORIDE 20 MEQ: 1500 TABLET, EXTENDED RELEASE ORAL at 13:18

## 2022-05-05 RX ADMIN — PIPERACILLIN SODIUM,TAZOBACTAM SODIUM 3375 MG: 3; .375 INJECTION, POWDER, FOR SOLUTION INTRAVENOUS at 08:33

## 2022-05-05 RX ADMIN — PRAMIPEXOLE DIHYDROCHLORIDE 1 MG: 1 TABLET ORAL at 08:34

## 2022-05-05 RX ADMIN — ATORVASTATIN CALCIUM 40 MG: 40 TABLET, FILM COATED ORAL at 08:34

## 2022-05-05 RX ADMIN — ENOXAPARIN SODIUM 30 MG: 100 INJECTION SUBCUTANEOUS at 08:34

## 2022-05-05 NOTE — PROGRESS NOTES
Shift assessment complete; see flow sheet. Scheduled medications administered; See MAR. IV infusing without difficulty. Pt denies pain at this time. Pt denies any needs at this time. Pt educated on use of call light and to call out with needs, verbalized understanding, bed in low locked position for pt safety. Bed alarm on.

## 2022-05-05 NOTE — PLAN OF CARE
Problem: Discharge Planning  Goal: Discharge to home or other facility with appropriate resources  5/5/2022 1042 by Lucila Day RN  Outcome: Progressing     Problem: Safety - Adult  Goal: Free from fall injury  5/5/2022 1042 by Lucila Day RN  Outcome: Progressing     Problem: Chronic Conditions and Co-morbidities  Goal: Patient's chronic conditions and co-morbidity symptoms are monitored and maintained or improved  5/5/2022 1042 by Lucila Day RN  Outcome: Progressing     Problem: Skin/Tissue Integrity  Goal: Absence of new skin breakdown  Description: 1. Monitor for areas of redness and/or skin breakdown  2. Assess vascular access sites hourly  3. Every 4-6 hours minimum:  Change oxygen saturation probe site  4. Every 4-6 hours:  If on nasal continuous positive airway pressure, respiratory therapy assess nares and determine need for appliance change or resting period.   5/5/2022 1042 by Lucila Day RN  Outcome: Progressing     Problem: ABCDS Injury Assessment  Goal: Absence of physical injury  Outcome: Progressing

## 2022-05-05 NOTE — PROGRESS NOTES
[facility] SURGERY    PATIENT NAME: Silvio Staley     TODAY'S DATE: 5/5/2022    CHIEF COMPLAINT: Does not like the full liquids    INTERVAL HISTORY/HPI:    Pt states he feels well. He denies abdominal pain. He is tolerating clear liquids and wants to eat. REVIEW OF SYSTEMS:  Pertinent positives and negatives as per interval history section    OBJECTIVE:  VITALS:  BP (!) 147/75   Pulse 53   Temp 98.3 °F (36.8 °C) (Oral)   Resp 16   Ht 5' 8\" (1.727 m)   Wt 234 lb 4.8 oz (106.3 kg)   SpO2 96%   BMI 35.63 kg/m²     INTAKE/OUTPUT:    I/O last 3 completed shifts: In: 360 [P.O.:360]  Out: 1300 [Urine:1300]  I/O this shift:  In: 360 [P.O.:360]  Out: -     CONSTITUTIONAL:  awake and alert  LUNGS:  Respirations easy and unlabored, clear to auscultation  CARD:  bradycardic with regular rhythm  ABDOMEN:  normal bowel sounds, soft, non-distended, non-tender     Data:  CBC: Recent Labs     05/03/22 1538 05/04/22  0554 05/05/22  0540   WBC 7.2 5.6 6.3   HGB 13.9 12.3* 12.3*   HCT 40.1* 35.3* 34.9*    181 205     BMP:    Recent Labs     05/03/22 1538 05/04/22  0554 05/05/22  0540   * 134* 134*   K 3.5 3.5 3.4*   CL 91* 99 99   CO2 24 22 23   BUN 20 16 13   CREATININE 1.1 1.1 1.1   GLUCOSE 145* 105* 146*     Hepatic:   Recent Labs     05/03/22 1538 05/04/22  0554 05/05/22  0540   AST 75* 59* 70*   * 111* 113*   BILITOT 1.9* 1.3* 0.9   ALKPHOS 202* 163* 161*     Mag:      Recent Labs     05/03/22 1538 05/04/22  0554 05/05/22  0540   MG 2.00 2.10 2.10      Phos:   No results for input(s): PHOS in the last 72 hours. INR:   Recent Labs     05/03/22  1538   INR 1.33*       Radiology Review:  *Imaging personally reviewed by me. N/A      ASSESSMENT AND PLAN:  Acute calculus cholecystitis, clinically improved. Advance diet.   If he tolerates the diet advancement, he is okay for discharge from surgical standpoint with plans for interval cholecystectomy as an outpatient     Electronically signed by Masood Ferguson MD     [id number]

## 2022-05-05 NOTE — PROGRESS NOTES
Handoff report and transfer of care given at bedside to [facility]. Patient in stable condition, denies needs/concerns at this time. Call light within reach.

## 2022-05-05 NOTE — PLAN OF CARE
Problem: Discharge Planning  Goal: Discharge to home or other facility with appropriate resources  5/5/2022 1322 by Katherine Meza RN  Outcome: Completed     Problem: Safety - Adult  Goal: Free from fall injury  5/5/2022 1322 by Katherine Meza RN  Outcome: Completed     Problem: Chronic Conditions and Co-morbidities  Goal: Patient's chronic conditions and co-morbidity symptoms are monitored and maintained or improved  5/5/2022 1322 by Katherine Meza RN  Outcome: Completed     Problem: Skin/Tissue Integrity  Goal: Absence of new skin breakdown  Description: 1. Monitor for areas of redness and/or skin breakdown  2. Assess vascular access sites hourly  3. Every 4-6 hours minimum:  Change oxygen saturation probe site  4. Every 4-6 hours:  If on nasal continuous positive airway pressure, respiratory therapy assess nares and determine need for appliance change or resting period.   5/5/2022 1322 by Katherine Meza RN  Outcome: Completed     Problem: ABCDS Injury Assessment  Goal: Absence of physical injury  5/5/2022 1322 by Katherine Meza RN  Outcome: Completed

## 2022-05-06 ENCOUNTER — TELEPHONE (OUTPATIENT)
Dept: FAMILY MEDICINE CLINIC | Age: 82
End: 2022-05-06

## 2022-05-06 ENCOUNTER — CARE COORDINATION (OUTPATIENT)
Dept: CASE MANAGEMENT | Age: 82
End: 2022-05-06

## 2022-05-06 DIAGNOSIS — K81.0 ACUTE CHOLECYSTITIS: Primary | ICD-10-CM

## 2022-05-06 PROCEDURE — 1111F DSCHRG MED/CURRENT MED MERGE: CPT | Performed by: NURSE PRACTITIONER

## 2022-05-06 NOTE — CARE COORDINATION
Claudio 45 Transitions Initial Follow Up Call    Call within 2 business days of discharge: Yes    Patient: Rupal Live Patient : 1940   MRN: [id number]  Reason for Admission: sepsis, metabolic encephalopathy, acute calculus  cholecystitis, elevated LFTs, elevated ALK phos, elevated bilirubin, hx lung fibrosis, hyponatremia, generalized weakness, CAD, ILD, HTN, HLD, diverticulosis, hypothyroidism, GERD, RLS, BPH, COLLIN on CPAP -> home no services, OP tono, AM-PAC 18  Discharge Date: 22 RARS: Readmission Risk Score: 12.1 ( )      Last Discharge [facility]       Complaint Diagnosis Description Type Department Provider    5/3/22 Extremity Weakness Acute cholecystitis ED to Hosp-Admission (Discharged) (ADMITTED) Elizabeth Colorado 2 Oneida Sanches MD; Da... Spoke with: Rupal Live (patient)    Facility: [facility]    Non-face-to-face services provided:  Scheduled appointment with PCP-declined  Obtained and reviewed discharge summary and/or continuity of care documents  Education of patient/family/caregiver/guardian to support self-management-low fat diet  Assessment and support for treatment adherence and medication management-[id number] completed    Was this an external facility discharge? No Discharge Facility: NA    Challenges to be reviewed by the provider   Additional needs identified to be addressed with provider yes  medications-not taking baby aspirin; needs TCM visit       Method of communication with provider : staff message    Advance Care Planning:   Does patient have an Advance Directive:  decision maker updated. Was this a readmission? No  Patient stated reason for admission: weakness, chills, fatigue for several days  Patients top risk factors for readmission:   functional physical ability  medical condition-see above  PCP relationship    Care Transition Nurse (CTN) contacted the patient by telephone to perform post hospital discharge assessment.  Provided introduction to self, and explanation of the CTN role. CTN reviewed discharge instructions, medical action plan and red flags with patient who verbalized understanding. Patient given an opportunity to ask questions and does not have any further questions or concerns at this time. Were discharge instructions available to patient? Yes. Reviewed appropriate site of care based on symptoms and resources available to patient including: PCP  Specialist. The patient agrees to contact the PCP office for questions related to their healthcare. Medication reconciliation was performed with patient, who verbalizes understanding of administration of home medications. Patient was given an opportunity to verbalize any questions and concerns and agrees to contact CTN or health care provider for questions related to their healthcare. Taking abx as prescribed and tolerating - started right after discharge. States he does not take ASA 81mg daily - states it was stopped by cardiology prior to this hospital visit. Declines scheduling TCM visit because he plans to schedule OP tono with surgeon in 2 weeks. Reviewed s/s to call physician same day. He declines referrals including skilled HC, meals and county resources. CTN provided contact information for future needs. Plan for follow-up call in 5-7 days based on severity of symptoms and risk factors. Plan for next call: symptom management-abd pain; follow up appointment? Care Transitions 24 Hour Call    Schedule Follow Up Appointment with PCP: Declined  Do you have a copy of your discharge instructions?: Yes  Do you have all of your prescriptions and are they filled?: Yes  Have you been contacted by a [facility] Pharmacist?: No  Have you scheduled your follow up appointment?: No  Do you feel like you have everything you need to keep you well at home?: Yes  Care Transitions Interventions  No Identified Needs       Follow Up  No future appointments.     Jimmy Serna RN

## 2022-05-06 NOTE — TELEPHONE ENCOUNTER
Claudio 45 Transitions Initial Follow Up Call    Outreach made within 2 business days of discharge: Yes    Patient: Chintan Blandon Patient : 1940   MRN: [id number]  Reason for Admission: There are no discharge diagnoses documented for the most recent discharge. Discharge Date: 22       Spoke with: spouse / Mal Iron    Discharge department/facility: [facility]    Non-face-to-face services provided:  Scheduled appointment with PCP- 22   3:40    TCM Interactive Patient Contact:  Was patient able to fill all prescriptions: Yes  Was patient instructed to bring all medications to the follow-up visit: Yes  Is patient taking all medications as directed in the discharge summary?  Yes  Does patient understand their discharge instructions: Yes  Does patient have questions or concerns that need addressed prior to 7-14 day follow up office visit: no    Scheduled appointment with PCP within 7-14 days    Follow Up  Future Appointments   Date Time Provider Madelin Stallworth   2022  3:40 PM Walter Perdue, [address]       Tayler Mendez RN

## 2022-05-07 DIAGNOSIS — F33.41 RECURRENT MAJOR DEPRESSIVE DISORDER, IN PARTIAL REMISSION (HCC): ICD-10-CM

## 2022-05-07 LAB
BLOOD CULTURE, ROUTINE: NORMAL
CULTURE, BLOOD 2: NORMAL

## 2022-05-09 ENCOUNTER — TELEPHONE (OUTPATIENT)
Dept: SURGERY | Age: 82
End: 2022-05-09

## 2022-05-09 RX ORDER — VENLAFAXINE 75 MG/1
TABLET ORAL
Qty: 180 TABLET | Refills: 1 | Status: SHIPPED | OUTPATIENT
Start: 2022-05-09

## 2022-05-09 NOTE — DISCHARGE SUMMARY
Name:  David Nguyen  Room:  0229/0229-02  MRN:    [id number]    Discharge Summary      This discharge summary is in conjunction with a complete physical exam done on the day of discharge. Discharging Physician: Dr. Vi Francisr: 5/3/2022  Discharge:  5/5/2022    HPI taken from admission H&P:    The patient is a [de-identified] y.o. male with CAD, HTN, HLD, ILD, diverticulosis, Hypothyroidism, GERD, RLS, BPH and COLLIN who presented to [facility] ED with complaint of weakness. Patient states 2 weeks ago he and his wife both had a \"GI bug\" associate with vomiting and diarrhea for approximately 6 days which resolved on its own without antibiotics. Several days after resolution of his episode on Thursday of last week patient states he ate pizza and woke up early Friday morning with terrible gas pains and abdominal distention this also resolved on its own but the following day he endorses severe chills and weakness. His weakness was so severe that he states he fell to the ground and his family members had to help him get back up. He denies presyncope, dizziness or loss of consciousness. His weakness improved over the next day or so but yesterday started to get bad again and his wife made him come to the emergency department. He endorses several days of loss of appetite. He endorses mild abdominal pain that is not worsened by food. He states he has had BMs daily and denies hematochezia or melena. He denies fevers, chest pain, shortness of breath, nausea/vomiting/diarrhea, numbness/tingling/swelling, skin color change or itching. Sepsis criteria was met upon arrival to the ED with T1 100.8 °F, RR 22,  and GI source. CT of his abdomen was obtained which displayed mild hydrops of the gallbladder and findings suspicious for acute cholecystitis. He was hyponatremic. Procalcitonin was elevated 1.09. An elevated alk phos, bilirubin and transaminitis.   He is admitted for further care.     I did discuss CODE STATUS with the patient and patient wishes to remain a full code. Diagnoses this Admission and Hospital Course     #Sepsis POA  -T 100.8, RR 22, , GI source   -BP stable, not requiring pressors  -No LA elevation  -IVF bolus given in ED  -Blood cx x 2 pending --> no growth to date   -Abx below     #Metabolic encephalopathy - resolved  -Alert and oriented x3 for me today   -Patient has good insight into current presentation  -Patient was thought to have confusion and slurred speech in the emergency department. At that time ED provider did not find focal deficits.  -Neuro exam again nonfocal today and cranial nerves II through XII intact     # possible acute cholecystitis   -CT as above  -Check RUQ US   -NPO  -IV zosyn D #1-->  augmentin PO   -Surgery consult --> plan for outpatient cholecystectomy      #Elevated LFTs  #Elevated ALK phos  #Elevated bilirubin  -2/2 above  -Trending down  -Monitored CMP     Pneumonitis versus possible progressive lung fibrosis on abdominal CT. Has h/o lung fibrosis- seen Dr. Yue Barajas in the past      #Hyponatremia  -129--134  -Improving with IVF     #Generalized weakness  -PTOT     #CAD  -Cath report above  -Continue ASA and statin  -Plavix held with possible procedure     #ILD  -No daily inhalers   -No hypoxic, no home O2  -Continue albuterol PRN     #HTN  -BP is controlled  -Continue norvasc  -Holding home maxzide with hyponatremia --> can continue      #HLD  -Continue statin     #Diverticulosis  -Stable, no diverticulitis     #Hypothyroidism  -Continue synthroid     #GERD  -Continue PPI     #RLS   -Continue mirapex     #BPH    -On doxazosin     #COLLIN   -Continue home CPAP       Procedures (Please Review Full Report for Details)  N/A    Consults    General surgery       Physical Exam at Discharge:    /65   Pulse 62   Temp 97.9 °F (36.6 °C) (Oral)   Resp 18   Ht 5' 8\" (1.727 m)   Wt 234 lb 4.8 oz (106.3 kg)   SpO2 99%   BMI 35.63 kg/m²       Gen: No distress. Alert.    Eyes: PERRL. + sclera icterus. No conjunctival injection. ENT: No discharge. Pharynx clear. Neck: Trachea midline. Normal thyroid. Resp: No accessory muscle use. No crackles. No wheezes. No rhonchi. No dullness on percussion. CV: Regular rate. Regular rhythm. No murmur or rub. No edema. GI: Non-tender. Non-distended. No masses. No organomegaly. Normal bowel sounds. No hernia. Skin: Warm and dry. No nodule on exposed extremities. No rash on exposed extremities. Lymph: No cervical LAD. No supraclavicular LAD. M/S: No cyanosis. No joint deformity. No clubbing. Neuro: Awake. Moves all 4 extremities, non focal  Psych: Oriented x 3. No anxiety or agitation. Lab Results   Component Value Date    WBC 6.3 05/05/2022    HGB 12.3 (L) 05/05/2022    HCT 34.9 (L) 05/05/2022    MCV 87.5 05/05/2022     05/05/2022     Lab Results   Component Value Date     (L) 05/05/2022    K 3.4 (L) 05/05/2022    CL 99 05/05/2022    CO2 23 05/05/2022    BUN 13 05/05/2022    CREATININE 1.1 05/05/2022    GLUCOSE 146 (H) 05/05/2022    CALCIUM 8.2 (L) 05/05/2022    PROT 5.6 (L) 05/05/2022    LABALBU 3.3 (L) 05/05/2022    BILITOT 0.9 05/05/2022    ALKPHOS 161 (H) 05/05/2022    AST 70 (H) 05/05/2022     (H) 05/05/2022    LABGLOM >60 05/05/2022    GFRAA >60 05/05/2022    AGRATIO 1.4 05/05/2022    GLOB 3.2 03/04/2021         CULTURES  Blood cultures no growth to date   Urine culture no growth to date   COVID and Flu not detected       RADIOLOGY    US GALLBLADDER RUQ   Final Result   Edematous appearing gallbladder wall with pericholecystic fluid with   gallstones seen. The patient was on pain medications and there was a   negative sonographic Vega's sign on examination. Acute cholecystitis, is   not ruled out, is suspected. Recommend comparison with clinical and surgical   exam and/or follow-up with HIDA scan.          CT ABDOMEN PELVIS W IV CONTRAST Additional Contrast? None   Final Result   Mild hydrops of the gallbladder and findings suspicious for acute   cholecystitis with no obvious gallstones or biliary duct dilatation. Recommend ultrasound follow-up. Mild chronic liver changes and mild splenomegaly which is more prominent. Small hiatal hernia which is more prominent. Bibasilar subpleural interstitial prominence throughout the lung bases which   is increased and may represent early pneumonitis vs progressive fibrosis and   scarring. Suggest follow-up with serial chest x-rays. Scattered diverticula throughout the distal left colon with no pericolonic   inflammation. Mild prostatic enlargement and diffuse mild bladder wall thickening which   could be due to poor distention. Recommend clinical follow-up. Questionable small inguinal hernias bilaterally with no bowel loops in the   hernias      Mild compression deformities scattered along the lower thoracic spine which   appear chronic. Recommend clinical follow-up of the vertebra. CT HEAD WO CONTRAST   Final Result   No acute intracranial abnormality.          XR CHEST PORTABLE   Final Result   Bilateral ill-defined peripheral pulmonary opacities could represent atypical   pneumonia               Discharge Medications     Medication List      START taking these medications    amoxicillin-clavulanate 875-125 MG per tablet  Commonly known as: AUGMENTIN  Take 1 tablet by mouth 2 times daily for 7 days        CONTINUE taking these medications    albuterol sulfate  (90 Base) MCG/ACT inhaler  Commonly known as: Ventolin HFA  Inhale 2 puffs into the lungs every 6 hours as needed for Wheezing or Shortness of Breath     amLODIPine 10 MG tablet  Commonly known as: NORVASC  TAKE ONE TABLET BY MOUTH DAILY     aspirin EC 81 MG EC tablet  Take 1 tablet by mouth daily     atorvastatin 40 MG tablet  Commonly known as: LIPITOR  TAKE ONE TABLET BY MOUTH DAILY     Calcium + D 600-200 MG-UNIT Tabs  Generic drug: calcium carbonate-vitamin D cetirizine 10 MG tablet  Commonly known as: ZYRTEC  TAKE ONE TABLET BY MOUTH ONCE NIGHTLY     clopidogrel 75 MG tablet  Commonly known as: PLAVIX  TAKE ONE TABLET BY MOUTH DAILY     doxazosin 8 MG tablet  Commonly known as: CARDURA  Take one tablet by mouth twice a day     FeroSul 325 (65 Fe) MG tablet  Generic drug: ferrous sulfate  TAKE ONE TABLET BY MOUTH DAILY WITH BREAKFAST     levothyroxine 75 MCG tablet  Commonly known as: SYNTHROID  TAKE ONE TABLET BY MOUTH DAILY     magnesium gluconate 500 MG tablet  Commonly known as: MAGONATE     methocarbamol 500 MG tablet  Commonly known as: ROBAXIN  Take 1 tablet by mouth 3 times daily as needed (spasm)     montelukast 10 MG tablet  Commonly known as: SINGULAIR  TAKE ONE TABLET BY MOUTH DAILY     nitroGLYCERIN 0.4 MG SL tablet  Commonly known as: Nitrostat  Place 1 tablet under the tongue every 5 minutes as needed for Chest pain up to max of 3 total doses. If no relief after 1 dose, call 911. omeprazole 40 MG delayed release capsule  Commonly known as: PRILOSEC  TAKE ONE CAPSULE BY MOUTH DAILY     potassium chloride 10 MEQ extended release tablet  Commonly known as: KLOR-CON  TAKE ONE TABLET BY MOUTH TWICE A DAY     pramipexole 1 MG tablet  Commonly known as: MIRAPEX  TAKE ONE TABLET BY MOUTH TWICE A DAY     triamcinolone 0.1 % cream  Commonly known as: KENALOG  Apply topically every 7 days     triamterene-hydroCHLOROthiazide 37.5-25 MG per tablet  Commonly known as: MAXZIDE-25  Take 1 tablet by mouth daily     Vitamin D 1000 units Caps capsule  Commonly known as: CHOLECALCIFEROL     zinc gluconate 50 MG tablet     zoster recombinant adjuvanted vaccine 50 MCG/0.5ML Susr injection  Commonly known as: Shingrix  50 MCG IM then repeat 2-6 months.         STOP taking these medications    venlafaxine 75 MG tablet  Commonly known as: EFFEXOR           Where to Get Your Medications      These medications were sent to [facility] [id number] - [facility], [place] - [address] - P [phone] Brad Merchant [phone]  [address] [place] [address] [address]    Phone: [phone]   · amoxicillin-clavulanate 875-125 MG per tablet           Discharged in stable condition to home     Follow Up: Follow up with PCP in 1 week and General Surgery 1-2 weeks        NATHANIEL Ames.

## 2022-05-11 ENCOUNTER — OFFICE VISIT (OUTPATIENT)
Dept: FAMILY MEDICINE CLINIC | Age: 82
End: 2022-05-11
Payer: MEDICARE

## 2022-05-11 VITALS
BODY MASS INDEX: 34.25 KG/M2 | SYSTOLIC BLOOD PRESSURE: 131 MMHG | OXYGEN SATURATION: 99 % | HEART RATE: 68 BPM | WEIGHT: 226 LBS | DIASTOLIC BLOOD PRESSURE: 68 MMHG | HEIGHT: 68 IN

## 2022-05-11 DIAGNOSIS — R74.8 ELEVATED ALKALINE PHOSPHATASE LEVEL: ICD-10-CM

## 2022-05-11 DIAGNOSIS — K81.0 ACUTE CHOLECYSTITIS: Primary | ICD-10-CM

## 2022-05-11 DIAGNOSIS — R79.89 ELEVATED LFTS: ICD-10-CM

## 2022-05-11 DIAGNOSIS — R53.1 GENERALIZED WEAKNESS: ICD-10-CM

## 2022-05-11 DIAGNOSIS — E87.1 HYPONATREMIA: ICD-10-CM

## 2022-05-11 DIAGNOSIS — R17 ELEVATED BILIRUBIN: ICD-10-CM

## 2022-05-11 DIAGNOSIS — Z09 HOSPITAL DISCHARGE FOLLOW-UP: ICD-10-CM

## 2022-05-11 DIAGNOSIS — J84.9 ILD (INTERSTITIAL LUNG DISEASE) (HCC): ICD-10-CM

## 2022-05-11 DIAGNOSIS — A41.9 SEPSIS, DUE TO UNSPECIFIED ORGANISM, UNSPECIFIED WHETHER ACUTE ORGAN DYSFUNCTION PRESENT (HCC): ICD-10-CM

## 2022-05-11 DIAGNOSIS — G93.41 METABOLIC ENCEPHALOPATHY: ICD-10-CM

## 2022-05-11 DIAGNOSIS — I10 PRIMARY HYPERTENSION: ICD-10-CM

## 2022-05-11 DIAGNOSIS — I25.119 CORONARY ARTERY DISEASE INVOLVING NATIVE CORONARY ARTERY OF NATIVE HEART WITH ANGINA PECTORIS (HCC): ICD-10-CM

## 2022-05-11 PROCEDURE — 99496 TRANSJ CARE MGMT HIGH F2F 7D: CPT | Performed by: NURSE PRACTITIONER

## 2022-05-11 PROCEDURE — 1111F DSCHRG MED/CURRENT MED MERGE: CPT | Performed by: NURSE PRACTITIONER

## 2022-05-11 PROCEDURE — 36415 COLL VENOUS BLD VENIPUNCTURE: CPT | Performed by: NURSE PRACTITIONER

## 2022-05-11 NOTE — PATIENT INSTRUCTIONS
Patient Education        Low-Fat Diet for Gallbladder Disease: Care Instructions  Overview     When you eat, the gallbladder releases bile, which helps you digest the fat in food. If you have an inflamed gallbladder, this may cause pain. A low-fat diet may give your gallbladder a rest so you can start to heal. Your doctor and dietitian can help you make an eating plan that does not irritate your digestive system. Always talk with your doctor or dietitian before you makechanges in your diet. Follow-up care is a key part of your treatment and safety. Be sure to make and go to all appointments, and call your doctor if you are having problems. It's also a good idea to know your test results and keep alist of the medicines you take. How can you care for yourself at home?  Eat many small meals and snacks each day instead of three large meals.  Choose lean meats. ? Eat no more than 5 to 6½ ounces of meat a day. ? Cut off all fat you can see. ? Eat chicken and turkey without the skin. ? Many types of fish, such as salmon, lake trout, tuna, and herring, provide healthy omega-3 fat. But, avoid fish canned in oil, such as sardines in olive oil. ? Bake, broil, or grill meats, poultry, or fish instead of frying them in butter or fat.  Drink or eat nonfat or low-fat milk, yogurt, cheese, or other milk products each day. ? Read the labels on cheeses, and choose those with less than 5 grams of fat an ounce. ? Try fat-free sour cream, cream cheese, or yogurt. ? Avoid cream soups and cream sauces on pasta. ? Eat low-fat ice cream, frozen yogurt, or sorbet. Avoid regular ice cream.   Eat whole-grain cereals, breads, crackers, rice, or pasta. Avoid high-fat foods such as croissants, scones, biscuits, waffles, doughnuts, muffins, granola, and high-fat breads.  Flavor your foods with herbs and spices (such as basil, tarragon, or mint), fat-free sauces, or lemon juice instead of butter.  You can also use butter substitutes, fat-free mayonnaise, or fat-free dressing.  Try applesauce, prune puree, or mashed bananas to replace some or all of the fat when you bake.  Limit fats and oils, such as butter, margarine, mayonnaise, and salad dressing, to no more than 1 tablespoon a meal.   Avoid high-fat foods, such as:  ? Chocolate, whole milk, ice cream, and processed cheese. ? Fried or buttered foods. ? Sausage, salami, and oglesby. ? Cinnamon rolls, cakes, pies, cookies, and other pastries. ? Prepared snack foods, such as potato chips, nut and granola bars, and mixed nuts. ? Coconut and avocado.  Learn how to read food labels for serving sizes and ingredients. Fast-food and convenience-food meals often have lots of fat. Where can you learn more? Go to https://[employer]. org and sign in to your [employer] account. Enter F909 in the [facility] box to learn more about \"Low-Fat Diet for Gallbladder Disease: Care Instructions. \"     If you do not have an account, please click on the \"Sign Up Now\" link. Current as of: September 8, 2021               Content Version: 13.2  © 9282-4102 [employer]wise, Incorporated. Care instructions adapted under license by [facility] ([facility]). If you have questions about a medical condition or this instruction, always ask your healthcare professional. [place] [address] any warranty or liability for your use of this information.

## 2022-05-11 NOTE — TELEPHONE ENCOUNTER
I spoke to wife and she scheduled for 5/18/2022.
I tried to call again, no answer.
I tried to call but phone just rang and no voicemail.
Need to see him in office first.
Pt was in hospital this past weekend. Wife is calling to schedule surgery. Is it ok to go ahead and schedule lap tono or do you want to see him in office first?  And he is on Plavix and Aspirin.
(0) independent

## 2022-05-12 LAB
A/G RATIO: 1.2 (ref 1.1–2.2)
ALBUMIN SERPL-MCNC: 3.8 G/DL (ref 3.4–5)
ALP BLD-CCNC: 133 U/L (ref 40–129)
ALT SERPL-CCNC: 102 U/L (ref 10–40)
ANION GAP SERPL CALCULATED.3IONS-SCNC: 12 MMOL/L (ref 3–16)
AST SERPL-CCNC: 41 U/L (ref 15–37)
BASOPHILS ABSOLUTE: 0.1 K/UL (ref 0–0.2)
BASOPHILS RELATIVE PERCENT: 0.9 %
BILIRUB SERPL-MCNC: 0.5 MG/DL (ref 0–1)
BUN BLDV-MCNC: 19 MG/DL (ref 7–20)
CALCIUM SERPL-MCNC: 9.1 MG/DL (ref 8.3–10.6)
CHLORIDE BLD-SCNC: 99 MMOL/L (ref 99–110)
CO2: 25 MMOL/L (ref 21–32)
CREAT SERPL-MCNC: 0.9 MG/DL (ref 0.8–1.3)
EOSINOPHILS ABSOLUTE: 0.3 K/UL (ref 0–0.6)
EOSINOPHILS RELATIVE PERCENT: 3.4 %
GFR AFRICAN AMERICAN: >60
GFR NON-AFRICAN AMERICAN: >60
GLUCOSE BLD-MCNC: 127 MG/DL (ref 70–99)
HCT VFR BLD CALC: 40.5 % (ref 40.5–52.5)
HEMOGLOBIN: 13.7 G/DL (ref 13.5–17.5)
LYMPHOCYTES ABSOLUTE: 1.7 K/UL (ref 1–5.1)
LYMPHOCYTES RELATIVE PERCENT: 20.6 %
MCH RBC QN AUTO: 31.2 PG (ref 26–34)
MCHC RBC AUTO-ENTMCNC: 33.7 G/DL (ref 31–36)
MCV RBC AUTO: 92.7 FL (ref 80–100)
MONOCYTES ABSOLUTE: 0.5 K/UL (ref 0–1.3)
MONOCYTES RELATIVE PERCENT: 6.1 %
NEUTROPHILS ABSOLUTE: 5.8 K/UL (ref 1.7–7.7)
NEUTROPHILS RELATIVE PERCENT: 69 %
PDW BLD-RTO: 15 % (ref 12.4–15.4)
PLATELET # BLD: 385 K/UL (ref 135–450)
PMV BLD AUTO: 6.5 FL (ref 5–10.5)
POTASSIUM SERPL-SCNC: 4 MMOL/L (ref 3.5–5.1)
RBC # BLD: 4.37 M/UL (ref 4.2–5.9)
SODIUM BLD-SCNC: 136 MMOL/L (ref 136–145)
TOTAL PROTEIN: 7.1 G/DL (ref 6.4–8.2)
WBC # BLD: 8.5 K/UL (ref 4–11)

## 2022-05-12 NOTE — PROGRESS NOTES
Post-Discharge Transitional Care  Follow Up      Silvio Staley   YOB: 1940    Date of Office Visit:  5/11/2022  Date of Hospital Admission: 5/3/22  Date of Hospital Discharge: 5/5/22  Risk of hospital readmission (high >=14%. Medium >=10%) :Readmission Risk Score: 12.1 ( )      Care management risk score Rising risk (score 2-5) and Complex Care (Scores >=6): 2     Non face to face  following discharge, date last encounter closed (first attempt may have been earlier): 5/6/2022 11:49 AM    Call initiated 2 business days of discharge:  Yes    ASSESSMENT/PLAN:   Acute cholecystitis  Reviewed and discussed patient's abdominal CT as well as right upper quadrant ultrasound  IV antibiotics given in the hospital  Sees surgeon next week for probable cholecystectomy  -     Comprehensive Metabolic Panel  -     CBC with Auto Differential  -     [place] DISCHARGE MEDS RECONCILED W/ CURRENT OUTPATIENT MED LIST  Elevated LFTs  Recheck labs to ensure no worsening  -     Comprehensive Metabolic Panel  -     CBC with Auto Differential  -     HI DISCHARGE MEDS RECONCILED W/ CURRENT OUTPATIENT MED LIST  Elevated alkaline phosphatase level  -     Comprehensive Metabolic Panel  -     CBC with Auto Differential  -     [place] DISCHARGE MEDS RECONCILED W/ CURRENT OUTPATIENT MED LIST  Elevated bilirubin  -     Comprehensive Metabolic Panel  -     CBC with Auto Differential  -     [place] DISCHARGE MEDS RECONCILED W/ CURRENT OUTPATIENT MED LIST  Sepsis, due to unspecified organism, unspecified whether acute organ dysfunction present (HCC)  Blood pressure has been stable  Blood cultures x2 in the hospital were negative  Continue Augmentin  -     CBC with Auto Differential  -     [place] DISCHARGE MEDS RECONCILED W/ CURRENT OUTPATIENT MED LIST  Metabolic encephalopathy  Resolved  -     [place] DISCHARGE MEDS RECONCILED W/ CURRENT OUTPATIENT MED LIST  Hyponatremia  Improved in the hospital with IV hydration  Maxide initially held in the hospital due to hyponatremia however he was to recently this medication upon discharge  -     [place] DISCHARGE MEDS RECONCILED W/ CURRENT OUTPATIENT MED LIST  ILD (interstitial lung disease) ([facility] [address]  CT showed pneumonitis versus possible progressive lung fibrosis. Patient does have history of lung fibrosis and sees Dr. Ashlee Kovacs for this issue  And likely any pneumonia present  Continue albuterol as needed  -     [place] DISCHARGE MEDS RECONCILED W/ CURRENT OUTPATIENT MED LIST  Generalized weakness  -     [place] DISCHARGE MEDS RECONCILED W/ CURRENT OUTPATIENT MED LIST  Coronary artery disease involving native coronary artery of native heart with angina pectoris ([facility] [address]  -     [place] DISCHARGE MEDS RECONCILED W/ CURRENT OUTPATIENT MED LIST  Primary hypertension  Blood pressure stable  Maxide has been restarted  Continue amlodipine  -     [address] [place] [address] discharge follow-up  -     [place] DISCHARGE MEDS RECONCILED W/ CURRENT OUTPATIENT MED LIST  Extensively reviewed and discussed recent in patient hospital records, labs, imaging and consults with patient, discussed HPI and Plan, coordinated care and patient counseling provided at today's visit  Pt. Does require assistance with medication set-up, monitoring of vitals, and monitoring of condition to discover changes in condition requiring pre-emptive action to prevent deterioration/hospitalization. Medical Decision Making: high complexity  Return in 1 month (on 6/11/2022). Subjective:   HPI:  Follow up of Hospital problems/diagnosis(es):   1. Acute cholecystitis    2. Elevated LFTs    3. Elevated alkaline phosphatase level    4. Elevated bilirubin    5. Sepsis, due to unspecified organism, unspecified whether acute organ dysfunction present ([facility] [address]    6. Metabolic encephalopathy    7. Hyponatremia    8. ILD (interstitial lung disease) ([facility] [address]    9. Generalized weakness    10.  Coronary artery disease involving native coronary artery of native heart with angina pectoris ([address]    11. Primary hypertension        Inpatient course: Discharge summary reviewed- see chart. Interval history/Current status:   He denies any abdominal pain, fever, chills, nausea or vomiting. He will be seeing surgeon on Wednesday, 5/13/2022 For probable cholecystectomy. Patient is complaining of dark urine. He does have some slight jaundice in his eyes and skin  He states he is eating well and taking fluids well  Patient is unsure of what to eat for a \"gallbladder diet\"  He is not taking his aspirin. He states the cardiologist discontinued it quite sometime ago. He is continuing to hold his Plavix for probable upcoming surgery. Patient denies any exertional chest pain, dyspnea, palpitations, syncope, orthopnea, edema or paroxysmal nocturnal dyspnea. He is continuing on his Augmentin. He states his last dose is tomorrow morning.     Patient Active Problem List   Diagnosis    Bradycardia    Chronic cough    COLLIN on CPAP    Abnormal PFT    ILD (interstitial lung disease) ([address]    Hyperlipidemia    Restless legs syndrome    Essential and other specified forms of tremor    Paralysis agitans ([address]    Chronic ischemic heart disease    Essential hypertension    Restless legs syndrome (RLS)    Obstructive sleep apnea    Depression    Acute bronchitis    Exertional dyspnea    Anxiety    COLLIN treated with BiPAP    Chronic diastolic congestive heart failure (HCC)    Anginal equivalent (HCC)    Hypothyroidism    Rosacea    Vitamin D deficiency    Lichen sclerosus of penis    Scrotal pruritus    GERD (gastroesophageal reflux disease)    Coronary artery disease involving native coronary artery of native heart with angina pectoris (HCC)    Chest pain    Angina, class III (HCC)    Recurrent major depressive disorder, in partial remission ([address]    CAD in native artery    Acute bronchitis due to Streptococcus    [facility]    Class 2 severe obesity due to excess calories with serious comorbidity and body mass index (BMI) of 36.0 to 36.9 in adult [facility])    Spinal stenosis of lumbar region    Bilateral sciatica    History of tobacco abuse    Diastolic dysfunction    Acute cholecystitis    Sepsis (HCC)    Hyponatremia    Elevated LFTs    Elevated alkaline phosphatase level    Elevated bilirubin    Benign prostatic hyperplasia without lower urinary tract symptoms    Diverticulosis       Medications listed as ordered at the time of discharge from hospital     Medication List          Accurate as of May 11, 2022 11:59 PM. If you have any questions, ask your nurse or doctor.             CONTINUE taking these medications    albuterol sulfate  (90 Base) MCG/ACT inhaler  Commonly known as: Ventolin HFA  Inhale 2 puffs into the lungs every 6 hours as needed for Wheezing or Shortness of Breath     amLODIPine 10 MG tablet  Commonly known as: NORVASC  TAKE ONE TABLET BY MOUTH DAILY     amoxicillin-clavulanate 875-125 MG per tablet  Commonly known as: AUGMENTIN  Take 1 tablet by mouth 2 times daily for 7 days     aspirin EC 81 MG EC tablet  Take 1 tablet by mouth daily     atorvastatin 40 MG tablet  Commonly known as: LIPITOR  TAKE ONE TABLET BY MOUTH DAILY     Calcium + D 600-200 MG-UNIT Tabs  Generic drug: calcium carbonate-vitamin D     cetirizine 10 MG tablet  Commonly known as: ZYRTEC  TAKE ONE TABLET BY MOUTH ONCE NIGHTLY     clopidogrel 75 MG tablet  Commonly known as: PLAVIX  TAKE ONE TABLET BY MOUTH DAILY     doxazosin 8 MG tablet  Commonly known as: CARDURA  Take one tablet by mouth twice a day     FeroSul 325 (65 Fe) MG tablet  Generic drug: ferrous sulfate  TAKE ONE TABLET BY MOUTH DAILY WITH BREAKFAST     levothyroxine 75 MCG tablet  Commonly known as: SYNTHROID  TAKE ONE TABLET BY MOUTH DAILY     magnesium gluconate 500 MG tablet  Commonly known as: MAGONATE     methocarbamol 500 MG tablet  Commonly known as: ROBAXIN  Take 1 tablet by mouth 3 times daily as needed (spasm)     montelukast 10 MG tablet  Commonly known as: SINGULAIR  TAKE ONE TABLET BY MOUTH DAILY     nitroGLYCERIN 0.4 MG SL tablet  Commonly known as: Nitrostat  Place 1 tablet under the tongue every 5 minutes as needed for Chest pain up to max of 3 total doses. If no relief after 1 dose, call 911. omeprazole 40 MG delayed release capsule  Commonly known as: PRILOSEC  TAKE ONE CAPSULE BY MOUTH DAILY     potassium chloride 10 MEQ extended release tablet  Commonly known as: KLOR-CON  TAKE ONE TABLET BY MOUTH TWICE A DAY     pramipexole 1 MG tablet  Commonly known as: MIRAPEX  TAKE ONE TABLET BY MOUTH TWICE A DAY     triamcinolone 0.1 % cream  Commonly known as: KENALOG  Apply topically every 7 days     triamterene-hydroCHLOROthiazide 37.5-25 MG per tablet  Commonly known as: MAXZIDE-25  Take 1 tablet by mouth daily     venlafaxine 75 MG tablet  Commonly known as: EFFEXOR  Take 1 tablet by mouth twice daily     Vitamin D 1000 units Caps capsule  Commonly known as: CHOLECALCIFEROL     zinc gluconate 50 MG tablet     zoster recombinant adjuvanted vaccine 50 MCG/0.5ML Susr injection  Commonly known as: Shingrix  50 MCG IM then repeat 2-6 months.               Medications marked \"taking\" at this time  Outpatient Medications Marked as Taking for the 5/11/22 encounter (Office Visit) with ADORE Leslie CNP   Medication Sig Dispense Refill    venlafaxine (EFFEXOR) 75 MG tablet Take 1 tablet by mouth twice daily 180 tablet 1    amoxicillin-clavulanate (AUGMENTIN) 875-125 MG per tablet Take 1 tablet by mouth 2 times daily for 7 days 14 tablet 0    FEROSUL 325 (65 Fe) MG tablet TAKE ONE TABLET BY MOUTH DAILY WITH BREAKFAST 90 tablet 1    methocarbamol (ROBAXIN) 500 MG tablet Take 1 tablet by mouth 3 times daily as needed (spasm) 90 tablet 0    montelukast (SINGULAIR) 10 MG tablet TAKE ONE TABLET BY MOUTH DAILY 90 tablet 0    atorvastatin (LIPITOR) 40 MG tablet TAKE ONE TABLET BY MOUTH DAILY 90 tablet 3    potassium chloride (KLOR-CON) 10 MEQ extended release tablet TAKE ONE TABLET BY MOUTH TWICE A  tablet 3    doxazosin (CARDURA) 8 MG tablet Take one tablet by mouth twice a day 180 tablet 3    triamterene-hydroCHLOROthiazide (MAXZIDE-25) 37.5-25 MG per tablet Take 1 tablet by mouth daily 90 tablet 3    nitroGLYCERIN (NITROSTAT) 0.4 MG SL tablet Place 1 tablet under the tongue every 5 minutes as needed for Chest pain up to max of 3 total doses. If no relief after 1 dose, call 911. 25 tablet 3    omeprazole (PRILOSEC) 40 MG delayed release capsule TAKE ONE CAPSULE BY MOUTH DAILY 90 capsule 1    amLODIPine (NORVASC) 10 MG tablet TAKE ONE TABLET BY MOUTH DAILY 90 tablet 5    levothyroxine (SYNTHROID) 75 MCG tablet TAKE ONE TABLET BY MOUTH DAILY 90 tablet 1    zoster recombinant adjuvanted vaccine (SHINGRIX) 50 MCG/0.5ML SUSR injection 50 MCG IM then repeat 2-6 months. 0.5 mL 1    pramipexole (MIRAPEX) 1 MG tablet TAKE ONE TABLET BY MOUTH TWICE A  tablet 3    cetirizine (ZYRTEC) 10 MG tablet TAKE ONE TABLET BY MOUTH ONCE NIGHTLY 90 tablet 1    zinc gluconate 50 MG tablet Take 50 mg by mouth daily      triamcinolone (KENALOG) 0.1 % cream Apply topically every 7 days 80 g 5    albuterol sulfate HFA (VENTOLIN HFA) 108 (90 Base) MCG/ACT inhaler Inhale 2 puffs into the lungs every 6 hours as needed for Wheezing or Shortness of Breath 1 Inhaler 5    magnesium gluconate (MAGONATE) 500 MG tablet Take 500 mg by mouth daily       Vitamin D (CHOLECALCIFEROL) 1000 UNITS CAPS capsule Take 1,000 Units by mouth daily.  Calcium Carbonate-Vitamin D (CALCIUM + D) 600-200 MG-UNIT TABS Take 600 mg by mouth daily. Medications patient taking as of now reconciled against medications ordered at time of hospital discharge: Yes    A comprehensive review of systems was negative except for what was noted in the HPI.     Objective:    /68 (Site: Left Upper Arm, Position: Sitting, Cuff Size: Large Adult)   Pulse 68   Ht 5' 8\" (1.727 m)   Wt 226 lb (102.5 kg)   SpO2 99%   BMI 34.36 kg/m²   General Appearance: alert and oriented to person, place and time, well developed and well- nourished, in no acute distress  Skin: warm and dry, no rash or erythema  Head: normocephalic and atraumatic  Eyes: pupils equal, round, and reactive to light, extraocular eye movements intact, conjunctivae normal  ENT: tympanic membrane, external ear and ear canal normal bilaterally, nose without deformity, nasal mucosa and turbinates normal without polyps  Neck: supple and non-tender without mass, no thyromegaly or thyroid nodules, no cervical lymphadenopathy  Pulmonary/Chest: clear to auscultation bilaterally- no wheezes, rales or rhonchi, normal air movement, no respiratory distress  Cardiovascular: normal rate, regular rhythm, normal S1 and S2, no murmurs, rubs, clicks, or gallops, distal pulses intact, no carotid bruits  Abdomen: soft, non-tender, non-distended, normal bowel sounds, no masses or organomegaly  Extremities: no cyanosis, clubbing or edema  Musculoskeletal: normal range of motion, no joint swelling, deformity or tenderness  Neurologic: reflexes normal and symmetric, no cranial nerve deficit, gait, coordination and speech normal      An electronic signature was used to authenticate this note.   --ADORE Wells - CNP

## 2022-05-16 ENCOUNTER — CARE COORDINATION (OUTPATIENT)
Dept: CASE MANAGEMENT | Age: 82
End: 2022-05-16

## 2022-05-16 NOTE — CARE COORDINATION
[facility] Transitions Follow Up Call    2022    Patient: Heather Connell  Patient : 1940   MRN: [id number]  Reason for Admission: sepsis, metabolic encephalopathy, acute calculus  cholecystitis, elevated LFTs, elevated ALK phos, elevated bilirubin, hx lung fibrosis, hyponatremia, generalized weakness, CAD, ILD, HTN, HLD, diverticulosis, hypothyroidism, GERD, RLS, BPH, COLLIN on CPAP -> home no services, OP tono, AM-PAC 18  Discharge Date: 22 RARS: Readmission Risk Score: 12.1 ( )       Spoke with: Heather Connell (patient)    Needs to be reviewed by the provider   Additional needs identified to be addressed with provider: No         Method of communication with provider : none    Care Transition Nurse (CTN) contacted the patient by telephone to follow up after recent hospital admission. Addressed changes since last contact: completed TCM visit  - note reviewed prior to outreach   Discussed follow-up appointments. If no appointment was previously scheduled, appointment scheduling offered: no.   Non-[facility] follow up appointment(s): NA    Discussed appropriate site of care based on symptoms and resources available to patient including: PCP  Specialist. The patient agrees to contact the PCP office for questions related to their healthcare. Patients top risk factors for readmission: medical condition  Interventions to address risk factors: Education of patient/family/caregiver/guardian to support self-management-report s/s same day for rec    Feels well. Tolerating PO. Denies discomfort/pain. Sees surgeon in 2 days for surgical eval. Denies needs/referrals at this time. CTN provided contact information. Plan for follow-up call in 7-10 days based on severity of symptoms and risk factors. Plan for next call: follow up appointment-surg?     Follow Up  Future Appointments   Date Time Provider Madelin Stallworth   2022  2:00 PM MD BLAINE [facility]       Royce Maria Get Cardona

## 2022-05-18 ENCOUNTER — INITIAL CONSULT (OUTPATIENT)
Dept: SURGERY | Age: 82
End: 2022-05-18
Payer: MEDICARE

## 2022-05-18 VITALS
TEMPERATURE: 97.5 F | WEIGHT: 230.4 LBS | DIASTOLIC BLOOD PRESSURE: 69 MMHG | HEIGHT: 68 IN | HEART RATE: 63 BPM | SYSTOLIC BLOOD PRESSURE: 143 MMHG | BODY MASS INDEX: 34.92 KG/M2

## 2022-05-18 DIAGNOSIS — K80.00 ACUTE CALCULOUS CHOLECYSTITIS: Primary | ICD-10-CM

## 2022-05-18 PROCEDURE — 99213 OFFICE O/P EST LOW 20 MIN: CPT | Performed by: SURGERY

## 2022-05-18 PROCEDURE — 1123F ACP DISCUSS/DSCN MKR DOCD: CPT | Performed by: SURGERY

## 2022-05-19 ENCOUNTER — ANESTHESIA EVENT (OUTPATIENT)
Dept: OPERATING ROOM | Age: 82
End: 2022-05-19
Payer: MEDICARE

## 2022-05-19 DIAGNOSIS — K80.00 ACUTE CALCULOUS CHOLECYSTITIS: ICD-10-CM

## 2022-05-20 NOTE — PROGRESS NOTES
PRE OP INSTRUCTION SHEET   1. Do not eat or drink anything after 12 midnight  prior to surgery. This includes no water, chewing gum or mints. 2. Take the following pills will a small sip of water (see MAR)                                        3. Aspirin, Ibuprofen, Advil, Naproxen, Vitamin E, fish oil and other Anti-inflammatory products should be stopped for 5 days before surgery or as directed by your physician. 4. Check with your Doctor regarding stopping Plavix, Coumadin, Lovenox, Fragmin or other blood thinners   5. Do not smoke, and do not drink any alcoholic beverages 24 hours prior to surgery. This includes NA Beer. 6. You may brush your teeth and gargle the morning of surgery. DO NOT SWALLOW WATER   7. You MUST make arrangements for a responsible adult to take you home after your surgery. You will not be allowed to leave alone or drive yourself home. It is strongly suggested someone stay with you the first 24 hrs. Your surgery will be cancelled if you do not have a ride home. 8. A parent/legal guardian must accompany a child scheduled for surgery and plan to stay at the hospital until the child is discharged. Please do not bring other children with you. 9. Please wear simple, loose fitting clothing to the hospital.  Lilliam Manuelring not bring valuables (money, credit cards, checkbooks, etc.) Do not wear any makeup (including no eye makeup) or nail polish on your fingers or toes. 10. DO NOT wear any jewelry or piercings on day of surgery. All body piercing jewelry must be removed. 11. If you have dentures,glasses, or contacts they will be removed before going to the OR; we will provide you a container. 12. Please see your family doctor/and cardiologist for a history & physical and/or concerning medications. Bring any test results/reports from your physician's office. Have history and labs faxed to [phone].  Remember to bring Blood Bank bracelet on the day of surgery. 14. If you have a Living Will and Durable Power of  for Healthcare, please bring in a copy. 13. Notify your Surgeon if you develop any illness between now and surgery  time, cough, cold, fever, sore throat, nausea, vomiting, etc.  Please notify your surgeon if you experience dizziness, shortness of breath or blurred vision between now & the time of your surgery   16. DO NOT shave your operative site 96 hours prior to surgery. For face & neck surgery, men may use an electric razor 48 hours prior to surgery. 17. Shower with _x__Antibacterial soap (x_chlorhexidine for total joint  Pt's) shower two times before surgery.(the morning of and the night before. 18. To provide excellent care visitors will be limited to one in the room at any given time.   Please call pre admission testing if you any further questions [phone]

## 2022-05-24 ENCOUNTER — CARE COORDINATION (OUTPATIENT)
Dept: CASE MANAGEMENT | Age: 82
End: 2022-05-24

## 2022-05-24 ASSESSMENT — ENCOUNTER SYMPTOMS: SHORTNESS OF BREATH: 1

## 2022-05-24 NOTE — CARE COORDINATION
for readmission: medical condition--  Interventions to address risk factors: Education of patient/family/caregiver/guardian to support self-management--      Non-[facility] follow up appointment(s):     CTN provided contact information for future needs. Plan for follow-up call in 5-7 days based on severity of symptoms and risk factors. Plan for next call: self management--          Care Transitions Subsequent and Final Call    Subsequent and Final Calls  Care Transitions Interventions  Other Interventions: Follow Up  No future appointments.     Teresa Mills LPN

## 2022-05-24 NOTE — ANESTHESIA PRE PROCEDURE
Department of Anesthesiology  Preprocedure Note       Name:  Sergio Vines   Age:  [de-identified] y.o.  :  1940                                          MRN:  [id number]         Date:  2022      Surgeon: Keila Gregory):  Christiana Mcbride MD    Procedure: Procedure(s):  LAPAROSCOPIC CHOLECYSTECTOMY, POSSIBLE CHOLANGIOGRAMS, POSSIBLE CONVENTIONAL CHOLECYSTECTOMY    Medications prior to admission:   Prior to Admission medications    Medication Sig Start Date End Date Taking? Authorizing Provider   venlafaxine (EFFEXOR) 75 MG tablet Take 1 tablet by mouth twice daily 22   Yimi Barnes, ADORE - CNP   FEROSUL 325 (65 Fe) MG tablet TAKE ONE TABLET BY MOUTH DAILY WITH BREAKFAST 22   ADORE Bautista - CNP   methocarbamol (ROBAXIN) 500 MG tablet Take 1 tablet by mouth 3 times daily as needed (spasm) 3/25/22   ADORE Bautista CNP   montelukast (SINGULAIR) 10 MG tablet TAKE ONE TABLET BY MOUTH DAILY 3/17/22   ADORE Gordon CNP   atorvastatin (LIPITOR) 40 MG tablet TAKE ONE TABLET BY MOUTH DAILY 22   Veda Larios MD   clopidogrel (PLAVIX) 75 MG tablet TAKE ONE TABLET BY MOUTH DAILY 22   Veda Larios MD   potassium chloride (KLOR-CON) 10 MEQ extended release tablet TAKE ONE TABLET BY MOUTH TWICE A DAY 22   Veda Larios MD   doxazosin (CARDURA) 8 MG tablet Take one tablet by mouth twice a day 22   Veda Larios MD   triamterene-hydroCHLOROthiazide [facility]) 37.5-25 MG per tablet Take 1 tablet by mouth daily 22   Veda Larios MD   nitroGLYCERIN (NITROSTAT) 0.4 MG SL tablet Place 1 tablet under the tongue every 5 minutes as needed for Chest pain up to max of 3 total doses.  If no relief after 1 dose, call 911. 22   Veda Larios MD   omeprazole (PRILOSEC) 40 MG delayed release capsule TAKE ONE CAPSULE BY MOUTH DAILY 22   ADORE Bautista CNP   amLODIPine (NORVASC) 10 MG tablet TAKE ONE TABLET BY MOUTH DAILY 11/30/21   Shelby Pack MD   levothyroxine (SYNTHROID) 75 MCG tablet TAKE ONE TABLET BY MOUTH DAILY 11/5/21   Darryle Slain, MD   zoster recombinant adjuvanted vaccine [facility]) 50 MCG/0.5ML SUSR injection 50 MCG IM then repeat 2-6 months. 11/4/21 11/4/22  ADORE Padilla CNP   pramipexole (MIRAPEX) 1 MG tablet TAKE ONE TABLET BY MOUTH TWICE A DAY 11/4/21   ADORE Padilla CNP   cetirizine (ZYRTEC) 10 MG tablet TAKE ONE TABLET BY MOUTH ONCE NIGHTLY 10/27/21   ADORE Padilla - CNP   zinc gluconate 50 MG tablet Take 50 mg by mouth daily    Historical Provider, MD   triamcinolone (KENALOG) 0.1 % cream Apply topically every 7 days 4/23/21   ADORE Padilla CNP   albuterol sulfate HFA (VENTOLIN HFA) 108 (90 Base) MCG/ACT inhaler Inhale 2 puffs into the lungs every 6 hours as needed for Wheezing or Shortness of Breath 4/23/21   ADORE Padilla CNP   magnesium gluconate (MAGONATE) 500 MG tablet Take 500 mg by mouth daily     Historical Provider, MD   Vitamin D (CHOLECALCIFEROL) 1000 UNITS CAPS capsule Take 1,000 Units by mouth daily. Historical Provider, MD   Calcium Carbonate-Vitamin D (CALCIUM + D) 600-200 MG-UNIT TABS Take 600 mg by mouth daily. Historical Provider, MD       Current medications:    No current facility-administered medications for this encounter.      Current Outpatient Medications   Medication Sig Dispense Refill    venlafaxine (EFFEXOR) 75 MG tablet Take 1 tablet by mouth twice daily 180 tablet 1    FEROSUL 325 (65 Fe) MG tablet TAKE ONE TABLET BY MOUTH DAILY WITH BREAKFAST 90 tablet 1    methocarbamol (ROBAXIN) 500 MG tablet Take 1 tablet by mouth 3 times daily as needed (spasm) 90 tablet 0    montelukast (SINGULAIR) 10 MG tablet TAKE ONE TABLET BY MOUTH DAILY 90 tablet 0    atorvastatin (LIPITOR) 40 MG tablet TAKE ONE TABLET BY MOUTH DAILY 90 tablet 3    clopidogrel (PLAVIX) 75 MG tablet TAKE ONE TABLET BY MOUTH DAILY 90 tablet 3    potassium chloride (KLOR-CON) 10 MEQ extended release tablet TAKE ONE TABLET BY MOUTH TWICE A  tablet 3    doxazosin (CARDURA) 8 MG tablet Take one tablet by mouth twice a day 180 tablet 3    triamterene-hydroCHLOROthiazide (MAXZIDE-25) 37.5-25 MG per tablet Take 1 tablet by mouth daily 90 tablet 3    nitroGLYCERIN (NITROSTAT) 0.4 MG SL tablet Place 1 tablet under the tongue every 5 minutes as needed for Chest pain up to max of 3 total doses. If no relief after 1 dose, call 911. 25 tablet 3    omeprazole (PRILOSEC) 40 MG delayed release capsule TAKE ONE CAPSULE BY MOUTH DAILY 90 capsule 1    amLODIPine (NORVASC) 10 MG tablet TAKE ONE TABLET BY MOUTH DAILY 90 tablet 5    levothyroxine (SYNTHROID) 75 MCG tablet TAKE ONE TABLET BY MOUTH DAILY 90 tablet 1    zoster recombinant adjuvanted vaccine (SHINGRIX) 50 MCG/0.5ML SUSR injection 50 MCG IM then repeat 2-6 months. 0.5 mL 1    pramipexole (MIRAPEX) 1 MG tablet TAKE ONE TABLET BY MOUTH TWICE A  tablet 3    cetirizine (ZYRTEC) 10 MG tablet TAKE ONE TABLET BY MOUTH ONCE NIGHTLY 90 tablet 1    zinc gluconate 50 MG tablet Take 50 mg by mouth daily      triamcinolone (KENALOG) 0.1 % cream Apply topically every 7 days 80 g 5    albuterol sulfate HFA (VENTOLIN HFA) 108 (90 Base) MCG/ACT inhaler Inhale 2 puffs into the lungs every 6 hours as needed for Wheezing or Shortness of Breath 1 Inhaler 5    magnesium gluconate (MAGONATE) 500 MG tablet Take 500 mg by mouth daily       Vitamin D (CHOLECALCIFEROL) 1000 UNITS CAPS capsule Take 1,000 Units by mouth daily.  Calcium Carbonate-Vitamin D (CALCIUM + D) 600-200 MG-UNIT TABS Take 600 mg by mouth daily. Allergies:     Allergies   Allergen Reactions    Ace Inhibitors Angioedema     Cough    Oxybutynin Other (See Comments)     nightmares    Percocet [Oxycodone-Acetaminophen] Itching    Simvastatin Other (See Comments)     Did not control cholesterol - takes atorvastatin at home as of 5/3/22       Problem List:    Patient Active Problem List   Diagnosis Code    Bradycardia R00.1    Chronic cough R05.3    COLLIN on CPAP G47.33, Z99.89    Abnormal PFT R94.2    ILD (interstitial lung disease) ([facility] J84.9    Hyperlipidemia E78.5    Restless legs syndrome G25.81    Essential and other specified forms of tremor G25.0, G25.2    Paralysis agitans ([facility] G20    Chronic ischemic heart disease I25.9    Essential hypertension I10    Restless legs syndrome (RLS) G25.81    Obstructive sleep apnea G47.33    Depression F32. A    Acute bronchitis J20.9    Exertional dyspnea R06.00    Anxiety F41.9    COLLIN treated with BiPAP G47.33    Chronic diastolic congestive heart failure (HCC) I50.32    Anginal equivalent ([facility]) I20.8    Hypothyroidism E03.9    Rosacea L71.9    Vitamin D deficiency D19.6    Lichen sclerosus of penis N48.0    Scrotal pruritus L29.1    GERD (gastroesophageal reflux disease) K21.9    Coronary artery disease involving native coronary artery of native heart with angina pectoris ([facility]) I25.119    Chest pain R07.9    Angina, class III ([facility]) I20.9    Recurrent major depressive disorder, in partial remission ([facility] F33.41    CAD in native artery I25.10    Acute bronchitis due to Streptococcus J20.2    Claudication ([facility]) I73.9    Class 2 severe obesity due to excess calories with serious comorbidity and body mass index (BMI) of 36.0 to 36.9 in adult ([facility]) E66.01, Z68.36    Spinal stenosis of lumbar region M48.061    Bilateral sciatica M54.31, M54.32    History of tobacco abuse W85.295    Diastolic dysfunction R12.83    Acute cholecystitis K81.0    Sepsis ([facility]) A41.9    Hyponatremia E87.1    Elevated LFTs R79.89    Elevated alkaline phosphatase level R74.8    Elevated bilirubin R17    Benign prostatic hyperplasia without lower urinary tract symptoms N40.0    Diverticulosis K57.90    Acute calculous cholecystitis K80.00       Past Medical History:        Diagnosis Date    Arthritis     Back injuries     BPH (benign prostatic hyperplasia)     Chest pain 2021    CPAP (continuous positive airway pressure) dependence     Diverticulosis     colonoscopy 2015    Esophageal dysmotility     GERD (gastroesophageal reflux disease)     Hiatal hernia     History of colon polyps     seen on colonoscopy again 2015    Hyperlipidemia     Hypertension     ILD (interstitial lung disease) ([facility] 2013    Internal hemorrhoid     colonoscopy     Lichen sclerosus of penis     Dr Jamarcus Sanders Mild cognitive impairment 2016    [facility] neurology    COLLIN (obstructive sleep apnea)     Renal insufficiency     Restless legs syndrome     Rosacea     Schatzki's ring     last dilated     Scrotal pruritus 2018    Sleep apnea        Past Surgical History:        Procedure Laterality Date    CARDIAC CATHETERIZATION  2021    Non Obs CAD, medical management    CIRCUMCISION      COLONOSCOPY      COLONOSCOPY  2015    CORONARY ANGIOPLASTY  10/15/2019    CYSTOSCOPY  2016    CYSTOSCOPY  2017    CYSTOSCOPY     CYSTOSCOPY  2017    ENDOSCOPY, COLON, DIAGNOSTIC  10/11/2017    esoph. stricture    FRACTURE SURGERY      right leg    FRACTURE SURGERY      wrist   right    HERNIA REPAIR      PROSTATE SURGERY      TOE SURGERY      TURP  2017    UPPER GASTROINTESTINAL ENDOSCOPY  2016    UPPER GASTROINTESTINAL ENDOSCOPY  10/11/2017    esophageal stricture    UPPER GASTROINTESTINAL ENDOSCOPY  2020       Social History:    Social History     Tobacco Use    Smoking status: Former Smoker     Packs/day: 1.50     Years: 17.00     Pack years: 25.50     Types: Cigarettes     Quit date: 1974     Years since quittin.4    Smokeless tobacco: Never Used   Substance Use Topics    Alcohol use: No     Alcohol/week: 0.0 standard drinks                                Counseling Anesthesia Evaluation  Patient summary reviewed and Nursing notes reviewed no history of anesthetic complications:   Airway: Mallampati: II  TM distance: >3 FB   Neck ROM: full  Mouth opening: > = 3 FB   Dental:          Pulmonary:   (+) shortness of breath:  sleep apnea: on CPAP,                             Cardiovascular:    (+) hypertension:, angina:, CAD:, CHF: diastolic, BERTRAND:,                   Neuro/Psych:   (+) neuromuscular disease:, psychiatric history:            GI/Hepatic/Renal:   (+) hiatal hernia, GERD: well controlled, renal disease: CRI,      (-) liver disease       Endo/Other:    (+) hypothyroidism::., .    (-) diabetes mellitus, arthritis               Abdominal:             Vascular: negative vascular ROS. Other Findings:           Anesthesia Plan      general     ASA 3     (I discussed with the patient the risks and benefits of PIV, general anesthesia, IV Narcotics, PACU. All questions were answered the patient agrees with the plan)  Induction: intravenous. MIPS: Prophylactic antiemetics administered. Anesthetic plan and risks discussed with patient. Plan discussed with CRNA.                     Eugene Jaimes MD   5/24/2022

## 2022-05-25 ENCOUNTER — HOSPITAL ENCOUNTER (OUTPATIENT)
Age: 82
Setting detail: OUTPATIENT SURGERY
Discharge: HOME | End: 2022-05-25
Attending: SURGERY | Admitting: SURGERY
Payer: MEDICARE

## 2022-05-25 ENCOUNTER — ANESTHESIA (OUTPATIENT)
Dept: OPERATING ROOM | Age: 82
End: 2022-05-25
Payer: MEDICARE

## 2022-05-25 VITALS
BODY MASS INDEX: 34.86 KG/M2 | WEIGHT: 230 LBS | TEMPERATURE: 97.7 F | SYSTOLIC BLOOD PRESSURE: 132 MMHG | HEART RATE: 74 BPM | RESPIRATION RATE: 31 BRPM | HEIGHT: 68 IN | OXYGEN SATURATION: 94 % | DIASTOLIC BLOOD PRESSURE: 55 MMHG

## 2022-05-25 DIAGNOSIS — K80.00 ACUTE CALCULOUS CHOLECYSTITIS: Primary | ICD-10-CM

## 2022-05-25 DIAGNOSIS — K81.0 CHOLECYSTITIS, ACUTE: ICD-10-CM

## 2022-05-25 LAB
A/G RATIO: 1.7 (ref 1.1–2.2)
ALBUMIN SERPL-MCNC: 4.5 G/DL (ref 3.4–5)
ALP BLD-CCNC: 123 U/L (ref 40–129)
ALT SERPL-CCNC: 25 U/L (ref 10–40)
ANION GAP SERPL CALCULATED.3IONS-SCNC: 11 MMOL/L (ref 3–16)
AST SERPL-CCNC: 19 U/L (ref 15–37)
BILIRUB SERPL-MCNC: 0.6 MG/DL (ref 0–1)
BUN BLDV-MCNC: 17 MG/DL (ref 7–20)
CALCIUM SERPL-MCNC: 9.2 MG/DL (ref 8.3–10.6)
CHLORIDE BLD-SCNC: 101 MMOL/L (ref 99–110)
CO2: 29 MMOL/L (ref 21–32)
CREAT SERPL-MCNC: 0.9 MG/DL (ref 0.8–1.3)
GFR AFRICAN AMERICAN: >60
GFR NON-AFRICAN AMERICAN: >60
GLUCOSE BLD-MCNC: 113 MG/DL (ref 70–99)
POTASSIUM REFLEX MAGNESIUM: 3.6 MMOL/L (ref 3.5–5.1)
SODIUM BLD-SCNC: 141 MMOL/L (ref 136–145)
TOTAL PROTEIN: 7.2 G/DL (ref 6.4–8.2)

## 2022-05-25 PROCEDURE — 6360000002 HC RX W HCPCS: Performed by: SURGERY

## 2022-05-25 PROCEDURE — 47562 LAPAROSCOPIC CHOLECYSTECTOMY: CPT | Performed by: SURGERY

## 2022-05-25 PROCEDURE — 2580000003 HC RX 258

## 2022-05-25 PROCEDURE — 2500000003 HC RX 250 WO HCPCS: Performed by: ANESTHESIOLOGY

## 2022-05-25 PROCEDURE — 6370000000 HC RX 637 (ALT 250 FOR IP): Performed by: ANESTHESIOLOGY

## 2022-05-25 PROCEDURE — 2500000003 HC RX 250 WO HCPCS: Performed by: SURGERY

## 2022-05-25 PROCEDURE — 2720000010 HC SURG SUPPLY STERILE: Performed by: SURGERY

## 2022-05-25 PROCEDURE — 6360000002 HC RX W HCPCS

## 2022-05-25 PROCEDURE — 2709999900 HC NON-CHARGEABLE SUPPLY: Performed by: SURGERY

## 2022-05-25 PROCEDURE — 7100000001 HC PACU RECOVERY - ADDTL 15 MIN: Performed by: SURGERY

## 2022-05-25 PROCEDURE — 2580000003 HC RX 258: Performed by: SURGERY

## 2022-05-25 PROCEDURE — 2580000003 HC RX 258: Performed by: ANESTHESIOLOGY

## 2022-05-25 PROCEDURE — 6360000002 HC RX W HCPCS: Performed by: ANESTHESIOLOGY

## 2022-05-25 PROCEDURE — 3700000001 HC ADD 15 MINUTES (ANESTHESIA): Performed by: SURGERY

## 2022-05-25 PROCEDURE — 88304 TISSUE EXAM BY PATHOLOGIST: CPT

## 2022-05-25 PROCEDURE — 36415 COLL VENOUS BLD VENIPUNCTURE: CPT

## 2022-05-25 PROCEDURE — 3600000004 HC SURGERY LEVEL 4 BASE: Performed by: SURGERY

## 2022-05-25 PROCEDURE — 2500000003 HC RX 250 WO HCPCS

## 2022-05-25 PROCEDURE — 80053 COMPREHEN METABOLIC PANEL: CPT

## 2022-05-25 PROCEDURE — 3600000014 HC SURGERY LEVEL 4 ADDTL 15MIN: Performed by: SURGERY

## 2022-05-25 PROCEDURE — 7100000011 HC PHASE II RECOVERY - ADDTL 15 MIN: Performed by: SURGERY

## 2022-05-25 PROCEDURE — 7100000000 HC PACU RECOVERY - FIRST 15 MIN: Performed by: SURGERY

## 2022-05-25 PROCEDURE — 3700000000 HC ANESTHESIA ATTENDED CARE: Performed by: SURGERY

## 2022-05-25 PROCEDURE — 7100000010 HC PHASE II RECOVERY - FIRST 15 MIN: Performed by: SURGERY

## 2022-05-25 RX ORDER — BUPIVACAINE HYDROCHLORIDE 5 MG/ML
INJECTION, SOLUTION EPIDURAL; INTRACAUDAL PRN
Status: DISCONTINUED | OUTPATIENT
Start: 2022-05-25 | End: 2022-05-25 | Stop reason: ALTCHOICE

## 2022-05-25 RX ORDER — ONDANSETRON 2 MG/ML
INJECTION INTRAMUSCULAR; INTRAVENOUS PRN
Status: DISCONTINUED | OUTPATIENT
Start: 2022-05-25 | End: 2022-05-25 | Stop reason: SDUPTHER

## 2022-05-25 RX ORDER — FAMOTIDINE 10 MG/ML
20 INJECTION, SOLUTION INTRAVENOUS ONCE
Status: COMPLETED | OUTPATIENT
Start: 2022-05-25 | End: 2022-05-25

## 2022-05-25 RX ORDER — LIDOCAINE HYDROCHLORIDE 20 MG/ML
INJECTION, SOLUTION INFILTRATION; PERINEURAL PRN
Status: DISCONTINUED | OUTPATIENT
Start: 2022-05-25 | End: 2022-05-25 | Stop reason: SDUPTHER

## 2022-05-25 RX ORDER — SODIUM CHLORIDE, SODIUM LACTATE, POTASSIUM CHLORIDE, CALCIUM CHLORIDE 600; 310; 30; 20 MG/100ML; MG/100ML; MG/100ML; MG/100ML
INJECTION, SOLUTION INTRAVENOUS CONTINUOUS
Status: DISCONTINUED | OUTPATIENT
Start: 2022-05-25 | End: 2022-05-25 | Stop reason: HOSPADM

## 2022-05-25 RX ORDER — GLYCOPYRROLATE 0.2 MG/ML
INJECTION INTRAMUSCULAR; INTRAVENOUS PRN
Status: DISCONTINUED | OUTPATIENT
Start: 2022-05-25 | End: 2022-05-25 | Stop reason: SDUPTHER

## 2022-05-25 RX ORDER — HEPARIN SODIUM 5000 [USP'U]/ML
5000 INJECTION, SOLUTION INTRAVENOUS; SUBCUTANEOUS ONCE
Status: COMPLETED | OUTPATIENT
Start: 2022-05-25 | End: 2022-05-25

## 2022-05-25 RX ORDER — FENTANYL CITRATE 50 UG/ML
INJECTION, SOLUTION INTRAMUSCULAR; INTRAVENOUS PRN
Status: DISCONTINUED | OUTPATIENT
Start: 2022-05-25 | End: 2022-05-25 | Stop reason: SDUPTHER

## 2022-05-25 RX ORDER — SODIUM CHLORIDE 0.9 % (FLUSH) 0.9 %
5-40 SYRINGE (ML) INJECTION EVERY 12 HOURS SCHEDULED
Status: DISCONTINUED | OUTPATIENT
Start: 2022-05-25 | End: 2022-05-25 | Stop reason: HOSPADM

## 2022-05-25 RX ORDER — PROPOFOL 10 MG/ML
INJECTION, EMULSION INTRAVENOUS PRN
Status: DISCONTINUED | OUTPATIENT
Start: 2022-05-25 | End: 2022-05-25 | Stop reason: SDUPTHER

## 2022-05-25 RX ORDER — DEXAMETHASONE SODIUM PHOSPHATE 4 MG/ML
INJECTION, SOLUTION INTRA-ARTICULAR; INTRALESIONAL; INTRAMUSCULAR; INTRAVENOUS; SOFT TISSUE PRN
Status: DISCONTINUED | OUTPATIENT
Start: 2022-05-25 | End: 2022-05-25 | Stop reason: SDUPTHER

## 2022-05-25 RX ORDER — MEPERIDINE HYDROCHLORIDE 25 MG/ML
12.5 INJECTION INTRAMUSCULAR; INTRAVENOUS; SUBCUTANEOUS EVERY 5 MIN PRN
Status: DISCONTINUED | OUTPATIENT
Start: 2022-05-25 | End: 2022-05-25 | Stop reason: HOSPADM

## 2022-05-25 RX ORDER — DIPHENHYDRAMINE HYDROCHLORIDE 50 MG/ML
12.5 INJECTION INTRAMUSCULAR; INTRAVENOUS
Status: DISCONTINUED | OUTPATIENT
Start: 2022-05-25 | End: 2022-05-25 | Stop reason: HOSPADM

## 2022-05-25 RX ORDER — HYDROCODONE BITARTRATE AND ACETAMINOPHEN 5; 325 MG/1; MG/1
1 TABLET ORAL EVERY 6 HOURS PRN
Qty: 24 TABLET | Refills: 0 | Status: SHIPPED | OUTPATIENT
Start: 2022-05-25 | End: 2022-06-01

## 2022-05-25 RX ORDER — SODIUM CHLORIDE 9 MG/ML
INJECTION, SOLUTION INTRAVENOUS PRN
Status: DISCONTINUED | OUTPATIENT
Start: 2022-05-25 | End: 2022-05-25 | Stop reason: HOSPADM

## 2022-05-25 RX ORDER — SODIUM CHLORIDE, SODIUM LACTATE, POTASSIUM CHLORIDE, CALCIUM CHLORIDE 600; 310; 30; 20 MG/100ML; MG/100ML; MG/100ML; MG/100ML
INJECTION, SOLUTION INTRAVENOUS CONTINUOUS PRN
Status: DISCONTINUED | OUTPATIENT
Start: 2022-05-25 | End: 2022-05-25 | Stop reason: SDUPTHER

## 2022-05-25 RX ORDER — HYDROCODONE BITARTRATE AND ACETAMINOPHEN 5; 325 MG/1; MG/1
1 TABLET ORAL ONCE
Status: COMPLETED | OUTPATIENT
Start: 2022-05-25 | End: 2022-05-25

## 2022-05-25 RX ORDER — ONDANSETRON 2 MG/ML
4 INJECTION INTRAMUSCULAR; INTRAVENOUS EVERY 10 MIN PRN
Status: DISCONTINUED | OUTPATIENT
Start: 2022-05-25 | End: 2022-05-25 | Stop reason: HOSPADM

## 2022-05-25 RX ORDER — ROCURONIUM BROMIDE 10 MG/ML
INJECTION, SOLUTION INTRAVENOUS PRN
Status: DISCONTINUED | OUTPATIENT
Start: 2022-05-25 | End: 2022-05-25 | Stop reason: SDUPTHER

## 2022-05-25 RX ORDER — MIDAZOLAM HYDROCHLORIDE 1 MG/ML
1 INJECTION INTRAMUSCULAR; INTRAVENOUS EVERY 5 MIN PRN
Status: DISCONTINUED | OUTPATIENT
Start: 2022-05-25 | End: 2022-05-25 | Stop reason: HOSPADM

## 2022-05-25 RX ORDER — HYDRALAZINE HYDROCHLORIDE 20 MG/ML
INJECTION INTRAMUSCULAR; INTRAVENOUS PRN
Status: DISCONTINUED | OUTPATIENT
Start: 2022-05-25 | End: 2022-05-25 | Stop reason: SDUPTHER

## 2022-05-25 RX ORDER — SODIUM CHLORIDE 0.9 % (FLUSH) 0.9 %
5-40 SYRINGE (ML) INJECTION PRN
Status: DISCONTINUED | OUTPATIENT
Start: 2022-05-25 | End: 2022-05-25 | Stop reason: HOSPADM

## 2022-05-25 RX ORDER — SODIUM CHLORIDE, SODIUM LACTATE, POTASSIUM CHLORIDE, AND CALCIUM CHLORIDE .6; .31; .03; .02 G/100ML; G/100ML; G/100ML; G/100ML
IRRIGANT IRRIGATION PRN
Status: DISCONTINUED | OUTPATIENT
Start: 2022-05-25 | End: 2022-05-25 | Stop reason: ALTCHOICE

## 2022-05-25 RX ORDER — SODIUM CHLORIDE 9 MG/ML
25 INJECTION, SOLUTION INTRAVENOUS PRN
Status: DISCONTINUED | OUTPATIENT
Start: 2022-05-25 | End: 2022-05-25 | Stop reason: HOSPADM

## 2022-05-25 RX ORDER — HYDRALAZINE HYDROCHLORIDE 20 MG/ML
5 INJECTION INTRAMUSCULAR; INTRAVENOUS
Status: DISCONTINUED | OUTPATIENT
Start: 2022-05-25 | End: 2022-05-25 | Stop reason: HOSPADM

## 2022-05-25 RX ADMIN — FENTANYL CITRATE 50 MCG: 50 INJECTION INTRAMUSCULAR; INTRAVENOUS at 11:47

## 2022-05-25 RX ADMIN — VANCOMYCIN HYDROCHLORIDE 1000 MG: 1 INJECTION, POWDER, LYOPHILIZED, FOR SOLUTION INTRAVENOUS at 11:09

## 2022-05-25 RX ADMIN — HEPARIN SODIUM 5000 UNITS: 5000 INJECTION INTRAVENOUS; SUBCUTANEOUS at 10:34

## 2022-05-25 RX ADMIN — FAMOTIDINE 20 MG: 10 INJECTION, SOLUTION INTRAVENOUS at 10:00

## 2022-05-25 RX ADMIN — PROPOFOL 150 MG: 10 INJECTION, EMULSION INTRAVENOUS at 11:17

## 2022-05-25 RX ADMIN — ONDANSETRON HYDROCHLORIDE 4 MG: 2 INJECTION, SOLUTION INTRAMUSCULAR; INTRAVENOUS at 11:18

## 2022-05-25 RX ADMIN — SODIUM CHLORIDE, POTASSIUM CHLORIDE, SODIUM LACTATE AND CALCIUM CHLORIDE: 600; 310; 30; 20 INJECTION, SOLUTION INTRAVENOUS at 10:00

## 2022-05-25 RX ADMIN — HYDROMORPHONE HYDROCHLORIDE 0.5 MG: 1 INJECTION, SOLUTION INTRAMUSCULAR; INTRAVENOUS; SUBCUTANEOUS at 13:50

## 2022-05-25 RX ADMIN — HYDRALAZINE HYDROCHLORIDE 20 MG: 20 INJECTION INTRAMUSCULAR; INTRAVENOUS at 11:49

## 2022-05-25 RX ADMIN — SUGAMMADEX 400 MG: 100 INJECTION, SOLUTION INTRAVENOUS at 12:18

## 2022-05-25 RX ADMIN — FENTANYL CITRATE 50 MCG: 50 INJECTION INTRAMUSCULAR; INTRAVENOUS at 11:18

## 2022-05-25 RX ADMIN — DEXAMETHASONE SODIUM PHOSPHATE 4 MG: 4 INJECTION, SOLUTION INTRAMUSCULAR; INTRAVENOUS at 11:18

## 2022-05-25 RX ADMIN — HYDROCODONE BITARTRATE AND ACETAMINOPHEN 1 TABLET: 5; 325 TABLET ORAL at 14:22

## 2022-05-25 RX ADMIN — ROCURONIUM BROMIDE 50 MG: 10 INJECTION, SOLUTION INTRAVENOUS at 11:18

## 2022-05-25 RX ADMIN — SODIUM CHLORIDE, POTASSIUM CHLORIDE, SODIUM LACTATE AND CALCIUM CHLORIDE: 600; 310; 30; 20 INJECTION, SOLUTION INTRAVENOUS at 10:30

## 2022-05-25 RX ADMIN — ROCURONIUM BROMIDE 15 MG: 10 INJECTION, SOLUTION INTRAVENOUS at 12:03

## 2022-05-25 RX ADMIN — LIDOCAINE HYDROCHLORIDE 40 MG: 20 INJECTION, SOLUTION INFILTRATION; PERINEURAL at 11:17

## 2022-05-25 RX ADMIN — GLYCOPYRROLATE 0.2 MG: 0.2 INJECTION, SOLUTION INTRAMUSCULAR; INTRAVENOUS at 11:38

## 2022-05-25 ASSESSMENT — PAIN DESCRIPTION - LOCATION: LOCATION: ABDOMEN

## 2022-05-25 ASSESSMENT — PAIN SCALES - GENERAL
PAINLEVEL_OUTOF10: 7
PAINLEVEL_OUTOF10: 5

## 2022-05-25 ASSESSMENT — PAIN DESCRIPTION - DESCRIPTORS: DESCRIPTORS: SHARP

## 2022-05-25 ASSESSMENT — PAIN - FUNCTIONAL ASSESSMENT: PAIN_FUNCTIONAL_ASSESSMENT: 0-10

## 2022-05-25 ASSESSMENT — PAIN DESCRIPTION - ORIENTATION: ORIENTATION: MID

## 2022-05-25 NOTE — BRIEF OP NOTE
Brief Postoperative Note      Patient: Rishi Shepherd  YOB: 1940  MRN: [id number]    Date of Procedure: 5/25/2022    Pre-Op Diagnosis: ACUTE CALCULOUS CHOLECYSTITIS    Post-Op Diagnosis: Same       Procedure(s):  LAPAROSCOPIC CHOLECYSTECTOMY    Surgeon(s):  hKai Edwards MD    Assistant:  Surgical Assistant: Jesica Byrnes    Anesthesia: General    Estimated Blood Loss (mL): 749 mL    Complications: None    Specimens:   ID Type Source Tests Collected by Time Destination   A : GALLBLADDER AND CONTENTS Tissue Tissue SURGICAL PATHOLOGY Khai Edwards MD 5/25/2022 1139        Implants:  * No implants in log *      Drains:   Closed/Suction Drain Superior;Medial Abdomen Bulb (Active)       Findings: As above    Electronically signed by Amber Leyva MD on 5/25/2022 at 12:28 PM

## 2022-05-25 NOTE — ANESTHESIA POSTPROCEDURE EVALUATION
Department of Anesthesiology  Postprocedure Note    Patient: Arden Ty  MRN: [id number]  YOB: 1940  Date of evaluation: 5/25/2022  Time:  1:27 PM     Procedure Summary     Date: 05/25/22 Room / Location: [facility]    Anesthesia Start: 1114 Anesthesia Stop: 0497    Procedure: LAPAROSCOPIC CHOLECYSTECTOMY (N/A Abdomen) Diagnosis:       Cholecystitis, acute      (ACUTE CALCULOUS CHOLECYSTITIS)    Surgeons: Ellyn Dorman MD Responsible Provider: Raghu Maldonado MD    Anesthesia Type: general ASA Status: 3          Anesthesia Type: No value filed. Opal Phase I: Opal Score: 8    Opal Phase II:      Last vitals: Reviewed and per EMR flowsheets.        Anesthesia Post Evaluation    Patient location during evaluation: PACU  Level of consciousness: awake  Airway patency: patent  Nausea & Vomiting: no nausea  Complications: no  Cardiovascular status: blood pressure returned to baseline  Respiratory status: acceptable  Hydration status: euvolemic

## 2022-05-25 NOTE — PROGRESS NOTES
Arrived from OR respirations unlabored. Abdomen with dressings times four dry and intact. FRANKY drain to site with small amount bloody drainage noted in tubing. No discomfort noted at this time.   Report received from OR nurse

## 2022-05-25 NOTE — PROGRESS NOTES
[facility] SURGERY      S:   Patient presents for follow up of recent admission at [facility] for cholecystitis. He reports feeling well. He denies pain. Completed antibiotics. He is eating OK. O:   Comfortable. No distress. Chest CTA   Heart regular   Abdomen soft. Non distended. Non tender. A:     Encounter Diagnosis   Name Primary?  Acute calculous cholecystitis Yes            P:  The diagnosis and recommended procedure were explained. Questions answered. Prepare for gallbladder surgery. Dr. Leonarda Monet evaluated during admission and recommended no further cardiac testing.

## 2022-05-25 NOTE — H&P
[facility]      The H&P was reviewed, the patient was examined, and no change has occurred in the patient's condition since the H&P was completed. The indications for the procedure were reviewed, and any questions were answered. I updated the consult note from 5/4/2022 which is the H&P.     Vitals:    05/25/22 0956   BP: (!) 145/68   Pulse: 54   Resp: 16   Temp: 97.1 °F (36.2 °C)   SpO2: 99%

## 2022-05-25 NOTE — PROGRESS NOTES
Detailed verbal and written discharge instruction and FRANKY drain instructions given to patient and family. Verbalized understanding.

## 2022-05-26 NOTE — OP NOTE
[address]                 [address] [address]                                OPERATIVE REPORT    PATIENT NAME: Lali Robertson                      :        1940  MED REC NO:   [id number]                          ROOM:  ACCOUNT NO:   [de-identified]                           ADMIT DATE: 2022  PROVIDER:     Kallie Caraballo MD    DATE OF PROCEDURE:  2022    OPERATION PERFORMED:  Complicated laparoscopic cholecystectomy. PREOPERATIVE DIAGNOSIS:  Acute calculous cholecystitis. POSTOPERATIVE DIAGNOSIS:  Acute calculous cholecystitis. SURGEON:  Kallie Caraballo MD.    ANESTHESIA  General.    COMPLICATION:  None. ESTIMATED BLOOD LOSS:  Less than 50 mL. DRAINS:  Lio-Frost x1 in subhepatic space. INDICATIONS FOR OPERATION:  An 60-year-old male recently hospitalized  with acute calculous cholecystitis. He recovered. He now presents for  definitive cholecystectomy. We have allowed the acute process to cool  off. The patient understood the risks and benefits and wanted to  proceed. DESCRIPTION OF OPERATION:  The patient was brought to the operating  room. General anesthesia was induced. He was prepped and draped in  usual surgical sterile fashion. A vertical supraumbilical incision was  made. Veress needle was inserted. Pneumoperitoneum was established. Disposable 5-mm trocar was passed through the incision. Laparoscope was  inserted. Under direct vision, 11-mm port was placed in the epigastrium  and two 5-mm ports in the right upper quadrant. Gallbladder was encased  in adhesions. This was as a result of recent acute infection. We were  able to dissect this and expose the deepa hepatis. The deepa hepatis  had significant inflammatory changes. It took a while of a slow,  meticulous dissection to protect critical structures. I identified the  cystic duct as it tapered into the gallbladder.   I identified the cystic  duct as it tapered into the gallbladder. Clips were placed on the  cystic duct and the cystic duct was divided. Cystic artery had two  clips placed proximal, one clip distal and it was divided. Posterior  branch of the artery was clipped as well. Gallbladder was dissected  from the gallbladder fossa of the liver bed. There was significant  inflammatory change in the plane between the gallbladder and the liver. The gallbladder was eventually brought out through the epigastric  incision. I reinspected the right upper quadrant. I copiously  irrigated the area. I suctioned out the irrigant. There was no evident  bleeding, bile leak or complication. A drain was left in the subhepatic  space because of the difficult dissection. This was brought out through  the most lateral 5-mm port site. Hemostasis was confirmed. I removed  the ports and deflated the abdomen. The fascia at the epigastric port  site was reapproximated with 0 Vicryl suture. Local anesthetic was  infiltrated. A 4-0 Vicryl was used to reapproximate the skin at all the  incisions. Benzoin and Steri-Strip dressing were placed. DISPOSITION:  It should be noted this is a complicated case. It took  nearly an hour to complete, which is much longer than normal.  There was  significant inflammatory change around the gallbladder suggesting acute  infection. Slow, meticulous dissection was needed to protect critical  main biliary tree structures. There was significant inflammation in the  plane between the gallbladder and the liver also requiring slow,  meticulous dissection. The patient went to recovery in stable  condition.         Christiano Bains MD    D: 05/26/2022 8:17:07       T: 05/26/2022 8:21:48     [email or web address]  Job#: [id number]     Doc#: [id number]    CC:

## 2022-06-01 ENCOUNTER — CARE COORDINATION (OUTPATIENT)
Dept: CASE MANAGEMENT | Age: 82
End: 2022-06-01

## 2022-06-01 ENCOUNTER — OFFICE VISIT (OUTPATIENT)
Dept: SURGERY | Age: 82
End: 2022-06-01

## 2022-06-01 VITALS
HEIGHT: 68 IN | BODY MASS INDEX: 35.61 KG/M2 | SYSTOLIC BLOOD PRESSURE: 134 MMHG | DIASTOLIC BLOOD PRESSURE: 72 MMHG | WEIGHT: 235 LBS

## 2022-06-01 DIAGNOSIS — Z09 SURGICAL FOLLOW-UP CARE: Primary | ICD-10-CM

## 2022-06-01 PROCEDURE — 99024 POSTOP FOLLOW-UP VISIT: CPT | Performed by: SURGERY

## 2022-06-02 DIAGNOSIS — E03.9 HYPOTHYROIDISM, UNSPECIFIED TYPE: ICD-10-CM

## 2022-06-02 RX ORDER — LEVOTHYROXINE SODIUM 0.07 MG/1
TABLET ORAL
Qty: 90 TABLET | Refills: 1 | Status: SHIPPED | OUTPATIENT
Start: 2022-06-02

## 2022-06-02 NOTE — CARE COORDINATION
[facility] Trans[facility] Follow Up Call    2022    Patient: Raven Walsh  Patient : 1940   MRN: [id number]  Reason for Admission: sepsis, metabolic encephalopathy, acute calculus  cholecystitis, elevated LFTs, elevated ALK phos, elevated bilirubin, hx lung fibrosis, hyponatremia, generalized weakness, CAD, ILD, HTN, HLD, diverticulosis, hypothyroidism, GERD, RLS, BPH, COLLIN on CPAP -> home no services, OP tono, AM-PAC 18  Discharge Date: 22 RARS: Readmission Risk Score: 12.1 ( )      Spoke with: Raven Walsh (patient)    Needs to be reviewed by the provider   Additional needs identified to be addressed with provider: no         Method of communication with provider : none    Care Transition Nurse (CTN) contacted the patient by telephone to follow up after recent hospital admission. Addressed changes since last contact: surgery  Discussed follow-up appointments. If no appointment was previously scheduled, appointment scheduling offered: no.   Non-[facility] follow up appointment(s): na    Discussed appropriate site of care based on symptoms and resources available to patient including: PCP  Specialist. The patient agrees to contact the PCP office for questions related to their healthcare. Patients top risk factors for readmission: medical condition-see dx above  Interventions to address risk factors: Education of patient/family/caregiver/guardian to support self-management-s/s monitor and report for rec    He is s/p complicated lap tono on  by Dr Kunal Guerin and completed f/up OV yesterday. Today reports feeling well. Tolerating PO without n/v/d. Denies fever, chills. Has some soreness around incision sites but reports they are CDI without weeping, redness, drainage. He started taking Metamucil. Denies needs at this time. CTN provided contact information. No further follow-up call indicated based on severity of symptoms and risk factors. Follow Up  No future appointments.     Juan Olivo Natali Crain

## 2022-06-29 DIAGNOSIS — K21.9 GASTROESOPHAGEAL REFLUX DISEASE WITHOUT ESOPHAGITIS: ICD-10-CM

## 2022-06-29 RX ORDER — OMEPRAZOLE 40 MG/1
CAPSULE, DELAYED RELEASE ORAL
Qty: 90 CAPSULE | Refills: 1 | Status: SHIPPED | OUTPATIENT
Start: 2022-06-29

## 2022-07-02 NOTE — PROGRESS NOTES
[facility] GENERAL SURGERY      S:   Patient presents s/p lap tono 1 week ago. He reports feeling better. O:   Comfortable         Incision sites healing well. FRANKY drain scant and non bilious. FRANKY removed. FINAL DIAGNOSIS:     Gallbladder, cholecystectomy:   - Chronic active cholecystitis with cholelithiasis.     BUCCA/BUCCA               A:   S/P lap tono    P:   Follow up as needed.

## 2022-09-01 DIAGNOSIS — N48.0 LICHEN SCLEROSUS OF PENIS: ICD-10-CM

## 2022-09-01 RX ORDER — TRIAMCINOLONE ACETONIDE 1 MG/G
CREAM TOPICAL
Qty: 80 G | Refills: 5 | Status: SHIPPED | OUTPATIENT
Start: 2022-09-01

## 2022-09-06 ENCOUNTER — TELEPHONE (OUTPATIENT)
Dept: FAMILY MEDICINE CLINIC | Age: 82
End: 2022-09-06

## 2022-09-06 NOTE — TELEPHONE ENCOUNTER
Pt called stating that he's having issues with his sleep machine. States that it was ordered by Dr. Edi Alamo, but he's gone now. Pt not sure if PCP is able to order a new one or if he would need to see someone else. Pt also wanting to switch to [facility].  Call back pt [phone]

## 2022-09-08 ENCOUNTER — TELEPHONE (OUTPATIENT)
Dept: PULMONOLOGY | Age: 82
End: 2022-09-08

## 2022-09-14 DIAGNOSIS — I10 ESSENTIAL HYPERTENSION: ICD-10-CM

## 2022-09-14 RX ORDER — POTASSIUM CHLORIDE 750 MG/1
TABLET, FILM COATED, EXTENDED RELEASE ORAL
Qty: 180 TABLET | Refills: 1 | Status: SHIPPED | OUTPATIENT
Start: 2022-09-14

## 2022-09-14 NOTE — TELEPHONE ENCOUNTER
Pt called stating that he's been having issues getting his Rx filled and wanting to see if PCP would be able to prescribe it.   Last appt 5/11/2022, no appt scheduled

## 2022-09-15 ENCOUNTER — OFFICE VISIT (OUTPATIENT)
Dept: SLEEP MEDICINE | Age: 82
End: 2022-09-15
Payer: MEDICARE

## 2022-09-15 VITALS
HEART RATE: 57 BPM | RESPIRATION RATE: 18 BRPM | OXYGEN SATURATION: 97 % | SYSTOLIC BLOOD PRESSURE: 123 MMHG | TEMPERATURE: 97.8 F | HEIGHT: 68 IN | WEIGHT: 238 LBS | DIASTOLIC BLOOD PRESSURE: 67 MMHG | BODY MASS INDEX: 36.07 KG/M2

## 2022-09-15 DIAGNOSIS — R53.83 OTHER FATIGUE: ICD-10-CM

## 2022-09-15 DIAGNOSIS — Z71.89 ENCOUNTER FOR BIPAP USE COUNSELING: ICD-10-CM

## 2022-09-15 DIAGNOSIS — E66.9 OBESITY (BMI 30-39.9): ICD-10-CM

## 2022-09-15 DIAGNOSIS — G25.81 RESTLESS LEGS SYNDROME (RLS): ICD-10-CM

## 2022-09-15 DIAGNOSIS — G47.10 HYPERSOMNIA: ICD-10-CM

## 2022-09-15 DIAGNOSIS — G47.33 MODERATE OBSTRUCTIVE SLEEP APNEA: Primary | ICD-10-CM

## 2022-09-15 PROCEDURE — 1123F ACP DISCUSS/DSCN MKR DOCD: CPT | Performed by: NURSE PRACTITIONER

## 2022-09-15 PROCEDURE — 99214 OFFICE O/P EST MOD 30 MIN: CPT | Performed by: NURSE PRACTITIONER

## 2022-09-15 ASSESSMENT — SLEEP AND FATIGUE QUESTIONNAIRES
HOW LIKELY ARE YOU TO NOD OFF OR FALL ASLEEP WHILE SITTING INACTIVE IN A PUBLIC PLACE: 0
HOW LIKELY ARE YOU TO NOD OFF OR FALL ASLEEP WHILE LYING DOWN TO REST IN THE AFTERNOON WHEN CIRCUMSTANCES PERMIT: 2
HOW LIKELY ARE YOU TO NOD OFF OR FALL ASLEEP WHEN YOU ARE A PASSENGER IN A CAR FOR AN HOUR WITHOUT A BREAK: 0
ESS TOTAL SCORE: 8
HOW LIKELY ARE YOU TO NOD OFF OR FALL ASLEEP WHILE SITTING AND TALKING TO SOMEONE: 0
NECK CIRCUMFERENCE (INCHES): 16.75
HOW LIKELY ARE YOU TO NOD OFF OR FALL ASLEEP WHILE SITTING QUIETLY AFTER LUNCH WITHOUT ALCOHOL: 2
HOW LIKELY ARE YOU TO NOD OFF OR FALL ASLEEP WHILE SITTING AND READING: 2
HOW LIKELY ARE YOU TO NOD OFF OR FALL ASLEEP WHILE WATCHING TV: 2
HOW LIKELY ARE YOU TO NOD OFF OR FALL ASLEEP IN A CAR, WHILE STOPPED FOR A FEW MINUTES IN TRAFFIC: 0

## 2022-09-15 NOTE — PROGRESS NOTES
Patient ID: Laura Garay is a [de-identified] y.o. male who is being seen today for   Chief Complaint   Patient presents with    Sleep Apnea     COLLIN         HPI:     Laura Garay is a [de-identified] y.o. male in office with wife for COLLIN evaluation. He was diagnosed with COLLIN  around 40 years ago. States he has been on PAP since, re diagnosed in 2013. States BIPAP stopped working about 2 weeks ago. States he [place] [facility] [place] was told it could not be fixed. States prior to PAP he was snoring, poor sleep hypersomnia. With PAP symptoms improved. States he has been using friends old CPAP until he can get a replacement    Patient is using BiPAP  7 hrs/night. Using humidifier. No snoring on BiPAP. The pressure is well tolerated. The mask is comfortable- full face. No mask leak. Some daytime sleepiness. Denies nodding off when driving. No dry nose or throat. Somefatigue. Bedtime is 11 pm and rise time is 7 am. Sleep onset is few minutes. Wakes up 2-6 times at night total. 2-6 nocturia- has seen urology. It takes few minutes to fall back a sleep. 1 naps during the day, dozes if inactive. No headache in am. No weight gain. 0-1 caffienated beverages during the day. No alcohol. ESS is 8.         Sleep Medicine 9/15/2022 7/30/2021 11/23/2020 7/24/2019 7/23/2018 1/22/2018 11/13/2017   Sitting and reading 2 2 1 2 2 2 2   Watching TV 2 2 1 2 2 3 2   Sitting, inactive in a public place (e.g. a theatre or a meeting) 0 0 0 0 1 2 0   As a passenger in a car for an hour without a break 0 0 1 2 2 2 0   Lying down to rest in the afternoon when circumstances permit 2 0 2 3 3 2 3   Sitting and talking to someone 0 0 0 0 0 0 0   Sitting quietly after a lunch without alcohol 2 0 0 1 3 3 0   In a car, while stopped for a few minutes in traffic 0 0 0 0 0 0 0   Griffith Sleepiness Score 8 4 5 10 13 14 7   Neck circumference (Inches) 16.75 16.5 - 17.25 16 16 16       Past Medical History:  Past Medical History:   Diagnosis Date    Arthritis     Back injuries BPH (benign prostatic hyperplasia)     Chest pain 03/2021    CPAP (continuous positive airway pressure) dependence     Diverticulosis     colonoscopy 9/2015    Esophageal dysmotility     GERD (gastroesophageal reflux disease)     Hiatal hernia     History of colon polyps     seen on colonoscopy again 9/2015    Hyperlipidemia     Hypertension     ILD (interstitial lung disease) ([facility] [address] 07/30/2013    Internal hemorrhoid     colonoscopy 6/70/23    Lichen sclerosus of penis 2017    Dr Tiffanie Richardson    Mild cognitive impairment 08/2016    [facility] neurology    COLLIN (obstructive sleep apnea)     Renal insufficiency     Restless legs syndrome     Rosacea     Schatzki's ring     last dilated 2002    Scrotal pruritus 02/08/2018    Sleep apnea        Past Surgical History:        Procedure Laterality Date    CARDIAC CATHETERIZATION  03/04/2021    Non Obs CAD, medical management    CHOLECYSTECTOMY, LAPAROSCOPIC N/A 5/25/2022    LAPAROSCOPIC CHOLECYSTECTOMY performed by Tyrese Nye MD at [place] [address]  2010    COLONOSCOPY  09/14/2015    CORONARY ANGIOPLASTY  10/15/2019    CYSTOSCOPY  05/13/2016    CYSTOSCOPY  05/19/2017    CYSTOSCOPY     CYSTOSCOPY  07/14/2017    ENDOSCOPY, COLON, DIAGNOSTIC  10/11/2017    esoph. stricture    FRACTURE SURGERY      right leg    FRACTURE SURGERY      wrist   right    HERNIA REPAIR      OTHER SURGICAL HISTORY  05/25/2022    LAPAROSCOPIC CHOLECYSTECTOMY (N/A Abdomen)    PROSTATE SURGERY      TOE SURGERY      TURP  07/14/2017    UPPER GASTROINTESTINAL ENDOSCOPY  09/12/2016    UPPER GASTROINTESTINAL ENDOSCOPY  10/11/2017    esophageal stricture    UPPER GASTROINTESTINAL ENDOSCOPY  01/28/2020       Allergies:  is allergic to ace inhibitors, oxybutynin, percocet [oxycodone-acetaminophen], and simvastatin. Social History:    TOBACCO:   reports that he quit smoking about 48 years ago. His smoking use included cigarettes. He has a 25.50 pack-year smoking history.  He has never used smokeless tobacco.  ETOH:   reports no history of alcohol use.     Family History:       Problem Relation Age of Onset    Stroke Father     Cancer Father         prostate    Other Father         colitis    Diabetes Sister     Diabetes Brother     Cancer Brother         skin    High Blood Pressure Brother     High Cholesterol Brother     COPD Mother     Asthma Neg Hx     Emphysema Neg Hx     Heart Failure Neg Hx     Hypertension Neg Hx        Current Medications:    Current Outpatient Medications:     potassium chloride (KLOR-CON) 10 MEQ extended release tablet, TAKE ONE TABLET BY MOUTH TWICE A DAY, Disp: 180 tablet, Rfl: 1    triamcinolone (KENALOG) 0.1 % cream, APPLY TOPICALLY EVERY 7 DAYS, Disp: 80 g, Rfl: 5    omeprazole (PRILOSEC) 40 MG delayed release capsule, TAKE ONE CAPSULE BY MOUTH DAILY, Disp: 90 capsule, Rfl: 1    levothyroxine (SYNTHROID) 75 MCG tablet, TAKE ONE TABLET BY MOUTH DAILY, Disp: 90 tablet, Rfl: 1    venlafaxine (EFFEXOR) 75 MG tablet, Take 1 tablet by mouth twice daily, Disp: 180 tablet, Rfl: 1    FEROSUL 325 (65 Fe) MG tablet, TAKE ONE TABLET BY MOUTH DAILY WITH BREAKFAST, Disp: 90 tablet, Rfl: 1    methocarbamol (ROBAXIN) 500 MG tablet, Take 1 tablet by mouth 3 times daily as needed (spasm), Disp: 90 tablet, Rfl: 0    montelukast (SINGULAIR) 10 MG tablet, TAKE ONE TABLET BY MOUTH DAILY, Disp: 90 tablet, Rfl: 0    atorvastatin (LIPITOR) 40 MG tablet, TAKE ONE TABLET BY MOUTH DAILY, Disp: 90 tablet, Rfl: 3    clopidogrel (PLAVIX) 75 MG tablet, TAKE ONE TABLET BY MOUTH DAILY, Disp: 90 tablet, Rfl: 3    doxazosin (CARDURA) 8 MG tablet, Take one tablet by mouth twice a day, Disp: 180 tablet, Rfl: 3    triamterene-hydroCHLOROthiazide (MAXZIDE-25) 37.5-25 MG per tablet, Take 1 tablet by mouth daily, Disp: 90 tablet, Rfl: 3    amLODIPine (NORVASC) 10 MG tablet, TAKE ONE TABLET BY MOUTH DAILY, Disp: 90 tablet, Rfl: 5    pramipexole (MIRAPEX) 1 MG tablet, TAKE ONE TABLET BY MOUTH TWICE A DAY, Disp: 180 tablet, Rfl: 3    cetirizine (ZYRTEC) 10 MG tablet, TAKE ONE TABLET BY MOUTH ONCE NIGHTLY, Disp: 90 tablet, Rfl: 1    zinc gluconate 50 MG tablet, Take 50 mg by mouth daily, Disp: , Rfl:     albuterol sulfate HFA (VENTOLIN HFA) 108 (90 Base) MCG/ACT inhaler, Inhale 2 puffs into the lungs every 6 hours as needed for Wheezing or Shortness of Breath, Disp: 1 Inhaler, Rfl: 5    magnesium gluconate (MAGONATE) 500 MG tablet, Take 500 mg by mouth daily , Disp: , Rfl:     Vitamin D (CHOLECALCIFEROL) 1000 UNITS CAPS capsule, Take 1,000 Units by mouth daily. , Disp: , Rfl:     calcium carbonate-vitamin D 600-200 MG-UNIT TABS, Take 600 mg by mouth daily. , Disp: , Rfl:     nitroGLYCERIN (NITROSTAT) 0.4 MG SL tablet, Place 1 tablet under the tongue every 5 minutes as needed for Chest pain up to max of 3 total doses. If no relief after 1 dose, call 911. (Patient not taking: Reported on 9/15/2022), Disp: 25 tablet, Rfl: 3    zoster recombinant adjuvanted vaccine (SHINGRIX) 50 MCG/0.5ML SUSR injection, 50 MCG IM then repeat 2-6 months. (Patient not taking: Reported on 9/15/2022), Disp: 0.5 mL, Rfl: 1      REVIEW OF SYSTEMS:  Constitutional: Negative for fever  HENT: Negative for sore throat  Eyes: Negative for redness   Respiratory: Negative for dyspnea, cough  Cardiovascular: Negative for chest pain  Gastrointestinal: Negative for vomiting, diarrhea   Genitourinary: Negative for hematuria   Musculoskeletal: Negative for arthralgias   Skin: Negative for rash  Neurological: Negative for syncope  Hematological: Negative for adenopathy  Psychiatric/Behavorial: Negative for anxiety        Objective:   PHYSICAL EXAM:    /67   Pulse 57   Temp 97.8 °F (36.6 °C)   Resp 18   Ht 5' 8\" (1.727 m)   Wt 238 lb (108 kg)   SpO2 97% Comment: RA  BMI 36.19 kg/m²     Physical exam:  Gen: No acute distress. Obese. BMI of 36.19  Eyes: PERRL. No sclera icterus. No conjunctival injection. ENT: No discharge. Pharynx clear. Mallampati class IV. Neck: Trachea midline. No obvious mass. Neck circumference 16.75\"  Resp: No accessory muscle use. No crackles. No wheezes. No rhonchi. CV: Regular rate. Regular rhythm. No murmur or rub. Skin: Warm and dry. M/S: No cyanosis. No obvious joint deformity. Neuro: Awake. Alert. Moves all four extremities. Psych: Oriented x 3. No anxiety. DATA:   6/26/2013 PSG AHI 21.9, low SPO2 92%, moderate COLLIN adequately controlled with BiPAP 20/14 cm H2O  11/4/2015 titration controlled sleep-related breathing disorder with BiPAP, PLMS 1 or 2.5, recommendations auto BiPAP EPAP minimum 7, IPAP maximum 20, pressure support 4 cm H2O  5/18/2016 titration improved sleep-related breathing disorder with BiPAP, PLMS 65.9, recommendations BiPAP 20/15 cm H2O        Assessment:      Moderate COLLIN. Snoring-resolved on BiPAP  Fatigue/hypersomnia-improved with PAP use  Obesity    Plan:     - New BiPAP 20/15 cm H2O  -Follow AHI and adjust pressure if needed. -Titration if no improvement  - Advised to use PAP 6-8 hrs at night and during naps. - Replacement of mask, tubing, head straps every 3-6 months or sooner if damaged. - Patient instructed to contact [email or web address] company for any mask, tubing or machine trouble shooting if problems arise.  - Sleep hygiene  - Avoid sedatives, alcohol and caffeinated drinks at bed time. - Patient counseled to never drive or operate heavy machinery while fatigue, drowsy or sleepy.    - Weight loss is recommended as a long-term intervention.    - Complications of COLLIN if not treated were discussed with patient patient, including: systemic hypertension, pulmonary hypertension, cardiovascular morbidities, car accidents and all cause mortality.  -Patient education handout provided regarding sleep tips and PAP cleaning recommendations

## 2022-09-15 NOTE — PATIENT INSTRUCTIONS

## 2022-10-19 ENCOUNTER — HOSPITAL ENCOUNTER (OUTPATIENT)
Age: 82
Discharge: HOME | End: 2022-10-19
Payer: MEDICARE

## 2022-10-19 LAB
A/G RATIO: 1.5 (ref 1.1–2.2)
ALBUMIN SERPL-MCNC: 4.1 G/DL (ref 3.4–5)
ALP BLD-CCNC: 92 U/L (ref 40–129)
ALT SERPL-CCNC: 19 U/L (ref 10–40)
ANION GAP SERPL CALCULATED.3IONS-SCNC: 9 MMOL/L (ref 3–16)
AST SERPL-CCNC: 17 U/L (ref 15–37)
BASOPHILS ABSOLUTE: 0.1 K/UL (ref 0–0.2)
BASOPHILS RELATIVE PERCENT: 0.9 %
BILIRUB SERPL-MCNC: 0.7 MG/DL (ref 0–1)
BUN BLDV-MCNC: 19 MG/DL (ref 7–20)
CALCIUM SERPL-MCNC: 9.3 MG/DL (ref 8.3–10.6)
CHLORIDE BLD-SCNC: 102 MMOL/L (ref 99–110)
CO2: 28 MMOL/L (ref 21–32)
CREAT SERPL-MCNC: 1 MG/DL (ref 0.8–1.3)
EOSINOPHILS ABSOLUTE: 0.4 K/UL (ref 0–0.6)
EOSINOPHILS RELATIVE PERCENT: 3.7 %
GFR SERPL CREATININE-BSD FRML MDRD: >60 ML/MIN/{1.73_M2}
GLUCOSE BLD-MCNC: 106 MG/DL (ref 70–99)
HCT VFR BLD CALC: 41.5 % (ref 40.5–52.5)
HEMOGLOBIN: 14.6 G/DL (ref 13.5–17.5)
INR BLD: 1.09 (ref 0.87–1.14)
LIPASE: 20 U/L (ref 13–60)
LYMPHOCYTES ABSOLUTE: 1.7 K/UL (ref 1–5.1)
LYMPHOCYTES RELATIVE PERCENT: 16.4 %
MCH RBC QN AUTO: 31.4 PG (ref 26–34)
MCHC RBC AUTO-ENTMCNC: 35.1 G/DL (ref 31–36)
MCV RBC AUTO: 89.4 FL (ref 80–100)
MONOCYTES ABSOLUTE: 0.6 K/UL (ref 0–1.3)
MONOCYTES RELATIVE PERCENT: 5.7 %
NEUTROPHILS ABSOLUTE: 7.5 K/UL (ref 1.7–7.7)
NEUTROPHILS RELATIVE PERCENT: 73.3 %
PDW BLD-RTO: 13.8 % (ref 12.4–15.4)
PLATELET # BLD: 215 K/UL (ref 135–450)
PMV BLD AUTO: 6.6 FL (ref 5–10.5)
POTASSIUM SERPL-SCNC: 3.7 MMOL/L (ref 3.5–5.1)
PROTHROMBIN TIME: 14.1 SEC (ref 11.7–14.5)
RBC # BLD: 4.65 M/UL (ref 4.2–5.9)
SODIUM BLD-SCNC: 139 MMOL/L (ref 136–145)
TOTAL PROTEIN: 6.8 G/DL (ref 6.4–8.2)
WBC # BLD: 10.3 K/UL (ref 4–11)

## 2022-10-19 PROCEDURE — 80053 COMPREHEN METABOLIC PANEL: CPT

## 2022-10-19 PROCEDURE — 85025 COMPLETE CBC W/AUTO DIFF WBC: CPT

## 2022-10-19 PROCEDURE — 83690 ASSAY OF LIPASE: CPT

## 2022-10-19 PROCEDURE — 85610 PROTHROMBIN TIME: CPT

## 2022-10-19 PROCEDURE — 36415 COLL VENOUS BLD VENIPUNCTURE: CPT

## 2022-10-27 ENCOUNTER — TELEPHONE (OUTPATIENT)
Dept: PULMONOLOGY | Age: 82
End: 2022-10-27

## 2022-10-27 NOTE — TELEPHONE ENCOUNTER
Patient cancelled appointment on 11/11/22 with Mark Cohen for 31-90d. Reason: patient was set up 10/18/22 with resmed machine. 31-90d needs to be between 11/18/22-1/18/23    Patient did not reschedule appointment. Appointment rescheduled for n/a. Patient called with message left for patient to call back to office.      Can offer 12/9/22 In Office at [facility]

## 2022-10-31 DIAGNOSIS — F33.41 RECURRENT MAJOR DEPRESSIVE DISORDER, IN PARTIAL REMISSION (HCC): ICD-10-CM

## 2022-10-31 DIAGNOSIS — D50.9 IRON DEFICIENCY ANEMIA, UNSPECIFIED IRON DEFICIENCY ANEMIA TYPE: ICD-10-CM

## 2022-10-31 RX ORDER — VENLAFAXINE 75 MG/1
TABLET ORAL
Qty: 180 TABLET | Refills: 0 | OUTPATIENT
Start: 2022-10-31

## 2022-10-31 RX ORDER — FERROUS SULFATE 325(65) MG
TABLET ORAL
Qty: 90 TABLET | Refills: 1 | Status: SHIPPED | OUTPATIENT
Start: 2022-10-31

## 2022-11-05 DIAGNOSIS — F33.41 RECURRENT MAJOR DEPRESSIVE DISORDER, IN PARTIAL REMISSION (HCC): ICD-10-CM

## 2022-11-07 RX ORDER — VENLAFAXINE 75 MG/1
TABLET ORAL
Qty: 180 TABLET | Refills: 0 | Status: SHIPPED | OUTPATIENT
Start: 2022-11-07

## 2022-11-09 ENCOUNTER — TELEPHONE (OUTPATIENT)
Dept: CARDIOLOGY CLINIC | Age: 82
End: 2022-11-09

## 2022-11-09 NOTE — TELEPHONE ENCOUNTER
Александр Lara, 1940    [facility]    Cardiac Risk Assessment    What type of procedure are you having? COLONOSCOPY    When is your procedure scheduled for?  11.17.22    Medications to be stopped. PLAVIX 5-DAYS PRIOR    What physician is performing your procedure? Nancy Davis    Phone Number:   [phone]    Fax number to send the letter:   [phone]    Cardiologist:   ILIANA ELLINGTON COVER[facility]    Last Appointment:   1.13.22  Assessment:     [de-identified] y.o. patient with:     1. BERTRAND/CP/hx CAD:  At [address] 10/9/19 c/o random chest heaviness and given abnl EKG he underwent LHC 10/15/19 w/ PCTA/DANTE mid LAD. Most recent LHC/RHC 3/4/21 showed non-obstructive CAD with patent mid-LAD stent; 50-60% prox LAD; 20-30% stenoses of CCx and RCA; mildly elevated right heart pressures. There are no concerning symptoms for angina currently. 2.  Hypertension:  Well controlled and will continue current medical regimen. ~BP: 120/60      3. Hyperlipidemia: Most recent labs 11/13/21 see results above I personally reviewed. Well controlled and at goal and will continue current medical regimen. 4. Interstitial lung disease: Dr. Zepeda Care dx him with early ILD and lungs should not cause significant symptoms. PFTs 5/2/16 FVC 3.24 (82%) FEV1 2.68 (94%) FEV1/FVC ratio 83% TLC 5.51 (83%) DLCO 17.92 (66%)     5. COLLIN              ~wears bipap     Plan:  1. Current medications reviewed. No changes at this time. Refills given as warranted. 2. No cardiac testing at this time. 3. Stop taking Aspirin 81 mg daily; DAPT in patients 79yo or > has higher bleeding risk and no definite indication for DAPT now. 4. Continue on Plavix indefinitely  5. Repeat blood work the end of 2022 before you see me in January 2022     Follow up with me in 1 year     Cost of prescription medications and patient compliance have been reviewed with patient. All questions answered. All questions and concerns were addressed to the patient/family. Alternatives to my treatment were discussed. The note was completed using EMR. Every effort was made to ensure accuracy; however, inadvertent computerized transcription errors may be presen     This note is scribed in the presence of Dr. Richard Carter by Jim Butt RN.

## 2022-11-09 NOTE — TELEPHONE ENCOUNTER
May send cardiology clearance moderate risk    may hold aspirin if necessary for 5 days before surgery but resume as soon as possible.

## 2022-11-29 DIAGNOSIS — G25.81 RESTLESS LEGS SYNDROME: ICD-10-CM

## 2022-11-29 RX ORDER — PRAMIPEXOLE DIHYDROCHLORIDE 1 MG/1
TABLET ORAL
Qty: 180 TABLET | Refills: 0 | Status: SHIPPED | OUTPATIENT
Start: 2022-11-29

## 2022-12-02 DIAGNOSIS — E03.9 HYPOTHYROIDISM, UNSPECIFIED TYPE: ICD-10-CM

## 2022-12-02 RX ORDER — LEVOTHYROXINE SODIUM 0.07 MG/1
TABLET ORAL
Qty: 90 TABLET | Refills: 1 | Status: SHIPPED | OUTPATIENT
Start: 2022-12-02

## 2022-12-09 ENCOUNTER — TELEPHONE (OUTPATIENT)
Dept: PULMONOLOGY | Age: 82
End: 2022-12-09

## 2022-12-09 ENCOUNTER — OFFICE VISIT (OUTPATIENT)
Dept: PULMONOLOGY | Age: 82
End: 2022-12-09
Payer: MEDICARE

## 2022-12-09 VITALS
RESPIRATION RATE: 18 BRPM | BODY MASS INDEX: 37.74 KG/M2 | DIASTOLIC BLOOD PRESSURE: 68 MMHG | HEIGHT: 68 IN | HEART RATE: 57 BPM | SYSTOLIC BLOOD PRESSURE: 135 MMHG | WEIGHT: 249 LBS

## 2022-12-09 DIAGNOSIS — E66.01 SEVERE OBESITY (BMI 35.0-39.9) WITH COMORBIDITY (HCC): ICD-10-CM

## 2022-12-09 DIAGNOSIS — G47.33 MODERATE OBSTRUCTIVE SLEEP APNEA: Primary | ICD-10-CM

## 2022-12-09 DIAGNOSIS — I10 ESSENTIAL HYPERTENSION: ICD-10-CM

## 2022-12-09 DIAGNOSIS — Z71.89 ENCOUNTER FOR BIPAP USE COUNSELING: ICD-10-CM

## 2022-12-09 PROCEDURE — 3078F DIAST BP <80 MM HG: CPT | Performed by: NURSE PRACTITIONER

## 2022-12-09 PROCEDURE — 1123F ACP DISCUSS/DSCN MKR DOCD: CPT | Performed by: NURSE PRACTITIONER

## 2022-12-09 PROCEDURE — 3074F SYST BP LT 130 MM HG: CPT | Performed by: NURSE PRACTITIONER

## 2022-12-09 PROCEDURE — 99214 OFFICE O/P EST MOD 30 MIN: CPT | Performed by: NURSE PRACTITIONER

## 2022-12-09 ASSESSMENT — SLEEP AND FATIGUE QUESTIONNAIRES
HOW LIKELY ARE YOU TO NOD OFF OR FALL ASLEEP WHILE WATCHING TV: 2
ESS TOTAL SCORE: 4
HOW LIKELY ARE YOU TO NOD OFF OR FALL ASLEEP IN A CAR, WHILE STOPPED FOR A FEW MINUTES IN TRAFFIC: 0
HOW LIKELY ARE YOU TO NOD OFF OR FALL ASLEEP WHILE SITTING QUIETLY AFTER LUNCH WITHOUT ALCOHOL: 0
HOW LIKELY ARE YOU TO NOD OFF OR FALL ASLEEP WHILE SITTING AND READING: 1
HOW LIKELY ARE YOU TO NOD OFF OR FALL ASLEEP WHILE SITTING AND TALKING TO SOMEONE: 0
HOW LIKELY ARE YOU TO NOD OFF OR FALL ASLEEP WHILE SITTING INACTIVE IN A PUBLIC PLACE: 0
HOW LIKELY ARE YOU TO NOD OFF OR FALL ASLEEP WHILE LYING DOWN TO REST IN THE AFTERNOON WHEN CIRCUMSTANCES PERMIT: 1
HOW LIKELY ARE YOU TO NOD OFF OR FALL ASLEEP WHEN YOU ARE A PASSENGER IN A CAR FOR AN HOUR WITHOUT A BREAK: 0
NECK CIRCUMFERENCE (INCHES): 16

## 2022-12-09 NOTE — PROGRESS NOTES
Patient ID: Nadeen Sacks is a [de-identified] y.o. male who is being seen today for   Chief Complaint   Patient presents with    Follow-up     31-90 day cr scanned          HPI:   Nadeen Sacks is a [de-identified] y.o. male in office for COLLIN follow up. States he is doing well with new. BIPAP states he tried nasal mask but did not like it. States full face mask is better but leaks. Patient is using BiPAP 6-7 hrs/night. Using humidifier. No snoring on BiPAP. The pressure is well tolerated. The mask is comfortable- full face. +mask leak. No significant daytime sleepiness. Denies nodding off when driving. No dry nose or throat. Some fatigue. Bedtime is MN and rise time is 730-8 am. Sleep onset is few minutes. Wakes up 6 times at night total. 6 nocturia. It takes few minutes to fall back a sleep. Occasional naps during the day. No headache in am. No weight gain. 0-1 caffienated beverages during the day. No alcohol. ESS is 4        Previous HPI 9/15/22  Nadeen Sacks is a [de-identified] y.o. male in office with wife for COLLIN evaluation. He was diagnosed with COLLIN  around 40 years ago. States he has been on PAP since, re diagnosed in 2013. States BIPAP stopped working about 2 weeks ago. [place] [facility] [place] could not be [place]. States prior to PAP he was snoring, poor sleep hypersomnia. With PAP symptoms improved. States he has been using friends old CPAP until he can get a replacement    Patient is using BiPAP  7 hrs/night. Using humidifier. No snoring on BiPAP. The pressure is well tolerated. The mask is comfortable- full face. No mask leak. Some daytime sleepiness. Denies nodding off when driving. No dry nose or throat. Somefatigue. Bedtime is 11 pm and rise time is 7 am. Sleep onset is few minutes. Wakes up 2-6 times at night total. 2-6 nocturia- has seen urology. It takes few minutes to fall back a sleep. 1 naps during the day, dozes if inactive. No headache in am. No weight gain. 0-1 caffienated beverages during the day.  No alcohol. ESS is 8.         Sleep Medicine 12/9/2022 9/15/2022 7/30/2021 11/23/2020 7/24/2019 7/23/2018 1/22/2018   Sitting and reading 1 2 2 1 2 2 2   Watching TV 2 2 2 1 2 2 3   Sitting, inactive in a public place (e.g. a theatre or a meeting) 0 0 0 0 0 1 2   As a passenger in a car for an hour without a break 0 0 0 1 2 2 2   Lying down to rest in the afternoon when circumstances permit 1 2 0 2 3 3 2   Sitting and talking to someone 0 0 0 0 0 0 0   Sitting quietly after a lunch without alcohol 0 2 0 0 1 3 3   In a car, while stopped for a few minutes in traffic 0 0 0 0 0 0 0   Napoleon Sleepiness Score 4 8 4 5 10 13 14   Neck circumference (Inches) 16 16.75 16.5 - 17.25 16 16       Past Medical History:  Past Medical History:   Diagnosis Date    Arthritis     Back injuries     BPH (benign prostatic hyperplasia)     Chest pain 03/2021    CPAP (continuous positive airway pressure) dependence     Diverticulosis     colonoscopy 9/2015    Esophageal dysmotility     GERD (gastroesophageal reflux disease)     Hiatal hernia     History of colon polyps     seen on colonoscopy again 9/2015    Hyperlipidemia     Hypertension     [facility] 07/30/2013    Internal hemorrhoid     colonoscopy 8/23/87    Lichen sclerosus of penis 2017    Dr Armida Carroll    Mild cognitive impairment 08/2016    [facility] neurology    COLLIN (obstructive sleep apnea)     Renal insufficiency     Restless legs syndrome     Rosacea     Schatzki's ring     last dilated 2002    Scrotal pruritus 02/08/2018    Sleep apnea        Past Surgical History:        Procedure Laterality Date    CARDIAC CATHETERIZATION  03/04/2021    Non Obs CAD, medical management    CHOLECYSTECTOMY, LAPAROSCOPIC N/A 5/25/2022    LAPAROSCOPIC CHOLECYSTECTOMY performed by Cande Gonzalez MD at [facility]  2010    COLONOSCOPY  09/14/2015    CORONARY ANGIOPLASTY  10/15/2019    CYSTOSCOPY  05/13/2016    CYSTOSCOPY  05/19/2017 CYSTOSCOPY     CYSTOSCOPY  07/14/2017    ENDOSCOPY, COLON, DIAGNOSTIC  10/11/2017    esoph. stricture    FRACTURE SURGERY      right leg    FRACTURE SURGERY      wrist   right    HERNIA REPAIR      OTHER SURGICAL HISTORY  05/25/2022    LAPAROSCOPIC CHOLECYSTECTOMY (N/A Abdomen)    PROSTATE SURGERY      TOE SURGERY      TURP  07/14/2017    UPPER GASTROINTESTINAL ENDOSCOPY  09/12/2016    UPPER GASTROINTESTINAL ENDOSCOPY  10/11/2017    esophageal stricture    UPPER GASTROINTESTINAL ENDOSCOPY  01/28/2020       Allergies:  is allergic to ace inhibitors, oxybutynin, percocet [oxycodone-acetaminophen], and simvastatin. Social History:    TOBACCO:   reports that he quit smoking about 48 years ago. His smoking use included cigarettes. He has a 25.50 pack-year smoking history. He has never used smokeless tobacco.  ETOH:   reports no history of alcohol use.     Family History:       Problem Relation Age of Onset    Stroke Father     Cancer Father         prostate    Other Father         colitis    Diabetes Sister     Diabetes Brother     Cancer Brother         skin    High Blood Pressure Brother     High Cholesterol Brother     COPD Mother     Asthma Neg Hx     Emphysema Neg Hx     Heart Failure Neg Hx     Hypertension Neg Hx        Current Medications:    Current Outpatient Medications:     levothyroxine (SYNTHROID) 75 MCG tablet, TAKE ONE TABLET BY MOUTH DAILY, Disp: 90 tablet, Rfl: 1    pramipexole (MIRAPEX) 1 MG tablet, TAKE ONE TABLET BY MOUTH TWICE A DAY, Disp: 180 tablet, Rfl: 0    venlafaxine (EFFEXOR) 75 MG tablet, Take 1 tablet by mouth twice daily, Disp: 180 tablet, Rfl: 0    FEROSUL 325 (65 Fe) MG tablet, TAKE ONE TABLET BY MOUTH DAILY WITH BREAKFAST, Disp: 90 tablet, Rfl: 1    potassium chloride (KLOR-CON) 10 MEQ extended release tablet, TAKE ONE TABLET BY MOUTH TWICE A DAY, Disp: 180 tablet, Rfl: 1    triamcinolone (KENALOG) 0.1 % cream, APPLY TOPICALLY EVERY 7 DAYS, Disp: 80 g, Rfl: 5    omeprazole (PRILOSEC) 40 MG delayed release capsule, TAKE ONE CAPSULE BY MOUTH DAILY, Disp: 90 capsule, Rfl: 1    methocarbamol (ROBAXIN) 500 MG tablet, Take 1 tablet by mouth 3 times daily as needed (spasm), Disp: 90 tablet, Rfl: 0    montelukast (SINGULAIR) 10 MG tablet, TAKE ONE TABLET BY MOUTH DAILY, Disp: 90 tablet, Rfl: 0    atorvastatin (LIPITOR) 40 MG tablet, TAKE ONE TABLET BY MOUTH DAILY, Disp: 90 tablet, Rfl: 3    clopidogrel (PLAVIX) 75 MG tablet, TAKE ONE TABLET BY MOUTH DAILY, Disp: 90 tablet, Rfl: 3    doxazosin (CARDURA) 8 MG tablet, Take one tablet by mouth twice a day, Disp: 180 tablet, Rfl: 3    triamterene-hydroCHLOROthiazide (MAXZIDE-25) 37.5-25 MG per tablet, Take 1 tablet by mouth daily, Disp: 90 tablet, Rfl: 3    nitroGLYCERIN (NITROSTAT) 0.4 MG SL tablet, Place 1 tablet under the tongue every 5 minutes as needed for Chest pain up to max of 3 total doses. If no relief after 1 dose, call 911., Disp: 25 tablet, Rfl: 3    amLODIPine (NORVASC) 10 MG tablet, TAKE ONE TABLET BY MOUTH DAILY, Disp: 90 tablet, Rfl: 5    cetirizine (ZYRTEC) 10 MG tablet, TAKE ONE TABLET BY MOUTH ONCE NIGHTLY, Disp: 90 tablet, Rfl: 1    zinc gluconate 50 MG tablet, Take 50 mg by mouth daily, Disp: , Rfl:     albuterol sulfate HFA (VENTOLIN HFA) 108 (90 Base) MCG/ACT inhaler, Inhale 2 puffs into the lungs every 6 hours as needed for Wheezing or Shortness of Breath, Disp: 1 Inhaler, Rfl: 5    magnesium gluconate (MAGONATE) 500 MG tablet, Take 500 mg by mouth daily , Disp: , Rfl:     Vitamin D (CHOLECALCIFEROL) 1000 UNITS CAPS capsule, Take 1,000 Units by mouth daily. , Disp: , Rfl:     calcium carbonate-vitamin D 600-200 MG-UNIT TABS, Take 600 mg by mouth daily. , Disp: , Rfl:             Objective:   PHYSICAL EXAM:    /68 (Site: Left Upper Arm, Position: Sitting, Cuff Size: Medium Adult)   Pulse 57   Resp 18   Ht 5' 8\" (1.727 m)   Wt 249 lb (112.9 kg)   BMI 37.86 kg/m²     Physical exam:  Gen: No acute distress. Obese.  BMI of 37.86  Eyes: PERRL. No sclera icterus. No conjunctival injection. ENT: No discharge. Pharynx clear. Mallampati class IV. Neck: Trachea midline. No obvious mass. Neck circumference 16\"  Resp: No accessory muscle use. No crackles. No wheezes. No rhonchi. CV: Regular rate. Regular rhythm. No murmur or rub. Skin: Warm and dry. M/S: No cyanosis. No obvious joint deformity. Neuro: Awake. Alert. Moves all four extremities. Psych: Oriented x 3. No anxiety. DATA:   6/26/2013 PSG AHI 21.9, low SPO2 92%, moderate COLLIN adequately controlled with BiPAP 20/14 cm H2O  11/4/2015 titration controlled sleep-related breathing disorder with BiPAP, PLMS 1 or 2.5, recommendations auto BiPAP EPAP minimum 7, IPAP maximum 20, pressure support 4 cm H2O  5/18/2016 titration improved sleep-related breathing disorder with BiPAP, PLMS 65.9, recommendations BiPAP 20/15 cm H2O    BiPAP download data:  Compliance download report from 11/6/22 to 12/5/22 reviewed today by me and showed patient is using machine 6:26 hrs/night with 93% compliance and AHI 6.6 within this time frame. 28/30days with greater than 4 hours of machine use. Leak 48.5 L/Min. BIPAP 20/15 cm H20    Assessment:      Moderate COLLIN. BiPAP 20/15 cm H2O. Optimal compliance on review today, residual AHI 6.6  Snoring-resolved on BiPAP  Fatigue/hypersomnia-improved with PAP use  Obesity  Hypertension    Plan:     - Continue BiPAP 20/15 cm H2O for now  -Work on mask fitting and follow AHI and adjust pressure if needed.  -Discussed recommended cleaning and replacement of PAP supplies  -Titration if no improvement  - Advised to use PAP 6-8 hrs at night and during naps. - Replacement of mask, tubing, head straps every 3-6 months or sooner if damaged. - Patient instructed to contact [employer] company for any mask, tubing or machine trouble shooting if problems arise.  - Sleep hygiene  - Avoid sedatives, alcohol and caffeinated drinks at bed time.   - Patient counseled to never drive or operate heavy machinery while fatigue, drowsy or sleepy. - Weight loss is recommended as a long-term intervention.    - Complications of COLLIN if not treated were discussed with patient patient, including: systemic hypertension, pulmonary hypertension, cardiovascular morbidities, car accidents and all cause mortality.  -Patient education handout provided regarding sleep tips and PAP cleaning recommendations   -Continue blood pressure medications as ordered by treating providers- treatment of COLLIN can lower blood pressure by levels that are clinically significant.       Follow-up 6 months, sooner if needed

## 2022-12-26 DIAGNOSIS — K21.9 GASTROESOPHAGEAL REFLUX DISEASE WITHOUT ESOPHAGITIS: ICD-10-CM

## 2022-12-27 RX ORDER — OMEPRAZOLE 40 MG/1
CAPSULE, DELAYED RELEASE ORAL
Qty: 90 CAPSULE | Refills: 0 | Status: SHIPPED | OUTPATIENT
Start: 2022-12-27

## 2023-01-05 ENCOUNTER — OFFICE VISIT (OUTPATIENT)
Dept: FAMILY MEDICINE CLINIC | Age: 83
End: 2023-01-05
Payer: MEDICARE

## 2023-01-05 VITALS
HEIGHT: 68 IN | DIASTOLIC BLOOD PRESSURE: 72 MMHG | OXYGEN SATURATION: 97 % | WEIGHT: 253 LBS | HEART RATE: 57 BPM | BODY MASS INDEX: 38.34 KG/M2 | SYSTOLIC BLOOD PRESSURE: 136 MMHG

## 2023-01-05 DIAGNOSIS — L02.11 CUTANEOUS ABSCESS OF NECK: Primary | ICD-10-CM

## 2023-01-05 PROCEDURE — 1123F ACP DISCUSS/DSCN MKR DOCD: CPT | Performed by: NURSE PRACTITIONER

## 2023-01-05 PROCEDURE — 99213 OFFICE O/P EST LOW 20 MIN: CPT | Performed by: NURSE PRACTITIONER

## 2023-01-05 PROCEDURE — 3078F DIAST BP <80 MM HG: CPT | Performed by: NURSE PRACTITIONER

## 2023-01-05 PROCEDURE — 3075F SYST BP GE 130 - 139MM HG: CPT | Performed by: NURSE PRACTITIONER

## 2023-01-05 RX ORDER — SULFAMETHOXAZOLE AND TRIMETHOPRIM 800; 160 MG/1; MG/1
1 TABLET ORAL 2 TIMES DAILY
Qty: 20 TABLET | Refills: 0 | Status: SHIPPED | OUTPATIENT
Start: 2023-01-05 | End: 2023-01-15

## 2023-01-05 SDOH — ECONOMIC STABILITY: FOOD INSECURITY: WITHIN THE PAST 12 MONTHS, YOU WORRIED THAT YOUR FOOD WOULD RUN OUT BEFORE YOU GOT MONEY TO BUY MORE.: NEVER TRUE

## 2023-01-05 SDOH — ECONOMIC STABILITY: FOOD INSECURITY: WITHIN THE PAST 12 MONTHS, THE FOOD YOU BOUGHT JUST DIDN'T LAST AND YOU DIDN'T HAVE MONEY TO GET MORE.: NEVER TRUE

## 2023-01-05 ASSESSMENT — PATIENT HEALTH QUESTIONNAIRE - PHQ9
7. TROUBLE CONCENTRATING ON THINGS, SUCH AS READING THE NEWSPAPER OR WATCHING TELEVISION: 0
3. TROUBLE FALLING OR STAYING ASLEEP: 0
SUM OF ALL RESPONSES TO PHQ9 QUESTIONS 1 & 2: 0
2. FEELING DOWN, DEPRESSED OR HOPELESS: 0
8. MOVING OR SPEAKING SO SLOWLY THAT OTHER PEOPLE COULD HAVE NOTICED. OR THE OPPOSITE, BEING SO FIGETY OR RESTLESS THAT YOU HAVE BEEN MOVING AROUND A LOT MORE THAN USUAL: 0
5. POOR APPETITE OR OVEREATING: 0
4. FEELING TIRED OR HAVING LITTLE ENERGY: 0
1. LITTLE INTEREST OR PLEASURE IN DOING THINGS: 0
9. THOUGHTS THAT YOU WOULD BE BETTER OFF DEAD, OR OF HURTING YOURSELF: 0
SUM OF ALL RESPONSES TO PHQ QUESTIONS 1-9: 0
6. FEELING BAD ABOUT YOURSELF - OR THAT YOU ARE A FAILURE OR HAVE LET YOURSELF OR YOUR FAMILY DOWN: 0
SUM OF ALL RESPONSES TO PHQ QUESTIONS 1-9: 0
10. IF YOU CHECKED OFF ANY PROBLEMS, HOW DIFFICULT HAVE THESE PROBLEMS MADE IT FOR YOU TO DO YOUR WORK, TAKE CARE OF THINGS AT HOME, OR GET ALONG WITH OTHER PEOPLE: 0

## 2023-01-05 ASSESSMENT — ENCOUNTER SYMPTOMS
GASTROINTESTINAL NEGATIVE: 1
SHORTNESS OF BREATH: 0
BLOOD IN STOOL: 0
EYES NEGATIVE: 1
ANAL BLEEDING: 0
ALLERGIC/IMMUNOLOGIC NEGATIVE: 1
NAUSEA: 0
RESPIRATORY NEGATIVE: 1
ABDOMINAL PAIN: 0

## 2023-01-05 ASSESSMENT — SOCIAL DETERMINANTS OF HEALTH (SDOH): HOW HARD IS IT FOR YOU TO PAY FOR THE VERY BASICS LIKE FOOD, HOUSING, MEDICAL CARE, AND HEATING?: NOT HARD AT ALL

## 2023-01-05 NOTE — PROGRESS NOTES
[address] [place]  FAMILY [facility]ICINE  [address] [place] [address]  Dept: [phone]  Dept Fax: [phone]  Loc: [address] is a 81 y.o. male who presents today for his medical conditions/complaints as noted below. Dario López is c/o of Other (Growth besides botello apple that he noticed 5 days ago. )       Subjective:     Chief Complaint   Patient presents with    Other     Growth besides botello apple that he noticed 5 days ago. Other  Pertinent negatives include no abdominal pain, chest pain, fatigue, nausea, numbness or rash. Patient comes in today stating that he has a \"infected hair\" that showed up on Sunday, 1/1/2023 after he shaved the night before. Patient states that it is tender to the touch. He states that there was a pinpoint amount of white discharge from the area. He denies any fever, chills, nausea, vomiting, dysphagia or other upper respiratory symptoms  The patient has not tried anything for the problem. The patient denies any worsening of symptoms. Past Medical History:   Diagnosis Date    Arthritis     Back injuries     BPH (benign prostatic hyperplasia)     Chest pain 03/2021    CPAP (continuous positive airway pressure) dependence     Diverticulosis     colonoscopy 9/2015    Esophageal dysmotility     GERD (gastroesophageal reflux disease)     Hiatal hernia     History of colon polyps     seen on colonoscopy again 9/2015    Hyperlipidemia     Hypertension     ILD (interstitial lung disease) ([facility] [address] 07/30/2013    Internal hemorrhoid     colonoscopy 8/20/59    Lichen sclerosus of penis 2017    Dr Maribeth Girard    Mild cognitive impairment 08/2016    [facility] neurology    COLLIN (obstructive sleep apnea)     Renal insufficiency     Restless legs syndrome     Rosacea     Schatzki's ring     last dilated 2002    Scrotal pruritus 02/08/2018    Sleep apnea          Review of Systems   Constitutional: Negative.   Negative for appetite change, fatigue and unexpected weight change. HENT: Negative. Eyes: Negative. Respiratory: Negative. Negative for shortness of breath. Cardiovascular: Negative. Negative for chest pain, palpitations and leg swelling. Gastrointestinal: Negative. Negative for abdominal pain, anal bleeding, blood in stool and nausea. Endocrine: Negative. Genitourinary: Negative. Negative for hematuria. Musculoskeletal: Negative. Skin: Negative. Negative for rash. \"Infected hair\" anterior right neck   Allergic/Immunologic: Negative. Neurological: Negative. Negative for dizziness, syncope, light-headedness and numbness. Hematological: Negative. Does not bruise/bleed easily. Psychiatric/Behavioral: Negative. All other systems reviewed and are negative.      Current Outpatient Medications   Medication Sig Dispense Refill    Misc Natural Products (TUMERSAID PO) Take by mouth 2 caps a daily      sulfamethoxazole-trimethoprim (BACTRIM DS) 800-160 MG per tablet Take 1 tablet by mouth 2 times daily for 10 days 20 tablet 0    omeprazole (PRILOSEC) 40 MG delayed release capsule TAKE ONE CAPSULE BY MOUTH DAILY 90 capsule 0    levothyroxine (SYNTHROID) 75 MCG tablet TAKE ONE TABLET BY MOUTH DAILY 90 tablet 1    pramipexole (MIRAPEX) 1 MG tablet TAKE ONE TABLET BY MOUTH TWICE A  tablet 0    venlafaxine (EFFEXOR) 75 MG tablet Take 1 tablet by mouth twice daily 180 tablet 0    FEROSUL 325 (65 Fe) MG tablet TAKE ONE TABLET BY MOUTH DAILY WITH BREAKFAST 90 tablet 1    potassium chloride (KLOR-CON) 10 MEQ extended release tablet TAKE ONE TABLET BY MOUTH TWICE A  tablet 1    triamcinolone (KENALOG) 0.1 % cream APPLY TOPICALLY EVERY 7 DAYS 80 g 5    methocarbamol (ROBAXIN) 500 MG tablet Take 1 tablet by mouth 3 times daily as needed (spasm) 90 tablet 0    montelukast (SINGULAIR) 10 MG tablet TAKE ONE TABLET BY MOUTH DAILY 90 tablet 0    atorvastatin (LIPITOR) 40 MG tablet TAKE ONE TABLET BY MOUTH DAILY 90 tablet 3    clopidogrel (PLAVIX) 75 MG tablet TAKE ONE TABLET BY MOUTH DAILY 90 tablet 3    doxazosin (CARDURA) 8 MG tablet Take one tablet by mouth twice a day 180 tablet 3    triamterene-hydroCHLOROthiazide (MAXZIDE-25) 37.5-25 MG per tablet Take 1 tablet by mouth daily 90 tablet 3    nitroGLYCERIN (NITROSTAT) 0.4 MG SL tablet Place 1 tablet under the tongue every 5 minutes as needed for Chest pain up to max of 3 total doses. If no relief after 1 dose, call 911. 25 tablet 3    amLODIPine (NORVASC) 10 MG tablet TAKE ONE TABLET BY MOUTH DAILY 90 tablet 5    cetirizine (ZYRTEC) 10 MG tablet TAKE ONE TABLET BY MOUTH ONCE NIGHTLY 90 tablet 1    zinc gluconate 50 MG tablet Take 50 mg by mouth daily      albuterol sulfate HFA (VENTOLIN HFA) 108 (90 Base) MCG/ACT inhaler Inhale 2 puffs into the lungs every 6 hours as needed for Wheezing or Shortness of Breath 1 Inhaler 5    magnesium gluconate (MAGONATE) 500 MG tablet Take 500 mg by mouth daily       Vitamin D (CHOLECALCIFEROL) 1000 UNITS CAPS capsule Take 1,000 Units by mouth daily. calcium carbonate-vitamin D 600-200 MG-UNIT TABS Take 600 mg by mouth daily. No current facility-administered medications for this visit. No changes in past medical history, past surgical history, social history, orfamily history were noted during the patient encounter unless specifically listed above. All updates of past medical history, past surgical history, social history, or family history were reviewed personally by me duringthe office visit. All problems listed in the assessment are stable unless noted otherwise. Medication profile reviewed personally by me during the office visit. Medication side effects and possible impairments frommedications were discussed as applicable. Objective:     Physical Exam  Constitutional:       General: He is not in acute distress. Appearance: Normal appearance. He is well-developed. He is not toxic-appearing.    HENT: Head: Normocephalic and atraumatic. Right Ear: Hearing, tympanic membrane and ear canal normal.      Left Ear: Hearing, tympanic membrane and ear canal normal.      Nose: Nose normal.      Mouth/Throat:      Pharynx: Uvula midline. Eyes:      General: Lids are normal.      Conjunctiva/sclera: Conjunctivae normal.   Neck:     Cardiovascular:      Rate and Rhythm: Normal rate and regular rhythm. Pulses: Normal pulses. Heart sounds: Normal heart sounds. Pulmonary:      Effort: Pulmonary effort is normal. No accessory muscle usage or respiratory distress. Breath sounds: Normal breath sounds. Abdominal:      Palpations: Abdomen is soft. Tenderness: There is no abdominal tenderness. Musculoskeletal:      Cervical back: Neck supple. Lymphadenopathy:      Head:      Right side of head: Submandibular adenopathy present. No submental adenopathy. Left side of head: No submental or submandibular adenopathy. Cervical: Cervical adenopathy present. Right cervical: Superficial cervical adenopathy present. Skin:     General: Skin is warm and dry. Findings: No lesion or rash. Neurological:      Mental Status: He is alert and oriented to person, place, and time. Psychiatric:         Speech: Speech normal.         Behavior: Behavior normal. Behavior is cooperative. /72 (Site: Left Upper Arm, Position: Sitting, Cuff Size: Large Adult)   Pulse 57   Ht 5' 8\" (1.727 m)   Wt 253 lb (114.8 kg)   SpO2 97%   BMI 38.47 kg/m²   Body mass index is 38.47 kg/m². BP Readings from Last 2 Encounters:   01/05/23 136/72   12/09/22 135/68       Wt Readings from Last 3 Encounters:   01/05/23 253 lb (114.8 kg)   12/09/22 249 lb (112.9 kg)   09/15/22 238 lb (108 kg)       Lab Review   No visits with results within 2 Month(s) from this visit.    Latest known visit with results is:   Hospital Outpatient Visit on 10/19/2022   Component Date Value    WBC 10/19/2022 10.3     RBC 10/19/2022 4.65     Hemoglobin 10/19/2022 14.6     Hematocrit 10/19/2022 41.5     MCV 10/19/2022 89.4     MCH 10/19/2022 31.4     MCHC 10/19/2022 35.1     RDW 10/19/2022 13.8     Platelets 13/83/0294 215     MPV 10/19/2022 6.6     Neutrophils % 10/19/2022 73.3     Lymphocytes % 10/19/2022 16.4     Monocytes % 10/19/2022 5.7     Eosinophils % 10/19/2022 3.7     Basophils % 10/19/2022 0.9     Neutrophils Absolute 10/19/2022 7.5     Lymphocytes Absolute 10/19/2022 1.7     Monocytes Absolute 10/19/2022 0.6     Eosinophils Absolute 10/19/2022 0.4     Basophils Absolute 10/19/2022 0.1     Sodium 10/19/2022 139     Potassium 10/19/2022 3.7     Chloride 10/19/2022 102     CO2 10/19/2022 28     Anion Gap 10/19/2022 9     Glucose 10/19/2022 106 (A)     BUN 10/19/2022 19     Creatinine 10/19/2022 1.0     Est, Glom Filt Rate 10/19/2022 >60     Calcium 10/19/2022 9.3     Total Protein 10/19/2022 6.8     Albumin 10/19/2022 4.1     Albumin/Globulin Ratio 10/19/2022 1.5     Total Bilirubin 10/19/2022 0.7     Alkaline Phosphatase 10/19/2022 92     ALT 10/19/2022 19     AST 10/19/2022 17     Protime 10/19/2022 14.1     INR 10/19/2022 1.09     Lipase 10/19/2022 20.0        No results found for this visit on 01/05/23. Assessment:       1. Cutaneous abscess of neck        No results found for this visit on 01/05/23. Plan:       [facility] was [facility] today for other. Diagnoses and all orders for this visit:    Cutaneous abscess of neck  -     sulfamethoxazole-trimethoprim (BACTRIM DS) 800-160 MG per tablet; Take 1 tablet by mouth 2 times daily for 10 days  Patient will follow-up in the office in 7 days and call for any new symptoms. Recommend warm compresses to the area to promote drainage.   If there is no improvement then he will be sent to general surgery for probable removal/incision and drainage if needed    Patient also should schedule his routine follow-up on his chronic conditions for March 2023    Patient has been instructed call the office immediately with new symptoms, change in symptoms or worseningof symptoms. If this is not feasible, patient is instructed to report to the emergency room. Medication profile reviewed. Medication side effects and possible impairments from medications were discussed as applicable. Allergies were reviewed. Health maintenance was reviewed and updated as appropriate. (Comment: Please note this report has been produced using a combination of typing and speech recognition software and may contain errors related to that system including errors in grammar, punctuation, and spelling, as well as words and phrases that may be inappropriate.  If there are any questions or concerns please feel free to contact the dictating provider for clarification.)

## 2023-01-09 DIAGNOSIS — I10 ESSENTIAL HYPERTENSION: ICD-10-CM

## 2023-01-09 DIAGNOSIS — E78.2 MIXED HYPERLIPIDEMIA: ICD-10-CM

## 2023-01-09 RX ORDER — ATORVASTATIN CALCIUM 40 MG/1
TABLET, FILM COATED ORAL
Qty: 90 TABLET | Refills: 0 | Status: SHIPPED | OUTPATIENT
Start: 2023-01-09

## 2023-01-09 RX ORDER — TRIAMTERENE AND HYDROCHLOROTHIAZIDE 37.5; 25 MG/1; MG/1
TABLET ORAL
Qty: 90 TABLET | Refills: 0 | Status: SHIPPED | OUTPATIENT
Start: 2023-01-09

## 2023-01-09 NOTE — TELEPHONE ENCOUNTER
BiPAP download report from 12/10/2022 - 1/8/2023 on BiPAP 20/15 cm H2O reviewed. Compliance is good 100%. AHI is elevated 8.3. Report showing leak 39.4 L/min    Please send order to change to BiPAP 21/16 cm H2O and continue to work on mask leak as it is likely contributing to elevated AHI.   Please get new report about 30 days after pressure is changed

## 2023-01-09 NOTE — TELEPHONE ENCOUNTER
Order for pressure change and mask fitting signed. Agree with mask fitting and washing mask daily.       Please look for new report about 30 days after pressure is changed

## 2023-01-09 NOTE — TELEPHONE ENCOUNTER
Pt called back stating that he thought about it and he will try the pressure change and see if it works. Order pending.

## 2023-01-09 NOTE — TELEPHONE ENCOUNTER
Spoke to patient and he said \"that's not gonna happen\" about increasing pressure. Patient said mask leaks so bad now that a pressure of 21 will blow his face off. Patient said that he has his mask so tight its causing wrinkles in his face. Also said that it is keeping his wife awake and causing her not to sleep well. Patient said that he does not wash mask daily just occasionally. Informed patient to wash mask daily with mild soap and water. Briana Holland he wants to try this first before we go increasing his pressure.

## 2023-01-12 ENCOUNTER — OFFICE VISIT (OUTPATIENT)
Dept: FAMILY MEDICINE CLINIC | Age: 83
End: 2023-01-12
Payer: MEDICARE

## 2023-01-12 VITALS
HEART RATE: 74 BPM | HEIGHT: 68 IN | DIASTOLIC BLOOD PRESSURE: 62 MMHG | OXYGEN SATURATION: 97 % | SYSTOLIC BLOOD PRESSURE: 134 MMHG | WEIGHT: 245 LBS | BODY MASS INDEX: 37.13 KG/M2

## 2023-01-12 DIAGNOSIS — L03.116 LEFT LEG CELLULITIS: ICD-10-CM

## 2023-01-12 DIAGNOSIS — G25.81 RESTLESS LEGS SYNDROME (RLS): ICD-10-CM

## 2023-01-12 DIAGNOSIS — L02.11 CUTANEOUS ABSCESS OF NECK: Primary | ICD-10-CM

## 2023-01-12 PROCEDURE — 1123F ACP DISCUSS/DSCN MKR DOCD: CPT | Performed by: NURSE PRACTITIONER

## 2023-01-12 PROCEDURE — 3075F SYST BP GE 130 - 139MM HG: CPT | Performed by: NURSE PRACTITIONER

## 2023-01-12 PROCEDURE — 3078F DIAST BP <80 MM HG: CPT | Performed by: NURSE PRACTITIONER

## 2023-01-12 PROCEDURE — 99214 OFFICE O/P EST MOD 30 MIN: CPT | Performed by: NURSE PRACTITIONER

## 2023-01-12 RX ORDER — PRAMIPEXOLE DIHYDROCHLORIDE 1.5 MG/1
1.5 TABLET ORAL 2 TIMES DAILY
Qty: 60 TABLET | Refills: 3 | Status: SHIPPED | OUTPATIENT
Start: 2023-01-12

## 2023-01-12 RX ORDER — CEPHALEXIN 500 MG/1
500 CAPSULE ORAL 3 TIMES DAILY
Qty: 30 CAPSULE | Refills: 0 | Status: SHIPPED | OUTPATIENT
Start: 2023-01-12 | End: 2023-01-22

## 2023-01-12 NOTE — PROGRESS NOTES
[address] [place]  FAMILY MEDICINE  [address] [place] [address]  Dept: [phone]  Dept Fax: [phone]  Loc: [address] is a 81 y.o. male who presents today for his medical conditions/complaints as noted below. Guillermo Judge is c/o of Other (One week follow up from knot next to his skyler's apple)       Subjective:     Chief Complaint   Patient presents with    Other     One week follow up from knot next to his skyler's apple       HPI  The patient is here today for follow-up from office visit of 1/5/2023 where he came in with an area just to the right of his \"Skyler's apple\". He thought it was an infected hair. He states it was not there the Saturday prior to his visit but he did shave Saturday evening and then woke up Sunday morning with this tender fluid-filled area. The area was tender and slightly warm but there was no discharge noted. I did put the patient on Bactrim twice a day for 10 days and recommended warm compresses to the area. States he still has had no drainage. He did not get the antibiotic filled right away and is currently only on his sixth day of antibiotic. The patient states that the redness, tenderness and size has all improved    The patient also comes in today for his restless leg syndrome. He is currently on Mirapex 1 mg twice a day. He states he only uses it twice a day maybe 3-4 times per week. He states that in the evening his restless leg symptoms are getting worse. He is also noticing that his symptoms start occurring around lunchtime. The patient also dates that he has chronic itching in his bilateral lower extremities. He states that he has been scratching his left lower extremity frequently and has caused some open areas. He has now progressed to having erythema and increased warmth in his left lower extremity.   The patient states he has used some Vaseline anti-itch lotion which does help the itching but he has not used this on a consistent basis    Past Medical History:   Diagnosis Date    Arthritis     Back injuries     BPH (benign prostatic hyperplasia)     Chest pain 03/2021    CPAP (continuous positive airway pressure) dependence     Diverticulosis     colonoscopy 9/2015    Esophageal dysmotility     GERD (gastroesophageal reflux disease)     Hiatal hernia     History of colon polyps     seen on colonoscopy again 9/2015    Hyperlipidemia     Hypertension     ILD (interstitial lung disease) ([facility] [address] 07/30/2013    Internal hemorrhoid     colonoscopy 9/62/92    Lichen sclerosus of penis 2017    Dr Shellie Valencia    Mild cognitive impairment 08/2016    [facility] neurology    COLLIN (obstructive sleep apnea)     Renal insufficiency     Restless legs syndrome     Rosacea     Schatzki's ring     last dilated 2002    Scrotal pruritus 02/08/2018    Sleep apnea          Review of Systems     Current Outpatient Medications   Medication Sig Dispense Refill    triamterene-hydroCHLOROthiazide (MAXZIDE-25) 37.5-25 MG per tablet TAKE ONE TABLET BY MOUTH DAILY 90 tablet 0    atorvastatin (LIPITOR) 40 MG tablet TAKE ONE TABLET BY MOUTH DAILY 90 tablet 0    Misc Natural Products (TUMERSAID PO) Take by mouth 2 caps a daily      sulfamethoxazole-trimethoprim (BACTRIM DS) 800-160 MG per tablet Take 1 tablet by mouth 2 times daily for 10 days 20 tablet 0    omeprazole (PRILOSEC) 40 MG delayed release capsule TAKE ONE CAPSULE BY MOUTH DAILY 90 capsule 0    levothyroxine (SYNTHROID) 75 MCG tablet TAKE ONE TABLET BY MOUTH DAILY 90 tablet 1    pramipexole (MIRAPEX) 1 MG tablet TAKE ONE TABLET BY MOUTH TWICE A  tablet 0    venlafaxine (EFFEXOR) 75 MG tablet Take 1 tablet by mouth twice daily 180 tablet 0    FEROSUL 325 (65 Fe) MG tablet TAKE ONE TABLET BY MOUTH DAILY WITH BREAKFAST 90 tablet 1    potassium chloride (KLOR-CON) 10 MEQ extended release tablet TAKE ONE TABLET BY MOUTH TWICE A  tablet 1    triamcinolone (KENALOG) 0.1 % cream APPLY TOPICALLY EVERY 7 DAYS 80 g 5    methocarbamol (ROBAXIN) 500 MG tablet Take 1 tablet by mouth 3 times daily as needed (spasm) 90 tablet 0    montelukast (SINGULAIR) 10 MG tablet TAKE ONE TABLET BY MOUTH DAILY 90 tablet 0    clopidogrel (PLAVIX) 75 MG tablet TAKE ONE TABLET BY MOUTH DAILY 90 tablet 3    doxazosin (CARDURA) 8 MG tablet Take one tablet by mouth twice a day 180 tablet 3    nitroGLYCERIN (NITROSTAT) 0.4 MG SL tablet Place 1 tablet under the tongue every 5 minutes as needed for Chest pain up to max of 3 total doses. If no relief after 1 dose, call 911. 25 tablet 3    amLODIPine (NORVASC) 10 MG tablet TAKE ONE TABLET BY MOUTH DAILY 90 tablet 5    cetirizine (ZYRTEC) 10 MG tablet TAKE ONE TABLET BY MOUTH ONCE NIGHTLY 90 tablet 1    zinc gluconate 50 MG tablet Take 50 mg by mouth daily      albuterol sulfate HFA (VENTOLIN HFA) 108 (90 Base) MCG/ACT inhaler Inhale 2 puffs into the lungs every 6 hours as needed for Wheezing or Shortness of Breath 1 Inhaler 5    magnesium gluconate (MAGONATE) 500 MG tablet Take 500 mg by mouth daily       Vitamin D (CHOLECALCIFEROL) 1000 UNITS CAPS capsule Take 1,000 Units by mouth daily. calcium carbonate-vitamin D 600-200 MG-UNIT TABS Take 600 mg by mouth daily. No current facility-administered medications for this visit. No changes in past medical history, past surgical history, social history, orfamily history were noted during the patient encounter unless specifically listed above. All updates of past medical history, past surgical history, social history, or family history were reviewed personally by me duringthe office visit. All problems listed in the assessment are stable unless noted otherwise. Medication profile reviewed personally by me during the office visit. Medication side effects and possible impairments frommedications were discussed as applicable.      Objective:     Physical Exam  HENT:      Head:     Skin:            Comments: 2 open and healing areas on the left lower extremity. Distal aspect of left lower extremity has  erythema and increased warmth. There is no drainage or discharge from the area       /62 (Site: Left Upper Arm, Position: Sitting, Cuff Size: Large Adult)   Pulse 74   Ht 5' 8\" (1.727 m)   Wt 245 lb (111.1 kg)   SpO2 97%   BMI 37.25 kg/m²   Body mass index is 37.25 kg/m². BP Readings from Last 2 Encounters:   01/12/23 134/62   01/05/23 136/72       Wt Readings from Last 3 Encounters:   01/12/23 245 lb (111.1 kg)   01/05/23 253 lb (114.8 kg)   12/09/22 249 lb (112.9 kg)       Lab Review   No visits with results within 2 Month(s) from this visit.    Latest known visit with results is:   Hospital Outpatient Visit on 10/19/2022   Component Date Value    WBC 10/19/2022 10.3     RBC 10/19/2022 4.65     Hemoglobin 10/19/2022 14.6     Hematocrit 10/19/2022 41.5     MCV 10/19/2022 89.4     MCH 10/19/2022 31.4     MCHC 10/19/2022 35.1     RDW 10/19/2022 13.8     Platelets 00/67/4491 215     MPV 10/19/2022 6.6     Neutrophils % 10/19/2022 73.3     Lymphocytes % 10/19/2022 16.4     Monocytes % 10/19/2022 5.7     Eosinophils % 10/19/2022 3.7     Basophils % 10/19/2022 0.9     Neutrophils Absolute 10/19/2022 7.5     Lymphocytes Absolute 10/19/2022 1.7     Monocytes Absolute 10/19/2022 0.6     Eosinophils Absolute 10/19/2022 0.4     Basophils Absolute 10/19/2022 0.1     Sodium 10/19/2022 139     Potassium 10/19/2022 3.7     Chloride 10/19/2022 102     CO2 10/19/2022 28     Anion Gap 10/19/2022 9     Glucose 10/19/2022 106 (A)     BUN 10/19/2022 19     Creatinine 10/19/2022 1.0     Est, Glom Filt Rate 10/19/2022 >60     Calcium 10/19/2022 9.3     Total Protein 10/19/2022 6.8     Albumin 10/19/2022 4.1     Albumin/Globulin Ratio 10/19/2022 1.5     Total Bilirubin 10/19/2022 0.7     Alkaline Phosphatase 10/19/2022 92     ALT 10/19/2022 19     AST 10/19/2022 17     Protime 10/19/2022 14.1     INR 10/19/2022 1.09     Lipase 10/19/2022 20.0        No results found for this visit on 01/12/23. Assessment:       No diagnosis found. No results found for this visit on 01/12/23. Plan:       Shirley Rojas was seen today for other. Diagnoses and all orders for this visit:    Cutaneous abscess of neck  Continue Bactrim until it is complete and to call the office back if the area has not resolved    Restless legs syndrome (RLS)  Again as noted in HPI patient's symptoms are progressing. Will increase Mirapex from 1 mg twice daily (which she was not using consistently twice daily)  To  -     pramipexole (MIRAPEX) 1.5 MG tablet; Take 1 tablet by mouth in the morning and at bedtime  Patient did have an arterial Doppler 5/5/2018 that was negative for any type of arterial insufficiency however if his symptoms do not improve we will consider repeating the arterial Doppler    Left leg cellulitis  Start  -     cephALEXin (KEFLEX) 500 MG capsule; Take 1 capsule by mouth 3 times daily for 10 days      Patient has been instructed call the office immediately with new symptoms, change in symptoms or worseningof symptoms. If this is not feasible, patient is instructed to report to the emergency room. Medication profile reviewed. Medication side effects and possible impairments from medications were discussed as applicable. Allergies were reviewed. Health maintenance was reviewed and updated as appropriate. (Comment: Please note this report has been produced using a combination of typing and speech recognition software and may contain errors related to that system including errors in grammar, punctuation, and spelling, as well as words and phrases that may be inappropriate.  If there are any questions or concerns please feel free to contact the dictating provider for clarification.)

## 2023-02-04 DIAGNOSIS — F33.41 RECURRENT MAJOR DEPRESSIVE DISORDER, IN PARTIAL REMISSION (HCC): ICD-10-CM

## 2023-02-06 RX ORDER — VENLAFAXINE 75 MG/1
TABLET ORAL
Qty: 180 TABLET | Refills: 0 | Status: SHIPPED | OUTPATIENT
Start: 2023-02-06

## 2023-02-06 NOTE — TELEPHONE ENCOUNTER
Anibal Click is requesting refill(s)   Last OV 3/25/22 (pertaining to medication)  LR 11/7/22 (per medication requested)  Next office visit scheduled or attempted Yes   If no, reason:

## 2023-02-13 ENCOUNTER — OFFICE VISIT (OUTPATIENT)
Dept: CARDIOLOGY CLINIC | Age: 83
End: 2023-02-13
Payer: MEDICARE

## 2023-02-13 VITALS
BODY MASS INDEX: 38.49 KG/M2 | OXYGEN SATURATION: 95 % | HEIGHT: 68 IN | SYSTOLIC BLOOD PRESSURE: 132 MMHG | DIASTOLIC BLOOD PRESSURE: 70 MMHG | HEART RATE: 64 BPM | WEIGHT: 254 LBS | TEMPERATURE: 98.8 F

## 2023-02-13 DIAGNOSIS — I50.32 CHRONIC DIASTOLIC CONGESTIVE HEART FAILURE (HCC): ICD-10-CM

## 2023-02-13 DIAGNOSIS — G47.33 OSA ON CPAP: ICD-10-CM

## 2023-02-13 DIAGNOSIS — I25.119 CORONARY ARTERY DISEASE INVOLVING NATIVE CORONARY ARTERY OF NATIVE HEART WITH ANGINA PECTORIS (HCC): ICD-10-CM

## 2023-02-13 DIAGNOSIS — E78.2 MIXED HYPERLIPIDEMIA: ICD-10-CM

## 2023-02-13 DIAGNOSIS — I25.9 CHRONIC ISCHEMIC HEART DISEASE: Primary | ICD-10-CM

## 2023-02-13 DIAGNOSIS — I10 ESSENTIAL HYPERTENSION: ICD-10-CM

## 2023-02-13 DIAGNOSIS — Z87.891 HISTORY OF TOBACCO ABUSE: ICD-10-CM

## 2023-02-13 DIAGNOSIS — Z79.899 MEDICATION MANAGEMENT: ICD-10-CM

## 2023-02-13 DIAGNOSIS — Z99.89 OSA ON CPAP: ICD-10-CM

## 2023-02-13 PROCEDURE — 99214 OFFICE O/P EST MOD 30 MIN: CPT | Performed by: INTERNAL MEDICINE

## 2023-02-13 PROCEDURE — 1123F ACP DISCUSS/DSCN MKR DOCD: CPT | Performed by: INTERNAL MEDICINE

## 2023-02-13 PROCEDURE — 3078F DIAST BP <80 MM HG: CPT | Performed by: INTERNAL MEDICINE

## 2023-02-13 PROCEDURE — 3075F SYST BP GE 130 - 139MM HG: CPT | Performed by: INTERNAL MEDICINE

## 2023-02-13 RX ORDER — AMLODIPINE BESYLATE 10 MG/1
TABLET ORAL
Qty: 90 TABLET | Refills: 3 | Status: SHIPPED | OUTPATIENT
Start: 2023-02-13

## 2023-02-13 RX ORDER — ATORVASTATIN CALCIUM 40 MG/1
TABLET, FILM COATED ORAL
Qty: 90 TABLET | Refills: 3 | Status: SHIPPED | OUTPATIENT
Start: 2023-02-13

## 2023-02-13 RX ORDER — POTASSIUM CHLORIDE 750 MG/1
TABLET, FILM COATED, EXTENDED RELEASE ORAL
Qty: 180 TABLET | Refills: 3 | Status: SHIPPED | OUTPATIENT
Start: 2023-02-13

## 2023-02-13 NOTE — PROGRESS NOTES
[address] [facility]   Cardiovascular Evaluation    PATIENT: Alejandra Vasquez  DATE: 2023  MRN: [id number]  CSN: [id number]  : 1940    Primary Care Doctor: Dior Menezes, ADORE - CNP    Subjective: Mr. Grace Bull is here today for cardiology follow up; No cardiac complaints today. History of Present Illness    Alejandra Vasquez is a 81 y.o. male who has PMH CAD s/p LAD DANTE 10/19, HTN, HLD, ILD, s/p tono , chronic cough, and COLLIN (uses bipap). Prior saw Dr. Tami Ortiz for ILD. ECHO 2015 EF=55-60%. Grade I DD (no change from  study). Marcela nuc 2015 negative, EF of 62%. LE arterial doppler CLOVER's >1 and no arterial insufficiency. C 10/15/19 s/p PCTA mid-LAD with 3.0 x 18 DANTE 3.25 x 8; LM <20%; LAD 50% prox, 70% mid. FFR 0.79; Cx 40% distal;OM1 40% prox; RCA 30% mid. [place] [employer] 20 normal.  LHC/RHC 3/4/21 NOCAD with patent mid-LAD stent; 50-60% prox LAD; 20-30% stenoses of CCx and RCA; mildly elevated right heart pressures; wedge=13. OV 22 stopped Asprin 81 mg for age and increased risk of bleeding with elderly age. Most recent EKG 5/3/22  bpm Left axis; LVH with repol abnl (no change 3/22). Today, he reports he is feeling good. He is now following with Casimiro Baltazar at [address] for his sleep apnea. He reports he lost 30 pounds prior but he has gained 15# back. Patient denies current edema, chest pain, sob, palpitations, dizziness or syncope. Patient is taking all cardiac medications as prescribed and tolerates them well. Weight today is 254# (Up 20# from 2022)    Patient is not vaccinated.   Vaccine is postponed until 2022    Past Medical History:   has a past medical history of Arthritis, Back injuries, BPH (benign prostatic hyperplasia), Chest pain, CPAP (continuous positive airway pressure) dependence, Diverticulosis, Esophageal dysmotility, GERD (gastroesophageal reflux disease), Hiatal hernia, History of colon polyps, Hyperlipidemia, Hypertension, ILD (interstitial lung disease) ([facility] [address] Internal hemorrhoid, Lichen sclerosus of penis, Mild cognitive impairment, COLLIN (obstructive sleep apnea), Renal insufficiency, Restless legs syndrome, Rosacea, Schatzki's ring, Scrotal pruritus, and Sleep apnea. Surgical History:   has a past surgical history that includes Circumcision; fracture surgery; fracture surgery; Toe Surgery; hernia repair; Colonoscopy (2010); Colonoscopy (09/14/2015); Cystocopy (05/13/2016); Upper gastrointestinal endoscopy (09/12/2016); Cystoscopy (05/19/2017); TURP (07/14/2017); Cystoscopy (07/14/2017); Endoscopy, colon, diagnostic (10/11/2017); Upper gastrointestinal endoscopy (10/11/2017); Coronary angioplasty (10/15/2019); Prostate surgery; Upper gastrointestinal endoscopy (01/28/2020); Cardiac catheterization (03/04/2021); other surgical history (05/25/2022); and Cholecystectomy, laparoscopic (N/A, 5/25/2022). Social History:   reports that he quit smoking about 1970. His smoking use included cigarettes. He has a 25.50 pack-year smoking history. He has never used smokeless tobacco. He reports that he does not drink alcohol and does not use drugs. Family History:  No evidence for sudden cardiac death or premature CAD with parents    Medications:  Reviewed and are listed in nursing record. and/or listed below  Outpatient Medications: Allergies:  Ace inhibitors, Oxybutynin, Percocet [oxycodone-acetaminophen], and Simvastatin     Review of Systems:   All 12 point review of symptoms completed. Pertinent positives identified in the HPI, all other review of symptoms negative as below.     Physical Examination:    Vitals:    02/13/23 1439   BP: 132/70   Pulse: 64   Temp: 98.8 °F (37.1 °C)   SpO2: 95%   Weight: 254 lb (115.2 kg)   Height: 5' 8\" (1.727 m)       No intake or output data in the 24 hours ending 02/13/23 1531    General Appearance:  Alert, cooperative, no distress, appears stated age   Head:  Normocephalic, without obvious abnormality, atraumatic   Eyes:  PERRL, conjunctiva/corneas clear       Nose: Nares normal, no drainage or sinus tenderness   Throat: Lips, mucosa, and tongue normal   Neck: Supple, symmetrical, trachea midline, no adenopathy, thyroid: not enlarged, symmetric, no tenderness/mass/nodules, no carotid bruit or JVD       Lungs:   Soft crackles at bases; respirations unlabored   Chest Wall:  No tenderness or deformity   Heart:  Regular rhythm and normal rate; S1, S2 are normal Soft ANGELA; no rub or gallop   Abdomen:   Soft, non-tender, bowel sounds active all four quadrants,  no masses, no organomegaly           Extremities: Extremities normal, atraumatic, no cyanosis 1+ edema bilaterally   Pulses: 2+ and symmetric   Skin: Skin color, texture, turgor normal, no rashes or lesions   Pysch: Normal mood and affect   Neurologic: Normal gross motor and sensory exam.         Labs    Lab Results   Component Value Date    CHOL 105 11/13/2021    CHOL 113 03/04/2021    CHOL 114 05/04/2020     Lab Results   Component Value Date    TRIG 106 11/13/2021    TRIG 98 03/04/2021    TRIG 170 (H) 05/04/2020     Lab Results   Component Value Date    HDL 36 (L) 11/13/2021    HDL 39 (L) 03/04/2021    HDL 38 (L) 05/04/2020     Lab Results   Component Value Date    LDLCALC 48 11/13/2021    LDLCALC 54 03/04/2021    LDLCALC 42 05/04/2020     Lab Results   Component Value Date    LABVLDL 21 11/13/2021    LABVLDL 20 03/04/2021    LABVLDL 34 05/04/2020     I have personally reviewed all labs including lipids     Assessment:    81 y.o. patient with:    1. BERTRAND/CP/hx CAD:  Underwent LHC 10/15/19 w/ PCTA/DANTE mid LAD. LHC/RHC 3/4/21 NOCAD with patent mid-LAD stent; 50-60% prox LAD; 20-30% stenoses CCx and RCA. There are no concerning symptoms for angina currently. 2.  Hypertension:  Well controlled and will continue current medical regimen. ~BP: 132/70    3. Hyperlipidemia: Most recent labs 11/13/21 see results above I personally reviewed. Well controlled and at goal and will continue current medical regimen. Need repeat since > 1 year. 4. Interstitial lung disease: Dr. Mahi Penaloza dx him with early ILD not significant. 5. COLLIN   ~wears bipap    Plan:  1. Current medications reviewed. Refills given as warranted. 2. As long as your swelling in your legs goes down over night I am not worried about it. 3. Repeat cholesterol blood work. I can also check your sugar level for you.   -you will need to be fasting   -it is ok to take pills with sips of water. 4. No cardiac testing at this time. Follow up with me in 1 year, or sooner if needed    This note is scribed in the presence of Dr. Isidro Rollins by Elsy Norton RN.    I, Dr. Radha Caraballo, personally performed the services described in this documentation, as scribed by the above signed scribe in my presence. It is both accurate and complete to my knowledge. I agree with the details independently gathered by the clinical support staff, while the remaining scribed note accurately describes my personal service to the patient.     Radha Caraballo MD  2/13/2023  3:31 PM

## 2023-02-13 NOTE — PATIENT INSTRUCTIONS
Plan:  1. Current medications reviewed. Refills given as warranted. 2. As long as your swelling in your legs goes down over night I am not worried about it. 3. Repeat cholesterol blood work. I can also check your sugar level for you.   -you will need to be fasting   -it is ok to take pills with sips of water. 4. No cardiac testing at this time.     Follow up with me in 1 year, or sooner if needed
No

## 2023-02-23 ENCOUNTER — HOSPITAL ENCOUNTER (OUTPATIENT)
Age: 83
Discharge: HOME | End: 2023-02-23
Payer: MEDICARE

## 2023-02-23 DIAGNOSIS — Z79.899 MEDICATION MANAGEMENT: ICD-10-CM

## 2023-02-23 PROCEDURE — 83036 HEMOGLOBIN GLYCOSYLATED A1C: CPT

## 2023-02-23 PROCEDURE — 36415 COLL VENOUS BLD VENIPUNCTURE: CPT

## 2023-02-23 PROCEDURE — 80076 HEPATIC FUNCTION PANEL: CPT

## 2023-02-23 PROCEDURE — 80061 LIPID PANEL: CPT

## 2023-02-24 ENCOUNTER — TELEPHONE (OUTPATIENT)
Dept: CARDIOLOGY CLINIC | Age: 83
End: 2023-02-24

## 2023-02-24 DIAGNOSIS — E78.2 MIXED HYPERLIPIDEMIA: Primary | ICD-10-CM

## 2023-02-24 DIAGNOSIS — E78.2 MIXED HYPERLIPIDEMIA: ICD-10-CM

## 2023-02-24 LAB
ALBUMIN SERPL-MCNC: 4.2 G/DL (ref 3.4–5)
ALP BLD-CCNC: 89 U/L (ref 40–129)
ALT SERPL-CCNC: 22 U/L (ref 10–40)
AST SERPL-CCNC: 19 U/L (ref 15–37)
BILIRUB SERPL-MCNC: 0.6 MG/DL (ref 0–1)
BILIRUBIN DIRECT: <0.2 MG/DL (ref 0–0.3)
BILIRUBIN, INDIRECT: NORMAL MG/DL (ref 0–1)
CHOLESTEROL, TOTAL: 115 MG/DL (ref 0–199)
ESTIMATED AVERAGE GLUCOSE: 116.9 MG/DL
HBA1C MFR BLD: 5.7 %
HDLC SERPL-MCNC: 42 MG/DL (ref 40–60)
LDL CHOLESTEROL CALCULATED: 53 MG/DL
TOTAL PROTEIN: 7.3 G/DL (ref 6.4–8.2)
TRIGL SERPL-MCNC: 98 MG/DL (ref 0–150)
VLDLC SERPL CALC-MCNC: 20 MG/DL

## 2023-02-24 NOTE — TELEPHONE ENCOUNTER
----- Message from Daryle Robinsons, MD sent at 2/24/2023  7:34 AM EST -----  Excellent lipids except for chronically lower HDL (good chol). Do not treat as not shownt to have any clinical benefit. Can you add LFT's to lab?  Thanks

## 2023-02-24 NOTE — TELEPHONE ENCOUNTER
Spoke with patient's wife, ok per hippa regarding results. All questions answered. Patient verbalized understanding.

## 2023-02-24 NOTE — TELEPHONE ENCOUNTER
Spoke with Karissa at lab and she will add A1C good at 5. 7.  will wait for all labs to come back to call patient

## 2023-02-27 DIAGNOSIS — I25.119 CORONARY ARTERY DISEASE INVOLVING NATIVE CORONARY ARTERY OF NATIVE HEART WITH ANGINA PECTORIS (HCC): ICD-10-CM

## 2023-02-27 RX ORDER — DOXAZOSIN 8 MG/1
TABLET ORAL
Qty: 180 TABLET | Refills: 3 | Status: SHIPPED | OUTPATIENT
Start: 2023-02-27

## 2023-03-16 ENCOUNTER — OFFICE VISIT (OUTPATIENT)
Dept: FAMILY MEDICINE CLINIC | Age: 83
End: 2023-03-16
Payer: MEDICARE

## 2023-03-16 VITALS
HEART RATE: 55 BPM | WEIGHT: 251 LBS | TEMPERATURE: 98.3 F | DIASTOLIC BLOOD PRESSURE: 64 MMHG | OXYGEN SATURATION: 98 % | BODY MASS INDEX: 38.16 KG/M2 | SYSTOLIC BLOOD PRESSURE: 134 MMHG

## 2023-03-16 DIAGNOSIS — E66.01 SEVERE OBESITY (BMI 35.0-39.9) WITH COMORBIDITY (HCC): Primary | ICD-10-CM

## 2023-03-16 DIAGNOSIS — Z12.5 SCREENING FOR PROSTATE CANCER: ICD-10-CM

## 2023-03-16 DIAGNOSIS — I10 ESSENTIAL HYPERTENSION: ICD-10-CM

## 2023-03-16 DIAGNOSIS — E55.9 VITAMIN D DEFICIENCY: ICD-10-CM

## 2023-03-16 DIAGNOSIS — J84.9 ILD (INTERSTITIAL LUNG DISEASE) (HCC): ICD-10-CM

## 2023-03-16 DIAGNOSIS — G25.81 RESTLESS LEGS SYNDROME (RLS): ICD-10-CM

## 2023-03-16 DIAGNOSIS — I73.9 CLAUDICATION (HCC): ICD-10-CM

## 2023-03-16 DIAGNOSIS — G20 PARALYSIS AGITANS (HCC): ICD-10-CM

## 2023-03-16 DIAGNOSIS — F33.41 RECURRENT MAJOR DEPRESSIVE DISORDER, IN PARTIAL REMISSION (HCC): ICD-10-CM

## 2023-03-16 DIAGNOSIS — E03.9 HYPOTHYROIDISM, UNSPECIFIED TYPE: ICD-10-CM

## 2023-03-16 DIAGNOSIS — R19.7 DIARRHEA OF PRESUMED INFECTIOUS ORIGIN: ICD-10-CM

## 2023-03-16 DIAGNOSIS — D50.9 IRON DEFICIENCY ANEMIA, UNSPECIFIED IRON DEFICIENCY ANEMIA TYPE: ICD-10-CM

## 2023-03-16 PROCEDURE — 36415 COLL VENOUS BLD VENIPUNCTURE: CPT | Performed by: NURSE PRACTITIONER

## 2023-03-16 PROCEDURE — 3078F DIAST BP <80 MM HG: CPT | Performed by: NURSE PRACTITIONER

## 2023-03-16 PROCEDURE — 1123F ACP DISCUSS/DSCN MKR DOCD: CPT | Performed by: NURSE PRACTITIONER

## 2023-03-16 PROCEDURE — 3075F SYST BP GE 130 - 139MM HG: CPT | Performed by: NURSE PRACTITIONER

## 2023-03-16 PROCEDURE — 99214 OFFICE O/P EST MOD 30 MIN: CPT | Performed by: NURSE PRACTITIONER

## 2023-03-16 RX ORDER — VENLAFAXINE 75 MG/1
75 TABLET ORAL 2 TIMES DAILY
Qty: 180 TABLET | Refills: 1 | Status: SHIPPED | OUTPATIENT
Start: 2023-03-16 | End: 2023-03-16 | Stop reason: SDUPTHER

## 2023-03-16 RX ORDER — VENLAFAXINE 75 MG/1
75 TABLET ORAL 2 TIMES DAILY
Qty: 180 TABLET | Refills: 1 | Status: SHIPPED | OUTPATIENT
Start: 2023-03-16

## 2023-03-16 RX ORDER — LEVOTHYROXINE SODIUM 0.07 MG/1
TABLET ORAL
Qty: 90 TABLET | Refills: 1 | Status: SHIPPED | OUTPATIENT
Start: 2023-03-16

## 2023-03-16 SDOH — ECONOMIC STABILITY: HOUSING INSECURITY
IN THE LAST 12 MONTHS, WAS THERE A TIME WHEN YOU DID NOT HAVE A STEADY PLACE TO SLEEP OR SLEPT IN A SHELTER (INCLUDING NOW)?: NO

## 2023-03-16 SDOH — ECONOMIC STABILITY: INCOME INSECURITY: HOW HARD IS IT FOR YOU TO PAY FOR THE VERY BASICS LIKE FOOD, HOUSING, MEDICAL CARE, AND HEATING?: PATIENT DECLINED

## 2023-03-16 SDOH — ECONOMIC STABILITY: FOOD INSECURITY: WITHIN THE PAST 12 MONTHS, THE FOOD YOU BOUGHT JUST DIDN'T LAST AND YOU DIDN'T HAVE MONEY TO GET MORE.: NEVER TRUE

## 2023-03-16 SDOH — ECONOMIC STABILITY: FOOD INSECURITY: WITHIN THE PAST 12 MONTHS, YOU WORRIED THAT YOUR FOOD WOULD RUN OUT BEFORE YOU GOT MONEY TO BUY MORE.: NEVER TRUE

## 2023-03-16 ASSESSMENT — ENCOUNTER SYMPTOMS
BLOATING: 0
ABDOMINAL PAIN: 1
DIARRHEA: 1
SHORTNESS OF BREATH: 0
ANAL BLEEDING: 0
RESPIRATORY NEGATIVE: 1
NAUSEA: 0
FLATUS: 0
ALLERGIC/IMMUNOLOGIC NEGATIVE: 1
COUGH: 0
BLOOD IN STOOL: 0
EYES NEGATIVE: 1

## 2023-03-16 NOTE — PROGRESS NOTES
Subjective:     Patient Name: Zbigniew Thao is a 81 y.o. male. Chief Complaint   Patient presents with    Depression    Hypothyroidism     Not fasting    Gastroesophageal Reflux    Hypertension    Spasms     RLS    Coronary Artery Disease       Diarrhea   This is a new problem. The current episode started more than 1 month ago. The problem occurs 2 to 4 times per day. The problem has been unchanged. The stool consistency is described as Watery. Associated symptoms include abdominal pain (just prior to going to bathroom). Pertinent negatives include no arthralgias, bloating, chills, coughing or increased  flatus. Nothing aggravates the symptoms. Had colonoscopy about 6-8 weeks ago and was told normal.  He has not been using metamucil or miralax. The patient was on antibiotics in January and started with diarrhea in February    Hypothyroidism: Recent symptoms: diarrhea. He denies any other symptoms. Patient is  taking his medication consistently on an empty stomach. Lab Results   Component Value Date/Time    TSHREFLEX 4.97 04/23/2021 01:46 PM    TSHREFLEX 4.03 01/10/2017 11:01 AM    TSHREFLEX 4.05 03/23/2016 09:44 AM     Lab Results   Component Value Date    TSH 3.20 11/13/2021    TSH 3.02 09/18/2019    TSH 2.26 02/08/2019     Hypertension:  Home blood pressure monitoring: No.  He is adherent to a low sodium diet. Patient denies chest pain, shortness of breath, headache, lightheadedness, blurred vision, palpitations, dry cough, and fatigue. Antihypertensive medication side effects: no medication side effects noted.                                       Sodium (mmol/L)   Date Value   10/19/2022 139    BUN (mg/dL)   Date Value   10/19/2022 19    Glucose (mg/dL)   Date Value   10/19/2022 106 (H)      Potassium (mmol/L)   Date Value   10/19/2022 3.7     Potassium reflex Magnesium (mmol/L)   Date Value   05/25/2022 3.6    Creatinine (mg/dL)   Date Value   10/19/2022 1.0         BP Readings from Last 3 Encounters:   03/16/23 134/64   02/13/23 132/70   01/12/23 134/62     At the last visit the patient's restless leg medication was increased to 1-1/2 tabs twice a day. Patient states that this has made a significant improvement    Hyperlipidemia:  No new myalgias or GI upset on atorvastatin (Lipitor). Medication compliance: compliant all of the time. Patient is  following a low fat, low cholesterol diet. He is not exercising regularly. Lab Results   Component Value Date    CHOL 115 02/23/2023    TRIG 98 02/23/2023    HDL 42 02/23/2023    LDLCALC 53 02/23/2023     Lab Results   Component Value Date    ALT 22 02/23/2023    AST 19 02/23/2023      The ASCVD Risk score (Rodrick DK, et al., 2019) failed to calculate for the following reasons: The 2019 ASCVD risk score is only valid for ages 36 to 78    Vitamin D Deficiency  Patient with vitamin d deficiency and currently on supplement without adverse reactions. Lab Results   Component Value Date    VITD25 46.2 11/13/2021         Review of Systems   Constitutional: Negative. Negative for appetite change, chills, fatigue and unexpected weight change. HENT: Negative. Eyes: Negative. Respiratory: Negative. Negative for cough and shortness of breath. Cardiovascular: Negative. Negative for chest pain, palpitations and leg swelling. Gastrointestinal:  Positive for abdominal pain (just prior to going to bathroom) and diarrhea. Negative for anal bleeding, bloating, blood in stool, flatus and nausea. Endocrine: Negative. Genitourinary: Negative. Negative for hematuria. Musculoskeletal: Negative. Negative for arthralgias. Skin: Negative. Negative for rash. Allergic/Immunologic: Negative. Neurological: Negative. Negative for dizziness, syncope, light-headedness and numbness. Hematological: Negative. Does not bruise/bleed easily. Psychiatric/Behavioral: Negative. All other systems reviewed and are negative.      Past Medical History: Diagnosis Date    Arthritis     Back injuries     BPH (benign prostatic hyperplasia)     Chest pain 03/2021    CPAP (continuous positive airway pressure) dependence     Diverticulosis     colonoscopy 9/2015    Esophageal dysmotility     GERD (gastroesophageal reflux disease)     Hiatal hernia     History of colon polyps     seen on colonoscopy again 9/2015    Hyperlipidemia     Hypertension     ILD (interstitial lung disease) ([facility] [address] 07/30/2013    Internal hemorrhoid     colonoscopy 9/06/74    Lichen sclerosus of penis 2017    Dr Silver Gonzalez    Mild cognitive impairment 08/2016    [facility] neurology    COLLIN (obstructive sleep apnea)     Renal insufficiency     Restless legs syndrome     Rosacea     Schatzki's ring     last dilated 2002    Scrotal pruritus 02/08/2018    Sleep apnea      Family History   Problem Relation Age of Onset    Stroke Father     Cancer Father         prostate    Other Father         colitis    Diabetes Sister     Diabetes Brother     Cancer Brother         skin    High Blood Pressure Brother     High Cholesterol Brother     COPD Mother     Asthma Neg Hx     Emphysema Neg Hx     Heart Failure Neg Hx     Hypertension Neg Hx      Past Surgical History:   Procedure Laterality Date    CARDIAC CATHETERIZATION  03/04/2021    Non Obs CAD, medical management    CHOLECYSTECTOMY, LAPAROSCOPIC N/A 5/25/2022    LAPAROSCOPIC CHOLECYSTECTOMY performed by Valdo Shafer MD at [facility]  2010    COLONOSCOPY  09/14/2015    CORONARY ANGIOPLASTY  10/15/2019    CYSTOSCOPY  05/13/2016    CYSTOSCOPY  05/19/2017    CYSTOSCOPY     CYSTOSCOPY  07/14/2017    ENDOSCOPY, COLON, DIAGNOSTIC  10/11/2017    esoph. stricture    FRACTURE SURGERY      right leg    FRACTURE SURGERY      wrist   right    HERNIA REPAIR      OTHER SURGICAL HISTORY  05/25/2022    LAPAROSCOPIC CHOLECYSTECTOMY (N/A Abdomen)    PROSTATE SURGERY      TOE SURGERY      TURP  07/14/2017    UPPER GASTROINTESTINAL ENDOSCOPY  2016    UPPER GASTROINTESTINAL ENDOSCOPY  10/11/2017    esophageal stricture    UPPER GASTROINTESTINAL ENDOSCOPY  2020     Social History     Socioeconomic History    Marital status:      Spouse name: Not on file    Number of children: Not on file    Years of education: Not on file    Highest education level: Not on file   Occupational History    Not on file   Tobacco Use    Smoking status: Former     Packs/day: 1.50     Years: 17.00     Pack years: 25.50     Types: Cigarettes     Quit date: 1974     Years since quittin.2    Smokeless tobacco: Never   Vaping Use    Vaping Use: Never used   Substance and Sexual Activity    Alcohol use: No     Alcohol/week: 0.0 standard drinks    Drug use: No    Sexual activity: Not Currently   Other Topics Concern    Not on file   Social History Narrative    Not on file     Social Determinants of Health     Financial Resource Strain: Unknown    Difficulty of Paying Living Expenses: Patient refused   Food Insecurity: No Food Insecurity    Worried About Running Out of Food in the Last Year: Never true    Ran Out of Food in the Last Year: Never true   Transportation Needs: Unknown    Lack of Transportation (Medical): Not on file    Lack of Transportation (Non-Medical): No   Physical Activity: Inactive    Days of Exercise per Week: 0 days    Minutes of Exercise per Session: 0 min   Stress: Not on file   Social Connections: Not on file   Intimate Partner Violence: Not on file   Housing Stability: Unknown    Unable to Pay for Housing in the Last Year: Not on file    Number of Places Lived in the Last Year: Not on file    Unstable Housing in the Last Year: No     Current Outpatient Medications   Medication Sig Dispense Refill    doxazosin (CARDURA) 8 MG tablet TAKE ONE TABLET BY MOUTH TWICE A  tablet 3    potassium chloride (KLOR-CON) 10 MEQ extended release tablet TAKE ONE TABLET BY MOUTH TWICE A  tablet 3    amLODIPine (NORVASC) 10 MG tablet Take  one tablet by mouth daily 90 tablet 3    atorvastatin (LIPITOR) 40 MG tablet Take one tablet by mouth daily 90 tablet 3    venlafaxine (EFFEXOR) 75 MG tablet Take 1 tablet by mouth twice daily 180 tablet 0    pramipexole (MIRAPEX) 1.5 MG tablet Take 1 tablet by mouth in the morning and at bedtime 60 tablet 3    triamterene-hydroCHLOROthiazide (MAXZIDE-25) 37.5-25 MG per tablet TAKE ONE TABLET BY MOUTH DAILY 90 tablet 0    Misc Natural Products (TUMERSAID PO) Take by mouth 2 caps a daily      omeprazole (PRILOSEC) 40 MG delayed release capsule TAKE ONE CAPSULE BY MOUTH DAILY 90 capsule 0    levothyroxine (SYNTHROID) 75 MCG tablet TAKE ONE TABLET BY MOUTH DAILY 90 tablet 1    FEROSUL 325 (65 Fe) MG tablet TAKE ONE TABLET BY MOUTH DAILY WITH BREAKFAST 90 tablet 1    triamcinolone (KENALOG) 0.1 % cream APPLY TOPICALLY EVERY 7 DAYS 80 g 5    clopidogrel (PLAVIX) 75 MG tablet TAKE ONE TABLET BY MOUTH DAILY 90 tablet 3    nitroGLYCERIN (NITROSTAT) 0.4 MG SL tablet Place 1 tablet under the tongue every 5 minutes as needed for Chest pain up to max of 3 total doses. If no relief after 1 dose, call 911. 25 tablet 3    cetirizine (ZYRTEC) 10 MG tablet TAKE ONE TABLET BY MOUTH ONCE NIGHTLY 90 tablet 1    zinc gluconate 50 MG tablet Take 50 mg by mouth daily      albuterol sulfate HFA (VENTOLIN HFA) 108 (90 Base) MCG/ACT inhaler Inhale 2 puffs into the lungs every 6 hours as needed for Wheezing or Shortness of Breath 1 Inhaler 5    magnesium gluconate (MAGONATE) 500 MG tablet Take 500 mg by mouth daily       Vitamin D (CHOLECALCIFEROL) 1000 UNITS CAPS capsule Take 1,000 Units by mouth daily. calcium carbonate-vitamin D 600-200 MG-UNIT TABS Take 600 mg by mouth daily. methocarbamol (ROBAXIN) 500 MG tablet Take 1 tablet by mouth 3 times daily as needed (spasm) (Patient not taking: Reported on 3/16/2023) 90 tablet 0     No current facility-administered medications for this visit.         No changes in past medical history, past surgical history, social history, orfamily history were noted during the patient encounter unless specifically listed above.  All updates of past medical history, past surgical history, social history, or family history were reviewed personally by me duringthe office visit.  All problems listed in the assessment are stable unless noted otherwise.  Medication profile reviewed personally by me during the office visit.  Medication side effects and possible impairments frommedications were discussed as applicable.     Objective:     /64   Pulse 55   Temp 98.3 °F (36.8 °C) (Infrared)   Wt 251 lb (113.9 kg)   SpO2 98%   BMI 38.16 kg/m²   Body mass index is 38.16 kg/m².  Wt Readings from Last 3 Encounters:   03/16/23 251 lb (113.9 kg)   02/13/23 254 lb (115.2 kg)   01/12/23 245 lb (111.1 kg)       Physical Exam  Vitals and nursing note reviewed.   Constitutional:       General: He is not in acute distress.     Appearance: Normal appearance. He is well-developed.   HENT:      Head: Normocephalic and atraumatic.      Right Ear: Tympanic membrane, ear canal and external ear normal.      Left Ear: Tympanic membrane, ear canal and external ear normal.      Nose: Nose normal.      Mouth/Throat:      Lips: Pink.      Mouth: Mucous membranes are moist.   Eyes:      General: Lids are normal.      Extraocular Movements: Extraocular movements intact.      Conjunctiva/sclera: Conjunctivae normal.      Pupils: Pupils are equal, round, and reactive to light.   Neck:      Thyroid: No thyromegaly.      Vascular: No carotid bruit or JVD.      Trachea: Trachea normal.   Cardiovascular:      Rate and Rhythm: Normal rate and regular rhythm.      Pulses: Normal pulses.           Radial pulses are 2+ on the right side and 2+ on the left side.        Dorsalis pedis pulses are 2+ on the right side and 2+ on the left side.        Posterior tibial pulses are 2+ on the right side and 2+ on the left side.      Heart sounds: Normal  heart sounds. No murmur heard. No friction rub. No gallop. Pulmonary:      Effort: Pulmonary effort is normal. No respiratory distress. Breath sounds: Normal breath sounds. Chest:      Chest wall: No deformity. Abdominal:      General: Bowel sounds are normal. There is no distension. Palpations: Abdomen is soft. There is no hepatomegaly, splenomegaly or mass. Tenderness: There is no abdominal tenderness. Musculoskeletal:         General: Normal range of motion. Cervical back: Normal range of motion and neck supple. Right lower leg: No edema. Left lower leg: No edema. Lymphadenopathy:      Head:      Right side of head: No submandibular adenopathy. Left side of head: No submandibular adenopathy. Cervical: No cervical adenopathy. Skin:     General: Skin is warm and dry. Findings: No lesion or rash. Neurological:      Mental Status: He is alert and oriented to person, place, and time. Motor: Tremor present. Coordination: Coordination is intact. Gait: Gait abnormal.      Deep Tendon Reflexes: Reflexes are normal and symmetric. Psychiatric:         Attention and Perception: Attention and perception normal.         Mood and Affect: Mood and affect normal.         Speech: Speech normal.         Behavior: Behavior normal. Behavior is cooperative. Thought Content:  Thought content normal.         Cognition and Memory: Cognition and memory normal.         Judgment: Judgment normal.       Lab Review   Hospital Outpatient Visit on 02/23/2023   Component Date Value    Hemoglobin A1C 02/23/2023 5.7     eAG 02/23/2023 116.9     Cholesterol, Total 02/23/2023 115     Triglycerides 02/23/2023 98     HDL 02/23/2023 42     LDL Calculated 02/23/2023 53     VLDL Cholesterol Calcula* 02/23/2023 20     Total Protein 02/23/2023 7.3     Albumin 02/23/2023 4.2     Alkaline Phosphatase 02/23/2023 89     ALT 02/23/2023 22     AST 02/23/2023 19     Total Bilirubin 02/23/2023 0.6     Bilirubin, Direct 02/23/2023 <0.2     Bilirubin, Indirect 02/23/2023 see below        No results found for this visit on 03/16/23. Assessment:       1. Severe obesity (BMI 35.0-39. 9) with comorbidity ([address]    2. Recurrent major depressive disorder, in partial remission (HCC)    3. Paralysis agitans ([facility] [address]    4. Claudication ([address]    5. ILD (interstitial lung disease) ([address]    6. Diarrhea of presumed infectious origin    7. Hypothyroidism, unspecified type    8. Essential hypertension    9. Iron deficiency anemia, unspecified iron deficiency anemia type    10. Restless legs syndrome (RLS)    11. Vitamin D deficiency    12. Screening for prostate cancer        No results found for this visit on 03/16/23. Plan:       Mohit Shanks was seen today for depression, hypothyroidism, gastroesophageal reflux, hypertension, spasms and coronary artery disease. Diagnoses and all orders for this visit:    Severe obesity (BMI 35.0-39. 9) with comorbidity [facility])  The patient is asked to make an attempt to improve diet and exercise patterns to aid in medical management of this problem. Recurrent major depressive disorder, in partial remission (HCC)  refill  -      venlafaxine (EFFEXOR) 75 MG tablet; Take 1 tablet by mouth 2 times daily  Educated if patient develops SI/HI/vipul to call 911 or go to ER. Discussed use, benefit, risks and side effects of prescribed medications. Barriers to compliance discussed. All patient questions answered. Pt voiced understanding. Paralysis agitans [facility])  Patient saw neurologist several years ago and it was stated that the patient did not have Parkinson's disease. He does continue with a tremor and a somewhat abnormal gait. It has not changed in several years.   The patient is not interested in going back to neurology at this time    Claudication [facility])  Resolved    ILD (interstitial lung disease) ([facility] [address]  The patient has not seen pulmonology for early interstitial lung disease since 2018 because he was told it was not significant. Referral was placed last year for [facility] for his interstitial lung disease however patient did not go. Patient states that it is stable and no worsening shortness of breath    Diarrhea of presumed infectious origin  Patient was on antibiotics for 2 different infections in January and then started with symptoms in February. Obtain stool specimens  -     Gastrointestinal Panel, Molecular; Future  -     Clostridium Difficile Toxin/Antigen; Future  -     Fecal leukocytes; Future    Hypothyroidism, unspecified type  -     levothyroxine (SYNTHROID) 75 MCG tablet; TAKE ONE TABLET BY MOUTH DAILY  -     TSH with Reflex    Essential hypertension  -     CBC with Auto Differential  -     Basic Metabolic Panel  Hypertension, Blood pressure is  well controlled on current medication regimen. Medication: no change. Dietary sodium restriction. Regular aerobic exercise. Check blood pressures monthly and record. The patient just saw cardiology on 2/13/2023. No concerning symptoms for angina  No changes to the patient's regimen. Is to follow-up in 1 year. Iron deficiency anemia, unspecified iron deficiency anemia type  -     Iron and TIBC  -     Ferritin    Restless legs syndrome (RLS)  At the last visit patient's Mirapex was increased to 1-1/2 tablets twice a day and he states that is helped significantly with his restless leg symptoms    Vitamin D deficiency  -     Vitamin D 25 Hydroxy  Discussed with patient that we make vitamin D from the sun and get it from some food sources, but it is very common to be deficient. Discussed the need for vitamin D replacement because low vitamin D can cause fatigue, joint aches and has been implicated in heart disease, bone disease like osteoporosis, and some other chronic illnesses. Patient will start/continue vitamin D supplement as per order.    Recommend vitamin D to be rechecked in 6 months. Screening for prostate cancer  -     PSA Screening        12/9/2022 patient saw pulmonology for obstructive sleep apnea and is to follow-up in 6 months  Continue with BiPAP        Patient has been instructed call the office immediately with new symptoms, change in symptoms or worseningof symptoms. If this is not feasible, patient is instructed to report to the emergency room. Medication profile reviewed. Medication side effects and possible impairments from medications were discussed as applicable. Allergies were reviewed. Health maintenance was reviewed and updated as appropriate. Return in about 6 months (around 9/16/2023) for [facility].

## 2023-03-17 DIAGNOSIS — R79.89 ELEVATED TSH: Primary | ICD-10-CM

## 2023-03-17 DIAGNOSIS — R79.89 ELEVATED TSH: ICD-10-CM

## 2023-03-17 LAB
25(OH)D3 SERPL-MCNC: 38.1 NG/ML
ANION GAP SERPL CALCULATED.3IONS-SCNC: 17 MMOL/L (ref 3–16)
BASOPHILS # BLD: 0.1 K/UL (ref 0–0.2)
BASOPHILS NFR BLD: 0.7 %
BUN SERPL-MCNC: 17 MG/DL (ref 7–20)
CALCIUM SERPL-MCNC: 9.4 MG/DL (ref 8.3–10.6)
CHLORIDE SERPL-SCNC: 102 MMOL/L (ref 99–110)
CO2 SERPL-SCNC: 23 MMOL/L (ref 21–32)
CREAT SERPL-MCNC: 1 MG/DL (ref 0.8–1.3)
DEPRECATED RDW RBC AUTO: 14.2 % (ref 12.4–15.4)
EOSINOPHIL # BLD: 0.5 K/UL (ref 0–0.6)
EOSINOPHIL NFR BLD: 5 %
FERRITIN SERPL IA-MCNC: 108.4 NG/ML (ref 30–400)
GFR SERPLBLD CREATININE-BSD FMLA CKD-EPI: >60 ML/MIN/{1.73_M2}
GLUCOSE SERPL-MCNC: 136 MG/DL (ref 70–99)
HCT VFR BLD AUTO: 42.6 % (ref 40.5–52.5)
HGB BLD-MCNC: 14.7 G/DL (ref 13.5–17.5)
IRON SATN MFR SERPL: 35 % (ref 20–50)
IRON SERPL-MCNC: 111 UG/DL (ref 59–158)
LYMPHOCYTES # BLD: 2 K/UL (ref 1–5.1)
LYMPHOCYTES NFR BLD: 21.7 %
MCH RBC QN AUTO: 31.3 PG (ref 26–34)
MCHC RBC AUTO-ENTMCNC: 34.4 G/DL (ref 31–36)
MCV RBC AUTO: 91.2 FL (ref 80–100)
MONOCYTES # BLD: 0.5 K/UL (ref 0–1.3)
MONOCYTES NFR BLD: 5.9 %
NEUTROPHILS # BLD: 6.1 K/UL (ref 1.7–7.7)
NEUTROPHILS NFR BLD: 66.7 %
PLATELET # BLD AUTO: 204 K/UL (ref 135–450)
PMV BLD AUTO: 7.6 FL (ref 5–10.5)
POTASSIUM SERPL-SCNC: 3.5 MMOL/L (ref 3.5–5.1)
PSA SERPL DL<=0.01 NG/ML-MCNC: 2.58 NG/ML (ref 0–4)
RBC # BLD AUTO: 4.68 M/UL (ref 4.2–5.9)
SODIUM SERPL-SCNC: 142 MMOL/L (ref 136–145)
T4 FREE SERPL-MCNC: 1.3 NG/DL (ref 0.9–1.8)
THYROPEROXIDASE AB SERPL IA-ACNC: 6 IU/ML
TIBC SERPL-MCNC: 316 UG/DL (ref 260–445)
TSH SERPL DL<=0.005 MIU/L-ACNC: 4.48 UIU/ML (ref 0.27–4.2)
WBC # BLD AUTO: 9.2 K/UL (ref 4–11)

## 2023-03-20 DIAGNOSIS — E03.9 HYPOTHYROIDISM, UNSPECIFIED TYPE: Primary | ICD-10-CM

## 2023-03-21 ENCOUNTER — NURSE ONLY (OUTPATIENT)
Dept: FAMILY MEDICINE CLINIC | Age: 83
End: 2023-03-21

## 2023-03-21 DIAGNOSIS — R19.7 DIARRHEA OF PRESUMED INFECTIOUS ORIGIN: ICD-10-CM

## 2023-03-22 LAB
C DIFF TOX A+B STL QL IA: NORMAL
GI PATHOGENS PNL STL NAA+PROBE: NORMAL
LACTOFERRIN STL QL IA: NORMAL

## 2023-03-24 DIAGNOSIS — I25.119 CORONARY ARTERY DISEASE INVOLVING NATIVE CORONARY ARTERY OF NATIVE HEART WITH ANGINA PECTORIS (HCC): ICD-10-CM

## 2023-03-24 RX ORDER — CLOPIDOGREL BISULFATE 75 MG/1
TABLET ORAL
Qty: 90 TABLET | Refills: 3 | Status: SHIPPED | OUTPATIENT
Start: 2023-03-24

## 2023-04-03 DIAGNOSIS — K21.9 GASTROESOPHAGEAL REFLUX DISEASE WITHOUT ESOPHAGITIS: ICD-10-CM

## 2023-04-03 RX ORDER — OMEPRAZOLE 40 MG/1
40 CAPSULE, DELAYED RELEASE ORAL DAILY
Qty: 90 CAPSULE | Refills: 1 | Status: SHIPPED | OUTPATIENT
Start: 2023-04-03

## 2023-04-04 DIAGNOSIS — E78.2 MIXED HYPERLIPIDEMIA: ICD-10-CM

## 2023-04-05 RX ORDER — ATORVASTATIN CALCIUM 40 MG/1
TABLET, FILM COATED ORAL
Qty: 90 TABLET | Refills: 3 | Status: SHIPPED | OUTPATIENT
Start: 2023-04-05

## 2023-05-15 DIAGNOSIS — D50.9 IRON DEFICIENCY ANEMIA, UNSPECIFIED IRON DEFICIENCY ANEMIA TYPE: ICD-10-CM

## 2023-05-15 RX ORDER — FERROUS SULFATE 325(65) MG
TABLET ORAL
Qty: 90 TABLET | Refills: 0 | Status: SHIPPED | OUTPATIENT
Start: 2023-05-15

## 2023-06-12 ENCOUNTER — TELEPHONE (OUTPATIENT)
Dept: PULMONOLOGY | Age: 83
End: 2023-06-12

## 2023-06-19 ENCOUNTER — OFFICE VISIT (OUTPATIENT)
Dept: FAMILY MEDICINE CLINIC | Age: 83
End: 2023-06-19
Payer: MEDICARE

## 2023-06-19 VITALS
OXYGEN SATURATION: 98 % | SYSTOLIC BLOOD PRESSURE: 143 MMHG | BODY MASS INDEX: 37.59 KG/M2 | HEIGHT: 68 IN | DIASTOLIC BLOOD PRESSURE: 63 MMHG | HEART RATE: 74 BPM | WEIGHT: 248 LBS

## 2023-06-19 DIAGNOSIS — S80.812A: Primary | ICD-10-CM

## 2023-06-19 DIAGNOSIS — G25.81 RESTLESS LEGS SYNDROME (RLS): ICD-10-CM

## 2023-06-19 DIAGNOSIS — R60.9 PERIPHERAL EDEMA: ICD-10-CM

## 2023-06-19 DIAGNOSIS — I87.2 VENOUS STASIS DERMATITIS OF BOTH LOWER EXTREMITIES: ICD-10-CM

## 2023-06-19 PROCEDURE — 99213 OFFICE O/P EST LOW 20 MIN: CPT | Performed by: NURSE PRACTITIONER

## 2023-06-19 PROCEDURE — 1123F ACP DISCUSS/DSCN MKR DOCD: CPT | Performed by: NURSE PRACTITIONER

## 2023-06-19 PROCEDURE — 3078F DIAST BP <80 MM HG: CPT | Performed by: NURSE PRACTITIONER

## 2023-06-19 PROCEDURE — 3074F SYST BP LT 130 MM HG: CPT | Performed by: NURSE PRACTITIONER

## 2023-06-19 RX ORDER — PRAMIPEXOLE DIHYDROCHLORIDE 1.5 MG/1
1.5 TABLET ORAL 2 TIMES DAILY
Qty: 180 TABLET | Refills: 1 | Status: SHIPPED | OUTPATIENT
Start: 2023-06-19

## 2023-06-19 RX ORDER — CEPHALEXIN 500 MG/1
500 CAPSULE ORAL 3 TIMES DAILY
Qty: 21 CAPSULE | Refills: 0 | Status: SHIPPED | OUTPATIENT
Start: 2023-06-19 | End: 2023-06-26

## 2023-06-20 ASSESSMENT — ENCOUNTER SYMPTOMS
ABDOMINAL PAIN: 0
RESPIRATORY NEGATIVE: 1
ANAL BLEEDING: 0
EYES NEGATIVE: 1
SHORTNESS OF BREATH: 0
NAUSEA: 0
BLOOD IN STOOL: 0
ALLERGIC/IMMUNOLOGIC NEGATIVE: 1
GASTROINTESTINAL NEGATIVE: 1

## 2023-06-20 NOTE — PROGRESS NOTES
reviewed. Health maintenance was reviewed and updated as appropriate. Return if symptoms worsen or fail to improve. (Comment: Please note this report has been produced using a combination of typing and speech recognition software and may contain errors related to that system including errors in grammar, punctuation, and spelling, as well as words and phrases that may be inappropriate.  If there are any questions or concerns please feel free to contact the dictating provider for clarification.)

## 2023-07-28 ENCOUNTER — TELEMEDICINE (OUTPATIENT)
Dept: FAMILY MEDICINE CLINIC | Age: 83
End: 2023-07-28
Payer: MEDICARE

## 2023-07-28 DIAGNOSIS — Z72.3 INACTIVITY: ICD-10-CM

## 2023-07-28 DIAGNOSIS — Z00.00 MEDICARE ANNUAL WELLNESS VISIT, SUBSEQUENT: Primary | ICD-10-CM

## 2023-07-28 DIAGNOSIS — Z91.81 AT RISK FOR FALLING: ICD-10-CM

## 2023-07-28 DIAGNOSIS — H91.93 BILATERAL CHANGE IN HEARING: ICD-10-CM

## 2023-07-28 PROCEDURE — 1123F ACP DISCUSS/DSCN MKR DOCD: CPT | Performed by: NURSE PRACTITIONER

## 2023-07-28 PROCEDURE — G0439 PPPS, SUBSEQ VISIT: HCPCS | Performed by: NURSE PRACTITIONER

## 2023-07-28 ASSESSMENT — PATIENT HEALTH QUESTIONNAIRE - PHQ9
8. MOVING OR SPEAKING SO SLOWLY THAT OTHER PEOPLE COULD HAVE NOTICED. OR THE OPPOSITE, BEING SO FIGETY OR RESTLESS THAT YOU HAVE BEEN MOVING AROUND A LOT MORE THAN USUAL: 0
1. LITTLE INTEREST OR PLEASURE IN DOING THINGS: 1
SUM OF ALL RESPONSES TO PHQ QUESTIONS 1-9: 1
6. FEELING BAD ABOUT YOURSELF - OR THAT YOU ARE A FAILURE OR HAVE LET YOURSELF OR YOUR FAMILY DOWN: 0
4. FEELING TIRED OR HAVING LITTLE ENERGY: 0
2. FEELING DOWN, DEPRESSED OR HOPELESS: 0
SUM OF ALL RESPONSES TO PHQ QUESTIONS 1-9: 1
3. TROUBLE FALLING OR STAYING ASLEEP: 0
SUM OF ALL RESPONSES TO PHQ QUESTIONS 1-9: 1
9. THOUGHTS THAT YOU WOULD BE BETTER OFF DEAD, OR OF HURTING YOURSELF: 0
5. POOR APPETITE OR OVEREATING: 0
SUM OF ALL RESPONSES TO PHQ QUESTIONS 1-9: 1
SUM OF ALL RESPONSES TO PHQ9 QUESTIONS 1 & 2: 1
7. TROUBLE CONCENTRATING ON THINGS, SUCH AS READING THE NEWSPAPER OR WATCHING TELEVISION: 0
10. IF YOU CHECKED OFF ANY PROBLEMS, HOW DIFFICULT HAVE THESE PROBLEMS MADE IT FOR YOU TO DO YOUR WORK, TAKE CARE OF THINGS AT HOME, OR GET ALONG WITH OTHER PEOPLE: 0

## 2023-07-28 ASSESSMENT — LIFESTYLE VARIABLES
HOW OFTEN DO YOU HAVE A DRINK CONTAINING ALCOHOL: NEVER
HOW MANY STANDARD DRINKS CONTAINING ALCOHOL DO YOU HAVE ON A TYPICAL DAY: PATIENT DOES NOT DRINK

## 2023-07-28 NOTE — PROGRESS NOTES
at least 150 minutes/week  See AVS for additional education material           Hearing Screen:  Do you or your family notice any trouble with your hearing that hasn't been managed with hearing aids?: (!) Yes    Interventions:  Patient has hearing aides and feels like his hearing is adequate  See AVS for additional education material    Vision Screen:  Do you have difficulty driving, watching TV, or doing any of your daily activities because of your eyesight?: No  Have you had an eye exam within the past year?: (!) No  No results found. Interventions:   Patient encouraged to make appointment with their eye specialist  See AVS for additional education material    Safety:  Do you have any tripping hazards - loose or unsecured carpets or rugs?: (!) Yes    Interventions:  Patient encouraged to tack down rugs  See AVS for additional education material    ADL's:   Patient reports needing help with:  Select all that apply: (!) Shopping    Interventions: Wife helps shopping   See AVS for additional education material                    Objective      Patient-Reported Vitals  Patient-Reported Weight: 235 lbs  Patient-Reported Height: 5'8\"            Allergies   Allergen Reactions    Ace Inhibitors Angioedema     Cough    Oxybutynin Other (See Comments)     nightmares    Percocet [Oxycodone-Acetaminophen] Itching    Simvastatin Other (See Comments)     Did not control cholesterol - takes atorvastatin at home as of 5/3/22     Prior to Visit Medications    Medication Sig Taking?  Authorizing Provider   Elastic Bandages & Supports (RELIEF MEDICAL LEG KNEE HIGH) MISC 20-30 mmHG compression knee high stockings with side zipper,  On QAM and remove QHS Yes ADORE Coulter - CNP   pramipexole (MIRAPEX) 1.5 MG tablet Take 1 tablet by mouth in the morning and at bedtime Yes ADORE Coulter - CNP   FEROSUL 325 (65 Fe) MG tablet Xuan Leslie Yes Armando Huynh MD

## 2023-08-16 DIAGNOSIS — D50.9 IRON DEFICIENCY ANEMIA, UNSPECIFIED IRON DEFICIENCY ANEMIA TYPE: ICD-10-CM

## 2023-08-16 RX ORDER — FERROUS SULFATE 325(65) MG
1 TABLET ORAL
Qty: 90 TABLET | Refills: 0 | Status: SHIPPED | OUTPATIENT
Start: 2023-08-16

## 2023-09-11 ENCOUNTER — OFFICE VISIT (OUTPATIENT)
Dept: PULMONOLOGY | Age: 83
End: 2023-09-11
Payer: MEDICARE

## 2023-09-11 ENCOUNTER — TELEPHONE (OUTPATIENT)
Dept: PULMONOLOGY | Age: 83
End: 2023-09-11

## 2023-09-11 VITALS
RESPIRATION RATE: 16 BRPM | SYSTOLIC BLOOD PRESSURE: 126 MMHG | HEART RATE: 60 BPM | HEIGHT: 68 IN | WEIGHT: 251 LBS | OXYGEN SATURATION: 96 % | DIASTOLIC BLOOD PRESSURE: 62 MMHG | BODY MASS INDEX: 38.04 KG/M2

## 2023-09-11 DIAGNOSIS — R68.2 DRY MOUTH: ICD-10-CM

## 2023-09-11 DIAGNOSIS — R53.83 OTHER FATIGUE: ICD-10-CM

## 2023-09-11 DIAGNOSIS — Z71.89 ENCOUNTER FOR BIPAP USE COUNSELING: ICD-10-CM

## 2023-09-11 DIAGNOSIS — E66.9 OBESITY (BMI 30-39.9): ICD-10-CM

## 2023-09-11 DIAGNOSIS — G47.33 MODERATE OBSTRUCTIVE SLEEP APNEA: Primary | ICD-10-CM

## 2023-09-11 DIAGNOSIS — I10 ESSENTIAL HYPERTENSION: ICD-10-CM

## 2023-09-11 DIAGNOSIS — Z78.9 DIFFICULTY WITH BIPAP USE: ICD-10-CM

## 2023-09-11 PROCEDURE — 3074F SYST BP LT 130 MM HG: CPT | Performed by: NURSE PRACTITIONER

## 2023-09-11 PROCEDURE — 3078F DIAST BP <80 MM HG: CPT | Performed by: NURSE PRACTITIONER

## 2023-09-11 PROCEDURE — 99214 OFFICE O/P EST MOD 30 MIN: CPT | Performed by: NURSE PRACTITIONER

## 2023-09-11 PROCEDURE — 1123F ACP DISCUSS/DSCN MKR DOCD: CPT | Performed by: NURSE PRACTITIONER

## 2023-09-11 ASSESSMENT — SLEEP AND FATIGUE QUESTIONNAIRES
HOW LIKELY ARE YOU TO NOD OFF OR FALL ASLEEP WHILE SITTING AND TALKING TO SOMEONE: 0
NECK CIRCUMFERENCE (INCHES): 17
HOW LIKELY ARE YOU TO NOD OFF OR FALL ASLEEP WHILE LYING DOWN TO REST IN THE AFTERNOON WHEN CIRCUMSTANCES PERMIT: 2
HOW LIKELY ARE YOU TO NOD OFF OR FALL ASLEEP IN A CAR, WHILE STOPPED FOR A FEW MINUTES IN TRAFFIC: 0
HOW LIKELY ARE YOU TO NOD OFF OR FALL ASLEEP WHILE SITTING INACTIVE IN A PUBLIC PLACE: 1
HOW LIKELY ARE YOU TO NOD OFF OR FALL ASLEEP WHEN YOU ARE A PASSENGER IN A CAR FOR AN HOUR WITHOUT A BREAK: 1
HOW LIKELY ARE YOU TO NOD OFF OR FALL ASLEEP WHILE SITTING AND READING: 0
ESS TOTAL SCORE: 6
HOW LIKELY ARE YOU TO NOD OFF OR FALL ASLEEP WHILE WATCHING TV: 1
HOW LIKELY ARE YOU TO NOD OFF OR FALL ASLEEP WHILE SITTING QUIETLY AFTER LUNCH WITHOUT ALCOHOL: 1

## 2023-09-11 NOTE — PATIENT INSTRUCTIONS
Remember to bring all pulmonary medications to your next appointment with the office. Please keep all of your future appointments scheduled by [id number], [facility] office. Out of respect for other patients and providers, you may be asked to reschedule your appointment if you arrive later than your scheduled appointment time. Appointments cancelled less than 24hrs in advance will be considered a no show. Patients with three missed appointments within 1 year or four missed appointments within 2 years can be dismissed from the practice. Sleep Hygiene. .. Tips for better sleep. .. Avoid naps. This will ensure you are sleepy at bedtime. If you have to take a nap, sleep less than 1 hour, before 3 pm.  Sleep only when sleepy; this reduces the time you are awake in bed. Regular exercise is recommended to help you deepen your sleep, but not within 4-6 hours of your bedtime. Timing of exercise is important, aim to exercise early in the morning or early afternoon. A light snack may help you fall asleep. Warm milk and foods high in the amino acid tryptophan, such as bananas, may help you to sleep  Be sure to avoid heavy, spicy or sugary foods 4-6 hours before bedtime and avoid at snack time. Stay away from stimulants such as caffeine and nicotine for at least 4-6 hours before bed. Stimulants can interfere with your ability to fall asleep. Caffeine is found in tea, cola, coffee, cocoa and chocolate and is best avoided at bedtime. Nicotine is found in tobacco products. Avoid alcohol 4-6 hours before bedtime. Alcohol has an immediate sleep-inducing effect, after a few hours when alcohol levels fall there is a stimulant or wake-up effect and will cause fragmented sleep. Sleep rituals are important. Give your body clues it is time to slow down and sleep. Examples include; yoga, deep breathing, listen to relaxing music, a hot bath or a few minutes of reading.   Have a fixed bedtime and

## 2023-09-11 NOTE — PROGRESS NOTES
public place (e.g. a theatre or a meeting) 1 0 0 0 0 0 0   As a passenger in a car for an hour without a break 1 1 0 0 0 1 2   Lying down to rest in the afternoon when circumstances permit 2 3 1 2 0 2 3   Sitting and talking to someone 0 0 0 0 0 0 0   Sitting quietly after a lunch without alcohol 1 1 0 2 0 0 1   In a car, while stopped for a few minutes in traffic 0 0 0 0 0 0 0   [place] Sleepiness Score 6 11 4 8 4 5 10   Neck circumference (Inches) 17 16.5 16 16.75 16.5  17.25       Past Medical History:  Past Medical History:   Diagnosis Date    Arthritis     Back injuries     BPH (benign prostatic hyperplasia)     Chest pain 03/2021    Claudication ([address]) 7/2/2020    CPAP (continuous positive airway pressure) dependence     Diverticulosis     colonoscopy 9/2015    Esophageal dysmotility     GERD (gastroesophageal reflux disease)     Hiatal hernia     History of colon polyps     seen on colonoscopy again 9/2015    Hyperlipidemia     Hypertension     ILD (interstitial lung disease) ([address]) 07/30/2013    Internal hemorrhoid     colonoscopy 8/17/95    Lichen sclerosus of penis 2017    Dr Zulema Montes    Mild cognitive impairment 08/2016    [facility] neurology    COLLIN (obstructive sleep apnea)     Renal insufficiency     Restless legs syndrome     Rosacea     Schatzki's ring     last dilated 2002    Scrotal pruritus 02/08/2018    Sleep apnea        Past Surgical History:        Procedure Laterality Date    CARDIAC CATHETERIZATION  03/04/2021    Non Obs CAD, medical management    CHOLECYSTECTOMY, LAPAROSCOPIC N/A 5/25/2022    LAPAROSCOPIC CHOLECYSTECTOMY performed by Royer Castro MD at [address] [place] [address]  2010    COLONOSCOPY  09/14/2015    CORONARY ANGIOPLASTY  10/15/2019    CYSTOSCOPY  05/13/2016    CYSTOSCOPY  05/19/2017    CYSTOSCOPY     CYSTOSCOPY  07/14/2017    ENDOSCOPY, COLON, DIAGNOSTIC  10/11/2017    esoph. stricture    FRACTURE SURGERY      right leg    FRACTURE SURGERY      wrist

## 2023-09-11 NOTE — TELEPHONE ENCOUNTER
Need CR report in 2 weeks (pressure was changed today in office)  will send back to [facility] once received (pending titration)

## 2023-09-19 PROBLEM — E87.1 HYPONATREMIA: Status: RESOLVED | Noted: 2022-05-04 | Resolved: 2023-09-19

## 2023-09-19 PROBLEM — I87.2 VENOUS STASIS DERMATITIS OF BOTH LOWER EXTREMITIES: Status: ACTIVE | Noted: 2023-09-19

## 2023-09-19 PROBLEM — K81.0 CHOLECYSTITIS, ACUTE: Status: RESOLVED | Noted: 2022-05-03 | Resolved: 2023-09-19

## 2023-09-19 PROBLEM — K80.00 ACUTE CALCULOUS CHOLECYSTITIS: Status: RESOLVED | Noted: 2022-05-19 | Resolved: 2023-09-19

## 2023-09-19 PROBLEM — R60.0 PERIPHERAL EDEMA: Status: ACTIVE | Noted: 2023-09-19

## 2023-09-19 PROBLEM — J20.2 ACUTE BRONCHITIS DUE TO STREPTOCOCCUS: Status: RESOLVED | Noted: 2020-03-04 | Resolved: 2023-09-19

## 2023-09-19 PROBLEM — R07.9 CHEST PAIN: Status: RESOLVED | Noted: 2018-09-04 | Resolved: 2023-09-19

## 2023-09-19 PROBLEM — A41.9 SEPSIS (HCC): Status: RESOLVED | Noted: 2022-05-04 | Resolved: 2023-09-19

## 2023-09-19 PROBLEM — D50.9 IRON DEFICIENCY ANEMIA: Status: ACTIVE | Noted: 2023-09-19

## 2023-09-19 PROBLEM — R60.9 PERIPHERAL EDEMA: Status: ACTIVE | Noted: 2023-09-19

## 2023-09-19 NOTE — PROGRESS NOTES
illnesses. Patient will start/continue vitamin D supplement as per order. Recommend vitamin D to be rechecked in 6 months. Iron deficiency anemia, unspecified iron deficiency anemia type    Skin lesions  The patient has a nonhealing skin lesion on the left outer lower extremity. Will refer to dermatology for further evaluation and or biopsy  -     External Referral To Dermatology        Patient has been instructed call the office immediately with new symptoms, change in symptoms or worseningof symptoms. If this is not feasible, patient is instructed to report to the emergency room. Medication profile reviewed. Medication side effects and possible impairments from medications were discussed as applicable. Allergies were reviewed. Health maintenance was reviewed and updated as appropriate. Return in about 6 months (around 3/21/2024) for [facility]. (Comment: Please note this report has been produced using a combination of typing and speech recognition software and may contain errors related to that system including errors in grammar, punctuation, and spelling, as well as words and phrases that may be inappropriate.  If there are any questions or concerns please feel free to contact the dictating provider for clarification.)

## 2023-09-21 ENCOUNTER — OFFICE VISIT (OUTPATIENT)
Dept: FAMILY MEDICINE CLINIC | Age: 83
End: 2023-09-21
Payer: MEDICARE

## 2023-09-21 VITALS
HEART RATE: 60 BPM | SYSTOLIC BLOOD PRESSURE: 130 MMHG | BODY MASS INDEX: 38.77 KG/M2 | DIASTOLIC BLOOD PRESSURE: 64 MMHG | HEIGHT: 67 IN | WEIGHT: 247 LBS | OXYGEN SATURATION: 97 %

## 2023-09-21 DIAGNOSIS — M54.50 CHRONIC RIGHT-SIDED LOW BACK PAIN WITHOUT SCIATICA: ICD-10-CM

## 2023-09-21 DIAGNOSIS — I10 ESSENTIAL HYPERTENSION: ICD-10-CM

## 2023-09-21 DIAGNOSIS — L98.9 SKIN LESIONS: ICD-10-CM

## 2023-09-21 DIAGNOSIS — E03.9 HYPOTHYROIDISM, UNSPECIFIED TYPE: ICD-10-CM

## 2023-09-21 DIAGNOSIS — E78.2 MIXED HYPERLIPIDEMIA: ICD-10-CM

## 2023-09-21 DIAGNOSIS — I87.2 VENOUS STASIS DERMATITIS OF BOTH LOWER EXTREMITIES: ICD-10-CM

## 2023-09-21 DIAGNOSIS — E55.9 VITAMIN D DEFICIENCY: ICD-10-CM

## 2023-09-21 DIAGNOSIS — F33.41 RECURRENT MAJOR DEPRESSIVE DISORDER, IN PARTIAL REMISSION (HCC): Primary | ICD-10-CM

## 2023-09-21 DIAGNOSIS — G89.29 CHRONIC RIGHT-SIDED LOW BACK PAIN WITHOUT SCIATICA: ICD-10-CM

## 2023-09-21 DIAGNOSIS — D50.9 IRON DEFICIENCY ANEMIA, UNSPECIFIED IRON DEFICIENCY ANEMIA TYPE: ICD-10-CM

## 2023-09-21 DIAGNOSIS — K21.9 GASTROESOPHAGEAL REFLUX DISEASE WITHOUT ESOPHAGITIS: ICD-10-CM

## 2023-09-21 DIAGNOSIS — G25.81 RESTLESS LEGS SYNDROME (RLS): ICD-10-CM

## 2023-09-21 DIAGNOSIS — R10.9 FLANK PAIN: ICD-10-CM

## 2023-09-21 LAB
BILIRUBIN, POC: NEGATIVE
BLOOD URINE, POC: NEGATIVE
CLARITY, POC: CLEAR
COLOR, POC: YELLOW
GLUCOSE URINE, POC: NEGATIVE
KETONES, POC: NEGATIVE
LEUKOCYTE EST, POC: NEGATIVE
NITRITE, POC: NEGATIVE
PH, POC: 7
PROTEIN, POC: NEGATIVE
SPECIFIC GRAVITY, POC: 1.02
UROBILINOGEN, POC: 0.2

## 2023-09-21 PROCEDURE — 81002 URINALYSIS NONAUTO W/O SCOPE: CPT | Performed by: NURSE PRACTITIONER

## 2023-09-21 PROCEDURE — 3075F SYST BP GE 130 - 139MM HG: CPT | Performed by: NURSE PRACTITIONER

## 2023-09-21 PROCEDURE — 3078F DIAST BP <80 MM HG: CPT | Performed by: NURSE PRACTITIONER

## 2023-09-21 PROCEDURE — 99214 OFFICE O/P EST MOD 30 MIN: CPT | Performed by: NURSE PRACTITIONER

## 2023-09-21 PROCEDURE — 36415 COLL VENOUS BLD VENIPUNCTURE: CPT | Performed by: NURSE PRACTITIONER

## 2023-09-21 PROCEDURE — 1123F ACP DISCUSS/DSCN MKR DOCD: CPT | Performed by: NURSE PRACTITIONER

## 2023-09-21 RX ORDER — OMEPRAZOLE 40 MG/1
40 CAPSULE, DELAYED RELEASE ORAL DAILY
Qty: 90 CAPSULE | Refills: 1 | Status: SHIPPED | OUTPATIENT
Start: 2023-09-21

## 2023-09-21 RX ORDER — LEVOTHYROXINE SODIUM 0.07 MG/1
TABLET ORAL
Qty: 90 TABLET | Refills: 1 | Status: SHIPPED | OUTPATIENT
Start: 2023-09-21

## 2023-09-21 RX ORDER — VENLAFAXINE 75 MG/1
75 TABLET ORAL 2 TIMES DAILY
Qty: 180 TABLET | Refills: 1 | Status: SHIPPED | OUTPATIENT
Start: 2023-09-21

## 2023-09-21 ASSESSMENT — ENCOUNTER SYMPTOMS
BACK PAIN: 1
BLOOD IN STOOL: 0
GASTROINTESTINAL NEGATIVE: 1
SHORTNESS OF BREATH: 0
ABDOMINAL PAIN: 0
RESPIRATORY NEGATIVE: 1
ANAL BLEEDING: 0
ALLERGIC/IMMUNOLOGIC NEGATIVE: 1
NAUSEA: 0
EYES NEGATIVE: 1

## 2023-09-21 ASSESSMENT — PATIENT HEALTH QUESTIONNAIRE - PHQ9
7. TROUBLE CONCENTRATING ON THINGS, SUCH AS READING THE NEWSPAPER OR WATCHING TELEVISION: 0
SUM OF ALL RESPONSES TO PHQ QUESTIONS 1-9: 0
9. THOUGHTS THAT YOU WOULD BE BETTER OFF DEAD, OR OF HURTING YOURSELF: 0
SUM OF ALL RESPONSES TO PHQ9 QUESTIONS 1 & 2: 0
6. FEELING BAD ABOUT YOURSELF - OR THAT YOU ARE A FAILURE OR HAVE LET YOURSELF OR YOUR FAMILY DOWN: 0
2. FEELING DOWN, DEPRESSED OR HOPELESS: 0
SUM OF ALL RESPONSES TO PHQ QUESTIONS 1-9: 0
4. FEELING TIRED OR HAVING LITTLE ENERGY: 0
1. LITTLE INTEREST OR PLEASURE IN DOING THINGS: 0
10. IF YOU CHECKED OFF ANY PROBLEMS, HOW DIFFICULT HAVE THESE PROBLEMS MADE IT FOR YOU TO DO YOUR WORK, TAKE CARE OF THINGS AT HOME, OR GET ALONG WITH OTHER PEOPLE: 0
3. TROUBLE FALLING OR STAYING ASLEEP: 0
5. POOR APPETITE OR OVEREATING: 0
8. MOVING OR SPEAKING SO SLOWLY THAT OTHER PEOPLE COULD HAVE NOTICED. OR THE OPPOSITE, BEING SO FIGETY OR RESTLESS THAT YOU HAVE BEEN MOVING AROUND A LOT MORE THAN USUAL: 0
SUM OF ALL RESPONSES TO PHQ QUESTIONS 1-9: 0
SUM OF ALL RESPONSES TO PHQ QUESTIONS 1-9: 0

## 2023-09-21 ASSESSMENT — ANXIETY QUESTIONNAIRES
3. WORRYING TOO MUCH ABOUT DIFFERENT THINGS: 0
GAD7 TOTAL SCORE: 0
IF YOU CHECKED OFF ANY PROBLEMS ON THIS QUESTIONNAIRE, HOW DIFFICULT HAVE THESE PROBLEMS MADE IT FOR YOU TO DO YOUR WORK, TAKE CARE OF THINGS AT HOME, OR GET ALONG WITH OTHER PEOPLE: NOT DIFFICULT AT ALL
1. FEELING NERVOUS, ANXIOUS, OR ON EDGE: 0
7. FEELING AFRAID AS IF SOMETHING AWFUL MIGHT HAPPEN: 0
4. TROUBLE RELAXING: 0
5. BEING SO RESTLESS THAT IT IS HARD TO SIT STILL: 0
6. BECOMING EASILY ANNOYED OR IRRITABLE: 0
2. NOT BEING ABLE TO STOP OR CONTROL WORRYING: 0

## 2023-09-22 LAB — TSH SERPL DL<=0.005 MIU/L-ACNC: 3.41 UIU/ML (ref 0.27–4.2)

## 2023-10-02 NOTE — TELEPHONE ENCOUNTER
BiPAP download report from 9/15/2023 - 9/28/2023 on BiPAP 21/16 cm H2O reviewed. Compliance is good 93%.   AHI remains elevated 24.1    Please proceed with BiPAP titration as ordered as AHI remains elevated despite pressure adjustment

## 2023-10-24 ENCOUNTER — HOSPITAL ENCOUNTER (OUTPATIENT)
Dept: SLEEP CENTER | Age: 83
Discharge: HOME | End: 2023-10-26
Payer: MEDICARE

## 2023-10-24 DIAGNOSIS — E66.9 OBESITY (BMI 30-39.9): ICD-10-CM

## 2023-10-24 DIAGNOSIS — G47.33 MODERATE OBSTRUCTIVE SLEEP APNEA: ICD-10-CM

## 2023-10-24 DIAGNOSIS — Z71.89 ENCOUNTER FOR BIPAP USE COUNSELING: ICD-10-CM

## 2023-10-24 DIAGNOSIS — I10 ESSENTIAL HYPERTENSION: ICD-10-CM

## 2023-10-24 DIAGNOSIS — Z78.9 DIFFICULTY WITH BIPAP USE: ICD-10-CM

## 2023-10-24 PROCEDURE — 95811 POLYSOM 6/>YRS CPAP 4/> PARM: CPT

## 2023-11-01 ENCOUNTER — TELEPHONE (OUTPATIENT)
Dept: SLEEP MEDICINE | Age: 83
End: 2023-11-01

## 2023-11-01 DIAGNOSIS — G47.33 MODERATE OBSTRUCTIVE SLEEP APNEA: Primary | ICD-10-CM

## 2023-11-01 NOTE — TELEPHONE ENCOUNTER
Patient informed of results orders pended for [facility] to review need to faxed to [facility].

## 2023-11-01 NOTE — TELEPHONE ENCOUNTER
Chart/order reviewed and signed. Please fax to DME.   Please also push follow-up appointment out to 4-6 weeks after pressure is changed

## 2023-11-16 DIAGNOSIS — D50.9 IRON DEFICIENCY ANEMIA, UNSPECIFIED IRON DEFICIENCY ANEMIA TYPE: ICD-10-CM

## 2023-11-16 RX ORDER — FERROUS SULFATE 325(65) MG
1 TABLET ORAL DAILY
Qty: 90 TABLET | Refills: 0 | Status: SHIPPED | OUTPATIENT
Start: 2023-11-16

## 2023-11-16 NOTE — TELEPHONE ENCOUNTER
Refill Request     CONFIRM preferrred pharmacy with the patient. If Mail Order Rx - Pend for 90 day refill. Last Seen: Last Seen Department: 9/21/2023  Last Seen by PCP: 9/21/2023    Last Written: 8/16/2023    If no future appointment scheduled, route STAFF MESSAGE with patient name to the [facility] for scheduling. Next Appointment:   Future Appointments   Date Time Provider [facility] [address]   11/27/2023  3:00 PM ADORE Cooper Sa, CNP   11/28/2023  1:40 PM ADORE White CNP, [place] [facility] [place]   3/21/2024  2:20 PM Tonny Quiroz APRN - CNP [place]       Message sent to  to schedule appt with patient?   NO      Requested Prescriptions     Pending Prescriptions Disp Refills    FEROSUL 325 (65 Fe) MG tablet [Pharmacy Med Name: FEROSUL 325 MG TABLET] 90 tablet 0     Sig: TAKE 1 TABLET BY MOUTH DAILY WITH BREAKFAST

## 2023-11-30 ENCOUNTER — OFFICE VISIT (OUTPATIENT)
Dept: FAMILY MEDICINE CLINIC | Age: 83
End: 2023-11-30
Payer: MEDICARE

## 2023-11-30 VITALS
WEIGHT: 245 LBS | HEIGHT: 67 IN | BODY MASS INDEX: 38.45 KG/M2 | OXYGEN SATURATION: 99 % | HEART RATE: 64 BPM | DIASTOLIC BLOOD PRESSURE: 68 MMHG | SYSTOLIC BLOOD PRESSURE: 132 MMHG

## 2023-11-30 DIAGNOSIS — D50.9 IRON DEFICIENCY ANEMIA, UNSPECIFIED IRON DEFICIENCY ANEMIA TYPE: ICD-10-CM

## 2023-11-30 DIAGNOSIS — I25.9 CHRONIC ISCHEMIC HEART DISEASE: ICD-10-CM

## 2023-11-30 DIAGNOSIS — I25.119 CORONARY ARTERY DISEASE INVOLVING NATIVE CORONARY ARTERY OF NATIVE HEART WITH ANGINA PECTORIS (HCC): ICD-10-CM

## 2023-11-30 DIAGNOSIS — F33.41 RECURRENT MAJOR DEPRESSIVE DISORDER, IN PARTIAL REMISSION (HCC): ICD-10-CM

## 2023-11-30 DIAGNOSIS — I10 ESSENTIAL HYPERTENSION: ICD-10-CM

## 2023-11-30 DIAGNOSIS — E66.9 OBESITY (BMI 30-39.9): ICD-10-CM

## 2023-11-30 DIAGNOSIS — K21.9 GASTROESOPHAGEAL REFLUX DISEASE WITHOUT ESOPHAGITIS: ICD-10-CM

## 2023-11-30 DIAGNOSIS — E78.2 MIXED HYPERLIPIDEMIA: ICD-10-CM

## 2023-11-30 DIAGNOSIS — I20.9 ANGINA, CLASS III (HCC): ICD-10-CM

## 2023-11-30 DIAGNOSIS — I50.32 CHRONIC DIASTOLIC CONGESTIVE HEART FAILURE (HCC): ICD-10-CM

## 2023-11-30 DIAGNOSIS — E03.9 HYPOTHYROIDISM, UNSPECIFIED TYPE: ICD-10-CM

## 2023-11-30 DIAGNOSIS — G25.81 RESTLESS LEGS SYNDROME (RLS): ICD-10-CM

## 2023-11-30 DIAGNOSIS — G47.33 OSA ON CPAP: ICD-10-CM

## 2023-11-30 DIAGNOSIS — Z01.818 PRE-OPERATIVE EXAM: Primary | ICD-10-CM

## 2023-11-30 PROCEDURE — 3078F DIAST BP <80 MM HG: CPT | Performed by: NURSE PRACTITIONER

## 2023-11-30 PROCEDURE — 1123F ACP DISCUSS/DSCN MKR DOCD: CPT | Performed by: NURSE PRACTITIONER

## 2023-11-30 PROCEDURE — 99213 OFFICE O/P EST LOW 20 MIN: CPT | Performed by: NURSE PRACTITIONER

## 2023-11-30 PROCEDURE — 3075F SYST BP GE 130 - 139MM HG: CPT | Performed by: NURSE PRACTITIONER

## 2023-11-30 NOTE — PATIENT INSTRUCTIONS
Change in medication regimen before surgery: Take albuterol inhaler with you on the day of surgery and take your Synthroid/levothyroxine and omeprazole on morning of surgery with sip of water, and hold all other medications until after surgery, Discontinue ASA 5 days before surgery, Discontinue Plavix/clopidogrel 5 days before surgery

## 2023-11-30 NOTE — PROGRESS NOTES
to pulse < 70.  4. Deep vein thrombosis prophylaxis: regimen to be chosen by surgical team  5. No contraindications to planned surgery      If you have questions, please do not hesitate to call me ([phone]). Sincerely,    Tiffanie Ortega.  Charu Bolton, CNP

## 2024-01-23 DIAGNOSIS — N48.0 LICHEN SCLEROSUS OF PENIS: ICD-10-CM

## 2024-01-23 RX ORDER — TRIAMCINOLONE ACETONIDE 1 MG/G
CREAM TOPICAL
Qty: 80 G | Refills: 5 | Status: SHIPPED | OUTPATIENT
Start: 2024-01-23

## 2024-01-23 NOTE — TELEPHONE ENCOUNTER
Refill Request     CONFIRM preferrred pharmacy with the patient.    If Mail Order Rx - Pend for 90 day refill.      Last Seen: Last Seen Department: 11/30/2023  Last Seen by PCP: 11/30/2023    Last Written: 6/10/23    If no future appointment scheduled, route STAFF MESSAGE with patient name to the  Pool for scheduling.      Next Appointment:   Future Appointments   Date Time Provider Department Center   3/21/2024  2:20 PM Martha Peoples, APRN - CNP [place]       Message sent to  to schedule appt with patient?  N/A      Requested Prescriptions     Pending Prescriptions Disp Refills    triamcinolone (KENALOG) 0.1 % cream [Pharmacy Med Name: TRIAMCINOLONE 0.1% CREAM] 80 g 5     Sig: APPLY 4 GRAMS TO AFFECTED AREA(S) DAILY FOR 7 DAYS

## 2024-01-25 ENCOUNTER — TELEPHONE (OUTPATIENT)
Dept: FAMILY MEDICINE CLINIC | Age: 84
End: 2024-01-25

## 2024-01-25 NOTE — TELEPHONE ENCOUNTER
Patient calling c/o L leg has infection growing on it.  R foot is swollen and hurting, baby toe is very painful.  Patient asking for in office visit, nothing available.  Please call patient and advise.

## 2024-01-25 NOTE — TELEPHONE ENCOUNTER
Spoke w/ patient - he said he's had an ongoing issue with his legs but about 3 weeks ago, he was visiting family and he hit his foot on a toy chest, thought he might have broken his 5th toe on his right foot. Since then top of foot is really swollen and red skin is not split yet but it's smooth to the point where it looks like it could bust open.   Left leg swells a lot and is extremely itchy, he scratched it open and it's gotten infected -   He has been using OTC triple antibiotic cream and moisture lotion as his skin is very dry and he used \"anti itch\" cream - nothing is helping  He wanted to know if he could get an xray of his right foot and if he could get a Rx for antibiotic cream to put on the scratch on his left leg  He's not able to do a video visit so he would only be able to do a telephone visit  He states no other symptoms, no fevers   Advised pt that I would ask PCP but he may need to be evaluated in ED as PCP would not be back in office until Monday   If okay to try an Rx and follow up next week - pt uses [facility]

## 2024-01-26 ENCOUNTER — HOSPITAL ENCOUNTER (OUTPATIENT)
Dept: GENERAL RADIOLOGY | Age: 84
Discharge: HOME | End: 2024-01-26
Payer: MEDICARE

## 2024-01-26 ENCOUNTER — TELEMEDICINE (OUTPATIENT)
Dept: FAMILY MEDICINE CLINIC | Age: 84
End: 2024-01-26
Payer: MEDICARE

## 2024-01-26 ENCOUNTER — HOSPITAL ENCOUNTER (OUTPATIENT)
Age: 84
Discharge: HOME | End: 2024-01-26
Payer: MEDICARE

## 2024-01-26 DIAGNOSIS — M79.671 RIGHT FOOT PAIN: ICD-10-CM

## 2024-01-26 DIAGNOSIS — I87.2 VENOUS STASIS DERMATITIS OF BOTH LOWER EXTREMITIES: ICD-10-CM

## 2024-01-26 DIAGNOSIS — L98.9 NON-HEALING SKIN LESION: ICD-10-CM

## 2024-01-26 DIAGNOSIS — S99.921A INJURY OF RIGHT FOOT, INITIAL ENCOUNTER: Primary | ICD-10-CM

## 2024-01-26 PROCEDURE — 73630 X-RAY EXAM OF FOOT: CPT

## 2024-01-26 PROCEDURE — 99442 PR PHYS/QHP TELEPHONE EVALUATION 11-20 MIN: CPT | Performed by: NURSE PRACTITIONER

## 2024-01-26 ASSESSMENT — ENCOUNTER SYMPTOMS
NAUSEA: 0
ABDOMINAL PAIN: 0
BLOOD IN STOOL: 0
ALLERGIC/IMMUNOLOGIC NEGATIVE: 1
SHORTNESS OF BREATH: 0
EYES NEGATIVE: 1
ANAL BLEEDING: 0
GASTROINTESTINAL NEGATIVE: 1
RESPIRATORY NEGATIVE: 1

## 2024-01-26 NOTE — PROGRESS NOTES
Abnormal   [x] Mouth/Throat: Mucous membranes are moist.      External Ears [x] Normal  [] Abnormal-      Neck: [x] No visualized mass      Pulmonary/Chest: [x] Respiratory effort normal.  [x] No visualized signs of difficulty breathing or respiratory distress        [] Abnormal-      Musculoskeletal:   [x] Normal gait with no signs of ataxia         [x] Normal range of motion of neck        [] Abnormal-         Neurological:        [x] No Facial Asymmetry (Cranial nerve 7 motor function) (limited exam to video visit)                       [x] No gaze palsy        [] Abnormal-         Skin:                     [x] No significant exanthematous lesions or discoloration noted on facial skin         [] Abnormal-                                  Psychiatric:           [x] Normal Affect [x] No Hallucinations        [] Abnormal     Other pertinent observable physical exam findings-      ASSESSMENT/PLAN:  1. Injury of right foot, initial encounter  2. Right foot pain  - XR FOOT RIGHT (MIN 3 VIEWS); Future  Will wait on results for further management    3. Venous stasis dermatitis of both lower extremities  - mupirocin (BACTROBAN) 2 % ointment; Apply topically 3 times daily.  Dispense: 1 g; Refill: 0    4. Non-healing skin lesion  LLE,  appears to have infection per patient report  Start   - mupirocin (BACTROBAN) 2 % ointment; Apply topically 3 times daily.  Dispense: 1 g; Refill: 0  Has appointment with derm next month    Patient has been instructed call the office immediately with new symptoms, change in symptoms or worseningof symptoms. If this is not feasible, patient is instructed to report to the emergency room. Medication profile reviewed. Medication side effects and possible impairments from medications were discussed as applicable.Allergies were reviewed. Health maintenance was reviewed and updated as appropriate.     No follow-ups on file.    Jj Lauren, was evaluated through a synchronous (real-time)

## 2024-01-31 ENCOUNTER — OFFICE VISIT (OUTPATIENT)
Dept: FAMILY MEDICINE CLINIC | Age: 84
End: 2024-01-31
Payer: MEDICARE

## 2024-01-31 VITALS
OXYGEN SATURATION: 97 % | SYSTOLIC BLOOD PRESSURE: 138 MMHG | DIASTOLIC BLOOD PRESSURE: 74 MMHG | WEIGHT: 249 LBS | HEIGHT: 67 IN | BODY MASS INDEX: 39.08 KG/M2 | HEART RATE: 54 BPM | TEMPERATURE: 97.6 F

## 2024-01-31 DIAGNOSIS — R60.9 PERIPHERAL EDEMA: Primary | ICD-10-CM

## 2024-01-31 DIAGNOSIS — R06.00 DYSPNEA, UNSPECIFIED TYPE: ICD-10-CM

## 2024-01-31 DIAGNOSIS — L98.9 NON-HEALING SKIN LESION: ICD-10-CM

## 2024-01-31 DIAGNOSIS — I87.2 VENOUS STASIS DERMATITIS OF BOTH LOWER EXTREMITIES: ICD-10-CM

## 2024-01-31 DIAGNOSIS — M79.671 RIGHT FOOT PAIN: ICD-10-CM

## 2024-01-31 PROCEDURE — 3078F DIAST BP <80 MM HG: CPT | Performed by: NURSE PRACTITIONER

## 2024-01-31 PROCEDURE — 3075F SYST BP GE 130 - 139MM HG: CPT | Performed by: NURSE PRACTITIONER

## 2024-01-31 PROCEDURE — 99213 OFFICE O/P EST LOW 20 MIN: CPT | Performed by: NURSE PRACTITIONER

## 2024-01-31 PROCEDURE — 36415 COLL VENOUS BLD VENIPUNCTURE: CPT | Performed by: NURSE PRACTITIONER

## 2024-01-31 PROCEDURE — 1123F ACP DISCUSS/DSCN MKR DOCD: CPT | Performed by: NURSE PRACTITIONER

## 2024-01-31 RX ORDER — CEPHALEXIN 500 MG/1
500 CAPSULE ORAL 3 TIMES DAILY
Qty: 30 CAPSULE | Refills: 0 | Status: SHIPPED | OUTPATIENT
Start: 2024-01-31 | End: 2024-02-10

## 2024-01-31 ASSESSMENT — ENCOUNTER SYMPTOMS
NAUSEA: 0
ANAL BLEEDING: 0
RESPIRATORY NEGATIVE: 1
BLOOD IN STOOL: 0
ABDOMINAL PAIN: 0
EYES NEGATIVE: 1
GASTROINTESTINAL NEGATIVE: 1
SHORTNESS OF BREATH: 0
ALLERGIC/IMMUNOLOGIC NEGATIVE: 1

## 2024-01-31 NOTE — PROGRESS NOTES
lesion  -     mupirocin (BACTROBAN) 2 % ointment; Apply topically 3 times daily.  Patient unfortunately was confused and did not have an appointment with dermatology in February however while he was here we did call and schedule an appointment for dermatology in April    Dyspnea, unspecified type  -     Brain Natriuretic Peptide    Other orders  -     cephALEXin (KEFLEX) 500 MG capsule; Take 1 capsule by mouth 3 times daily for 10 days        Patient has been instructed call the office immediately with new symptoms, change in symptoms or worseningof symptoms. If this is not feasible, patient is instructed to report to the emergency room. Medication profile reviewed. Medication side effects and possible impairments from medications were discussed as applicable.Allergies were reviewed. Health maintenance was reviewed and updated as appropriate.     Return in about 2 weeks (around 2/14/2024) for right foot pain and swelling.    (Comment: Please note this report has been produced using a combination of typing and speech recognition software and may contain errors related to that system including errors in grammar, punctuation, and spelling, as well as words and phrases that may be inappropriate. If there are any questions or concerns please feel free to contact the dictating provider for clarification.)

## 2024-02-01 LAB
ANION GAP SERPL CALCULATED.3IONS-SCNC: 14 MMOL/L (ref 3–16)
BASOPHILS # BLD: 0.1 K/UL (ref 0–0.2)
BASOPHILS NFR BLD: 0.7 %
BUN SERPL-MCNC: 22 MG/DL (ref 7–20)
CALCIUM SERPL-MCNC: 9.1 MG/DL (ref 8.3–10.6)
CHLORIDE SERPL-SCNC: 100 MMOL/L (ref 99–110)
CO2 SERPL-SCNC: 27 MMOL/L (ref 21–32)
CREAT SERPL-MCNC: 1 MG/DL (ref 0.8–1.3)
DEPRECATED RDW RBC AUTO: 13.9 % (ref 12.4–15.4)
EOSINOPHIL # BLD: 0.5 K/UL (ref 0–0.6)
EOSINOPHIL NFR BLD: 5.2 %
GFR SERPLBLD CREATININE-BSD FMLA CKD-EPI: >60 ML/MIN/{1.73_M2}
GLUCOSE SERPL-MCNC: 143 MG/DL (ref 70–99)
HCT VFR BLD AUTO: 41.7 % (ref 40.5–52.5)
HGB BLD-MCNC: 14.4 G/DL (ref 13.5–17.5)
LYMPHOCYTES # BLD: 1.9 K/UL (ref 1–5.1)
LYMPHOCYTES NFR BLD: 21.7 %
MCH RBC QN AUTO: 31.3 PG (ref 26–34)
MCHC RBC AUTO-ENTMCNC: 34.4 G/DL (ref 31–36)
MCV RBC AUTO: 90.8 FL (ref 80–100)
MONOCYTES # BLD: 0.6 K/UL (ref 0–1.3)
MONOCYTES NFR BLD: 6.4 %
NEUTROPHILS # BLD: 5.8 K/UL (ref 1.7–7.7)
NEUTROPHILS NFR BLD: 66 %
NT-PROBNP SERPL-MCNC: 116 PG/ML (ref 0–449)
PLATELET # BLD AUTO: 239 K/UL (ref 135–450)
PLATELET BLD QL SMEAR: NORMAL
PMV BLD AUTO: 7.4 FL (ref 5–10.5)
POTASSIUM SERPL-SCNC: 3.5 MMOL/L (ref 3.5–5.1)
RBC # BLD AUTO: 4.59 M/UL (ref 4.2–5.9)
SLIDE REVIEW: NORMAL
SODIUM SERPL-SCNC: 141 MMOL/L (ref 136–145)
URATE SERPL-MCNC: 6.8 MG/DL (ref 3.5–7.2)
WBC # BLD AUTO: 8.8 K/UL (ref 4–11)

## 2024-02-03 DIAGNOSIS — G25.81 RESTLESS LEGS SYNDROME (RLS): ICD-10-CM

## 2024-02-05 DIAGNOSIS — I10 ESSENTIAL HYPERTENSION: ICD-10-CM

## 2024-02-05 RX ORDER — AMLODIPINE BESYLATE 10 MG/1
TABLET ORAL
Qty: 90 TABLET | Refills: 0 | Status: SHIPPED | OUTPATIENT
Start: 2024-02-05

## 2024-02-05 RX ORDER — PRAMIPEXOLE DIHYDROCHLORIDE 1.5 MG/1
TABLET ORAL
Qty: 180 TABLET | Refills: 1 | Status: SHIPPED | OUTPATIENT
Start: 2024-02-05

## 2024-02-05 NOTE — TELEPHONE ENCOUNTER
Refill Request     CONFIRM preferrred pharmacy with the patient.    If Mail Order Rx - Pend for 90 day refill.      Last Seen: Last Seen Department: 1/31/2024  Last Seen by PCP: 1/31/2024    Last Written: 6/19/23    If no future appointment scheduled, route STAFF MESSAGE with patient name to the  Pool for scheduling.      Next Appointment:   Future Appointments   Date Time Provider Department Center   2/15/2024  2:40 PM Martha Peoples APRN - CNP [facility] [place]   3/21/2024  2:20 PM Martha Peoples APRN - CNP [facility] [place]       Message sent to  to schedule appt with patient?  N/A      Requested Prescriptions     Pending Prescriptions Disp Refills    pramipexole (MIRAPEX) 1.5 MG tablet [Pharmacy Med Name: PRAMIPEXOLE 1.5 MG TABLET] 180 tablet 1     Sig: TAKE ONE TABLET BY MOUTH EVERY MORNING AND TAKE ONE TABLET BY MOUTH EVERY NIGHT AT BEDTIME

## 2024-02-15 ENCOUNTER — OFFICE VISIT (OUTPATIENT)
Dept: FAMILY MEDICINE CLINIC | Age: 84
End: 2024-02-15
Payer: MEDICARE

## 2024-02-15 VITALS
BODY MASS INDEX: 38.92 KG/M2 | SYSTOLIC BLOOD PRESSURE: 132 MMHG | HEIGHT: 67 IN | DIASTOLIC BLOOD PRESSURE: 72 MMHG | WEIGHT: 248 LBS | OXYGEN SATURATION: 97 % | HEART RATE: 68 BPM

## 2024-02-15 DIAGNOSIS — S99.921D INJURY OF RIGHT FOOT, SUBSEQUENT ENCOUNTER: ICD-10-CM

## 2024-02-15 DIAGNOSIS — S91.104D OPEN WOUND OF FIFTH TOE OF RIGHT FOOT, SUBSEQUENT ENCOUNTER: Primary | ICD-10-CM

## 2024-02-15 DIAGNOSIS — S90.426A BLISTER OF FIFTH TOE: ICD-10-CM

## 2024-02-15 PROCEDURE — 3075F SYST BP GE 130 - 139MM HG: CPT | Performed by: NURSE PRACTITIONER

## 2024-02-15 PROCEDURE — 1123F ACP DISCUSS/DSCN MKR DOCD: CPT | Performed by: NURSE PRACTITIONER

## 2024-02-15 PROCEDURE — 3078F DIAST BP <80 MM HG: CPT | Performed by: NURSE PRACTITIONER

## 2024-02-15 PROCEDURE — 99213 OFFICE O/P EST LOW 20 MIN: CPT | Performed by: NURSE PRACTITIONER

## 2024-02-15 NOTE — PROGRESS NOTES
Clumped     SLIDE REVIEW 01/31/2024 see below     Neutrophils % 01/31/2024 66.0     Lymphocytes % 01/31/2024 21.7     Monocytes % 01/31/2024 6.4     Eosinophils % 01/31/2024 5.2     Basophils % 01/31/2024 0.7     Neutrophils Absolute 01/31/2024 5.8     Lymphocytes Absolute 01/31/2024 1.9     Monocytes Absolute 01/31/2024 0.6     Eosinophils Absolute 01/31/2024 0.5     Basophils Absolute 01/31/2024 0.1     Pro-BNP 01/31/2024 116     Sodium 01/31/2024 141     Potassium 01/31/2024 3.5     Chloride 01/31/2024 100     CO2 01/31/2024 27     Anion Gap 01/31/2024 14     Glucose 01/31/2024 143 (H)     BUN 01/31/2024 22 (H)     Creatinine 01/31/2024 1.0     Est, Glom Filt Rate 01/31/2024 >60     Calcium 01/31/2024 9.1        No results found for this visit on 02/15/24.       Assessment:       1. Open wound of fifth toe of right foot, subsequent encounter    2. Blister of fifth toe    3. Injury of right foot, subsequent encounter        No results found for this visit on 02/15/24.         Plan:       Jj was seen today for other.    Diagnoses and all orders for this visit:    Open wound of fifth toe of right foot, subsequent encounter    Blister of fifth toe  -     Culture, Wound    Injury of right foot, subsequent encounter    The wound between the fourth and fifth toe from his prior injury is healing well.  A little bit of callused skin was removed.  No open areas.  No signs of infection  Unfortunately due to the patient walking abnormally because of the his original wound he has now developed a 1.5 cm blister on the bottom of his right fifth toe.  The area is draining some clear/yellow liquid.  Wound culture was obtained.  There are no signs of infection and therefore the patient has not been started on another antibiotic at this time however if wound culture indicates then he will be placed on the appropriate antibiotic  I will have patient follow-up in 1 week to reevaluate both the blister and the healing wound between

## 2024-02-18 LAB
BACTERIA SPEC AEROBE CULT: ABNORMAL
BACTERIA SPEC AEROBE CULT: ABNORMAL
GRAM STN SPEC: ABNORMAL
ORGANISM: ABNORMAL

## 2024-02-19 DIAGNOSIS — S91.104D OPEN WOUND OF FIFTH TOE OF RIGHT FOOT, SUBSEQUENT ENCOUNTER: Primary | ICD-10-CM

## 2024-02-19 RX ORDER — LEVOFLOXACIN 250 MG/1
250 TABLET, FILM COATED ORAL DAILY
COMMUNITY
End: 2024-02-19 | Stop reason: SDUPTHER

## 2024-02-19 RX ORDER — LEVOFLOXACIN 250 MG/1
TABLET, FILM COATED ORAL
Qty: 7 TABLET | Refills: 0 | Status: SHIPPED | OUTPATIENT
Start: 2024-02-19

## 2024-02-19 RX ORDER — LEVOFLOXACIN 250 MG/1
250 TABLET, FILM COATED ORAL DAILY
Qty: 7 TABLET | Refills: 0 | Status: SHIPPED | OUTPATIENT
Start: 2024-02-19 | End: 2024-02-29

## 2024-02-19 NOTE — TELEPHONE ENCOUNTER
----- Message from ADORE Oquendo - CNP sent at 2/19/2024  7:59 AM EST -----  Culture does show some mild infection with Pseudomonas.  Patient should start Levaquin 250 mg once a day for 7 days #7 with no refills--pend prescription to pharmacy of choice

## 2024-02-20 NOTE — PROGRESS NOTES
[facility]  [facility]  [address] [place] [id number]  Dept: [phone]  Dept Fax: [phone]  Loc: [phone]    Jj Lauren is a 83 y.o. male who presents today for his medical conditions/complaints as noted below.  Jj Lauren is c/o of Other (Follow up on right toe wound/blister)       Subjective:     Chief Complaint   Patient presents with    Other     Follow up on right toe wound/blister       HPI  The patient is being seen for follow-up from office visit of 1/31/2024 where he presented with ongoing issues with his legs for approximately 3 to 4 weeks.  The patient was noted to have a possible foreign body on the inner right fifth digit along with resulting cellulitis.  It was removed and patient was placed on Keflex 3 times a day for 10 days .  He came in for follow-up last week and area was healing but was found to have a blister develop on the underside/tip of his 5th toe.  Culture was obtained and showed pseudomonas and was started on levaquin on 2/19/24. He continues on the levaquin without adverse effects.   He denies any fever, chills, nausea, vomiting or diarrhea    Patient will be seeing derm  for his LLE non healing skin lesion    Past Medical History:   Diagnosis Date    Acute bronchitis 12/18/2014    Acute bronchitis due to Streptococcus 3/4/2020    Acute calculous cholecystitis 5/19/2022    Arthritis     Back injuries     BPH (benign prostatic hyperplasia)     Chest pain 03/2021    Cholecystitis, acute 5/3/2022    Claudication ([facility]) 7/2/2020    CPAP (continuous positive airway pressure) dependence     Diverticulosis     colonoscopy 9/2015    Esophageal dysmotility     GERD (gastroesophageal reflux disease)     Hiatal hernia     History of colon polyps     seen on colonoscopy again 9/2015    Hyperlipidemia     Hypertension     Hyponatremia 5/4/2022    ILD (interstitial lung disease) ([facility]) 07/30/2013    Internal hemorrhoid     colonoscopy 9/14/15

## 2024-02-21 DIAGNOSIS — I25.119 CORONARY ARTERY DISEASE INVOLVING NATIVE CORONARY ARTERY OF NATIVE HEART WITH ANGINA PECTORIS (HCC): ICD-10-CM

## 2024-02-21 RX ORDER — DOXAZOSIN 8 MG/1
TABLET ORAL
Qty: 180 TABLET | Refills: 3 | Status: SHIPPED | OUTPATIENT
Start: 2024-02-21

## 2024-02-22 ENCOUNTER — HOSPITAL ENCOUNTER (OUTPATIENT)
Dept: GENERAL RADIOLOGY | Age: 84
Discharge: HOME | End: 2024-02-22
Payer: MEDICARE

## 2024-02-22 ENCOUNTER — OFFICE VISIT (OUTPATIENT)
Dept: FAMILY MEDICINE CLINIC | Age: 84
End: 2024-02-22
Payer: MEDICARE

## 2024-02-22 ENCOUNTER — HOSPITAL ENCOUNTER (OUTPATIENT)
Age: 84
Discharge: HOME | End: 2024-02-22
Payer: MEDICARE

## 2024-02-22 VITALS
BODY MASS INDEX: 39.08 KG/M2 | HEART RATE: 55 BPM | WEIGHT: 249 LBS | HEIGHT: 67 IN | OXYGEN SATURATION: 98 % | DIASTOLIC BLOOD PRESSURE: 72 MMHG | SYSTOLIC BLOOD PRESSURE: 132 MMHG

## 2024-02-22 DIAGNOSIS — S99.921D INJURY OF RIGHT FOOT, SUBSEQUENT ENCOUNTER: ICD-10-CM

## 2024-02-22 DIAGNOSIS — S91.104D OPEN WOUND OF FIFTH TOE OF RIGHT FOOT, SUBSEQUENT ENCOUNTER: Primary | ICD-10-CM

## 2024-02-22 DIAGNOSIS — S91.104D OPEN WOUND OF FIFTH TOE OF RIGHT FOOT, SUBSEQUENT ENCOUNTER: ICD-10-CM

## 2024-02-22 DIAGNOSIS — S90.426A BLISTER OF FIFTH TOE: ICD-10-CM

## 2024-02-22 PROCEDURE — 73660 X-RAY EXAM OF TOE(S): CPT

## 2024-02-22 PROCEDURE — 3075F SYST BP GE 130 - 139MM HG: CPT | Performed by: NURSE PRACTITIONER

## 2024-02-22 PROCEDURE — 1123F ACP DISCUSS/DSCN MKR DOCD: CPT | Performed by: NURSE PRACTITIONER

## 2024-02-22 PROCEDURE — 99213 OFFICE O/P EST LOW 20 MIN: CPT | Performed by: NURSE PRACTITIONER

## 2024-02-22 PROCEDURE — 3078F DIAST BP <80 MM HG: CPT | Performed by: NURSE PRACTITIONER

## 2024-02-23 ASSESSMENT — ENCOUNTER SYMPTOMS
NAUSEA: 0
ALLERGIC/IMMUNOLOGIC NEGATIVE: 1
ABDOMINAL PAIN: 0
RESPIRATORY NEGATIVE: 1
EYES NEGATIVE: 1
ANAL BLEEDING: 0
SHORTNESS OF BREATH: 0
GASTROINTESTINAL NEGATIVE: 1
BLOOD IN STOOL: 0

## 2024-03-06 DIAGNOSIS — I10 ESSENTIAL HYPERTENSION: ICD-10-CM

## 2024-03-06 RX ORDER — POTASSIUM CHLORIDE 750 MG/1
TABLET, FILM COATED, EXTENDED RELEASE ORAL
Qty: 60 TABLET | Refills: 0 | Status: SHIPPED | OUTPATIENT
Start: 2024-03-06

## 2024-03-06 NOTE — TELEPHONE ENCOUNTER
2/13/2023 [facility]  3/7/2024 [facility] upcoming appt  1/31/2024 bmp

## 2024-03-11 DIAGNOSIS — D50.9 IRON DEFICIENCY ANEMIA, UNSPECIFIED IRON DEFICIENCY ANEMIA TYPE: ICD-10-CM

## 2024-03-11 RX ORDER — FERROUS SULFATE 325(65) MG
1 TABLET ORAL DAILY
Qty: 90 TABLET | Refills: 0 | Status: SHIPPED | OUTPATIENT
Start: 2024-03-11

## 2024-03-11 NOTE — TELEPHONE ENCOUNTER
Refill Request     CONFIRM preferrred pharmacy with the patient.    If Mail Order Rx - Pend for 90 day refill.      Last Seen: Last Seen Department: 2/22/2024  Last Seen by PCP: 2/22/2024    Last Written: 11/16/23    If no future appointment scheduled, route STAFF MESSAGE with patient name to the  Pool for scheduling.      Next Appointment:   Future Appointments   Date Time Provider Department Center   3/14/2024 11:00 AM Ashish Cook APRN - CNP [facility]   3/21/2024  2:20 PM Martha Peoples [facility]N - [facility]NP [facility] [place] [facility]       Message sent to  to schedule appt with patient?  N/A      Requested Prescriptions     Pending Prescriptions Disp Refills    FEROSUL 325 (65 Fe) MG tablet [Pharmacy Med Name: FEROSUL 325 MG TABLET] 90 tablet 0     Sig: TAKE 1 TABLET BY MOUTH DAILY WITH BREAKFAST

## 2024-03-18 DIAGNOSIS — I25.119 CORONARY ARTERY DISEASE INVOLVING NATIVE CORONARY ARTERY OF NATIVE HEART WITH ANGINA PECTORIS (HCC): ICD-10-CM

## 2024-03-18 RX ORDER — CLOPIDOGREL BISULFATE 75 MG/1
TABLET ORAL
Qty: 90 TABLET | Refills: 3 | Status: SHIPPED | OUTPATIENT
Start: 2024-03-18

## 2024-03-19 NOTE — PROGRESS NOTES
appears not on beta-blocker due to bradycardia, allergy to ACE  4.  Monitor BP at home and call the office if consistently out of goal range; bring log to next appointment, consider adding spironolactone for HTN management  5.  Regular exercise and healthy diet encouraged  6.  Follow-up with pulmonary for interstitial lung disease  7.  Follow-up with Dr. Solis in 2-3 months    Ashish Cook, APRN-CNP  [facility]  [phone]

## 2024-03-20 ENCOUNTER — OFFICE VISIT (OUTPATIENT)
Dept: CARDIOLOGY CLINIC | Age: 84
End: 2024-03-20
Payer: MEDICARE

## 2024-03-20 VITALS
OXYGEN SATURATION: 97 % | HEIGHT: 67 IN | DIASTOLIC BLOOD PRESSURE: 72 MMHG | SYSTOLIC BLOOD PRESSURE: 136 MMHG | WEIGHT: 258.6 LBS | BODY MASS INDEX: 40.59 KG/M2 | HEART RATE: 58 BPM

## 2024-03-20 DIAGNOSIS — I25.119 CORONARY ARTERY DISEASE INVOLVING NATIVE CORONARY ARTERY OF NATIVE HEART WITH ANGINA PECTORIS (HCC): ICD-10-CM

## 2024-03-20 DIAGNOSIS — I10 ESSENTIAL HYPERTENSION: ICD-10-CM

## 2024-03-20 DIAGNOSIS — F33.41 RECURRENT MAJOR DEPRESSIVE DISORDER, IN PARTIAL REMISSION (HCC): ICD-10-CM

## 2024-03-20 DIAGNOSIS — K21.9 GASTROESOPHAGEAL REFLUX DISEASE WITHOUT ESOPHAGITIS: ICD-10-CM

## 2024-03-20 DIAGNOSIS — R06.09 DYSPNEA ON EXERTION: Primary | ICD-10-CM

## 2024-03-20 DIAGNOSIS — E78.2 MIXED HYPERLIPIDEMIA: ICD-10-CM

## 2024-03-20 PROCEDURE — 3075F SYST BP GE 130 - 139MM HG: CPT | Performed by: NURSE PRACTITIONER

## 2024-03-20 PROCEDURE — 3078F DIAST BP <80 MM HG: CPT | Performed by: NURSE PRACTITIONER

## 2024-03-20 PROCEDURE — 99214 OFFICE O/P EST MOD 30 MIN: CPT | Performed by: NURSE PRACTITIONER

## 2024-03-20 PROCEDURE — 1123F ACP DISCUSS/DSCN MKR DOCD: CPT | Performed by: NURSE PRACTITIONER

## 2024-03-20 RX ORDER — OMEPRAZOLE 40 MG/1
40 CAPSULE, DELAYED RELEASE ORAL DAILY
Qty: 90 CAPSULE | Refills: 0 | Status: SHIPPED | OUTPATIENT
Start: 2024-03-20

## 2024-03-20 RX ORDER — VENLAFAXINE 75 MG/1
75 TABLET ORAL 2 TIMES DAILY
Qty: 180 TABLET | Refills: 0 | Status: SHIPPED | OUTPATIENT
Start: 2024-03-20

## 2024-03-20 ASSESSMENT — ENCOUNTER SYMPTOMS
GASTROINTESTINAL NEGATIVE: 1
CHEST TIGHTNESS: 1
SHORTNESS OF BREATH: 1

## 2024-03-20 NOTE — TELEPHONE ENCOUNTER
Refill Request     CONFIRM preferrred pharmacy with the patient.    If Mail Order Rx - Pend for 90 day refill.      Last Seen: Last Seen Department: 2/22/2024  Last Seen by PCP: 2/22/2024    Last Written: 9-    If no future appointment scheduled, route STAFF MESSAGE with patient name to the  Pool for scheduling.      Next Appointment:   Future Appointments   Date Time Provider Department [place]   3/20/2024 10:30 AM Ashish Cook APRN - CNP [facility]   3/21/2024  2:20 PM [facility]oples [facility] - [facility] [place] [facility]       Message sent to  to schedule appt with patient?  N/A      Requested Prescriptions     Pending Prescriptions Disp Refills    venlafaxine (EFFEXOR) 75 MG tablet [Pharmacy Med Name: VENLAFAXINE HCL 75 MG TABLET] 180 tablet 1     Sig: TAKE 1 TABLET BY MOUTH TWICE A DAY    omeprazole (PRILOSEC) 40 MG delayed release capsule [Pharmacy Med Name: OMEPRAZOLE DR 40 MG CAPSULE] 90 capsule 1     Sig: TAKE 1 CAPSULE BY MOUTH DAILY

## 2024-03-20 NOTE — PATIENT INSTRUCTIONS
Echocardiogram due to shortness of breath, call [phone]  Recommend following up with pulmonary due to lung disease  Continue current medications  Check BP at home and call the office if consistently out of goal range; goals 130s/80 or less  Follow up with Dr. Solis in 2-3 months

## 2024-03-21 ENCOUNTER — OFFICE VISIT (OUTPATIENT)
Dept: FAMILY MEDICINE CLINIC | Age: 84
End: 2024-03-21

## 2024-03-21 VITALS
SYSTOLIC BLOOD PRESSURE: 134 MMHG | DIASTOLIC BLOOD PRESSURE: 72 MMHG | WEIGHT: 260 LBS | HEIGHT: 67 IN | BODY MASS INDEX: 40.81 KG/M2 | HEART RATE: 57 BPM | OXYGEN SATURATION: 96 %

## 2024-03-21 DIAGNOSIS — Z23 FLU VACCINE NEED: ICD-10-CM

## 2024-03-21 DIAGNOSIS — J84.9 ILD (INTERSTITIAL LUNG DISEASE) (HCC): ICD-10-CM

## 2024-03-21 DIAGNOSIS — K52.9 CHRONIC DIARRHEA: ICD-10-CM

## 2024-03-21 DIAGNOSIS — R79.0 LOW MAGNESIUM LEVEL: ICD-10-CM

## 2024-03-21 DIAGNOSIS — D50.9 IRON DEFICIENCY ANEMIA, UNSPECIFIED IRON DEFICIENCY ANEMIA TYPE: ICD-10-CM

## 2024-03-21 DIAGNOSIS — I50.32 CHRONIC DIASTOLIC CONGESTIVE HEART FAILURE (HCC): Primary | ICD-10-CM

## 2024-03-21 DIAGNOSIS — G25.81 RESTLESS LEGS SYNDROME (RLS): ICD-10-CM

## 2024-03-21 DIAGNOSIS — K21.9 GASTROESOPHAGEAL REFLUX DISEASE WITHOUT ESOPHAGITIS: ICD-10-CM

## 2024-03-21 DIAGNOSIS — F33.41 RECURRENT MAJOR DEPRESSIVE DISORDER, IN PARTIAL REMISSION (HCC): ICD-10-CM

## 2024-03-21 DIAGNOSIS — Z12.5 SCREENING FOR PROSTATE CANCER: ICD-10-CM

## 2024-03-21 DIAGNOSIS — I10 ESSENTIAL HYPERTENSION: ICD-10-CM

## 2024-03-21 DIAGNOSIS — E78.2 MIXED HYPERLIPIDEMIA: ICD-10-CM

## 2024-03-21 DIAGNOSIS — E55.9 VITAMIN D DEFICIENCY: ICD-10-CM

## 2024-03-21 DIAGNOSIS — E03.9 HYPOTHYROIDISM, UNSPECIFIED TYPE: ICD-10-CM

## 2024-03-21 SDOH — ECONOMIC STABILITY: FOOD INSECURITY: WITHIN THE PAST 12 MONTHS, YOU WORRIED THAT YOUR FOOD WOULD RUN OUT BEFORE YOU GOT MONEY TO BUY MORE.: NEVER TRUE

## 2024-03-21 SDOH — ECONOMIC STABILITY: INCOME INSECURITY: HOW HARD IS IT FOR YOU TO PAY FOR THE VERY BASICS LIKE FOOD, HOUSING, MEDICAL CARE, AND HEATING?: NOT HARD AT ALL

## 2024-03-21 SDOH — ECONOMIC STABILITY: FOOD INSECURITY: WITHIN THE PAST 12 MONTHS, THE FOOD YOU BOUGHT JUST DIDN'T LAST AND YOU DIDN'T HAVE MONEY TO GET MORE.: NEVER TRUE

## 2024-03-21 ASSESSMENT — PATIENT HEALTH QUESTIONNAIRE - PHQ9
SUM OF ALL RESPONSES TO PHQ QUESTIONS 1-9: 2
8. MOVING OR SPEAKING SO SLOWLY THAT OTHER PEOPLE COULD HAVE NOTICED. OR THE OPPOSITE, BEING SO FIGETY OR RESTLESS THAT YOU HAVE BEEN MOVING AROUND A LOT MORE THAN USUAL: NOT AT ALL
7. TROUBLE CONCENTRATING ON THINGS, SUCH AS READING THE NEWSPAPER OR WATCHING TELEVISION: NOT AT ALL
1. LITTLE INTEREST OR PLEASURE IN DOING THINGS: SEVERAL DAYS
SUM OF ALL RESPONSES TO PHQ9 QUESTIONS 1 & 2: 1
SUM OF ALL RESPONSES TO PHQ QUESTIONS 1-9: 2
4. FEELING TIRED OR HAVING LITTLE ENERGY: SEVERAL DAYS
10. IF YOU CHECKED OFF ANY PROBLEMS, HOW DIFFICULT HAVE THESE PROBLEMS MADE IT FOR YOU TO DO YOUR WORK, TAKE CARE OF THINGS AT HOME, OR GET ALONG WITH OTHER PEOPLE: NOT DIFFICULT AT ALL
5. POOR APPETITE OR OVEREATING: NOT AT ALL
3. TROUBLE FALLING OR STAYING ASLEEP: NOT AT ALL
SUM OF ALL RESPONSES TO PHQ QUESTIONS 1-9: 2
9. THOUGHTS THAT YOU WOULD BE BETTER OFF DEAD, OR OF HURTING YOURSELF: NOT AT ALL
6. FEELING BAD ABOUT YOURSELF - OR THAT YOU ARE A FAILURE OR HAVE LET YOURSELF OR YOUR FAMILY DOWN: NOT AT ALL
SUM OF ALL RESPONSES TO PHQ QUESTIONS 1-9: 2
2. FEELING DOWN, DEPRESSED OR HOPELESS: NOT AT ALL

## 2024-03-21 ASSESSMENT — ANXIETY QUESTIONNAIRES
3. WORRYING TOO MUCH ABOUT DIFFERENT THINGS: NOT AT ALL
4. TROUBLE RELAXING: NOT AT ALL
1. FEELING NERVOUS, ANXIOUS, OR ON EDGE: NOT AT ALL
IF YOU CHECKED OFF ANY PROBLEMS ON THIS QUESTIONNAIRE, HOW DIFFICULT HAVE THESE PROBLEMS MADE IT FOR YOU TO DO YOUR WORK, TAKE CARE OF THINGS AT HOME, OR GET ALONG WITH OTHER PEOPLE: NOT DIFFICULT AT ALL
5. BEING SO RESTLESS THAT IT IS HARD TO SIT STILL: NOT AT ALL
2. NOT BEING ABLE TO STOP OR CONTROL WORRYING: NOT AT ALL
GAD7 TOTAL SCORE: 0
6. BECOMING EASILY ANNOYED OR IRRITABLE: NOT AT ALL
7. FEELING AFRAID AS IF SOMETHING AWFUL MIGHT HAPPEN: NOT AT ALL

## 2024-03-21 ASSESSMENT — ENCOUNTER SYMPTOMS
ANAL BLEEDING: 0
BLOOD IN STOOL: 0
SHORTNESS OF BREATH: 1
ABDOMINAL PAIN: 0
EYES NEGATIVE: 1
NAUSEA: 0

## 2024-03-21 NOTE — PROGRESS NOTES
fatigue, joint aches and has been implicated in heart disease, bone disease like osteoporosis, and some other chronic illnesses.    Patient will start/continue vitamin D supplement as per order.   Recommend vitamin D to be rechecked in 6 months.      Flu vaccine need  -     Influenza, FLUAD, (age 65 y+), IM, Preservative Free, 0.5 mL    Screening for prostate cancer  -     PSA Screening; Future    Low magnesium level  -     Magnesium; Future    Seeing dermatology 4/11/24    Patient has been instructed call the office immediately with new symptoms, change in symptoms or worseningof symptoms. If this is not feasible, patient is instructed to report to the emergency room. Medication profile reviewed. Medication side effects and possible impairments from medications were discussed as applicable.Allergies were reviewed. Health maintenance was reviewed and updated as appropriate.     Return in about 6 months (around 9/21/2024) for [facility].

## 2024-03-21 NOTE — PATIENT INSTRUCTIONS
Not feeling your best?  Where to go for the right care at the right time.    Dear Jj Lauren   I wanted to provide you with some information that might help you seek care for your condition when your primary care provider or specialist is unavailable. If you have a need outside of normal business hours, you should first contact your primary care office or specialist caring for your condition. They may have on-call providers that could assist with your care. During office hours, you may request a virtual or same day appointment.   But what if your primary care provider is not in the office that day and you can't wait until the  next day for care? In that situation, your next option is to visit an urgent care facility.          [facility] now has urgent care sites open to support our community.   [facility] is a great alternative when you need immediate medical care that is not a serious threat to your health or your doctor's office is closed or unable to get you in for an appointment. The urgent care centers offer fast access to [facility] doctors for minor illnesses and injuries for patients of all ages. There are other medical services available including lab testing, X-rays, EKGs, and IV fluids.  Locations are open daily from 8 a.m. - 8 p.m.     [facility]  [address], [place], [place] [address]  [phone]    [address], [place], [place] [address]  [phone]    Local Urgent Care     [facility] 8:30 am - 7:70 pm   [address] [place] [address]  [phone]    [facility]     8 am - 8 pm   [address] [place] [address]  [phone]    [facility] 7 am - 7:30 pm  [address] [place] [address]  [phone]     [facility] [place] 7 am - 7:30 pm  [address] [place] [address]  [phone]    Angel

## 2024-03-22 ASSESSMENT — ENCOUNTER SYMPTOMS: DIARRHEA: 1

## 2024-03-26 ENCOUNTER — NURSE ONLY (OUTPATIENT)
Dept: FAMILY MEDICINE CLINIC | Age: 84
End: 2024-03-26
Payer: MEDICARE

## 2024-03-26 DIAGNOSIS — Z12.5 SCREENING FOR PROSTATE CANCER: ICD-10-CM

## 2024-03-26 DIAGNOSIS — D50.9 IRON DEFICIENCY ANEMIA, UNSPECIFIED IRON DEFICIENCY ANEMIA TYPE: ICD-10-CM

## 2024-03-26 DIAGNOSIS — K21.9 GASTROESOPHAGEAL REFLUX DISEASE WITHOUT ESOPHAGITIS: ICD-10-CM

## 2024-03-26 DIAGNOSIS — I10 ESSENTIAL HYPERTENSION: ICD-10-CM

## 2024-03-26 DIAGNOSIS — E78.2 MIXED HYPERLIPIDEMIA: ICD-10-CM

## 2024-03-26 DIAGNOSIS — E03.9 HYPOTHYROIDISM, UNSPECIFIED TYPE: ICD-10-CM

## 2024-03-26 DIAGNOSIS — E55.9 VITAMIN D DEFICIENCY: ICD-10-CM

## 2024-03-26 DIAGNOSIS — R79.0 LOW MAGNESIUM LEVEL: ICD-10-CM

## 2024-03-26 PROCEDURE — 36415 COLL VENOUS BLD VENIPUNCTURE: CPT | Performed by: NURSE PRACTITIONER

## 2024-03-27 LAB
25(OH)D3 SERPL-MCNC: 62.1 NG/ML
ALBUMIN SERPL-MCNC: 4.6 G/DL (ref 3.4–5)
ALBUMIN/GLOB SERPL: 1.6 {RATIO} (ref 1.1–2.2)
ALP SERPL-CCNC: 84 U/L (ref 40–129)
ALT SERPL-CCNC: 23 U/L (ref 10–40)
ANION GAP SERPL CALCULATED.3IONS-SCNC: 13 MMOL/L (ref 3–16)
AST SERPL-CCNC: 18 U/L (ref 15–37)
BASOPHILS # BLD: 0.1 K/UL (ref 0–0.2)
BASOPHILS NFR BLD: 1 %
BILIRUB SERPL-MCNC: 0.8 MG/DL (ref 0–1)
BUN SERPL-MCNC: 16 MG/DL (ref 7–20)
CALCIUM SERPL-MCNC: 9.5 MG/DL (ref 8.3–10.6)
CHLORIDE SERPL-SCNC: 100 MMOL/L (ref 99–110)
CHOLEST SERPL-MCNC: 136 MG/DL (ref 0–199)
CO2 SERPL-SCNC: 29 MMOL/L (ref 21–32)
CREAT SERPL-MCNC: 1 MG/DL (ref 0.8–1.3)
DEPRECATED RDW RBC AUTO: 14 % (ref 12.4–15.4)
EOSINOPHIL # BLD: 0.4 K/UL (ref 0–0.6)
EOSINOPHIL NFR BLD: 4.6 %
FERRITIN SERPL IA-MCNC: 147.4 NG/ML (ref 30–400)
GFR SERPLBLD CREATININE-BSD FMLA CKD-EPI: 75 ML/MIN/{1.73_M2}
GLUCOSE SERPL-MCNC: 109 MG/DL (ref 70–99)
HCT VFR BLD AUTO: 42.9 % (ref 40.5–52.5)
HDLC SERPL-MCNC: 50 MG/DL (ref 40–60)
HGB BLD-MCNC: 14.8 G/DL (ref 13.5–17.5)
IRON SATN MFR SERPL: 29 % (ref 20–50)
IRON SERPL-MCNC: 100 UG/DL (ref 59–158)
LDLC SERPL CALC-MCNC: 64 MG/DL
LYMPHOCYTES # BLD: 2 K/UL (ref 1–5.1)
LYMPHOCYTES NFR BLD: 20.7 %
MAGNESIUM SERPL-MCNC: 2.3 MG/DL (ref 1.8–2.4)
MCH RBC QN AUTO: 31.7 PG (ref 26–34)
MCHC RBC AUTO-ENTMCNC: 34.6 G/DL (ref 31–36)
MCV RBC AUTO: 91.6 FL (ref 80–100)
MONOCYTES # BLD: 0.6 K/UL (ref 0–1.3)
MONOCYTES NFR BLD: 6.6 %
NEUTROPHILS # BLD: 6.4 K/UL (ref 1.7–7.7)
NEUTROPHILS NFR BLD: 67.1 %
PLATELET # BLD AUTO: 208 K/UL (ref 135–450)
PMV BLD AUTO: 7.5 FL (ref 5–10.5)
POTASSIUM SERPL-SCNC: 3.5 MMOL/L (ref 3.5–5.1)
PROT SERPL-MCNC: 7.4 G/DL (ref 6.4–8.2)
PSA SERPL DL<=0.01 NG/ML-MCNC: 3.56 NG/ML (ref 0–4)
RBC # BLD AUTO: 4.68 M/UL (ref 4.2–5.9)
SODIUM SERPL-SCNC: 142 MMOL/L (ref 136–145)
TIBC SERPL-MCNC: 342 UG/DL (ref 260–445)
TRIGL SERPL-MCNC: 112 MG/DL (ref 0–150)
TSH SERPL DL<=0.005 MIU/L-ACNC: 4.14 UIU/ML (ref 0.27–4.2)
VLDLC SERPL CALC-MCNC: 22 MG/DL
WBC # BLD AUTO: 9.5 K/UL (ref 4–11)

## 2024-03-28 DIAGNOSIS — E78.2 MIXED HYPERLIPIDEMIA: ICD-10-CM

## 2024-03-28 RX ORDER — ATORVASTATIN CALCIUM 40 MG/1
TABLET, FILM COATED ORAL
Qty: 90 TABLET | Refills: 0 | Status: SHIPPED | OUTPATIENT
Start: 2024-03-28

## 2024-03-28 NOTE — TELEPHONE ENCOUNTER
Refill Request     CONFIRM preferrred pharmacy with the patient.    If Mail Order Rx - Pend for 90 day refill.      Last Seen: Last Seen Department: 3/21/2024  Last Seen by PCP: 3/21/2024    Last Written: 4/5/23    If no future appointment scheduled, route STAFF MESSAGE with patient name to the  Pool for scheduling.      Next Appointment:   Future Appointments   Date Time Provider Department Center   5/30/2024 12:30 PM [facility] ECHO ROOM [facility]   6/10/2024 11:00 AM Arie Solis MD [place]   9/26/2024  1:00 PM Martha Peoples, APRN - [facility] [place] [facility]       Message sent to  to schedule appt with patient?  N/A      Requested Prescriptions     Pending Prescriptions Disp Refills    atorvastatin (LIPITOR) 40 MG tablet 90 tablet 0     Sig: Take one tablet by mouth daily

## 2024-04-01 DIAGNOSIS — I10 ESSENTIAL HYPERTENSION: ICD-10-CM

## 2024-04-02 RX ORDER — TRIAMTERENE AND HYDROCHLOROTHIAZIDE 37.5; 25 MG/1; MG/1
TABLET ORAL
Qty: 90 TABLET | Refills: 0 | Status: SHIPPED | OUTPATIENT
Start: 2024-04-02

## 2024-04-06 DIAGNOSIS — I10 ESSENTIAL HYPERTENSION: ICD-10-CM

## 2024-04-08 RX ORDER — POTASSIUM CHLORIDE 750 MG/1
TABLET, FILM COATED, EXTENDED RELEASE ORAL
Qty: 180 TABLET | Refills: 3 | Status: SHIPPED | OUTPATIENT
Start: 2024-04-08

## 2024-04-12 ENCOUNTER — TELEPHONE (OUTPATIENT)
Dept: FAMILY MEDICINE CLINIC | Age: 84
End: 2024-04-12

## 2024-04-12 NOTE — TELEPHONE ENCOUNTER
Pt's wife called and stated that he went to the Derm and she told him that it is not cancer. But she wants him yo go see a vein specialist. was wanting to get a referral to one and she would like it around [place] or [place].

## 2024-04-12 NOTE — TELEPHONE ENCOUNTER
There is [facility] [place] on [address]  Phone # [phone]  ( I do not know if they take his insurance)    Also [facility] on [address]  Phone # [phone]

## 2024-04-15 NOTE — TELEPHONE ENCOUNTER
Spoke with patient.  Provided both number to see which specialist that he could ve seen by the soonest and takes his insurance

## 2024-05-02 DIAGNOSIS — I10 ESSENTIAL HYPERTENSION: ICD-10-CM

## 2024-05-02 RX ORDER — AMLODIPINE BESYLATE 10 MG/1
TABLET ORAL
Qty: 90 TABLET | Refills: 0 | Status: SHIPPED | OUTPATIENT
Start: 2024-05-02

## 2024-05-13 ENCOUNTER — OFFICE VISIT (OUTPATIENT)
Dept: FAMILY MEDICINE CLINIC | Age: 84
End: 2024-05-13

## 2024-05-13 ENCOUNTER — HOSPITAL ENCOUNTER (OUTPATIENT)
Dept: GENERAL RADIOLOGY | Age: 84
Discharge: HOME | End: 2024-05-13
Payer: MEDICARE

## 2024-05-13 ENCOUNTER — HOSPITAL ENCOUNTER (OUTPATIENT)
Age: 84
Discharge: HOME | End: 2024-05-13
Payer: MEDICARE

## 2024-05-13 VITALS
DIASTOLIC BLOOD PRESSURE: 70 MMHG | HEIGHT: 67 IN | SYSTOLIC BLOOD PRESSURE: 134 MMHG | BODY MASS INDEX: 38.45 KG/M2 | WEIGHT: 245 LBS | OXYGEN SATURATION: 97 % | HEART RATE: 92 BPM | TEMPERATURE: 97.7 F

## 2024-05-13 DIAGNOSIS — I50.32 CHRONIC DIASTOLIC CONGESTIVE HEART FAILURE (HCC): ICD-10-CM

## 2024-05-13 DIAGNOSIS — R05.3 PERSISTENT COUGH: ICD-10-CM

## 2024-05-13 DIAGNOSIS — J84.10 PULMONARY FIBROSIS (HCC): Primary | ICD-10-CM

## 2024-05-13 DIAGNOSIS — J84.9 ILD (INTERSTITIAL LUNG DISEASE) (HCC): ICD-10-CM

## 2024-05-13 DIAGNOSIS — R53.81 MALAISE: ICD-10-CM

## 2024-05-13 DIAGNOSIS — J84.9 ILD (INTERSTITIAL LUNG DISEASE) (HCC): Primary | ICD-10-CM

## 2024-05-13 LAB
Lab: 0
QC PASS/FAIL: NORMAL
SARS-COV-2 RDRP RESP QL NAA+PROBE: NEGATIVE

## 2024-05-13 PROCEDURE — 71046 X-RAY EXAM CHEST 2 VIEWS: CPT

## 2024-05-13 RX ORDER — ALBUTEROL SULFATE 2.5 MG/3ML
2.5 SOLUTION RESPIRATORY (INHALATION) ONCE
Status: COMPLETED | OUTPATIENT
Start: 2024-05-13 | End: 2024-05-13

## 2024-05-13 RX ORDER — FUROSEMIDE 20 MG/1
20 TABLET ORAL DAILY
Qty: 3 TABLET | Refills: 0 | Status: SHIPPED | OUTPATIENT
Start: 2024-05-13

## 2024-05-13 RX ORDER — FUROSEMIDE 20 MG/1
20 TABLET ORAL DAILY
COMMUNITY
End: 2024-05-13 | Stop reason: SDUPTHER

## 2024-05-13 RX ORDER — BUDESONIDE 0.5 MG/2ML
0.5 INHALANT ORAL ONCE
Status: COMPLETED | OUTPATIENT
Start: 2024-05-13 | End: 2024-05-13

## 2024-05-13 RX ORDER — PREDNISONE 20 MG/1
40 TABLET ORAL DAILY
Qty: 10 TABLET | Refills: 0 | Status: SHIPPED | OUTPATIENT
Start: 2024-05-13 | End: 2024-05-18

## 2024-05-13 RX ADMIN — ALBUTEROL SULFATE 2.5 MG: 2.5 SOLUTION RESPIRATORY (INHALATION) at 11:06

## 2024-05-13 RX ADMIN — BUDESONIDE 500 MCG: 0.5 INHALANT ORAL at 11:06

## 2024-05-13 ASSESSMENT — ENCOUNTER SYMPTOMS
COUGH: 1
WHEEZING: 0
SORE THROAT: 1
DIARRHEA: 0
RHINORRHEA: 0
ABDOMINAL PAIN: 0

## 2024-05-13 NOTE — PROGRESS NOTES
[facility]  [facility]  [address] [place] [id number]  Dept: [phone]  Dept Fax: [phone]  Loc: [phone]    Jj Lauren is a 83 y.o. male who presents today for his medical conditions/complaints as noted below.  Jj Lauren is c/o of Cough, Congestion (Symptoms started Monday), and Shortness of Breath       Subjective:     Chief Complaint   Patient presents with    Cough    Congestion     Symptoms started Monday    Shortness of Breath       URI   This is a new problem. The current episode started in the past 7 days. The problem has been gradually worsening. There has been no fever. Associated symptoms include congestion, coughing (mostly dry), a plugged ear sensation and a sore throat (resolved now). Pertinent negatives include no abdominal pain, chest pain, diarrhea, dysuria, ear pain, joint pain, joint swelling, nausea, rash, rhinorrhea or wheezing. He has tried increased fluids and inhaler use for the symptoms. The treatment provided no relief.     Grandson who is Here today is telling me that his family is stating that he has been sitting around the house for the last couple of days not doing anything.  Also states that every time he eats he starts to cough.  Patient does have history of interstitial lung disease  The patient states he went to the ER a week or 2 ago however he appears to be confused.  The patient has not been to the ER since February.  He has had increased shortness of breath.  His O2 sat at home has been 93 to 95%.  Oxygen level here in the office is 95-97% on RA.  Wife is now present who states patients appetite is good/normal for him.  Does not notice significant change in his fatigue (he is tired all the time).  He is at his baseline forgetfulness per wife.    Past Medical History:   Diagnosis Date    Acute bronchitis 12/18/2014    Acute bronchitis due to Streptococcus 3/4/2020    Acute calculous cholecystitis 5/19/2022    Arthritis

## 2024-05-13 NOTE — TELEPHONE ENCOUNTER
----- Message from ADORE Oquendo - CNP sent at 5/13/2024 12:49 PM EDT -----  Chest x-ray shows slight progression of his pulmonary fibrosis  There is possibly also some mild fluid  Recommend Lasix 20 mg daily for 3 days only #3 with no refill  Steroids have already been sent to the pharmacy  Needs referral to pulmonology and Dr. Salvador

## 2024-05-14 ENCOUNTER — TELEPHONE (OUTPATIENT)
Dept: FAMILY MEDICINE CLINIC | Age: 84
End: 2024-05-14

## 2024-05-14 LAB
ANION GAP SERPL CALCULATED.3IONS-SCNC: 16 MMOL/L (ref 3–16)
BASOPHILS # BLD: 0.1 K/UL (ref 0–0.2)
BASOPHILS NFR BLD: 0.5 %
BUN SERPL-MCNC: 21 MG/DL (ref 7–20)
CALCIUM SERPL-MCNC: 8.8 MG/DL (ref 8.3–10.6)
CHLORIDE SERPL-SCNC: 97 MMOL/L (ref 99–110)
CO2 SERPL-SCNC: 22 MMOL/L (ref 21–32)
CREAT SERPL-MCNC: 1 MG/DL (ref 0.8–1.3)
DEPRECATED RDW RBC AUTO: 14.3 % (ref 12.4–15.4)
EOSINOPHIL # BLD: 0.6 K/UL (ref 0–0.6)
EOSINOPHIL NFR BLD: 5 %
GFR SERPLBLD CREATININE-BSD FMLA CKD-EPI: 75 ML/MIN/{1.73_M2}
GLUCOSE SERPL-MCNC: 139 MG/DL (ref 70–99)
HCT VFR BLD AUTO: 38.8 % (ref 40.5–52.5)
HGB BLD-MCNC: 13.2 G/DL (ref 13.5–17.5)
LYMPHOCYTES # BLD: 1.6 K/UL (ref 1–5.1)
LYMPHOCYTES NFR BLD: 14.5 %
MCH RBC QN AUTO: 31 PG (ref 26–34)
MCHC RBC AUTO-ENTMCNC: 34 G/DL (ref 31–36)
MCV RBC AUTO: 91.2 FL (ref 80–100)
MONOCYTES # BLD: 0.8 K/UL (ref 0–1.3)
MONOCYTES NFR BLD: 7.4 %
NEUTROPHILS # BLD: 8 K/UL (ref 1.7–7.7)
NEUTROPHILS NFR BLD: 72.6 %
NT-PROBNP SERPL-MCNC: 502 PG/ML (ref 0–449)
PLATELET # BLD AUTO: 289 K/UL (ref 135–450)
PMV BLD AUTO: 7.3 FL (ref 5–10.5)
POTASSIUM SERPL-SCNC: 3.4 MMOL/L (ref 3.5–5.1)
RBC # BLD AUTO: 4.25 M/UL (ref 4.2–5.9)
SODIUM SERPL-SCNC: 135 MMOL/L (ref 136–145)
WBC # BLD AUTO: 11 K/UL (ref 4–11)

## 2024-05-14 ASSESSMENT — ENCOUNTER SYMPTOMS
NAUSEA: 0
ALLERGIC/IMMUNOLOGIC NEGATIVE: 1
BLOOD IN STOOL: 0
SHORTNESS OF BREATH: 1
EYES NEGATIVE: 1
GASTROINTESTINAL NEGATIVE: 1

## 2024-05-14 NOTE — TELEPHONE ENCOUNTER
RN called and spoke to spouse.  Spouse states that last night after he took the 1st dose of steroids he was up at 2 am looking for food to eat and made a sandwich. She felt like her was more confused.  Today he is much better, less confused.   RN reviewed side effects of steroids. Informed that mood changes, sleeplessness, increased appetite, or aggression may be attributed to steroid use.   Spouse states that he is not aggressive but some of the other symptoms are noted.   He still has a cough.   SOB has improved. His SaO2 has been 93- 95% at home.  Rn reviewed lower leg edema with Spouse. His legs are improved some. RN encouraged patient to elevate legs while up. Spouse states she reminds him to put his legs up when he is in the recliner.     RN encouraged to call PCP office foe any concerns.

## 2024-05-15 ENCOUNTER — TELEPHONE (OUTPATIENT)
Dept: FAMILY MEDICINE CLINIC | Age: 84
End: 2024-05-15

## 2024-05-15 DIAGNOSIS — E87.6 LOW BLOOD POTASSIUM: Primary | ICD-10-CM

## 2024-05-15 RX ORDER — POTASSIUM CHLORIDE 750 MG/1
10 TABLET, EXTENDED RELEASE ORAL DAILY
Qty: 5 TABLET | Refills: 0 | Status: CANCELLED | OUTPATIENT
Start: 2024-05-15

## 2024-05-15 NOTE — TELEPHONE ENCOUNTER
----- Message from ADORE Oquendo - CNP sent at 5/15/2024  8:01 AM EDT -----  Potassium is low.  Start KCl 10 mEq 1 p.o. daily x 5 days #5 NRF and recheck potassium level in 7-10 days  Please send prescription to pharmacy of choice

## 2024-05-16 ENCOUNTER — HOSPITAL ENCOUNTER (OUTPATIENT)
Age: 84
Discharge: HOME | End: 2024-05-16
Payer: MEDICARE

## 2024-05-16 ENCOUNTER — OFFICE VISIT (OUTPATIENT)
Dept: PULMONOLOGY | Age: 84
End: 2024-05-16
Payer: MEDICARE

## 2024-05-16 VITALS
DIASTOLIC BLOOD PRESSURE: 68 MMHG | HEART RATE: 64 BPM | BODY MASS INDEX: 40.02 KG/M2 | OXYGEN SATURATION: 94 % | SYSTOLIC BLOOD PRESSURE: 144 MMHG | HEIGHT: 67 IN | RESPIRATION RATE: 24 BRPM | WEIGHT: 255 LBS

## 2024-05-16 DIAGNOSIS — J84.9 ILD (INTERSTITIAL LUNG DISEASE) (HCC): ICD-10-CM

## 2024-05-16 DIAGNOSIS — E66.01 SEVERE OBESITY (BMI 35.0-39.9) WITH COMORBIDITY (HCC): ICD-10-CM

## 2024-05-16 DIAGNOSIS — J84.9 ILD (INTERSTITIAL LUNG DISEASE) (HCC): Primary | ICD-10-CM

## 2024-05-16 DIAGNOSIS — E03.9 HYPOTHYROIDISM, UNSPECIFIED TYPE: ICD-10-CM

## 2024-05-16 LAB — EOSINOPHIL # BLD: 0.2 K/UL (ref 0–0.6)

## 2024-05-16 PROCEDURE — 3078F DIAST BP <80 MM HG: CPT | Performed by: INTERNAL MEDICINE

## 2024-05-16 PROCEDURE — 85048 AUTOMATED LEUKOCYTE COUNT: CPT

## 2024-05-16 PROCEDURE — 86235 NUCLEAR ANTIGEN ANTIBODY: CPT

## 2024-05-16 PROCEDURE — 1123F ACP DISCUSS/DSCN MKR DOCD: CPT | Performed by: INTERNAL MEDICINE

## 2024-05-16 PROCEDURE — 82104 ALPHA-1-ANTITRYPSIN PHENO: CPT

## 2024-05-16 PROCEDURE — 36415 COLL VENOUS BLD VENIPUNCTURE: CPT

## 2024-05-16 PROCEDURE — 3077F SYST BP >= 140 MM HG: CPT | Performed by: INTERNAL MEDICINE

## 2024-05-16 PROCEDURE — 82103 ALPHA-1-ANTITRYPSIN TOTAL: CPT

## 2024-05-16 PROCEDURE — 86038 ANTINUCLEAR ANTIBODIES: CPT

## 2024-05-16 PROCEDURE — 99214 OFFICE O/P EST MOD 30 MIN: CPT | Performed by: INTERNAL MEDICINE

## 2024-05-16 RX ORDER — FLUTICASONE FUROATE, UMECLIDINIUM BROMIDE AND VILANTEROL TRIFENATATE 100; 62.5; 25 UG/1; UG/1; UG/1
1 POWDER RESPIRATORY (INHALATION) DAILY
Qty: 1 EACH | Refills: 3 | Status: SHIPPED | OUTPATIENT
Start: 2024-05-16

## 2024-05-16 RX ORDER — LEVOTHYROXINE SODIUM 0.07 MG/1
TABLET ORAL
Qty: 90 TABLET | Refills: 1 | Status: SHIPPED | OUTPATIENT
Start: 2024-05-16

## 2024-05-16 RX ORDER — ALBUTEROL SULFATE 90 UG/1
2 AEROSOL, METERED RESPIRATORY (INHALATION) EVERY 4 HOURS PRN
Qty: 18 G | Refills: 6 | Status: SHIPPED | OUTPATIENT
Start: 2024-05-16

## 2024-05-16 NOTE — TELEPHONE ENCOUNTER
Refill Request     CONFIRM preferrred pharmacy with the patient.    If Mail Order Rx - Pend for 90 day refill.      Last Seen: Last Seen Department: 5/13/2024  Last Seen by PCP: 5/13/2024    Last Written: 2/14/2024    If no future appointment scheduled, route STAFF MESSAGE with patient name to the  Pool for scheduling.      Next Appointment:   Future Appointments   Date Time Provider Department Center   5/16/2024 11:30 AM Carmelo Resendiz MD CLERM [facility]   5/28/2024 11:00 AM SCHEDULE, MHCX MT ORAB [facility] [place] [facility] [place]   5/30/2024 12:30 PM MHA CARDI ECHO 1 MHAZ AND [facility]   6/6/2024  3:00 PM Martha Peoples APRN - CNP [facility] [place]   6/10/2024 11:00 AM Arie Solis MD SARD[facility]IA CAR [facility]   9/26/2024  1:00 PM Martha Peoples APRN - CNP [facility] [place]       Message sent to  to schedule appt with patient?  NO      Requested Prescriptions     Pending Prescriptions Disp Refills    levothyroxine (SYNTHROID) 75 MCG tablet [Pharmacy Med Name: LEVOTHYROXINE 75 MCG TABLET] 90 tablet 1     Sig: TAKE 1 TABLET BY MOUTH DAILY

## 2024-05-16 NOTE — PATIENT INSTRUCTIONS
We will call to schedule pulmonary function testing and HRCT scheduled. If you do not hear from us give us a call [phone]. Dr. Resendiz wants a follow appointment in 4 weeks with all testing complete.

## 2024-05-16 NOTE — PROGRESS NOTES
P  [facility]                                               Pulmonary Clinic Consult     I had the pleasure of seeing  Jj Lauren     Chief Complaint   Patient presents with    New Patient     NPT Pulmonary Fibrosis referred by Martha Peoples CNp       HISTORY OF PRESENT ILLNESS:    Jj Lauren is a 83 y.o. year old  with 15 PPY     The Patient comes in with SOB that has been going on *  Associated with .....,He/She  states that it get worse with exercise or walking long distance and he can walk .... And go 1-2 flight of stairs before get short winded    He/She  has cough ,productive for   Sputum and it is more in the       No history of lung disease in the past   Has history of lung disease include    Sleep history :  Snoring   Feel tired during the day  Wake up fresh  excessive daytime sleepness            ALLERGIES:    Allergies   Allergen Reactions    Ace Inhibitors Angioedema     Cough    Oxybutynin Other (See Comments)     nightmares    Percocet [Oxycodone-Acetaminophen] Itching    Simvastatin Other (See Comments)     Did not control cholesterol - takes atorvastatin at home as of 5/3/22       PAST MEDICAL HISTORY:       Diagnosis Date    Acute bronchitis 12/18/2014    Acute bronchitis due to Streptococcus 3/4/2020    Acute calculous cholecystitis 5/19/2022    Arthritis     Back injuries     BPH (benign prostatic hyperplasia)     Chest pain 03/2021    Cholecystitis, acute 5/3/2022    Claudication (HCC) 7/2/2020    CPAP (continuous positive airway pressure) dependence     Diverticulosis     colonoscopy 9/2015    Esophageal dysmotility     GERD (gastroesophageal reflux disease)     Hiatal hernia     History of colon polyps     seen on colonoscopy again 9/2015    Hyperlipidemia     Hypertension     Hyponatremia 5/4/2022    ILD (interstitial lung disease) (HCC) 07/30/2013    Internal hemorrhoid     colonoscopy 9/14/15    Iron deficiency anemia 9/19/2023    Lichen

## 2024-05-17 LAB
ANA SER QL IA: NEGATIVE
ENA JO1 AB SER IA-ACNC: <0.2 AI (ref 0–0.9)
ENA RNP AB SER IA-ACNC: 0.3 AI (ref 0–0.9)
ENA SCL70 IGG SER IA-ACNC: <0.2 AI (ref 0–0.9)
ENA SS-A AB SER IA-ACNC: <0.2 AI (ref 0–0.9)
ENA SS-B AB SER IA-ACNC: <0.2 AI (ref 0–0.9)

## 2024-05-20 LAB
A1AT PHENOTYP SERPL-IMP: NORMAL
A1AT SERPL-MCNC: 197 MG/DL (ref 90–200)

## 2024-05-28 ENCOUNTER — NURSE ONLY (OUTPATIENT)
Dept: FAMILY MEDICINE CLINIC | Age: 84
End: 2024-05-28
Payer: MEDICARE

## 2024-05-28 DIAGNOSIS — E87.6 LOW BLOOD POTASSIUM: ICD-10-CM

## 2024-05-28 PROCEDURE — 36415 COLL VENOUS BLD VENIPUNCTURE: CPT | Performed by: NURSE PRACTITIONER

## 2024-05-29 LAB — POTASSIUM SERPL-SCNC: 3.5 MMOL/L (ref 3.5–5.1)

## 2024-05-30 ENCOUNTER — HOSPITAL ENCOUNTER (OUTPATIENT)
Dept: CARDIOLOGY | Age: 84
Discharge: HOME | End: 2024-06-01
Payer: MEDICARE

## 2024-05-30 DIAGNOSIS — R06.09 DYSPNEA ON EXERTION: ICD-10-CM

## 2024-05-30 LAB
ECHO AO ARCH DIAM: 2.6 CM
ECHO AO ASC DIAM: 2.9 CM
ECHO AO ROOT DIAM: 3.6 CM
ECHO AV AREA PEAK VELOCITY: 2.9 CM2
ECHO AV AREA VTI: 3 CM2
ECHO AV CUSP MM: 2 CM
ECHO AV MEAN GRADIENT: 4 MMHG
ECHO AV MEAN VELOCITY: 0.9 M/S
ECHO AV PEAK GRADIENT: 7 MMHG
ECHO AV PEAK VELOCITY: 1.3 M/S
ECHO AV VELOCITY RATIO: 0.85
ECHO AV VTI: 27.6 CM
ECHO EST RA PRESSURE: 8 MMHG
ECHO LA AREA 2C: 21.9 CM2
ECHO LA AREA 4C: 20.6 CM2
ECHO LA DIAMETER: 3.1 CM
ECHO LA MAJOR AXIS: 5.9 CM
ECHO LA MINOR AXIS: 5.9 CM
ECHO LA TO AORTIC ROOT RATIO: 0.86
ECHO LA VOL BP: 61 ML (ref 18–58)
ECHO LA VOL MOD A2C: 64 ML (ref 18–58)
ECHO LA VOL MOD A4C: 58 ML (ref 18–58)
ECHO LV E' LATERAL VELOCITY: 8 CM/S
ECHO LV E' SEPTAL VELOCITY: 7 CM/S
ECHO LV FRACTIONAL SHORTENING: 30 % (ref 28–44)
ECHO LV INTERNAL DIMENSION DIASTOLIC: 5.4 CM (ref 4.2–5.9)
ECHO LV INTERNAL DIMENSION SYSTOLIC: 3.8 CM
ECHO LV ISOVOLUMETRIC RELAXATION TIME (IVRT): 85 MS
ECHO LV IVSD: 0.9 CM (ref 0.6–1)
ECHO LV MASS 2D: 180.1 G (ref 88–224)
ECHO LV POSTERIOR WALL DIASTOLIC: 0.9 CM (ref 0.6–1)
ECHO LV RELATIVE WALL THICKNESS RATIO: 0.33
ECHO LVOT AREA: 3.5 CM2
ECHO LVOT AV VTI INDEX: 0.85
ECHO LVOT DIAM: 2.1 CM
ECHO LVOT MEAN GRADIENT: 2 MMHG
ECHO LVOT PEAK GRADIENT: 5 MMHG
ECHO LVOT PEAK VELOCITY: 1.1 M/S
ECHO LVOT SV: 81.4 ML
ECHO LVOT VTI: 23.5 CM
ECHO MV A VELOCITY: 0.78 M/S
ECHO MV AREA VTI: 2.6 CM2
ECHO MV E DECELERATION TIME (DT): 204 MS
ECHO MV E VELOCITY: 0.59 M/S
ECHO MV E/A RATIO: 0.76
ECHO MV E/E' LATERAL: 7.38
ECHO MV E/E' RATIO (AVERAGED): 7.9
ECHO MV E/E' SEPTAL: 8.43
ECHO MV LVOT VTI INDEX: 1.34
ECHO MV MAX VELOCITY: 1 M/S
ECHO MV MEAN GRADIENT: 1 MMHG
ECHO MV MEAN VELOCITY: 0.6 M/S
ECHO MV PEAK GRADIENT: 4 MMHG
ECHO MV VTI: 31.5 CM
ECHO RV TAPSE: 2.4 CM (ref 1.7–?)

## 2024-05-30 PROCEDURE — 93306 TTE W/DOPPLER COMPLETE: CPT

## 2024-06-03 ENCOUNTER — TELEPHONE (OUTPATIENT)
Dept: CARDIOLOGY CLINIC | Age: 84
End: 2024-06-03

## 2024-06-03 NOTE — TELEPHONE ENCOUNTER
----- Message from ADORE Dsouza CNP sent at 6/3/2024  1:32 PM EDT -----  Please let him know that echocardiogram showed overall normal heart function and pressures.  No changes and follow-up as planned with Dr. Solis.

## 2024-06-05 ENCOUNTER — TELEPHONE (OUTPATIENT)
Dept: CARDIOLOGY CLINIC | Age: 84
End: 2024-06-05

## 2024-06-05 NOTE — TELEPHONE ENCOUNTER
Ok for procedure. Ok to hold plavix least amount of time necessary and resume as soon as possible after.

## 2024-06-05 NOTE — PROGRESS NOTES
Simvastatin     Review of Systems:   All 12 point review of symptoms completed. Pertinent positives identified in the HPI, all other review of symptoms negative as below.    Physical Examination:    Vitals:    06/10/24 1118   BP: 122/60   Pulse: 65   SpO2: 92%   Weight: 111.1 kg (245 lb)   Height: 1.702 m (5' 7.01\")       General Appearance:  Alert, cooperative, no distress, appears stated age   Head:  Normocephalic, without obvious abnormality, atraumatic   Eyes:  PERRL, conjunctiva/corneas clear       Nose: Nares normal, no drainage or sinus tenderness   Throat: Lips, mucosa, and tongue normal   Neck: Supple, symmetrical, trachea midline, no adenopathy, thyroid: not enlarged, symmetric, no tenderness/mass/nodules, no carotid bruit or JVD       Lungs:   Dry crackles at bases; respirations unlabored   Chest Wall:  No tenderness or deformity   Heart:  Regular rhythm and normal rate; S1, S2 are normal Soft ANGELA; no rub or gallop   Abdomen:   Soft, non-tender, bowel sounds active all four quadrants,  no masses, no organomegaly           Extremities: Extremities normal, atraumatic, no cyanosis 1+ left edema 1-2+ right edema with joe hose in place   Pulses: 2+ and symmetric   Skin: Skin color, texture, turgor normal, no rashes or lesions   Pysch: Normal mood and affect   Neurologic: Normal gross motor and sensory exam.         Labs    Lab Results   Component Value Date    CHOL 136 03/26/2024    CHOL 115 02/23/2023    CHOL 105 11/13/2021     Lab Results   Component Value Date    TRIG 112 03/26/2024    TRIG 98 02/23/2023    TRIG 106 11/13/2021     Lab Results   Component Value Date    HDL 50 03/26/2024    HDL 42 02/23/2023    HDL 36 (L) 11/13/2021     No components found for: \"LDLCHOLESTEROL\", \"LDLCALC\"    Lab Results   Component Value Date    VLDL 22 03/26/2024    VLDL 20 02/23/2023    VLDL 21 11/13/2021     I have personally reviewed all labs including lipids     Assessment:    83 y.o. patient with:    1. BERTRAND/CP/hx CAD:

## 2024-06-05 NOTE — TELEPHONE ENCOUNTER
Patient needs cardiac clearance for to HOLD Plavix for 5 days for left excision conjunctival mass on 6/12/24 with Dr. Latricia Winn ([facility])    Fax [phone]   Ph [phone]

## 2024-06-06 ENCOUNTER — OFFICE VISIT (OUTPATIENT)
Dept: FAMILY MEDICINE CLINIC | Age: 84
End: 2024-06-06
Payer: MEDICARE

## 2024-06-06 VITALS
SYSTOLIC BLOOD PRESSURE: 136 MMHG | HEART RATE: 86 BPM | BODY MASS INDEX: 38.14 KG/M2 | OXYGEN SATURATION: 96 % | WEIGHT: 243 LBS | HEIGHT: 67 IN | DIASTOLIC BLOOD PRESSURE: 74 MMHG

## 2024-06-06 DIAGNOSIS — Z01.818 PRE-OPERATIVE EXAMINATION: Primary | ICD-10-CM

## 2024-06-06 DIAGNOSIS — J84.9 ILD (INTERSTITIAL LUNG DISEASE) (HCC): ICD-10-CM

## 2024-06-06 DIAGNOSIS — E78.2 MIXED HYPERLIPIDEMIA: ICD-10-CM

## 2024-06-06 DIAGNOSIS — F33.41 RECURRENT MAJOR DEPRESSIVE DISORDER, IN PARTIAL REMISSION (HCC): ICD-10-CM

## 2024-06-06 DIAGNOSIS — R31.9 HEMATURIA, UNSPECIFIED TYPE: ICD-10-CM

## 2024-06-06 DIAGNOSIS — N40.0 BENIGN PROSTATIC HYPERPLASIA WITHOUT LOWER URINARY TRACT SYMPTOMS: ICD-10-CM

## 2024-06-06 DIAGNOSIS — G25.81 RESTLESS LEGS SYNDROME (RLS): ICD-10-CM

## 2024-06-06 DIAGNOSIS — K21.9 GASTROESOPHAGEAL REFLUX DISEASE WITHOUT ESOPHAGITIS: ICD-10-CM

## 2024-06-06 DIAGNOSIS — E03.9 HYPOTHYROIDISM, UNSPECIFIED TYPE: ICD-10-CM

## 2024-06-06 DIAGNOSIS — I20.89 ANGINAL EQUIVALENT (HCC): ICD-10-CM

## 2024-06-06 DIAGNOSIS — R32 URINARY INCONTINENCE, UNSPECIFIED TYPE: ICD-10-CM

## 2024-06-06 DIAGNOSIS — J44.9 CHRONIC OBSTRUCTIVE PULMONARY DISEASE, UNSPECIFIED COPD TYPE (HCC): ICD-10-CM

## 2024-06-06 DIAGNOSIS — D50.9 IRON DEFICIENCY ANEMIA, UNSPECIFIED IRON DEFICIENCY ANEMIA TYPE: ICD-10-CM

## 2024-06-06 DIAGNOSIS — I50.32 CHRONIC DIASTOLIC CONGESTIVE HEART FAILURE (HCC): ICD-10-CM

## 2024-06-06 DIAGNOSIS — I10 ESSENTIAL HYPERTENSION: ICD-10-CM

## 2024-06-06 DIAGNOSIS — G47.33 OSA ON CPAP: ICD-10-CM

## 2024-06-06 DIAGNOSIS — I25.119 CORONARY ARTERY DISEASE INVOLVING NATIVE CORONARY ARTERY OF NATIVE HEART WITH ANGINA PECTORIS (HCC): ICD-10-CM

## 2024-06-06 PROCEDURE — 1123F ACP DISCUSS/DSCN MKR DOCD: CPT | Performed by: NURSE PRACTITIONER

## 2024-06-06 PROCEDURE — 3075F SYST BP GE 130 - 139MM HG: CPT | Performed by: NURSE PRACTITIONER

## 2024-06-06 PROCEDURE — 3078F DIAST BP <80 MM HG: CPT | Performed by: NURSE PRACTITIONER

## 2024-06-06 PROCEDURE — 99213 OFFICE O/P EST LOW 20 MIN: CPT | Performed by: NURSE PRACTITIONER

## 2024-06-06 NOTE — PROGRESS NOTES
associated with an overall increase in mortality.  Beta-blockers should be started days to weeks prior to surgery and titrated to pulse < 70.  4. Deep vein thrombosis prophylaxis: regimen to be chosen by surgical team  5. No contraindications to planned surgery      If you have questions, please do not hesitate to call me ([phone]).     Sincerely,    Martha Peoples, CNP

## 2024-06-10 ENCOUNTER — OFFICE VISIT (OUTPATIENT)
Dept: CARDIOLOGY CLINIC | Age: 84
End: 2024-06-10
Payer: MEDICARE

## 2024-06-10 VITALS
BODY MASS INDEX: 38.45 KG/M2 | HEIGHT: 67 IN | SYSTOLIC BLOOD PRESSURE: 122 MMHG | WEIGHT: 245 LBS | DIASTOLIC BLOOD PRESSURE: 60 MMHG | HEART RATE: 65 BPM | OXYGEN SATURATION: 92 %

## 2024-06-10 DIAGNOSIS — I25.119 CORONARY ARTERY DISEASE INVOLVING NATIVE CORONARY ARTERY OF NATIVE HEART WITH ANGINA PECTORIS (HCC): ICD-10-CM

## 2024-06-10 DIAGNOSIS — J44.9 CHRONIC OBSTRUCTIVE PULMONARY DISEASE, UNSPECIFIED COPD TYPE (HCC): ICD-10-CM

## 2024-06-10 DIAGNOSIS — E78.2 MIXED HYPERLIPIDEMIA: ICD-10-CM

## 2024-06-10 DIAGNOSIS — I25.9 CHRONIC ISCHEMIC HEART DISEASE: ICD-10-CM

## 2024-06-10 DIAGNOSIS — Z87.891 HISTORY OF TOBACCO ABUSE: ICD-10-CM

## 2024-06-10 DIAGNOSIS — I50.32 CHRONIC DIASTOLIC CONGESTIVE HEART FAILURE (HCC): ICD-10-CM

## 2024-06-10 DIAGNOSIS — I10 ESSENTIAL HYPERTENSION: ICD-10-CM

## 2024-06-10 DIAGNOSIS — I49.9 IRREGULAR HEART RATE: Primary | ICD-10-CM

## 2024-06-10 DIAGNOSIS — R06.09 EXERTIONAL DYSPNEA: ICD-10-CM

## 2024-06-10 PROCEDURE — 99214 OFFICE O/P EST MOD 30 MIN: CPT | Performed by: INTERNAL MEDICINE

## 2024-06-10 PROCEDURE — 3074F SYST BP LT 130 MM HG: CPT | Performed by: INTERNAL MEDICINE

## 2024-06-10 PROCEDURE — 93000 ELECTROCARDIOGRAM COMPLETE: CPT | Performed by: INTERNAL MEDICINE

## 2024-06-10 PROCEDURE — 1123F ACP DISCUSS/DSCN MKR DOCD: CPT | Performed by: INTERNAL MEDICINE

## 2024-06-10 PROCEDURE — 3078F DIAST BP <80 MM HG: CPT | Performed by: INTERNAL MEDICINE

## 2024-06-10 RX ORDER — AMLODIPINE BESYLATE 10 MG/1
TABLET ORAL
Qty: 90 TABLET | Refills: 3 | Status: SHIPPED | OUTPATIENT
Start: 2024-06-10

## 2024-06-10 RX ORDER — NITROGLYCERIN 0.4 MG/1
0.4 TABLET SUBLINGUAL EVERY 5 MIN PRN
Qty: 25 TABLET | Refills: 3 | Status: SHIPPED | OUTPATIENT
Start: 2024-06-10

## 2024-06-10 RX ORDER — ATORVASTATIN CALCIUM 40 MG/1
TABLET, FILM COATED ORAL
Qty: 90 TABLET | Refills: 3 | Status: SHIPPED | OUTPATIENT
Start: 2024-06-10

## 2024-06-14 ENCOUNTER — HOSPITAL ENCOUNTER (OUTPATIENT)
Dept: CT IMAGING | Age: 84
Discharge: HOME | End: 2024-06-14
Payer: MEDICARE

## 2024-06-14 ENCOUNTER — HOSPITAL ENCOUNTER (OUTPATIENT)
Dept: PULMONOLOGY | Age: 84
Discharge: HOME | End: 2024-06-14
Payer: MEDICARE

## 2024-06-14 DIAGNOSIS — J84.9 ILD (INTERSTITIAL LUNG DISEASE) (HCC): ICD-10-CM

## 2024-06-14 LAB
DLCO %PRED: 51 %
DLCO PRED: NORMAL
DLCO/VA %PRED: NORMAL
DLCO/VA PRED: NORMAL
DLCO/VA: NORMAL
DLCO: NORMAL
EXPIRATORY TIME-POST: NORMAL
EXPIRATORY TIME: NORMAL
FEF 25-75 %CHNG: NORMAL
FEF 25-75 POST %PRED: NORMAL
FEF 25-75% %PRED-PRE: NORMAL
FEF 25-75% PRED: NORMAL
FEF 25-75-POST: NORMAL
FEF 25-75-PRE: NORMAL
FEV1 %PRED-POST: 88 %
FEV1 %PRED-PRE: 92 %
FEV1 PRED: NORMAL
FEV1-POST: NORMAL
FEV1-PRE: NORMAL
FEV1/FVC %PRED-POST: NORMAL
FEV1/FVC %PRED-PRE: NORMAL
FEV1/FVC PRED: NORMAL
FEV1/FVC-POST: 79 %
FEV1/FVC-PRE: 86 %
FVC %PRED-POST: NORMAL
FVC %PRED-PRE: NORMAL
FVC PRED: NORMAL
FVC-POST: NORMAL
FVC-PRE: NORMAL
GAW %PRED: NORMAL
GAW PRED: NORMAL
GAW: NORMAL
IC PRE %PRED: NORMAL
IC PRED: NORMAL
IC: NORMAL
MEP: NORMAL
MIP: NORMAL
MVV %PRED-PRE: NORMAL
MVV PRED: NORMAL
MVV-PRE: NORMAL
PEF %PRED-POST: NORMAL
PEF %PRED-PRE: NORMAL
PEF PRED: NORMAL
PEF%CHNG: NORMAL
PEF-POST: NORMAL
PEF-PRE: NORMAL
RAW %PRED: NORMAL
RAW PRED: NORMAL
RAW: NORMAL
RV PRE %PRED: NORMAL
RV PRED: NORMAL
RV: NORMAL
SVC %PRED: NORMAL
SVC PRED: NORMAL
SVC: NORMAL
TLC PRE %PRED: 71 %
TLC PRED: NORMAL
TLC: NORMAL
VA %PRED: NORMAL
VA PRED: NORMAL
VA: NORMAL
VTG %PRED: NORMAL
VTG PRED: NORMAL
VTG: NORMAL

## 2024-06-14 PROCEDURE — 71250 CT THORAX DX C-: CPT

## 2024-06-14 PROCEDURE — 94640 AIRWAY INHALATION TREATMENT: CPT

## 2024-06-14 PROCEDURE — 6370000000 HC RX 637 (ALT 250 FOR IP): Performed by: INTERNAL MEDICINE

## 2024-06-14 PROCEDURE — 94726 PLETHYSMOGRAPHY LUNG VOLUMES: CPT

## 2024-06-14 PROCEDURE — 94618 PULMONARY STRESS TESTING: CPT

## 2024-06-14 PROCEDURE — 94729 DIFFUSING CAPACITY: CPT

## 2024-06-14 PROCEDURE — 94060 EVALUATION OF WHEEZING: CPT

## 2024-06-14 RX ORDER — ALBUTEROL SULFATE 90 UG/1
4 AEROSOL, METERED RESPIRATORY (INHALATION) ONCE
Status: COMPLETED | OUTPATIENT
Start: 2024-06-14 | End: 2024-06-14

## 2024-06-14 RX ADMIN — Medication 4 PUFF: at 07:50

## 2024-06-14 ASSESSMENT — PULMONARY FUNCTION TESTS
FEV1_PERCENT_PREDICTED_PRE: 92
FEV1_PERCENT_PREDICTED_POST: 88
FEV1/FVC_POST: 79
FEV1/FVC_PRE: 86

## 2024-06-17 DIAGNOSIS — F33.41 RECURRENT MAJOR DEPRESSIVE DISORDER, IN PARTIAL REMISSION (HCC): ICD-10-CM

## 2024-06-17 DIAGNOSIS — K21.9 GASTROESOPHAGEAL REFLUX DISEASE WITHOUT ESOPHAGITIS: ICD-10-CM

## 2024-06-17 RX ORDER — VENLAFAXINE 75 MG/1
75 TABLET ORAL 2 TIMES DAILY
Qty: 180 TABLET | Refills: 0 | Status: SHIPPED | OUTPATIENT
Start: 2024-06-17

## 2024-06-17 RX ORDER — OMEPRAZOLE 40 MG/1
40 CAPSULE, DELAYED RELEASE ORAL DAILY
Qty: 90 CAPSULE | Refills: 0 | Status: SHIPPED | OUTPATIENT
Start: 2024-06-17

## 2024-06-25 ENCOUNTER — OFFICE VISIT (OUTPATIENT)
Dept: PULMONOLOGY | Age: 84
End: 2024-06-25
Payer: MEDICARE

## 2024-06-25 VITALS
DIASTOLIC BLOOD PRESSURE: 60 MMHG | WEIGHT: 249.8 LBS | SYSTOLIC BLOOD PRESSURE: 128 MMHG | BODY MASS INDEX: 39.21 KG/M2 | HEIGHT: 67 IN | HEART RATE: 55 BPM | RESPIRATION RATE: 18 BRPM | OXYGEN SATURATION: 95 %

## 2024-06-25 DIAGNOSIS — J84.9 ILD (INTERSTITIAL LUNG DISEASE) (HCC): Primary | ICD-10-CM

## 2024-06-25 PROCEDURE — 1123F ACP DISCUSS/DSCN MKR DOCD: CPT | Performed by: INTERNAL MEDICINE

## 2024-06-25 PROCEDURE — 3078F DIAST BP <80 MM HG: CPT | Performed by: INTERNAL MEDICINE

## 2024-06-25 PROCEDURE — 3074F SYST BP LT 130 MM HG: CPT | Performed by: INTERNAL MEDICINE

## 2024-06-25 PROCEDURE — 99214 OFFICE O/P EST MOD 30 MIN: CPT | Performed by: INTERNAL MEDICINE

## 2024-06-25 ASSESSMENT — SLEEP AND FATIGUE QUESTIONNAIRES
HOW LIKELY ARE YOU TO NOD OFF OR FALL ASLEEP WHILE LYING DOWN TO REST IN THE AFTERNOON WHEN CIRCUMSTANCES PERMIT: HIGH CHANCE OF DOZING
HOW LIKELY ARE YOU TO NOD OFF OR FALL ASLEEP WHILE SITTING AND TALKING TO SOMEONE: WOULD NEVER DOZE
HOW LIKELY ARE YOU TO NOD OFF OR FALL ASLEEP IN A CAR, WHILE STOPPED FOR A FEW MINUTES IN TRAFFIC: WOULD NEVER DOZE
ESS TOTAL SCORE: 18
HOW LIKELY ARE YOU TO NOD OFF OR FALL ASLEEP WHILE SITTING INACTIVE IN A PUBLIC PLACE: HIGH CHANCE OF DOZING
HOW LIKELY ARE YOU TO NOD OFF OR FALL ASLEEP WHEN YOU ARE A PASSENGER IN A CAR FOR AN HOUR WITHOUT A BREAK: HIGH CHANCE OF DOZING
HOW LIKELY ARE YOU TO NOD OFF OR FALL ASLEEP WHILE SITTING AND READING: HIGH CHANCE OF DOZING
NECK CIRCUMFERENCE (INCHES): 17
HOW LIKELY ARE YOU TO NOD OFF OR FALL ASLEEP WHILE WATCHING TV: HIGH CHANCE OF DOZING
HOW LIKELY ARE YOU TO NOD OFF OR FALL ASLEEP WHILE SITTING QUIETLY AFTER LUNCH WITHOUT ALCOHOL: HIGH CHANCE OF DOZING

## 2024-06-25 NOTE — PROGRESS NOTES
TAKE ONE TABLET BY MOUTH EVERY NIGHT AT BEDTIME 180 tablet 1    triamcinolone (KENALOG) 0.1 % cream APPLY 4 GRAMS TO AFFECTED AREA(S) DAILY FOR 7 DAYS 80 g 5    Elastic Bandages & Supports (RELIEF MEDICAL LEG KNEE HIGH) MISC 20-30 mmHG compression knee high stockings with side zipper,  On QAM and remove QHS 1 each 0    Misc Natural Products (TUMERSAID PO) Take by mouth 2 caps a daily      methocarbamol (ROBAXIN) 500 MG tablet Take 1 tablet by mouth 3 times daily as needed (spasm) 90 tablet 0    cetirizine (ZYRTEC) 10 MG tablet TAKE ONE TABLET BY MOUTH ONCE NIGHTLY 90 tablet 1    zinc gluconate 50 MG tablet Take 1 tablet by mouth daily      albuterol sulfate HFA (VENTOLIN HFA) 108 (90 Base) MCG/ACT inhaler Inhale 2 puffs into the lungs every 6 hours as needed for Wheezing or Shortness of Breath 1 Inhaler 5    magnesium gluconate (MAGONATE) 500 MG tablet Take 1 tablet by mouth daily      Vitamin D (CHOLECALCIFEROL) 1000 UNITS CAPS capsule Take 1 capsule by mouth daily      calcium carbonate-vitamin D 600-200 MG-UNIT TABS Take 600 mg by mouth daily       No current facility-administered medications for this visit.       SOCIAL AND OCCUPATIONAL HEALTH:  Social History     Tobacco Use   Smoking Status Former    Current packs/day: 0.00    Average packs/day: 1.5 packs/day for 17.0 years (25.5 ttl pk-yrs)    Types: Cigarettes    Start date: 1957    Quit date: 1974    Years since quittin.5   Smokeless Tobacco Never     TB :  Pets   Industrial exposure:  Birds :    SURGICAL HISTORY:   Past Surgical History:   Procedure Laterality Date    CARDIAC CATHETERIZATION  2021    Non Obs CAD, medical management    CATARACT REMOVAL Bilateral 2024    CHOLECYSTECTOMY, LAPAROSCOPIC N/A 2022    LAPAROSCOPIC CHOLECYSTECTOMY [facility]as[facility] [place] [facility]    CIRCUMCISION      COLONOSCOPY      COLONOSCOPY  2015    CORONARY ANGIOPLASTY  10/15/2019    CYSTOSCOPY  2016    CYSTOSCOPY

## 2024-07-10 DIAGNOSIS — I10 ESSENTIAL HYPERTENSION: ICD-10-CM

## 2024-07-10 RX ORDER — TRIAMTERENE AND HYDROCHLOROTHIAZIDE 37.5; 25 MG/1; MG/1
1 TABLET ORAL DAILY
Qty: 90 TABLET | Refills: 3 | Status: SHIPPED | OUTPATIENT
Start: 2024-07-10

## 2024-07-11 ENCOUNTER — TELEPHONE (OUTPATIENT)
Dept: PULMONOLOGY | Age: 84
End: 2024-07-11

## 2024-07-11 NOTE — TELEPHONE ENCOUNTER
Called pt, spoke to pt spouse, gave her the phone number to Kassie KIMBROUGH, told them to call her tomorrow around 10-2 to get all of the information needed to help guide them through the process. Pt spouse verbalized understanding

## 2024-07-12 NOTE — TELEPHONE ENCOUNTER
Carelon called for additional information regarding patient's prescription. Gave information with Chacha.

## 2024-07-22 ENCOUNTER — TELEPHONE (OUTPATIENT)
Dept: PULMONOLOGY | Age: 84
End: 2024-07-22

## 2024-07-28 DIAGNOSIS — G25.81 RESTLESS LEGS SYNDROME (RLS): ICD-10-CM

## 2024-07-29 RX ORDER — PRAMIPEXOLE DIHYDROCHLORIDE 1.5 MG/1
TABLET ORAL
Qty: 180 TABLET | Refills: 0 | Status: SHIPPED | OUTPATIENT
Start: 2024-07-29

## 2024-07-29 NOTE — TELEPHONE ENCOUNTER
Refill Request     CONFIRM preferrred pharmacy with the patient.    If Mail Order Rx - Pend for 90 day refill.      Last Seen: Last Seen Department: 6/6/2024  Last Seen by PCP: 6/6/2024    Last Written: 2/5/24    If no future appointment scheduled, route STAFF MESSAGE with patient name to the  Pool for scheduling.      Next Appointment:   Future Appointments   Date Time Provider Department Center   9/26/2024  1:00 PM Martha Peoples, APRN - [facility]NP [facility] [place] [facility]   11/5/2024  1:30 PM Carmelo Resendiz MD CLERM [facility]   11/25/2024 11:00 AM Arie Solis MD [facility]       Message sent to  to schedule appt with patient?  N/A      Requested Prescriptions     Pending Prescriptions Disp Refills    pramipexole (MIRAPEX) 1.5 MG tablet [Pharmacy Med Name: PRAMIPEXOLE 1.5 MG TABLET] 180 tablet 1     Sig: TAKE 1 TABLET BY MOUTH EVERY MORNING AND TAKE 1 TABLET BY MOUTH EVERY NIGHT AT BEDTIME

## 2024-07-31 DIAGNOSIS — G25.81 RESTLESS LEGS SYNDROME (RLS): ICD-10-CM

## 2024-07-31 RX ORDER — PRAMIPEXOLE DIHYDROCHLORIDE 1.5 MG/1
TABLET ORAL
Qty: 180 TABLET | Refills: 0 | OUTPATIENT
Start: 2024-07-31

## 2024-08-22 DIAGNOSIS — D50.9 IRON DEFICIENCY ANEMIA, UNSPECIFIED IRON DEFICIENCY ANEMIA TYPE: ICD-10-CM

## 2024-08-22 RX ORDER — FERROUS SULFATE 325(65) MG
1 TABLET ORAL DAILY
Qty: 90 TABLET | Refills: 0 | Status: SHIPPED | OUTPATIENT
Start: 2024-08-22

## 2024-08-22 NOTE — TELEPHONE ENCOUNTER
Refill Request     CONFIRM preferrred pharmacy with the patient.    If Mail Order Rx - Pend for 90 day refill.      Last Seen: Last Seen Department: 6/6/2024  Last Seen by PCP: 6/6/2024    Last Written: 3/11/24    If no future appointment scheduled, route STAFF MESSAGE with patient name to the  Pool for scheduling.      Next Appointment:   Future Appointments   Date Time Provider Department Center   9/26/2024  1:00 PM Martha Peoples, APRN - CNP [facility]   11/5/2024  1:30 PM Carmelo Resendiz MD CLERM [facility]   11/25/2024 11:00 AM Arie Solis MD [facility]       Message sent to  to schedule appt with patient?  N/A      Requested Prescriptions     Pending Prescriptions Disp Refills    FEROSUL 325 (65 Fe) MG tablet [Pharmacy Med Name: FEROSUL 325 MG TABLET] 90 tablet 0     Sig: TAKE 1 TABLET BY MOUTH DAILY WITH BREAKFAST

## 2024-09-14 DIAGNOSIS — F33.41 RECURRENT MAJOR DEPRESSIVE DISORDER, IN PARTIAL REMISSION (HCC): ICD-10-CM

## 2024-09-14 DIAGNOSIS — K21.9 GASTROESOPHAGEAL REFLUX DISEASE WITHOUT ESOPHAGITIS: ICD-10-CM

## 2024-09-16 RX ORDER — VENLAFAXINE 75 MG/1
75 TABLET ORAL 2 TIMES DAILY
Qty: 180 TABLET | Refills: 0 | Status: SHIPPED | OUTPATIENT
Start: 2024-09-16

## 2024-09-16 RX ORDER — OMEPRAZOLE 40 MG/1
40 CAPSULE, DELAYED RELEASE ORAL DAILY
Qty: 90 CAPSULE | Refills: 1 | Status: SHIPPED | OUTPATIENT
Start: 2024-09-16

## 2024-09-26 ENCOUNTER — OFFICE VISIT (OUTPATIENT)
Dept: FAMILY MEDICINE CLINIC | Age: 84
End: 2024-09-26
Payer: MEDICARE

## 2024-09-26 VITALS
HEART RATE: 62 BPM | DIASTOLIC BLOOD PRESSURE: 70 MMHG | BODY MASS INDEX: 39.24 KG/M2 | WEIGHT: 250 LBS | OXYGEN SATURATION: 96 % | SYSTOLIC BLOOD PRESSURE: 134 MMHG | HEIGHT: 67 IN

## 2024-09-26 DIAGNOSIS — D50.9 IRON DEFICIENCY ANEMIA, UNSPECIFIED IRON DEFICIENCY ANEMIA TYPE: ICD-10-CM

## 2024-09-26 DIAGNOSIS — E03.9 HYPOTHYROIDISM, UNSPECIFIED TYPE: ICD-10-CM

## 2024-09-26 DIAGNOSIS — E55.9 VITAMIN D DEFICIENCY: ICD-10-CM

## 2024-09-26 DIAGNOSIS — I10 ESSENTIAL HYPERTENSION: ICD-10-CM

## 2024-09-26 DIAGNOSIS — K21.9 GASTROESOPHAGEAL REFLUX DISEASE WITHOUT ESOPHAGITIS: ICD-10-CM

## 2024-09-26 DIAGNOSIS — E78.2 MIXED HYPERLIPIDEMIA: ICD-10-CM

## 2024-09-26 DIAGNOSIS — G25.81 RESTLESS LEGS SYNDROME (RLS): ICD-10-CM

## 2024-09-26 DIAGNOSIS — F33.41 RECURRENT MAJOR DEPRESSIVE DISORDER, IN PARTIAL REMISSION (HCC): Primary | ICD-10-CM

## 2024-09-26 PROCEDURE — 1123F ACP DISCUSS/DSCN MKR DOCD: CPT | Performed by: NURSE PRACTITIONER

## 2024-09-26 PROCEDURE — 3075F SYST BP GE 130 - 139MM HG: CPT | Performed by: NURSE PRACTITIONER

## 2024-09-26 PROCEDURE — 3078F DIAST BP <80 MM HG: CPT | Performed by: NURSE PRACTITIONER

## 2024-09-26 PROCEDURE — 99214 OFFICE O/P EST MOD 30 MIN: CPT | Performed by: NURSE PRACTITIONER

## 2024-09-26 RX ORDER — FINASTERIDE 5 MG/1
TABLET, FILM COATED ORAL
COMMUNITY
Start: 2024-09-17

## 2024-09-26 RX ORDER — NINTEDANIB 100 MG/1
CAPSULE ORAL
COMMUNITY
Start: 2024-09-04

## 2024-09-26 ASSESSMENT — PATIENT HEALTH QUESTIONNAIRE - PHQ9
9. THOUGHTS THAT YOU WOULD BE BETTER OFF DEAD, OR OF HURTING YOURSELF: NOT AT ALL
1. LITTLE INTEREST OR PLEASURE IN DOING THINGS: NOT AT ALL
10. IF YOU CHECKED OFF ANY PROBLEMS, HOW DIFFICULT HAVE THESE PROBLEMS MADE IT FOR YOU TO DO YOUR WORK, TAKE CARE OF THINGS AT HOME, OR GET ALONG WITH OTHER PEOPLE: NOT DIFFICULT AT ALL
6. FEELING BAD ABOUT YOURSELF - OR THAT YOU ARE A FAILURE OR HAVE LET YOURSELF OR YOUR FAMILY DOWN: NOT AT ALL
SUM OF ALL RESPONSES TO PHQ QUESTIONS 1-9: 0
4. FEELING TIRED OR HAVING LITTLE ENERGY: NOT AT ALL
2. FEELING DOWN, DEPRESSED OR HOPELESS: NOT AT ALL
8. MOVING OR SPEAKING SO SLOWLY THAT OTHER PEOPLE COULD HAVE NOTICED. OR THE OPPOSITE, BEING SO FIGETY OR RESTLESS THAT YOU HAVE BEEN MOVING AROUND A LOT MORE THAN USUAL: NOT AT ALL
SUM OF ALL RESPONSES TO PHQ QUESTIONS 1-9: 0
SUM OF ALL RESPONSES TO PHQ9 QUESTIONS 1 & 2: 0
7. TROUBLE CONCENTRATING ON THINGS, SUCH AS READING THE NEWSPAPER OR WATCHING TELEVISION: NOT AT ALL
SUM OF ALL RESPONSES TO PHQ QUESTIONS 1-9: 0
SUM OF ALL RESPONSES TO PHQ QUESTIONS 1-9: 0
5. POOR APPETITE OR OVEREATING: NOT AT ALL
3. TROUBLE FALLING OR STAYING ASLEEP: NOT AT ALL

## 2024-09-26 ASSESSMENT — ANXIETY QUESTIONNAIRES
IF YOU CHECKED OFF ANY PROBLEMS ON THIS QUESTIONNAIRE, HOW DIFFICULT HAVE THESE PROBLEMS MADE IT FOR YOU TO DO YOUR WORK, TAKE CARE OF THINGS AT HOME, OR GET ALONG WITH OTHER PEOPLE: NOT DIFFICULT AT ALL
7. FEELING AFRAID AS IF SOMETHING AWFUL MIGHT HAPPEN: NOT AT ALL
3. WORRYING TOO MUCH ABOUT DIFFERENT THINGS: NOT AT ALL
1. FEELING NERVOUS, ANXIOUS, OR ON EDGE: SEVERAL DAYS
2. NOT BEING ABLE TO STOP OR CONTROL WORRYING: NOT AT ALL
6. BECOMING EASILY ANNOYED OR IRRITABLE: NOT AT ALL
4. TROUBLE RELAXING: NOT AT ALL
5. BEING SO RESTLESS THAT IT IS HARD TO SIT STILL: NOT AT ALL
GAD7 TOTAL SCORE: 1

## 2024-09-27 ENCOUNTER — TELEPHONE (OUTPATIENT)
Dept: PULMONOLOGY | Age: 84
End: 2024-09-27

## 2024-09-27 ENCOUNTER — HOSPITAL ENCOUNTER (EMERGENCY)
Age: 84
Discharge: HOME | End: 2024-09-27
Attending: STUDENT IN AN ORGANIZED HEALTH CARE EDUCATION/TRAINING PROGRAM
Payer: MEDICARE

## 2024-09-27 ENCOUNTER — APPOINTMENT (OUTPATIENT)
Dept: CT IMAGING | Age: 84
End: 2024-09-27
Payer: MEDICARE

## 2024-09-27 ENCOUNTER — APPOINTMENT (OUTPATIENT)
Dept: GENERAL RADIOLOGY | Age: 84
End: 2024-09-27
Payer: MEDICARE

## 2024-09-27 VITALS
TEMPERATURE: 97.7 F | BODY MASS INDEX: 39.15 KG/M2 | OXYGEN SATURATION: 95 % | RESPIRATION RATE: 18 BRPM | DIASTOLIC BLOOD PRESSURE: 84 MMHG | HEIGHT: 67 IN | SYSTOLIC BLOOD PRESSURE: 177 MMHG | WEIGHT: 249.4 LBS | HEART RATE: 57 BPM

## 2024-09-27 DIAGNOSIS — R10.13 ABDOMINAL PAIN, EPIGASTRIC: Primary | ICD-10-CM

## 2024-09-27 DIAGNOSIS — N13.30 HYDRONEPHROSIS, UNSPECIFIED HYDRONEPHROSIS TYPE: ICD-10-CM

## 2024-09-27 DIAGNOSIS — N30.01 ACUTE CYSTITIS WITH HEMATURIA: ICD-10-CM

## 2024-09-27 LAB
ALBUMIN SERPL-MCNC: 4.3 G/DL (ref 3.4–5)
ALBUMIN/GLOB SERPL: 1.3 {RATIO} (ref 1.1–2.2)
ALP SERPL-CCNC: 109 U/L (ref 40–129)
ALT SERPL-CCNC: 23 U/L (ref 10–40)
ANION GAP SERPL CALCULATED.3IONS-SCNC: 15 MMOL/L (ref 3–16)
AST SERPL-CCNC: 19 U/L (ref 15–37)
BACTERIA URNS QL MICRO: ABNORMAL /HPF
BASOPHILS # BLD: 0.1 K/UL (ref 0–0.2)
BASOPHILS NFR BLD: 0.9 %
BILIRUB SERPL-MCNC: 0.7 MG/DL (ref 0–1)
BILIRUB UR QL STRIP.AUTO: NEGATIVE
BUN SERPL-MCNC: 19 MG/DL (ref 7–20)
CALCIUM SERPL-MCNC: 9.3 MG/DL (ref 8.3–10.6)
CHLORIDE SERPL-SCNC: 98 MMOL/L (ref 99–110)
CLARITY UR: CLEAR
CO2 SERPL-SCNC: 26 MMOL/L (ref 21–32)
COLOR UR: YELLOW
CREAT SERPL-MCNC: 0.9 MG/DL (ref 0.8–1.3)
DEPRECATED RDW RBC AUTO: 14.6 % (ref 12.4–15.4)
EKG ATRIAL RATE: 56 BPM
EKG DIAGNOSIS: NORMAL
EKG P AXIS: 26 DEGREES
EKG P-R INTERVAL: 204 MS
EKG Q-T INTERVAL: 456 MS
EKG QRS DURATION: 98 MS
EKG QTC CALCULATION (BAZETT): 440 MS
EKG R AXIS: -45 DEGREES
EKG T AXIS: 95 DEGREES
EKG VENTRICULAR RATE: 56 BPM
EOSINOPHIL # BLD: 0.4 K/UL (ref 0–0.6)
EOSINOPHIL NFR BLD: 4.3 %
EPI CELLS #/AREA URNS HPF: ABNORMAL /HPF (ref 0–5)
GFR SERPLBLD CREATININE-BSD FMLA CKD-EPI: 85 ML/MIN/{1.73_M2}
GLUCOSE SERPL-MCNC: 129 MG/DL (ref 70–99)
GLUCOSE UR STRIP.AUTO-MCNC: NEGATIVE MG/DL
HCT VFR BLD AUTO: 44.9 % (ref 40.5–52.5)
HGB BLD-MCNC: 15.2 G/DL (ref 13.5–17.5)
HGB UR QL STRIP.AUTO: ABNORMAL
KETONES UR STRIP.AUTO-MCNC: NEGATIVE MG/DL
LACTATE BLDV-SCNC: 0.9 MMOL/L (ref 0.4–2)
LEUKOCYTE ESTERASE UR QL STRIP.AUTO: ABNORMAL
LIPASE SERPL-CCNC: 19 U/L (ref 13–60)
LYMPHOCYTES # BLD: 1.8 K/UL (ref 1–5.1)
LYMPHOCYTES NFR BLD: 19.7 %
MCH RBC QN AUTO: 30.6 PG (ref 26–34)
MCHC RBC AUTO-ENTMCNC: 33.9 G/DL (ref 31–36)
MCV RBC AUTO: 90.2 FL (ref 80–100)
MONOCYTES # BLD: 0.6 K/UL (ref 0–1.3)
MONOCYTES NFR BLD: 6.5 %
NEUTROPHILS # BLD: 6.2 K/UL (ref 1.7–7.7)
NEUTROPHILS NFR BLD: 68.6 %
NITRITE UR QL STRIP.AUTO: NEGATIVE
PH UR STRIP.AUTO: 7 [PH] (ref 5–8)
PLATELET # BLD AUTO: 207 K/UL (ref 135–450)
PMV BLD AUTO: 7 FL (ref 5–10.5)
POTASSIUM SERPL-SCNC: 3.6 MMOL/L (ref 3.5–5.1)
PROT SERPL-MCNC: 7.7 G/DL (ref 6.4–8.2)
PROT UR STRIP.AUTO-MCNC: NEGATIVE MG/DL
RBC # BLD AUTO: 4.98 M/UL (ref 4.2–5.9)
RBC #/AREA URNS HPF: ABNORMAL /HPF (ref 0–4)
SODIUM SERPL-SCNC: 139 MMOL/L (ref 136–145)
SP GR UR STRIP.AUTO: 1.01 (ref 1–1.03)
TROPONIN, HIGH SENSITIVITY: 18 NG/L (ref 0–22)
TROPONIN, HIGH SENSITIVITY: 24 NG/L (ref 0–22)
UA DIPSTICK W REFLEX MICRO PNL UR: YES
URN SPEC COLLECT METH UR: ABNORMAL
UROBILINOGEN UR STRIP-ACNC: 0.2 E.U./DL
WBC # BLD AUTO: 9 K/UL (ref 4–11)
WBC #/AREA URNS HPF: ABNORMAL /HPF (ref 0–5)

## 2024-09-27 PROCEDURE — 93010 ELECTROCARDIOGRAM REPORT: CPT | Performed by: INTERNAL MEDICINE

## 2024-09-27 PROCEDURE — 74177 CT ABD & PELVIS W/CONTRAST: CPT

## 2024-09-27 PROCEDURE — 6360000004 HC RX CONTRAST MEDICATION: Performed by: STUDENT IN AN ORGANIZED HEALTH CARE EDUCATION/TRAINING PROGRAM

## 2024-09-27 PROCEDURE — 84484 ASSAY OF TROPONIN QUANT: CPT

## 2024-09-27 PROCEDURE — 93005 ELECTROCARDIOGRAM TRACING: CPT | Performed by: STUDENT IN AN ORGANIZED HEALTH CARE EDUCATION/TRAINING PROGRAM

## 2024-09-27 PROCEDURE — 80053 COMPREHEN METABOLIC PANEL: CPT

## 2024-09-27 PROCEDURE — 36415 COLL VENOUS BLD VENIPUNCTURE: CPT

## 2024-09-27 PROCEDURE — 83690 ASSAY OF LIPASE: CPT

## 2024-09-27 PROCEDURE — 81001 URINALYSIS AUTO W/SCOPE: CPT

## 2024-09-27 PROCEDURE — 2500000003 HC RX 250 WO HCPCS: Performed by: STUDENT IN AN ORGANIZED HEALTH CARE EDUCATION/TRAINING PROGRAM

## 2024-09-27 PROCEDURE — 85025 COMPLETE CBC W/AUTO DIFF WBC: CPT

## 2024-09-27 PROCEDURE — 71045 X-RAY EXAM CHEST 1 VIEW: CPT

## 2024-09-27 PROCEDURE — 87086 URINE CULTURE/COLONY COUNT: CPT

## 2024-09-27 PROCEDURE — 96374 THER/PROPH/DIAG INJ IV PUSH: CPT

## 2024-09-27 PROCEDURE — 87077 CULTURE AEROBIC IDENTIFY: CPT

## 2024-09-27 PROCEDURE — 87186 SC STD MICRODIL/AGAR DIL: CPT

## 2024-09-27 PROCEDURE — 99285 EMERGENCY DEPT VISIT HI MDM: CPT

## 2024-09-27 PROCEDURE — 83605 ASSAY OF LACTIC ACID: CPT

## 2024-09-27 RX ORDER — IOPAMIDOL 755 MG/ML
75 INJECTION, SOLUTION INTRAVASCULAR
Status: COMPLETED | OUTPATIENT
Start: 2024-09-27 | End: 2024-09-27

## 2024-09-27 RX ORDER — CEPHALEXIN 500 MG/1
500 CAPSULE ORAL 4 TIMES DAILY
Qty: 28 CAPSULE | Refills: 0 | Status: SHIPPED | OUTPATIENT
Start: 2024-09-27 | End: 2024-10-04

## 2024-09-27 RX ORDER — FAMOTIDINE 10 MG/ML
20 INJECTION, SOLUTION INTRAVENOUS ONCE
Status: COMPLETED | OUTPATIENT
Start: 2024-09-27 | End: 2024-09-27

## 2024-09-27 RX ORDER — PRAMIPEXOLE DIHYDROCHLORIDE 1.5 MG/1
1.5 TABLET ORAL 2 TIMES DAILY
Qty: 180 TABLET | Refills: 1 | Status: SHIPPED | OUTPATIENT
Start: 2024-09-27

## 2024-09-27 RX ADMIN — IOPAMIDOL 75 ML: 755 INJECTION, SOLUTION INTRAVENOUS at 13:43

## 2024-09-27 RX ADMIN — FAMOTIDINE 20 MG: 10 INJECTION, SOLUTION INTRAVENOUS at 12:47

## 2024-09-27 ASSESSMENT — ENCOUNTER SYMPTOMS
ABDOMINAL PAIN: 0
COUGH: 1
BLOOD IN STOOL: 0
EYES NEGATIVE: 1
DIARRHEA: 1
ALLERGIC/IMMUNOLOGIC NEGATIVE: 1
NAUSEA: 0
ANAL BLEEDING: 0
SHORTNESS OF BREATH: 1

## 2024-09-27 ASSESSMENT — LIFESTYLE VARIABLES
HOW MANY STANDARD DRINKS CONTAINING ALCOHOL DO YOU HAVE ON A TYPICAL DAY: PATIENT DOES NOT DRINK
HOW OFTEN DO YOU HAVE A DRINK CONTAINING ALCOHOL: NEVER

## 2024-09-27 ASSESSMENT — PAIN DESCRIPTION - ORIENTATION: ORIENTATION: MID

## 2024-09-27 ASSESSMENT — PAIN DESCRIPTION - DESCRIPTORS: DESCRIPTORS: SHARP

## 2024-09-27 ASSESSMENT — PAIN SCALES - GENERAL: PAINLEVEL_OUTOF10: 4

## 2024-09-27 ASSESSMENT — PAIN DESCRIPTION - LOCATION: LOCATION: ABDOMEN

## 2024-09-27 ASSESSMENT — PAIN DESCRIPTION - PAIN TYPE: TYPE: ACUTE PAIN

## 2024-09-27 ASSESSMENT — PAIN DESCRIPTION - FREQUENCY: FREQUENCY: INTERMITTENT

## 2024-09-27 NOTE — ED PROVIDER NOTES
[facility] EMERGENCY DEPARTMENT     EMERGENCY DEPARTMENT ENCOUNTER            Pt Name: Jj Lauren   MRN: [id number]   Birthdate 1940   Date of evaluation: 9/27/2024   Provider: Ana Anderson MD   PCP: Martha Peoples, ADORE - CNP   Note Started: 12:05 PM EDT 9/27/24          CHIEF COMPLAINT     Chief Complaint   Patient presents with    Abdominal Pain     Mid abdominal pain that started this morning.        HISTORY OF PRESENT ILLNESS:   History from : Patient   Limitations to history : None     Jj Lauren is a 83 y.o. male who presents complaining of epigastric abdominal pain.  Patient states that he has been having abdominal pain since he woke up this morning.  He states that he is currently on a medication from his pulmonologist that can cause stomach issues and he is concerned that that may be causing his issues.  He states that the pain is sharp, between his sternum and his bellybutton.  Constant.  Nonradiating.  Denies fevers, diarrhea or constipation.  Denies chest pain or shortness of breath.  He denies any other complaints or concerns.    Nursing Notes were all reviewed and agreed with, or any disagreements were addressed in the HPI.     REVIEW OF SYSTEMS :    Positives and Pertinent negatives as per HPI.      MEDICAL HISTORY   has a past medical history of Acute bronchitis (12/18/2014), Acute bronchitis due to Streptococcus (3/4/2020), Acute calculous cholecystitis (5/19/2022), Arthritis, Back injuries, BPH (benign prostatic hyperplasia), Chest pain (03/2021), Cholecystitis, acute (5/3/2022), Claudication ([facility]) (7/2/2020), CPAP (continuous positive airway pressure) dependence, Diverticulosis, Esophageal dysmotility, GERD (gastroesophageal reflux disease), Hiatal hernia, History of colon polyps, Hyperlipidemia, Hypertension, Hyponatremia (5/4/2022), ILD (interstitial lung disease) ([facility]) (07/30/2013), Internal hemorrhoid, Iron deficiency anemia (9/19/2023), Lichen sclerosus of penis

## 2024-09-27 NOTE — TELEPHONE ENCOUNTER
Patient says the medication OFEV that he started taking has symptoms of stomach problems. He is having stomach problems and says that he is going to ER. He just wanted Dr. Resendiz to know.

## 2024-09-29 LAB
BACTERIA UR CULT: ABNORMAL
BACTERIA UR CULT: ABNORMAL
ORGANISM: ABNORMAL
ORGANISM: ABNORMAL

## 2024-09-30 ENCOUNTER — TELEPHONE (OUTPATIENT)
Dept: PULMONOLOGY | Age: 84
End: 2024-09-30

## 2024-09-30 ENCOUNTER — TELEPHONE (OUTPATIENT)
Dept: FAMILY MEDICINE CLINIC | Age: 84
End: 2024-09-30

## 2024-09-30 LAB
BACTERIA UR CULT: ABNORMAL
BACTERIA UR CULT: ABNORMAL
ORGANISM: ABNORMAL
ORGANISM: ABNORMAL

## 2024-09-30 NOTE — TELEPHONE ENCOUNTER
Pt called and said that when he was admitted in the hospital  on 9/27/24 they told him to stop taking ofev. Pt want to know what his next steps or what medication he should be taking. Since pt has been off medication he is not having any symptoms

## 2024-09-30 NOTE — TELEPHONE ENCOUNTER
Patient states that he wanted to discuss the OFEV capsules that dr. Farris  wanted him to take 100mg capsules and he was on them for a while.He states that he was having some issues with having some pain in his abdomen and he went to the ER because you had warned him that this medication could cause a GI bleed. He has since stopped the OFEV medication as dr. Farris informed him to do when he called in to dr. Cortez office for a follow up. He denies any lingering abdomen symptoms. He wants to know if he should follow up with you or not for a ED follow up. I informed him that since he has no lingering symptoms and that he is scheduling a follow up with dr. Salvador then I dont believe he needs to follow up with you but that I would ask you and then if he needs a ER follow up we will call him back to schedule one.

## 2024-09-30 NOTE — TELEPHONE ENCOUNTER
----- Message from Amilcar HEMPHILL sent at 9/30/2024  2:52 PM EDT -----  Regarding: ECC Message to Provider  ECC Message to Provider    Relationship to Patient: Self     Additional Information : The patient wants to talk with the PCP, Martha Peoples, ADORE - CNP, about the OFEV  medication prescribed by Carmelo Resendiz MD  --------------------------------------------------------------------------------------------------------------------------    Call Back Information: Do not leave any message, patient will call back for answer  Preferred Call Back Number: Phone [phone]

## 2024-10-01 ENCOUNTER — CARE COORDINATION (OUTPATIENT)
Dept: CARE COORDINATION | Age: 84
End: 2024-10-01

## 2024-10-01 NOTE — CARE COORDINATION
Ambulatory Care Coordination Note     10/1/2024 12:56 PM     patient outreach attempt by this AC today to offer care management services. Patient answered and immediate told me he was not interested in my calls. Screened out for care coordination.      Patient closed (patient disengaged) from the High Risk Care Management program on 10/1/2024.  Patient  declined .  Care management goals have been completed. No further Ambulatory Care Manager follow up scheduled.

## 2024-10-02 NOTE — TELEPHONE ENCOUNTER
Called pt, spoke to spouse, they do not have internet and are unable to do a video visit. I have them coming in on 10/15 at 2pm unless you want to just do a phone visit. Please let me know what you would like to do?

## 2024-10-15 ENCOUNTER — OFFICE VISIT (OUTPATIENT)
Dept: PULMONOLOGY | Age: 84
End: 2024-10-15
Payer: MEDICARE

## 2024-10-15 VITALS
HEART RATE: 61 BPM | HEIGHT: 67 IN | DIASTOLIC BLOOD PRESSURE: 70 MMHG | OXYGEN SATURATION: 97 % | RESPIRATION RATE: 17 BRPM | SYSTOLIC BLOOD PRESSURE: 132 MMHG | BODY MASS INDEX: 39.55 KG/M2 | WEIGHT: 252 LBS

## 2024-10-15 DIAGNOSIS — J84.9 ILD (INTERSTITIAL LUNG DISEASE) (HCC): Primary | ICD-10-CM

## 2024-10-15 PROCEDURE — 1123F ACP DISCUSS/DSCN MKR DOCD: CPT | Performed by: INTERNAL MEDICINE

## 2024-10-15 PROCEDURE — 3078F DIAST BP <80 MM HG: CPT | Performed by: INTERNAL MEDICINE

## 2024-10-15 PROCEDURE — 3075F SYST BP GE 130 - 139MM HG: CPT | Performed by: INTERNAL MEDICINE

## 2024-10-15 PROCEDURE — 99214 OFFICE O/P EST MOD 30 MIN: CPT | Performed by: INTERNAL MEDICINE

## 2024-10-15 NOTE — PROGRESS NOTES
:  Extraocular muscles intact.   External canals are patent and no discharge was appreciated.  Septum was midline,   mucosa was without erythema, exudates or cobblestoning.  No thrush was noted.      Neck: Supple without thyromegaly. No elevated JVP. Trachea was midline. No carotid bruits were auscultated.    Respiratory: rales bibasilar     Cardiovascular: Regular without murmur, clicks, gallops or rubs.  There is no left or right ventricular heave.    Pulses:  Carotid, radial and femoral pulses were equally bilaterally.    Abdomen: Slightly rounded and soft without organomegaly. No rebound, rigidity or guarding was appreciated.    Lymphatic: No lymphadenopathy.  Musculoskeletal: no joint deformity .    Extremities: swelling legs   Skin:  Warm and dry.  Good color, turgor and pigmentation. No lesions or scars.  Neurological/Psychiatric: The patient's general behavior, level of consciousness, thought content and emotional status is normal.  Cranial nerves II-XII are intact.      DATA:  FINDINGS:  1.  Spirometry:  The FEV1 is 2.58 liters or 96% of predicted.  The FVC  is 3.22 liters or 85% of predicted.  The FEV1/FVC ratio is 80%.  2.  Plethysmography:  The total lung capacity is 4.81 liters or 74% of  predicted.  3.  Diffusion Capacity:  The diffusion capacity of the carbon monoxide  is 19.20 mL/min/mmHg, which is 73% of predicted.  4.  Flow Volume Loops:  The tracings show mild restrictive defect  pattern.     IMPRESSION:  1.  There is no evidence of an obstructive lung defect.  2.  There is a mild restrictive ventilatory defect.  3.  There is a mild reduction in diffusion capacity.       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

## 2024-10-18 ENCOUNTER — TELEPHONE (OUTPATIENT)
Dept: PULMONOLOGY | Age: 84
End: 2024-10-18

## 2024-10-18 NOTE — TELEPHONE ENCOUNTER
[facility] called asking if the patient was stopping Ofev, continuing it or replacing it. I told her as per chart notes that the patient will be stopping Ofev and not replacing this at this time. She verbalized understanding

## 2024-11-08 NOTE — PROGRESS NOTES
[facility]   Cardiovascular Evaluation    PATIENT: Jj Lauren  DATE: 2024  MRN: [id number]  CSN: [id number]  : 1940    Primary Care Doctor: Martha Peoples, APRN - CNP    Subjective: Mr. Lauren is here today for cardiology follow up; c/o SOB and eye cyst removal soon    History of Present Illness    Jj Lauren is a 83 y.o. male who has PMH CAD s/p LAD DANTE 10/19, HTN, HLD, ILD, s/p tono , chronic cough, and COLLIN (uses bipap). Prior saw Dr. Robles for ILD. ECHO 2015 EF=55-60%.Grade I DD (no change from  study). Marcela nuc 2015 negative, EF of 62%. LE arterial doppler CLOVER's >1 and no arterial insufficiency.      LHC 10/15/19 s/p PCTA mid-LAD with 3.0 x 18 DANTE 3.25 x 8; LM <20%; LAD 50% prox, 70% mid. FFR 0.79; Cx 40% distal;OM1 40% prox; RCA 30% mid.  Marcela nuc 20 normal.  LHC/RHC 3/4/21 NOCAD with patent mid-LAD stent; 50-60% prox LAD; 20-30% stenoses of CCx and RCA; mildly elevated right heart pressures; wedge=13. SOB not felt due to heart disease but likely ILD.    OV 22 stopped Asprin 81mg and continued plavix only for age and increased risk of bleeding with elderly age. EKG 5/3/22  bpm Left axis; LVH with repol abnl (no change 3/22).   Echo 24 EF=55-60%; Grade I DD normal LAP. LAE. AV has moderate sclerosis. EKG 2024 SB rate 56 bpm; 1st degree A-V block; left axis deviation; LVH; ST abnormality anterior leads.     Today, his wife is present. He reports ***.      Weight today is ***# (Up ***# from 6/10/24)245      Past Medical History:   has a past medical history of Acute bronchitis, Acute bronchitis due to Streptococcus, Acute calculous cholecystitis, Arthritis, Back injuries, BPH (benign prostatic hyperplasia), Chest pain, Cholecystitis, acute, Claudication (HCC), CPAP (continuous positive airway pressure) dependence, Diverticulosis, Esophageal dysmotility, GERD (gastroesophageal reflux disease), Hiatal hernia, History of

## 2024-11-09 DIAGNOSIS — E03.9 HYPOTHYROIDISM, UNSPECIFIED TYPE: ICD-10-CM

## 2024-11-11 RX ORDER — LEVOTHYROXINE SODIUM 75 UG/1
TABLET ORAL
Qty: 90 TABLET | Refills: 1 | Status: SHIPPED | OUTPATIENT
Start: 2024-11-11

## 2024-11-11 NOTE — TELEPHONE ENCOUNTER
Refill Request     CONFIRM preferrred pharmacy with the patient.    If Mail Order Rx - Pend for 90 day refill.      Last Seen: Last Seen Department: 9/26/2024  Last Seen by PCP: 9/26/2024    Last Written: 5/16/24     If no future appointment scheduled, route STAFF MESSAGE with patient name to the  Pool for scheduling.      Next Appointment:   Future Appointments   Date Time Provider Department Center   11/25/2024 11:00 AM Arie Solis MD [facility]   3/27/2025 10:00 AM Martha Peoples, APRN - CNP [place] [facility] ECC DEP   4/9/2025 10:00 AM [facility] [place] [facility]   4/9/2025 11:00 AM [facility]       Message sent to  to schedule appt with patient?  N/A      Requested Prescriptions     Pending Prescriptions Disp Refills    levothyroxine (SYNTHROID) 75 MCG tablet [Pharmacy Med Name: LEVOTHYROXINE 75 MCG TABLET] 90 tablet 1     Sig: TAKE 1 TABLET BY MOUTH DAILY

## 2024-11-12 NOTE — PROGRESS NOTES
[facility]   Cardiovascular Evaluation    PATIENT: Jj Lauren  DATE: 2024  MRN: [id number]  CSN: [id number]  : 1940    Primary Care Doctor: Martha Peoples, ADORE - CNP    Subjective: Mr. Lauren is here today for cardiology follow up; c/o chronic SOB, CP    History of Present Illness    Jj Lauren is a 83 y.o. male who has PMH CAD s/p LAD DANTE 10/19, HTN, HLD, ILD/UIP/COPD (Dr. Resendiz), s/p tono , chronic cough, and COLLIN (uses bipap). Prior saw Dr. Robles for ILD. ECHO 2015 EF=55-60%.Grade I DD (no change from  study). Prior testing: LE arterial doppler CLOVER's >1 and no arterial insufficiency. OV 22 stopped Asprin 81mg and continued plavix only for age and increased risk of bleeding with elderly age. Note Dr. Resendiz tried medication for UIP lung disease Ofet but he could not tolerate.   LHC 10/15/19 s/p PCTA mid-LAD with 3.0 x 18 DANTE 3.25 x 8; LM <20%; LAD 50% prox, 70% mid. FFR 0.79; Cx 40% distal;OM1 40% prox; RCA 30% mid.  Marcela nuc 20 normal.  [facility]/RHC 3/4/21 NOCAD with patent mid-LAD stent; 50-60% prox LAD; 20-30% stenoses of CCx and RCA; mildly elevated right heart pressures; wedge=13. SOB not felt due to heart disease but likely ILD.    Echo 24 EF=55-60%; Grade I DD normal LAP. LAE. AV sclerosis; EKG 24 SB rate 56 bpm; 1st degree A-V block; left axis; LVH with repol change; septal infarct (no change ).    Today, he reports gaining weight as combo eating too much and wrong foods. He c/o sharp pain in mid sternal area on Saturday when he got out of bed lasting 30 minutes assoc with slightnausea. He had not had any CP in a long time before this episode. He had not had anything to eat.  He took his stomach medicine and he thinks the pain got better. He reports chronic SOB unchanged. Patient denies current edema, palpitations, dizziness or syncope.  Patient is taking all cardiac medications as prescribed and tolerates them well.

## 2024-11-25 ENCOUNTER — OFFICE VISIT (OUTPATIENT)
Dept: CARDIOLOGY CLINIC | Age: 84
End: 2024-11-25
Payer: MEDICARE

## 2024-11-25 VITALS
SYSTOLIC BLOOD PRESSURE: 126 MMHG | WEIGHT: 253.8 LBS | OXYGEN SATURATION: 95 % | HEIGHT: 67 IN | DIASTOLIC BLOOD PRESSURE: 60 MMHG | BODY MASS INDEX: 39.83 KG/M2 | HEART RATE: 78 BPM

## 2024-11-25 DIAGNOSIS — I50.32 CHRONIC DIASTOLIC CONGESTIVE HEART FAILURE (HCC): ICD-10-CM

## 2024-11-25 DIAGNOSIS — Z87.891 HISTORY OF TOBACCO ABUSE: ICD-10-CM

## 2024-11-25 DIAGNOSIS — E78.2 MIXED HYPERLIPIDEMIA: ICD-10-CM

## 2024-11-25 DIAGNOSIS — I25.9 CHRONIC ISCHEMIC HEART DISEASE: Primary | ICD-10-CM

## 2024-11-25 DIAGNOSIS — J44.9 CHRONIC OBSTRUCTIVE PULMONARY DISEASE, UNSPECIFIED COPD TYPE (HCC): ICD-10-CM

## 2024-11-25 DIAGNOSIS — R06.09 EXERTIONAL DYSPNEA: ICD-10-CM

## 2024-11-25 DIAGNOSIS — I10 ESSENTIAL HYPERTENSION: ICD-10-CM

## 2024-11-25 DIAGNOSIS — G47.33 OSA ON CPAP: ICD-10-CM

## 2024-11-25 DIAGNOSIS — I25.10 CAD IN NATIVE ARTERY: ICD-10-CM

## 2024-11-25 DIAGNOSIS — R07.89 OTHER CHEST PAIN: ICD-10-CM

## 2024-11-25 PROCEDURE — 1123F ACP DISCUSS/DSCN MKR DOCD: CPT | Performed by: INTERNAL MEDICINE

## 2024-11-25 PROCEDURE — 99214 OFFICE O/P EST MOD 30 MIN: CPT | Performed by: INTERNAL MEDICINE

## 2024-11-25 PROCEDURE — 1159F MED LIST DOCD IN RCRD: CPT | Performed by: INTERNAL MEDICINE

## 2024-11-25 PROCEDURE — 3078F DIAST BP <80 MM HG: CPT | Performed by: INTERNAL MEDICINE

## 2024-11-25 PROCEDURE — 3074F SYST BP LT 130 MM HG: CPT | Performed by: INTERNAL MEDICINE

## 2024-11-25 RX ORDER — NITROGLYCERIN 0.4 MG/1
0.4 TABLET SUBLINGUAL EVERY 5 MIN PRN
Qty: 25 TABLET | Refills: 3 | Status: SHIPPED | OUTPATIENT
Start: 2024-11-25

## 2024-11-25 NOTE — PATIENT INSTRUCTIONS
Plan:  Labs reviewed in epic and discussed with patient.  Current medications reviewed.  Refills given as warranted.  Call your pcp office to get a refill on your Iron pills.   If you start having chest pain more frequently or increased in intensity call and let me know.   How to take Nitroglycerine under your tongue instructions.    -Nitro can give you a headache or make you dizzy.   -Make sure you are sitting down before taking any doses.  -Take 1 Nitroglycerin every 5 min for a total of 3 doses.    -If chest pain does not go away: chew 4 baby aspirin 81  mg or 1 adult aspirin 325 mg and call 911.      No cardiac testing at this time.     Follow up with me the first of the year

## 2024-12-03 NOTE — TELEPHONE ENCOUNTER
Carley Renae from [facility] called stated that machine will only go up to 25 and order is for 26 she wanted to make you aware. <-- Click to add NO significant Past Surgical History

## 2024-12-14 DIAGNOSIS — F33.41 RECURRENT MAJOR DEPRESSIVE DISORDER, IN PARTIAL REMISSION (HCC): ICD-10-CM

## 2024-12-16 RX ORDER — VENLAFAXINE 75 MG/1
75 TABLET ORAL 2 TIMES DAILY
Qty: 180 TABLET | Refills: 1 | Status: SHIPPED | OUTPATIENT
Start: 2024-12-16

## 2024-12-16 NOTE — TELEPHONE ENCOUNTER
Refill Request     CONFIRM preferrred pharmacy with the patient.    If Mail Order Rx - Pend for 90 day refill.      Last Seen: Last Seen Department: 9/26/2024  Last Seen by PCP: 9/26/2024    Last Written: 9/16/24    If no future appointment scheduled, route STAFF MESSAGE with patient name to the  Pool for scheduling.      Next Appointment:   Future Appointments   Date Time Provider Department Center   3/3/2025 11:00 AM Arie Solis MD [place] MMA   3/27/2025 10:00 AM Martha Peoples, APRN - CNP [place]  BS ECC DEP   4/9/2025 10:00 AM [facility] [place] [facility] [place] [facility] [place] PFT [place] HOD   4/9/2025 11:00 AM [facility]       Message sent to  to schedule appt with patient?  NO      Requested Prescriptions     Pending Prescriptions Disp Refills    venlafaxine (EFFEXOR) 75 MG tablet [Pharmacy Med Name: VENLAFAXINE HCL 75 MG TABLET] 180 tablet 0     Sig: TAKE 1 TABLET BY MOUTH 2 TIMES A DAY

## 2024-12-19 DIAGNOSIS — D50.9 IRON DEFICIENCY ANEMIA, UNSPECIFIED IRON DEFICIENCY ANEMIA TYPE: ICD-10-CM

## 2024-12-19 RX ORDER — FERROUS SULFATE 325(65) MG
1 TABLET ORAL DAILY
Qty: 90 TABLET | Refills: 0 | Status: SHIPPED | OUTPATIENT
Start: 2024-12-19

## 2024-12-19 NOTE — TELEPHONE ENCOUNTER
Refill Request     CONFIRM preferrred pharmacy with the patient.    If Mail Order Rx - Pend for 90 day refill.      Last Seen: Last Seen Department: 9/26/2024  Last Seen by PCP: 9/26/2024    Last Written: 8/22/2024    If no future appointment scheduled, route STAFF MESSAGE with patient name to the  Pool for scheduling.      Next Appointment:   Future Appointments   Date Time Provider Department Center   3/3/2025 11:00 AM Arie Solis MD [facility] MMA   3/27/2025 10:00 AM Martha Peoples, APRN - CNP [place]  BS ECC DEP   4/9/2025 10:00 AM [facility] [place] [facility] [place] [place] [facility]   4/9/2025 11:00 AM [facility]       Message sent to  to schedule appt with patient?  NO      Requested Prescriptions     Pending Prescriptions Disp Refills    FEROSUL 325 (65 Fe) MG tablet [Pharmacy Med Name: FEROSUL 325 MG TABLET] 90 tablet 0     Sig: TAKE 1 TABLET BY MOUTH DAILY WITH BREAKFAST

## 2025-02-14 DIAGNOSIS — I25.119 CORONARY ARTERY DISEASE INVOLVING NATIVE CORONARY ARTERY OF NATIVE HEART WITH ANGINA PECTORIS (HCC): ICD-10-CM

## 2025-02-14 RX ORDER — DOXAZOSIN 8 MG/1
TABLET ORAL
Qty: 180 TABLET | Refills: 0 | Status: SHIPPED | OUTPATIENT
Start: 2025-02-14

## 2025-02-14 NOTE — TELEPHONE ENCOUNTER
Last Office Visit: 11/25/24 Provider: ILIANA  **Is provider OOT? No    Next Office Visit: 3/3/25 Provider: ILIA[facility]  **If no OV, when does pt need to be seen? N/a  **Has patient already had 30 day supply? No    Lab orders needed? no   Encounter provider correct? Yes If not, change provider  Script changes since last refill? no    LAST LABS:   CMP:   Lab Results   Component Value Date     09/27/2024    K 3.6 09/27/2024    CL 98 (L) 09/27/2024    CO2 26 09/27/2024    BUN 19 09/27/2024    CREATININE 0.9 09/27/2024    GLUCOSE 129 (H) 09/27/2024    CALCIUM 9.3 09/27/2024    BILITOT 0.7 09/27/2024    ALKPHOS 109 09/27/2024    AST 19 09/27/2024    ALT 23 09/27/2024    LABGLOM 85 09/27/2024    GFRAA >60 05/25/2022    AGRATIO 1.3 09/27/2024    GLOB 3.2 03/04/2021

## 2025-03-03 ENCOUNTER — OFFICE VISIT (OUTPATIENT)
Dept: CARDIOLOGY CLINIC | Age: 85
End: 2025-03-03
Payer: MEDICARE

## 2025-03-03 VITALS
HEART RATE: 70 BPM | BODY MASS INDEX: 38.64 KG/M2 | DIASTOLIC BLOOD PRESSURE: 60 MMHG | SYSTOLIC BLOOD PRESSURE: 136 MMHG | HEIGHT: 67 IN | OXYGEN SATURATION: 97 % | WEIGHT: 246.2 LBS

## 2025-03-03 DIAGNOSIS — Z87.891 HISTORY OF TOBACCO ABUSE: ICD-10-CM

## 2025-03-03 DIAGNOSIS — I25.119 CORONARY ARTERY DISEASE INVOLVING NATIVE CORONARY ARTERY OF NATIVE HEART WITH ANGINA PECTORIS: ICD-10-CM

## 2025-03-03 DIAGNOSIS — I50.32 CHRONIC DIASTOLIC CONGESTIVE HEART FAILURE (HCC): ICD-10-CM

## 2025-03-03 DIAGNOSIS — R06.09 EXERTIONAL DYSPNEA: ICD-10-CM

## 2025-03-03 DIAGNOSIS — I10 ESSENTIAL HYPERTENSION: Primary | ICD-10-CM

## 2025-03-03 PROCEDURE — 1123F ACP DISCUSS/DSCN MKR DOCD: CPT | Performed by: INTERNAL MEDICINE

## 2025-03-03 PROCEDURE — 3078F DIAST BP <80 MM HG: CPT | Performed by: INTERNAL MEDICINE

## 2025-03-03 PROCEDURE — 99214 OFFICE O/P EST MOD 30 MIN: CPT | Performed by: INTERNAL MEDICINE

## 2025-03-03 PROCEDURE — 3075F SYST BP GE 130 - 139MM HG: CPT | Performed by: INTERNAL MEDICINE

## 2025-03-03 PROCEDURE — 1159F MED LIST DOCD IN RCRD: CPT | Performed by: INTERNAL MEDICINE

## 2025-03-03 RX ORDER — DOXAZOSIN 8 MG/1
TABLET ORAL
Qty: 180 TABLET | Refills: 1 | Status: SHIPPED | OUTPATIENT
Start: 2025-03-03

## 2025-03-03 NOTE — PROGRESS NOTES
[facility]   Cardiovascular Evaluation    PATIENT: Jj Lauren  DATE: 3/3/2025  MRN: [id number]  CSN: [id number]  : 1940    Primary Care Doctor: Martha Peoples, APRN - CNP    Subjective: Mr. Lauren is here today for cardiology follow up; c/o chronic shortness of breath    History of Present Illness    Jj Lauren is a 84 y.o. male who has PMH CAD s/p LAD DANTE 10/19, HTN, HLD, ILD/UIP/COPD (Dr. Resendiz), s/p tono , chronic cough, and COLLIN (uses bipap). Prior saw Dr. Robles for ILD. ECHO 2015 EF=55-60%.Grade I DD (no change from  study). Prior testing: LE arterial doppler CLOVER's >1 and no arterial insufficiency. OV 22 stopped Aspirin 81mg and continued plavix only given age > 80 and bleed risk. Note Dr. Resendiz tried medication for UIP lung disease Ofet but he could not tolerate.   C 10/15/19 s/p PCTA mid-LAD with 3.0 x 18 DANTE 3.25 x 8; LM <20%; LAD 50% prox, 70% mid. FFR 0.79; Cx 40% distal;OM1 40% prox; RCA 30% mid.  Marcela nuc 20 normal.  [facility] 3/4/21 NOCAD with patent mid-LAD stent; 50-60% prox LAD; 20-30% stenoses of CCx and RCA; mildly elevated right heart pressures; wedge=13. SOB not felt due to heart disease but likely ILD.    Echo 24 EF=55-60%; Grade I DD normal LAP. LAE. AV sclerosis; EKG 24 SB rate 56 bpm; 1st degree AV block; left axis; LVH with repol change; septal infarct (no change ).    Today, he reports doing well. He is following with Pulmonary for COPD and sleep apnea management. He has chronic, baseline SOB with prolonged walking. He states this is unchanged. Patient currently denies any weight gain, edema, palpitations, chest pain, dizziness, and syncope. He is taking medications as prescribed, tolerating well.     Weight today is 246# (Down 7# from )     Past Medical History:   has a past medical history of Acute bronchitis, Acute bronchitis due to Streptococcus, Acute calculous cholecystitis, Arthritis, Back 
goal and will continue current medical regimen lipitor 40mg qd.     4. Interstitial lung disease: Dr. Resendiz follows. Concern for UIP and tried med but could not tolerate.     5. COLLIN   ~wears Bipap    6.  Former smoker-counseling and education provided.     Plan:  Labs reviewed in epic and discussed with patient.  Current medications reviewed.  Refills given as warranted.  ***    Follow up with me in ***.     ***    ***    Cost of prescription medications and patient compliance have been reviewed with patient. All questions answered.      Thank you for allowing me to participate in the care of this individual.     Arie Solis MD  3/2/2025  10:49 PM    
recent labs 3/26/24 see results above I personally reviewed. Well controlled and at goal and will continue current medical regimen lipitor 40mg qd.     4. Interstitial lung disease: Dr. Resendiz follows. Concern for UIP and tried med but could not tolerate.     5. COLLIN   ~wears Bipap    6.  Former smoker-counseling and education provided.     Plan:  Labs reviewed in epic and discussed with patient.  Current medications reviewed.  Refills given as warranted.  ***    Follow up with me in ***.     ***    ***    Cost of prescription medications and patient compliance have been reviewed with patient. All questions answered.      Thank you for allowing me to participate in the care of this individual.     Arie Solis MD  3/3/2025  11:29 AM

## 2025-03-03 NOTE — PATIENT INSTRUCTIONS
Plan:  Labs reviewed in epic and discussed with patient.  Current medications reviewed.  Refills given as warranted.  Great job on not smoking. Continue to avoid as this is risk factor for heart attack, stroke, and/or cancer.    Reviewed cardiac testing   Proceed as planned with PCP Martha Peoples, ADORE - CNP office visit, please fast prior to office visit in anticipation of annual labs      Follow up with me in 6 months.

## 2025-03-13 DIAGNOSIS — K21.9 GASTROESOPHAGEAL REFLUX DISEASE WITHOUT ESOPHAGITIS: ICD-10-CM

## 2025-03-13 DIAGNOSIS — I25.119 CORONARY ARTERY DISEASE INVOLVING NATIVE CORONARY ARTERY OF NATIVE HEART WITH ANGINA PECTORIS: ICD-10-CM

## 2025-03-13 RX ORDER — OMEPRAZOLE 40 MG/1
40 CAPSULE, DELAYED RELEASE ORAL DAILY
Qty: 90 CAPSULE | Refills: 1 | Status: SHIPPED | OUTPATIENT
Start: 2025-03-13

## 2025-03-13 RX ORDER — CLOPIDOGREL BISULFATE 75 MG/1
75 TABLET ORAL DAILY
Qty: 90 TABLET | Refills: 3 | Status: SHIPPED | OUTPATIENT
Start: 2025-03-13

## 2025-03-13 NOTE — TELEPHONE ENCOUNTER
Last Office Visit:3/3/25 Provider: ILIANA  **Is provider OOT? No    Next Office Visit: 9/17/25 Provider: JENM  **If no OV, when does pt need to be seen? N/a  **Has patient already had 30 day supply? No    Lab orders needed? no   Encounter provider correct? Yes If not, change provider  Script changes since last refill? no    LAST LABS:   CMP:   Lab Results   Component Value Date     09/27/2024    K 3.6 09/27/2024    CL 98 (L) 09/27/2024    CO2 26 09/27/2024    BUN 19 09/27/2024    CREATININE 0.9 09/27/2024    GLUCOSE 129 (H) 09/27/2024    CALCIUM 9.3 09/27/2024    BILITOT 0.7 09/27/2024    ALKPHOS 109 09/27/2024    AST 19 09/27/2024    ALT 23 09/27/2024    LABGLOM 85 09/27/2024    GFRAA >60 05/25/2022    AGRATIO 1.3 09/27/2024    GLOB 3.2 03/04/2021          CBC:   Lab Results   Component Value Date    WBC 9.0 09/27/2024    HGB 15.2 09/27/2024    HCT 44.9 09/27/2024    MCV 90.2 09/27/2024     09/27/2024

## 2025-03-13 NOTE — TELEPHONE ENCOUNTER
Refill Request     CONFIRM preferrred pharmacy with the patient.    If Mail Order Rx - Pend for 90 day refill.      Last Seen: Last Seen Department: 9/26/2024  Last Seen by PCP: 9/26/2024    Last Written: 9/16/24    If no future appointment scheduled, route STAFF MESSAGE with patient name to the  Pool for scheduling.      Next Appointment:   Future Appointments   Date Time Provider Department Center   3/27/2025 10:00 AM Martha Peoples, APRN - CNP [place] [facility] DEP   4/9/2025 10:00 AM [facility] [place] PULMONARY FUNCTION TESTING [facility]   4/9/2025 11:00 AM Carmelo Resendiz MD [facility]UL[facility]   9/17/2025  9:45 AM Arie Solis MD [facility]       Message sent to  to schedule appt with patient?  NO      Requested Prescriptions     Pending Prescriptions Disp Refills    omeprazole (PRILOSEC) 40 MG delayed release capsule [Pharmacy Med Name: OMEPRAZOLE DR 40 MG CAPSULE] 90 capsule 1     Sig: TAKE 1 CAPSULE BY MOUTH DAILY

## 2025-03-14 DIAGNOSIS — K21.9 GASTROESOPHAGEAL REFLUX DISEASE WITHOUT ESOPHAGITIS: ICD-10-CM

## 2025-03-14 RX ORDER — OMEPRAZOLE 40 MG/1
40 CAPSULE, DELAYED RELEASE ORAL DAILY
Qty: 90 CAPSULE | Refills: 1 | OUTPATIENT
Start: 2025-03-14

## 2025-03-17 ENCOUNTER — OFFICE VISIT (OUTPATIENT)
Dept: FAMILY MEDICINE CLINIC | Age: 85
End: 2025-03-17
Payer: MEDICARE

## 2025-03-17 ENCOUNTER — RESULTS FOLLOW-UP (OUTPATIENT)
Dept: FAMILY MEDICINE CLINIC | Age: 85
End: 2025-03-17

## 2025-03-17 VITALS
HEART RATE: 68 BPM | HEIGHT: 67 IN | BODY MASS INDEX: 37.51 KG/M2 | WEIGHT: 239 LBS | DIASTOLIC BLOOD PRESSURE: 72 MMHG | OXYGEN SATURATION: 98 % | SYSTOLIC BLOOD PRESSURE: 134 MMHG | TEMPERATURE: 97.6 F

## 2025-03-17 DIAGNOSIS — R39.9 UTI SYMPTOMS: Primary | ICD-10-CM

## 2025-03-17 DIAGNOSIS — R44.1 HALLUCINATIONS, VISUAL: ICD-10-CM

## 2025-03-17 LAB
BILIRUBIN, POC: ABNORMAL
BLOOD URINE, POC: ABNORMAL
CLARITY, POC: ABNORMAL
COLOR, POC: YELLOW
GLUCOSE URINE, POC: ABNORMAL MG/DL
KETONES, POC: ABNORMAL MG/DL
LEUKOCYTE EST, POC: ABNORMAL
NITRITE, POC: ABNORMAL
PH, POC: 7
PROTEIN, POC: 100 MG/DL
SPECIFIC GRAVITY, POC: 1.01
UROBILINOGEN, POC: 0.2 MG/DL

## 2025-03-17 PROCEDURE — 1123F ACP DISCUSS/DSCN MKR DOCD: CPT | Performed by: NURSE PRACTITIONER

## 2025-03-17 PROCEDURE — 81002 URINALYSIS NONAUTO W/O SCOPE: CPT | Performed by: NURSE PRACTITIONER

## 2025-03-17 PROCEDURE — 1159F MED LIST DOCD IN RCRD: CPT | Performed by: NURSE PRACTITIONER

## 2025-03-17 PROCEDURE — 3075F SYST BP GE 130 - 139MM HG: CPT | Performed by: NURSE PRACTITIONER

## 2025-03-17 PROCEDURE — 99213 OFFICE O/P EST LOW 20 MIN: CPT | Performed by: NURSE PRACTITIONER

## 2025-03-17 PROCEDURE — 3078F DIAST BP <80 MM HG: CPT | Performed by: NURSE PRACTITIONER

## 2025-03-17 RX ORDER — PHENAZOPYRIDINE HYDROCHLORIDE 200 MG/1
200 TABLET, FILM COATED ORAL 3 TIMES DAILY PRN
Qty: 15 TABLET | Refills: 0 | Status: SHIPPED | OUTPATIENT
Start: 2025-03-17 | End: 2025-03-22

## 2025-03-17 RX ORDER — CIPROFLOXACIN 500 MG/1
500 TABLET, FILM COATED ORAL 2 TIMES DAILY
Qty: 14 TABLET | Refills: 0 | Status: SHIPPED | OUTPATIENT
Start: 2025-03-17 | End: 2025-03-24

## 2025-03-17 ASSESSMENT — ENCOUNTER SYMPTOMS
SHORTNESS OF BREATH: 0
BLOOD IN STOOL: 0
EYES NEGATIVE: 1
NAUSEA: 0
ABDOMINAL PAIN: 0
ALLERGIC/IMMUNOLOGIC NEGATIVE: 1
RESPIRATORY NEGATIVE: 1
DIARRHEA: 0
GASTROINTESTINAL NEGATIVE: 1
ANAL BLEEDING: 0

## 2025-03-17 NOTE — PROGRESS NOTES
Jj Lauren (:  1940) is a 84 y.o. male, here for evaluation of the following chief complaint(s):  Other (Burning/frequent urination for the past 3 weeks )      1. UTI symptoms  -     Culture, Urine  -     POCT Urinalysis no Micro  -     phenazopyridine (PYRIDIUM) 200 MG tablet; Take 1 tablet by mouth 3 times daily as needed for Pain, Disp-15 tablet, R-0Normal  -     ciprofloxacin (CIPRO) 500 MG tablet; Take 1 tablet by mouth 2 times daily for 7 days, Disp-14 tablet, R-0Normal  2. Hallucinations, visual  -     Culture, Urine  -     POCT Urinalysis no Micro    Results for orders placed or performed in visit on 25   POCT Urinalysis no Micro   Result Value Ref Range    Color, UA yellow     Clarity, UA cloudy     Glucose, UA POC neg mg/dL    Bilirubin, UA neg     Ketones, UA neg mg/dL    Spec Grav, UA 1.015     Blood, UA POC moderate     pH, UA 7.0     Protein, UA  mg/dL    Urobilinogen, UA 0.2 mg/dL    Leukocytes, UA large     Nitrite, UA neg           Assessment & Plan  1. Urinary Tract Infection (UTI).  The presence of moderate blood and a significant amount of bacteria in the urine sample suggests a UTI. A prescription for Ciprofloxacin 500 mg, to be taken twice daily at 12-hour intervals, has been issued and sent to [facility] pharmacy. A urine culture will be conducted to identify the specific bacteria. Pyridium has been prescribed to alleviate the burning sensation during urination. If the urine culture indicates a need for a different antibiotic, he will be notified accordingly. A repeat urine test will be scheduled in 2 to 4 weeks to ensure the infection has cleared.    2. Hallucinations.  He reports seeing people who are not there, which has been ongoing for 3 to 4 months. This symptom may be related to the UTI. If the hallucinations persist after the UTI is treated, further evaluation will be necessary to determine the cause and appropriate treatment.    Results  Laboratory

## 2025-03-19 DIAGNOSIS — R39.9 UTI SYMPTOMS: Primary | ICD-10-CM

## 2025-03-19 LAB
BACTERIA UR CULT: ABNORMAL
ORGANISM: ABNORMAL

## 2025-03-27 ENCOUNTER — OFFICE VISIT (OUTPATIENT)
Dept: FAMILY MEDICINE CLINIC | Age: 85
End: 2025-03-27
Payer: MEDICARE

## 2025-03-27 VITALS
WEIGHT: 239 LBS | SYSTOLIC BLOOD PRESSURE: 124 MMHG | OXYGEN SATURATION: 95 % | BODY MASS INDEX: 37.51 KG/M2 | HEART RATE: 64 BPM | DIASTOLIC BLOOD PRESSURE: 68 MMHG | HEIGHT: 67 IN

## 2025-03-27 DIAGNOSIS — I10 ESSENTIAL HYPERTENSION: ICD-10-CM

## 2025-03-27 DIAGNOSIS — D50.9 IRON DEFICIENCY ANEMIA, UNSPECIFIED IRON DEFICIENCY ANEMIA TYPE: ICD-10-CM

## 2025-03-27 DIAGNOSIS — F33.41 RECURRENT MAJOR DEPRESSIVE DISORDER, IN PARTIAL REMISSION: ICD-10-CM

## 2025-03-27 DIAGNOSIS — J84.9 ILD (INTERSTITIAL LUNG DISEASE) (HCC): ICD-10-CM

## 2025-03-27 DIAGNOSIS — E55.9 VITAMIN D DEFICIENCY: ICD-10-CM

## 2025-03-27 DIAGNOSIS — E03.9 HYPOTHYROIDISM, UNSPECIFIED TYPE: ICD-10-CM

## 2025-03-27 DIAGNOSIS — G25.81 RESTLESS LEGS SYNDROME (RLS): ICD-10-CM

## 2025-03-27 DIAGNOSIS — J44.9 CHRONIC OBSTRUCTIVE PULMONARY DISEASE, UNSPECIFIED COPD TYPE (HCC): Primary | ICD-10-CM

## 2025-03-27 DIAGNOSIS — N30.00 ACUTE CYSTITIS WITHOUT HEMATURIA: ICD-10-CM

## 2025-03-27 DIAGNOSIS — E78.2 MIXED HYPERLIPIDEMIA: ICD-10-CM

## 2025-03-27 DIAGNOSIS — N40.0 BENIGN PROSTATIC HYPERPLASIA WITHOUT LOWER URINARY TRACT SYMPTOMS: ICD-10-CM

## 2025-03-27 DIAGNOSIS — Z12.5 SCREENING FOR PROSTATE CANCER: ICD-10-CM

## 2025-03-27 DIAGNOSIS — K21.9 GASTROESOPHAGEAL REFLUX DISEASE WITHOUT ESOPHAGITIS: ICD-10-CM

## 2025-03-27 PROCEDURE — G2211 COMPLEX E/M VISIT ADD ON: HCPCS | Performed by: NURSE PRACTITIONER

## 2025-03-27 PROCEDURE — 3074F SYST BP LT 130 MM HG: CPT | Performed by: NURSE PRACTITIONER

## 2025-03-27 PROCEDURE — 1123F ACP DISCUSS/DSCN MKR DOCD: CPT | Performed by: NURSE PRACTITIONER

## 2025-03-27 PROCEDURE — 1159F MED LIST DOCD IN RCRD: CPT | Performed by: NURSE PRACTITIONER

## 2025-03-27 PROCEDURE — 99214 OFFICE O/P EST MOD 30 MIN: CPT | Performed by: NURSE PRACTITIONER

## 2025-03-27 PROCEDURE — 3078F DIAST BP <80 MM HG: CPT | Performed by: NURSE PRACTITIONER

## 2025-03-27 RX ORDER — VENLAFAXINE 75 MG/1
TABLET ORAL
Qty: 28 TABLET | Refills: 0 | Status: SHIPPED
Start: 2025-03-27 | End: 2025-05-01

## 2025-03-27 SDOH — ECONOMIC STABILITY: FOOD INSECURITY: WITHIN THE PAST 12 MONTHS, THE FOOD YOU BOUGHT JUST DIDN'T LAST AND YOU DIDN'T HAVE MONEY TO GET MORE.: NEVER TRUE

## 2025-03-27 SDOH — ECONOMIC STABILITY: FOOD INSECURITY: WITHIN THE PAST 12 MONTHS, YOU WORRIED THAT YOUR FOOD WOULD RUN OUT BEFORE YOU GOT MONEY TO BUY MORE.: NEVER TRUE

## 2025-03-27 ASSESSMENT — ENCOUNTER SYMPTOMS
GASTROINTESTINAL NEGATIVE: 1
ANAL BLEEDING: 0
ALLERGIC/IMMUNOLOGIC NEGATIVE: 1
ABDOMINAL PAIN: 0
COUGH: 1
NAUSEA: 0
BLOOD IN STOOL: 0
EYES NEGATIVE: 1
SHORTNESS OF BREATH: 1

## 2025-03-27 ASSESSMENT — PATIENT HEALTH QUESTIONNAIRE - PHQ9
6. FEELING BAD ABOUT YOURSELF - OR THAT YOU ARE A FAILURE OR HAVE LET YOURSELF OR YOUR FAMILY DOWN: NOT AT ALL
SUM OF ALL RESPONSES TO PHQ QUESTIONS 1-9: 0
8. MOVING OR SPEAKING SO SLOWLY THAT OTHER PEOPLE COULD HAVE NOTICED. OR THE OPPOSITE, BEING SO FIGETY OR RESTLESS THAT YOU HAVE BEEN MOVING AROUND A LOT MORE THAN USUAL: NOT AT ALL
SUM OF ALL RESPONSES TO PHQ QUESTIONS 1-9: 0
10. IF YOU CHECKED OFF ANY PROBLEMS, HOW DIFFICULT HAVE THESE PROBLEMS MADE IT FOR YOU TO DO YOUR WORK, TAKE CARE OF THINGS AT HOME, OR GET ALONG WITH OTHER PEOPLE: NOT DIFFICULT AT ALL
5. POOR APPETITE OR OVEREATING: NOT AT ALL
2. FEELING DOWN, DEPRESSED OR HOPELESS: NOT AT ALL
3. TROUBLE FALLING OR STAYING ASLEEP: NOT AT ALL
SUM OF ALL RESPONSES TO PHQ QUESTIONS 1-9: 0
7. TROUBLE CONCENTRATING ON THINGS, SUCH AS READING THE NEWSPAPER OR WATCHING TELEVISION: NOT AT ALL
1. LITTLE INTEREST OR PLEASURE IN DOING THINGS: NOT AT ALL
9. THOUGHTS THAT YOU WOULD BE BETTER OFF DEAD, OR OF HURTING YOURSELF: NOT AT ALL
SUM OF ALL RESPONSES TO PHQ QUESTIONS 1-9: 0
4. FEELING TIRED OR HAVING LITTLE ENERGY: NOT AT ALL

## 2025-03-27 NOTE — PROGRESS NOTES
mmHg.  - Currently on Triamterene HCTZ 37.5/25 mg once daily and amlodipine 10 mg daily.  - No recent changes in medication or symptoms noted.  - Regular monitoring of blood pressure at home is recommended.  - Dietary sodium restriction.  - Regular aerobic exercise.      3. Hypothyroidism.  - Currently taking levothyroxine 75 mcg daily on an empty stomach.  - No significant changes in symptoms have been noted.  - Thyroid function tests to be monitored as per routine schedule.  - Patient reports some fatigue, but attributes it to age rather than thyroid issues.    4. Mixed Hyperlipidemia.  - Currently on atorvastatin 40 mg daily.  - No recent changes in lipid levels or medication.  - Patient advised to continue with current medication and monitor lipid levels as per cardiologist's recommendations.  - Emphasis on maintaining a low-fat, low-cholesterol diet and portion control.  - Recommend increasing physical activity as tolerated    5. Gastroesophageal Reflux Disease (GERD).  - Currently taking omeprazole 40 mg daily.  - Reports no heartburn symptoms.  - Omeprazole has effectively managed symptoms, including preventing food from getting stuck in the throat.  - Continue current medication and dietary modifications as needed.    6. Restless Legs Syndrome.  - Currently taking Mirapex 1.5 mg twice daily, which seems to be working fine.  - No new symptoms or issues reported.  - Continue current medication and monitor for any changes in symptoms.    7. Vitamin D Deficiency.  - Currently taking vitamin D 1000 international units daily.  - No new symptoms or issues reported.  - Continue current supplementation and monitor vitamin D levels as per routine schedule.    8. Benign Prostatic Hyperplasia (BPH).  - Currently on doxazosin 8 mg twice daily and finasteride 5 mg daily.  - Reports getting up at night multiple times to urinate.  - Follow-up with urologist scheduled for next week to reassess management.  - Continue current

## 2025-03-28 ENCOUNTER — RESULTS FOLLOW-UP (OUTPATIENT)
Dept: FAMILY MEDICINE CLINIC | Age: 85
End: 2025-03-28

## 2025-03-28 DIAGNOSIS — E87.6 LOW BLOOD POTASSIUM: Primary | ICD-10-CM

## 2025-03-28 LAB
25(OH)D3 SERPL-MCNC: 40.6 NG/ML
ALBUMIN SERPL-MCNC: 4.2 G/DL (ref 3.4–5)
ALBUMIN/GLOB SERPL: 1.6 {RATIO} (ref 1.1–2.2)
ALP SERPL-CCNC: 98 U/L (ref 40–129)
ALT SERPL-CCNC: 27 U/L (ref 10–40)
ANION GAP SERPL CALCULATED.3IONS-SCNC: 16 MMOL/L (ref 3–16)
AST SERPL-CCNC: 22 U/L (ref 15–37)
BASOPHILS # BLD: 0.1 K/UL (ref 0–0.2)
BASOPHILS NFR BLD: 1.3 %
BILIRUB SERPL-MCNC: 0.8 MG/DL (ref 0–1)
BUN SERPL-MCNC: 22 MG/DL (ref 7–20)
CALCIUM SERPL-MCNC: 9.4 MG/DL (ref 8.3–10.6)
CHLORIDE SERPL-SCNC: 102 MMOL/L (ref 99–110)
CHOLEST SERPL-MCNC: 123 MG/DL (ref 0–199)
CO2 SERPL-SCNC: 24 MMOL/L (ref 21–32)
CREAT SERPL-MCNC: 1.2 MG/DL (ref 0.8–1.3)
DEPRECATED RDW RBC AUTO: 14.6 % (ref 12.4–15.4)
EOSINOPHIL # BLD: 0.4 K/UL (ref 0–0.6)
EOSINOPHIL NFR BLD: 4.7 %
FERRITIN SERPL IA-MCNC: 248 NG/ML (ref 30–400)
GFR SERPLBLD CREATININE-BSD FMLA CKD-EPI: 60 ML/MIN/{1.73_M2}
GLUCOSE SERPL-MCNC: 119 MG/DL (ref 70–99)
HCT VFR BLD AUTO: 41 % (ref 40.5–52.5)
HDLC SERPL-MCNC: 39 MG/DL (ref 40–60)
HGB BLD-MCNC: 13.9 G/DL (ref 13.5–17.5)
IRON SATN MFR SERPL: 25 % (ref 20–50)
IRON SERPL-MCNC: 74 UG/DL (ref 59–158)
LDLC SERPL CALC-MCNC: 61 MG/DL
LYMPHOCYTES # BLD: 1.8 K/UL (ref 1–5.1)
LYMPHOCYTES NFR BLD: 19.5 %
MCH RBC QN AUTO: 31.3 PG (ref 26–34)
MCHC RBC AUTO-ENTMCNC: 34 G/DL (ref 31–36)
MCV RBC AUTO: 92 FL (ref 80–100)
MONOCYTES # BLD: 0.6 K/UL (ref 0–1.3)
MONOCYTES NFR BLD: 6.5 %
NEUTROPHILS # BLD: 6.3 K/UL (ref 1.7–7.7)
NEUTROPHILS NFR BLD: 68 %
PLATELET # BLD AUTO: 224 K/UL (ref 135–450)
PMV BLD AUTO: 7.3 FL (ref 5–10.5)
POTASSIUM SERPL-SCNC: 3.3 MMOL/L (ref 3.5–5.1)
PROT SERPL-MCNC: 6.9 G/DL (ref 6.4–8.2)
PSA SERPL DL<=0.01 NG/ML-MCNC: 2.83 NG/ML (ref 0–4)
RBC # BLD AUTO: 4.46 M/UL (ref 4.2–5.9)
SODIUM SERPL-SCNC: 142 MMOL/L (ref 136–145)
TIBC SERPL-MCNC: 300 UG/DL (ref 260–445)
TRIGL SERPL-MCNC: 115 MG/DL (ref 0–150)
TSH SERPL DL<=0.005 MIU/L-ACNC: 3.95 UIU/ML (ref 0.27–4.2)
VLDLC SERPL CALC-MCNC: 23 MG/DL
WBC # BLD AUTO: 9.2 K/UL (ref 4–11)

## 2025-03-31 ENCOUNTER — CLINICAL SUPPORT (OUTPATIENT)
Dept: FAMILY MEDICINE CLINIC | Age: 85
End: 2025-03-31

## 2025-03-31 DIAGNOSIS — E87.6 LOW BLOOD POTASSIUM: ICD-10-CM

## 2025-04-01 ENCOUNTER — RESULTS FOLLOW-UP (OUTPATIENT)
Dept: FAMILY MEDICINE CLINIC | Age: 85
End: 2025-04-01

## 2025-04-01 LAB — POTASSIUM SERPL-SCNC: 3.6 MMOL/L (ref 3.5–5.1)

## 2025-04-03 ENCOUNTER — TELEPHONE (OUTPATIENT)
Dept: FAMILY MEDICINE CLINIC | Age: 85
End: 2025-04-03

## 2025-04-03 NOTE — TELEPHONE ENCOUNTER
This has been fixed. It was his wife that called, I attached the wife and accidentally typed mother.

## 2025-04-03 NOTE — TELEPHONE ENCOUNTER
Please make sure this message is in the right patient's chart considering the patient's mother is probably  since the patient himself is 84 years old

## 2025-04-03 NOTE — TELEPHONE ENCOUNTER
Patients wife calling stating that patient was supposed to go to the Urology group yesterday but they called and stated that the doctor was out. So they rescheduled the appointment until the 24th of April. She is wanting to know if the testing that you wanted done was okay to wait until then--please advise.

## 2025-04-06 ENCOUNTER — HOSPITAL ENCOUNTER (EMERGENCY)
Age: 85
Discharge: HOME | End: 2025-04-06
Payer: MEDICARE

## 2025-04-06 ENCOUNTER — APPOINTMENT (OUTPATIENT)
Dept: CT IMAGING | Age: 85
End: 2025-04-06
Payer: MEDICARE

## 2025-04-06 VITALS
DIASTOLIC BLOOD PRESSURE: 68 MMHG | BODY MASS INDEX: 37.98 KG/M2 | WEIGHT: 242 LBS | RESPIRATION RATE: 18 BRPM | HEIGHT: 67 IN | TEMPERATURE: 98 F | OXYGEN SATURATION: 93 % | SYSTOLIC BLOOD PRESSURE: 162 MMHG | HEART RATE: 62 BPM

## 2025-04-06 DIAGNOSIS — S00.03XA HEMATOMA OF SCALP, INITIAL ENCOUNTER: ICD-10-CM
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
## 2025-04-06 DIAGNOSIS — M54.9 UPPER BACK PAIN ON RIGHT SIDE: ICD-10-CM

## 2025-04-06 PROCEDURE — 99284 EMERGENCY DEPT VISIT MOD MDM: CPT

## 2025-04-06 PROCEDURE — 70450 CT HEAD/BRAIN W/O DYE: CPT

## 2025-04-06 PROCEDURE — 71250 CT THORAX DX C-: CPT

## 2025-04-06 PROCEDURE — 6370000000 HC RX 637 (ALT 250 FOR IP): Performed by: PHYSICIAN ASSISTANT

## 2025-04-06 PROCEDURE — 72125 CT NECK SPINE W/O DYE: CPT

## 2025-04-06 RX ORDER — IBUPROFEN 600 MG/1
600 TABLET, FILM COATED ORAL ONCE
Status: COMPLETED | OUTPATIENT
Start: 2025-04-06 | End: 2025-04-06

## 2025-04-06 RX ORDER — LIDOCAINE 4 G/G
1 PATCH TOPICAL ONCE
Status: DISCONTINUED | OUTPATIENT
Start: 2025-04-06 | End: 2025-04-06 | Stop reason: HOSPADM

## 2025-04-06 RX ADMIN — IBUPROFEN 600 MG: 600 TABLET, FILM COATED ORAL at 17:32

## 2025-04-06 ASSESSMENT — PAIN - FUNCTIONAL ASSESSMENT: PAIN_FUNCTIONAL_ASSESSMENT: 0-10

## 2025-04-06 ASSESSMENT — PAIN SCALES - GENERAL: PAINLEVEL_OUTOF10: 7

## 2025-04-07 ENCOUNTER — TELEPHONE (OUTPATIENT)
Dept: FAMILY MEDICINE CLINIC | Age: 85
End: 2025-04-07

## 2025-04-07 NOTE — TELEPHONE ENCOUNTER
[facility] Transitions Initial Follow Up Call  Outreach made within 2 business days of discharge: Yes  Patient: Jj Lauren Patient : 1940   MRN: [id number]  Reason for Admission: There are no discharge diagnoses documented for the most recent discharge.  Discharge Date: 25     Spoke with: Jj   Discharge department/facility: [facility]  Non-face-to-face services provided:  Scheduled appointment with PCP-   Obtained and reviewed discharge summary and/or continuity of care documents  TCM Interactive Patient Contact:  Was patient able to fill all prescriptions: Yes  Was patient instructed to bring all medications to the follow-up visit: Yes  Is patient taking all medications as directed in the discharge summary? Yes  Does patient understand their discharge instructions: Yes  Does patient have questions or concerns that need addressed prior to 7-14 day follow up office visit: no  Scheduled appointment with PCP within 7-14 days  Follow Up  Future Appointments   Date Time Provider Department Center   2025 10:00 AM [facility] [place] [facility]   2025 11:00 AM [facility]   2025 11:20 AM Martha Peoples APRN - CNP [facility] DEP   2025  9:45 AM Arie oSlis MD [facility]   2025 10:00 AM [facility]       KIRILL SARAVIA RN

## 2025-04-07 NOTE — ED PROVIDER NOTES
[facility] EMERGENCY DEPARTMENT  EMERGENCY DEPARTMENT ENCOUNTER        Pt Name: jJ Lauren  MRN: [id number]  Birthdate 1940  Date of evaluation: 4/6/2025  Provider: Christiana Gallo PA-C  PCP: Martha Peoples, ADORE - CNP  Note Started: 5:27 PM EDT 4/7/25      SHEEBA. I have evaluated this patient.        CHIEF COMPLAINT       Chief Complaint   Patient presents with    Fall     Pt. Repots fall from standing, pt. Reports hit back of his head on a bench, fell to the floor. Pt. Reports head pain and back pain. Pt does take a blood thinner       HISTORY OF PRESENT ILLNESS: 1 or more Elements     History From: Patient and family            Chief Complaint: Fall    Jj Lauren is a 84 y.o. male who presents patient states he is on Plavix, he had a fall today.  He thinks he fell asleep and he tried to catch himself as he was falling backwards.  He reports hitting the back of his head.  He reports hitting his upper back on the ground as well.  He admits to pain in the mid back and right shoulder mostly when he moves his shoulder his back is hurting.  He denies syncope.  He denies any headache vomiting dizziness neck pain chest pain shortness of breath abdominal pain or hip pain.  He denies any preceding symptoms before he fell.  He states he has had ongoing issues with being very fatigued during the day, he has sleep apnea and sounds like there is some issues with his CPAP.  He just followed up with his regular doctor and had blood work.    Nursing Notes were all reviewed and agreed with or any disagreements were addressed in the HPI.    REVIEW OF SYSTEMS :      Review of Systems    Positives and Pertinent negatives as per HPI.     SURGICAL HISTORY     Past Surgical History:   Procedure Laterality Date    CARDIAC CATHETERIZATION  03/04/2021    Non Obs CAD, medical management    CATARACT REMOVAL Bilateral 03/01/2024    CHOLECYSTECTOMY, LAPAROSCOPIC N/A 05/25/2022    LAPAROSCOPIC CHOLECYSTECTOMY performed

## 2025-04-08 ENCOUNTER — CARE COORDINATION (OUTPATIENT)
Dept: CARE COORDINATION | Age: 85
End: 2025-04-08

## 2025-04-08 SDOH — ECONOMIC STABILITY: FOOD INSECURITY: HOW HARD IS IT FOR YOU TO PAY FOR THE VERY BASICS LIKE FOOD, HOUSING, MEDICAL CARE, AND HEATING?: NOT VERY HARD

## 2025-04-08 SDOH — HEALTH STABILITY: MENTAL HEALTH: HOW OFTEN DO YOU HAVE A DRINK CONTAINING ALCOHOL?: NEVER

## 2025-04-08 SDOH — ECONOMIC STABILITY: HOUSING INSECURITY: IN THE LAST 12 MONTHS, WAS THERE A TIME WHEN YOU WERE NOT ABLE TO PAY THE MORTGAGE OR RENT ON TIME?: NO

## 2025-04-08 SDOH — HEALTH STABILITY: MENTAL HEALTH: HOW MANY DRINKS CONTAINING ALCOHOL DO YOU HAVE ON A TYPICAL DAY WHEN YOU ARE DRINKING?: PATIENT DOES NOT DRINK

## 2025-04-08 SDOH — HEALTH STABILITY: PHYSICAL HEALTH: ON AVERAGE, HOW MANY DAYS PER WEEK DO YOU ENGAGE IN MODERATE TO STRENUOUS EXERCISE (LIKE A BRISK WALK)?: 0 DAYS

## 2025-04-08 SDOH — HEALTH STABILITY: MENTAL HEALTH
DO YOU FEEL STRESS - TENSE, RESTLESS, NERVOUS, OR ANXIOUS, OR UNABLE TO SLEEP AT NIGHT BECAUSE YOUR MIND IS TROUBLED ALL THE TIME - THESE DAYS?: NOT AT ALL

## 2025-04-08 SDOH — SOCIAL STABILITY: SOCIAL INSECURITY: ARE YOU MARRIED, WIDOWED, DIVORCED, SEPARATED, NEVER MARRIED, OR LIVING WITH A PARTNER?: MARRIED

## 2025-04-08 SDOH — HEALTH STABILITY: PHYSICAL HEALTH: ON AVERAGE, HOW MANY MINUTES DO YOU ENGAGE IN EXERCISE AT THIS LEVEL?: 0 MIN

## 2025-04-08 SDOH — ECONOMIC STABILITY: FOOD INSECURITY: WITHIN THE PAST 12 MONTHS, THE FOOD YOU BOUGHT JUST DIDN'T LAST AND YOU DIDN'T HAVE MONEY TO GET MORE.: NEVER TRUE

## 2025-04-08 SDOH — ECONOMIC STABILITY: FOOD INSECURITY: WITHIN THE PAST 12 MONTHS, YOU WORRIED THAT YOUR FOOD WOULD RUN OUT BEFORE YOU GOT THE MONEY TO BUY MORE.: NEVER TRUE

## 2025-04-08 SDOH — ECONOMIC STABILITY: TRANSPORTATION INSECURITY: IN THE PAST 12 MONTHS, HAS LACK OF TRANSPORTATION KEPT YOU FROM MEDICAL APPOINTMENTS OR FROM GETTING MEDICATIONS?: NO

## 2025-04-08 ASSESSMENT — ACTIVITIES OF DAILY LIVING (ADL): LACK_OF_TRANSPORTATION: NO

## 2025-04-08 NOTE — CARE COORDINATION
Ambulatory Care Coordination Note     2025 2:50 PM     Patient Current Location:  Home:  [place] [address]     This patient was received as a referral from [facility] .    ACM contacted the patient by telephone. Verified name and  with patient as identifiers. Provided introduction to self, and explanation of the ACM role.   Patient accepted care management services at this time.          ACM: Yael Guerrero RN     Challenges to be reviewed by the provider   Additional needs identified to be addressed with provider No  none               Method of communication with provider: chart routing.    Utilization: Has the patient been seen in the ED since your last call? Yes,   Discharge Date: 25   Discharge Facility: [facility]  Reason for ED Visit: fall hit head and back   Visit Diagnosis: fall     Number of ED visits in the last 6 months: 1      Do you have any ongoing symptoms? Yes, my symptoms have improved.   Current symptoms: head pain and soreness on back of head had resolved. He still has back pain and right arm/shoulder pain .    Did you call your PCP prior to going to the ED? No, did not call the PCP office.     Review of Discharge Instructions:   [x] AVS discharge instructions  [x] Right Care, Right Place, Right Time document  [x] Medication changes none in ER   [x] Follow up appointments- follow up with PCP is planned   [] Referral follow up - tdb  [x] Falll safety discussed. Advised to return to ER with severe headaches, nausea confusion, shortness of breath or chest pain        Care Summary Note: ER follow up call was completed. The patient was seen in ER on  for a fall. He fell backwards walking and hit his head. He is still having back and right arm pain. He is using lidocaine patch 4 % at this time that he had at home. Advised patient that he could purchase 3 % patch OTC since prescription ones were denied an costly. Advised ice and heat

## 2025-04-09 ENCOUNTER — HOSPITAL ENCOUNTER (OUTPATIENT)
Dept: LAB | Age: 85
Discharge: HOME | End: 2025-04-09
Payer: MEDICARE

## 2025-04-09 ENCOUNTER — TELEPHONE (OUTPATIENT)
Dept: PULMONOLOGY | Age: 85
End: 2025-04-09

## 2025-04-09 ENCOUNTER — OFFICE VISIT (OUTPATIENT)
Dept: PULMONOLOGY | Age: 85
End: 2025-04-09
Payer: MEDICARE

## 2025-04-09 ENCOUNTER — HOSPITAL ENCOUNTER (OUTPATIENT)
Dept: PULMONOLOGY | Age: 85
Discharge: HOME | End: 2025-04-09
Payer: MEDICARE

## 2025-04-09 VITALS
DIASTOLIC BLOOD PRESSURE: 60 MMHG | HEIGHT: 67 IN | RESPIRATION RATE: 17 BRPM | OXYGEN SATURATION: 95 % | HEART RATE: 55 BPM | WEIGHT: 251.6 LBS | BODY MASS INDEX: 39.49 KG/M2 | SYSTOLIC BLOOD PRESSURE: 148 MMHG

## 2025-04-09 VITALS — OXYGEN SATURATION: 95 %

## 2025-04-09 DIAGNOSIS — E66.01 MORBID (SEVERE) OBESITY DUE TO EXCESS CALORIES: ICD-10-CM

## 2025-04-09 DIAGNOSIS — G47.33 OSA (OBSTRUCTIVE SLEEP APNEA): Primary | ICD-10-CM

## 2025-04-09 DIAGNOSIS — J84.9 ILD (INTERSTITIAL LUNG DISEASE) (HCC): ICD-10-CM

## 2025-04-09 DIAGNOSIS — G47.33 OSA (OBSTRUCTIVE SLEEP APNEA): ICD-10-CM

## 2025-04-09 LAB
BASE EXCESS BLDA CALC-SCNC: 3.9 MMOL/L (ref -3–3)
CO2 BLDA-SCNC: 30.4 MMOL/L
COHGB MFR BLDA: 1.7 % (ref 0–1.5)
DLCO %PRED: 47 %
DLCO PRED: NORMAL
DLCO/VA %PRED: NORMAL
DLCO/VA PRED: NORMAL
DLCO/VA: NORMAL
DLCO: NORMAL
EXPIRATORY TIME-POST: NORMAL
EXPIRATORY TIME: NORMAL
FEF 25-75 %CHNG: NORMAL
FEF 25-75 POST %PRED: NORMAL
FEF 25-75% %PRED-PRE: NORMAL
FEF 25-75% PRED: NORMAL
FEF 25-75-POST: NORMAL
FEF 25-75-PRE: NORMAL
FEV1 %PRED-POST: NORMAL
FEV1 %PRED-PRE: 91 %
FEV1 PRED: NORMAL
FEV1-POST: NORMAL
FEV1-PRE: NORMAL
FEV1/FVC %PRED-POST: NORMAL
FEV1/FVC %PRED-PRE: NORMAL
FEV1/FVC PRED: NORMAL
FEV1/FVC-POST: NORMAL
FEV1/FVC-PRE: 79 %
FVC %PRED-POST: NORMAL
FVC %PRED-PRE: NORMAL
FVC PRED: NORMAL
FVC-POST: NORMAL
FVC-PRE: NORMAL
GAW %PRED: NORMAL
GAW PRED: NORMAL
GAW: NORMAL
HCO3 BLDA-SCNC: 29 MMOL/L (ref 21–29)
HGB BLDA-MCNC: 13.8 G/DL (ref 13.5–17.5)
IC PRE %PRED: NORMAL
IC PRED: NORMAL
IC: NORMAL
MEP: NORMAL
METHGB MFR BLDA: 0.3 %
MIP: NORMAL
MVV %PRED-PRE: NORMAL
MVV PRED: NORMAL
MVV-PRE: NORMAL
O2 THERAPY: ABNORMAL
PCO2 BLDA: 45.2 MMHG (ref 35–45)
PEF %PRED-POST: NORMAL
PEF %PRED-PRE: NORMAL
PEF PRED: NORMAL
PEF%CHNG: NORMAL
PEF-POST: NORMAL
PEF-PRE: NORMAL
PH BLDA: 7.42 [PH] (ref 7.35–7.45)
PO2 BLDA: 33.3 MMHG (ref 75–108)
RAW %PRED: NORMAL
RAW PRED: NORMAL
RAW: NORMAL
RV PRE %PRED: NORMAL
RV PRED: NORMAL
RV: NORMAL
SAO2 % BLDA: 65 %
SVC %PRED: NORMAL
SVC PRED: NORMAL
SVC: NORMAL
TLC PRE %PRED: 70 %
TLC PRED: NORMAL
TLC: NORMAL
VA %PRED: NORMAL
VA PRED: NORMAL
VA: NORMAL
VTG %PRED: NORMAL
VTG PRED: NORMAL
VTG: NORMAL

## 2025-04-09 PROCEDURE — 3078F DIAST BP <80 MM HG: CPT | Performed by: INTERNAL MEDICINE

## 2025-04-09 PROCEDURE — 94726 PLETHYSMOGRAPHY LUNG VOLUMES: CPT

## 2025-04-09 PROCEDURE — 94760 N-INVAS EAR/PLS OXIMETRY 1: CPT

## 2025-04-09 PROCEDURE — 94010 BREATHING CAPACITY TEST: CPT

## 2025-04-09 PROCEDURE — 3077F SYST BP >= 140 MM HG: CPT | Performed by: INTERNAL MEDICINE

## 2025-04-09 PROCEDURE — 82803 BLOOD GASES ANY COMBINATION: CPT

## 2025-04-09 PROCEDURE — 36415 COLL VENOUS BLD VENIPUNCTURE: CPT

## 2025-04-09 PROCEDURE — 94729 DIFFUSING CAPACITY: CPT

## 2025-04-09 PROCEDURE — 1159F MED LIST DOCD IN RCRD: CPT | Performed by: INTERNAL MEDICINE

## 2025-04-09 PROCEDURE — 99214 OFFICE O/P EST MOD 30 MIN: CPT | Performed by: INTERNAL MEDICINE

## 2025-04-09 PROCEDURE — 1123F ACP DISCUSS/DSCN MKR DOCD: CPT | Performed by: INTERNAL MEDICINE

## 2025-04-09 PROCEDURE — 36600 WITHDRAWAL OF ARTERIAL BLOOD: CPT

## 2025-04-09 ASSESSMENT — PULMONARY FUNCTION TESTS
FEV1_PERCENT_PREDICTED_PRE: 91
FEV1/FVC_PRE: 79

## 2025-04-09 NOTE — TELEPHONE ENCOUNTER
Patient came in to do a walk test per Dr Resendiz. Patient O2 stayed above 94 during the test. It did not drop.

## 2025-04-09 NOTE — PROCEDURES
[address] [address]                           PULMONARY FUNCTION      PATIENT NAME: SOWMYA MURILLO                : 1940  MED REC NO: [id number]                      ROOM:   ACCOUNT NO: [id number]                       ADMIT DATE: 2025  PROVIDER: Jett Leung MD      DATE OF PROCEDURE: 2025    SURGEON:  Jett Leung MD    REFERRING PHYSICIAN:  GARY EASLEY    INDICATION:  Interstitial pulmonary disease.    OVERALL INTERPRETATION:    1. Spirometry revealed no evidence of obstructive defect.  FEV1 is 2.17 L which is 91% predicted. FEV1/FVC ratio of 79%.  2. Lung volumes revealed mild restrictive defect.  Total lung capacity 4.59 L, which is 70% predicted.  3. Diffusion capacity is severely decreased at 12.55, which is 27% predicted.  4. Flow volume loop suggestive of restrictive defect.    CONCLUSION:    1. No evidence of obstructive defect.  2. Mild restrictive defect with severely decreased diffusion capacity, goes with the given diagnosis of interstitial pulmonary disease.          JETT LEUNG MD      D:  2025 16:42:54     T:  2025 16:52:17     /SRAVANTHIS  Job #:  [id number]     Doc#:  [id number]

## 2025-04-09 NOTE — PROGRESS NOTES
Called patient to review PET scan results. No answer. Left VM and my chart message.    walk test  PFT DLCO 47 .TLC 70 was 71   Will check eosinophilic count 200   -ambulate in office and sat remain above 91   -SOB seems multifactorial cardiac/ILD/COPD   Stop Trelegy   Flu and Pneumovax as primary  Pulm,onary rehab     Thank you for allowing me to participate in [facility]. I will keep following with you ,should you have any concerns ,please contact us at [place] pulmonary office     Sincerely,        Carmelo Resendiz MD  Pulmonary & Critical Care Medicine     NOTE: This report was transcribed using voice recognition software. Every effort was made to ensure accuracy; however, inadvertent computerized transcription errors may be present.

## 2025-04-10 ENCOUNTER — CARE COORDINATION (OUTPATIENT)
Dept: CARE COORDINATION | Age: 85
End: 2025-04-10

## 2025-04-10 ENCOUNTER — TELEPHONE (OUTPATIENT)
Dept: SLEEP MEDICINE | Age: 85
End: 2025-04-10

## 2025-04-10 ENCOUNTER — OFFICE VISIT (OUTPATIENT)
Dept: SLEEP MEDICINE | Age: 85
End: 2025-04-10
Payer: MEDICARE

## 2025-04-10 VITALS
BODY MASS INDEX: 39.87 KG/M2 | OXYGEN SATURATION: 96 % | DIASTOLIC BLOOD PRESSURE: 66 MMHG | SYSTOLIC BLOOD PRESSURE: 142 MMHG | WEIGHT: 254 LBS | HEART RATE: 65 BPM | HEIGHT: 67 IN

## 2025-04-10 DIAGNOSIS — G47.10 HYPERSOMNIA: ICD-10-CM

## 2025-04-10 DIAGNOSIS — G47.33 MODERATE OBSTRUCTIVE SLEEP APNEA: Primary | ICD-10-CM

## 2025-04-10 DIAGNOSIS — I10 ESSENTIAL HYPERTENSION: ICD-10-CM

## 2025-04-10 DIAGNOSIS — Z78.9 DIFFICULTY USING CONTINUOUS POSITIVE AIRWAY PRESSURE (CPAP) DEVICE: ICD-10-CM

## 2025-04-10 DIAGNOSIS — R53.83 OTHER FATIGUE: ICD-10-CM

## 2025-04-10 DIAGNOSIS — Z71.89 ENCOUNTER FOR BIPAP USE COUNSELING: ICD-10-CM

## 2025-04-10 PROCEDURE — 99214 OFFICE O/P EST MOD 30 MIN: CPT | Performed by: NURSE PRACTITIONER

## 2025-04-10 PROCEDURE — 1160F RVW MEDS BY RX/DR IN RCRD: CPT | Performed by: NURSE PRACTITIONER

## 2025-04-10 PROCEDURE — 1159F MED LIST DOCD IN RCRD: CPT | Performed by: NURSE PRACTITIONER

## 2025-04-10 PROCEDURE — 1123F ACP DISCUSS/DSCN MKR DOCD: CPT | Performed by: NURSE PRACTITIONER

## 2025-04-10 PROCEDURE — 3078F DIAST BP <80 MM HG: CPT | Performed by: NURSE PRACTITIONER

## 2025-04-10 PROCEDURE — 3077F SYST BP >= 140 MM HG: CPT | Performed by: NURSE PRACTITIONER

## 2025-04-10 ASSESSMENT — SLEEP AND FATIGUE QUESTIONNAIRES
HOW LIKELY ARE YOU TO NOD OFF OR FALL ASLEEP IN A CAR, WHILE STOPPED FOR A FEW MINUTES IN TRAFFIC: WOULD NEVER DOZE
HOW LIKELY ARE YOU TO NOD OFF OR FALL ASLEEP WHILE SITTING QUIETLY AFTER LUNCH WITHOUT ALCOHOL: HIGH CHANCE OF DOZING
HOW LIKELY ARE YOU TO NOD OFF OR FALL ASLEEP WHEN YOU ARE A PASSENGER IN A CAR FOR AN HOUR WITHOUT A BREAK: HIGH CHANCE OF DOZING
HOW LIKELY ARE YOU TO NOD OFF OR FALL ASLEEP WHILE WATCHING TV: HIGH CHANCE OF DOZING
ESS TOTAL SCORE: 20
HOW LIKELY ARE YOU TO NOD OFF OR FALL ASLEEP WHILE SITTING INACTIVE IN A PUBLIC PLACE: HIGH CHANCE OF DOZING
HOW LIKELY ARE YOU TO NOD OFF OR FALL ASLEEP WHILE SITTING AND TALKING TO SOMEONE: MODERATE CHANCE OF DOZING
HOW LIKELY ARE YOU TO NOD OFF OR FALL ASLEEP WHILE SITTING AND READING: HIGH CHANCE OF DOZING
HOW LIKELY ARE YOU TO NOD OFF OR FALL ASLEEP WHILE LYING DOWN TO REST IN THE AFTERNOON WHEN CIRCUMSTANCES PERMIT: HIGH CHANCE OF DOZING

## 2025-04-10 NOTE — CARE COORDINATION
Ambulatory Care Coordination Note     4/10/2025 10:54 AM     Patient outreach attempt by this ACM today to perform care management follow up . ACM was unable to reach the patient by telephone today;   Busy signal on call attempt x 2      ACM: Yael Guerrero RN     Care Summary Note: none     PCP/Specialist follow up:   Future Appointments         Provider Specialty Dept Phone    4/10/2025 3:00 PM Alla Palomino APRN - CNP Sleep Medicine [phone]    4/14/2025 11:20 AM Martha Peoples APRN - CNP Family Medicine [phone]    9/17/2025 9:45 AM Arie Solis MD Cardiology [phone]    9/29/2025 10:00 AM Martha Peoples APRN - CNP Family Medicine [phone]    4/9/2026 11:30 AM Carmelo Resendiz MD Pulmonology [phone]            Follow Up:   Plan for next ACM outreach in approximately 1-2 days  to complete:   (Busy line on 4/10/24)  - disease specific assessments- HTN ILD   - SDOH assessments- partial   - medication review - still needed   - advance care planning - still needed   - goal progression  - education   - RPM- assess again   - will reassess mobility and back pain. . .

## 2025-04-10 NOTE — PROGRESS NOTES
Patient ID: Jj Lauren is a 84 y.o. male who is being seen today for   Chief Complaint   Patient presents with    Follow-up    Sleep Apnea         HPI:   Jj Lauren is a 84 y.o. male in office with wife and son for COLLIN follow up.  He was last seen in 2023.  States he has been much more sleepy.  He has had trouble using BiPAP, states mask will not seal.  States up and down 6-8 time a night for the bathroom and sometimes does not put BIPAP back on.  States he has not had new supplies in 6+ months.      Patient is using BiPAP 3-4 hrs/night. Not using humidifier. No snoring on BiPAP. The pressure feels high.  The mask is not very comfortable- full face. ++mask leak. +daytime sleepiness.  Denies nodding off when driving.  Sometimes dry mouth. +fatigue. Bedtime is 10-11 pm and rise time is 7 am. Sleep onset is 1 minutes. Wakes up 2-8 times at night total. 2-8 nocturia. It takes 1 minutes to fall back a sleep. 2-3 naps during the day-dozes when inactive. No headache in am. No weight gain. 0-1 caffienated beverages during the day. No alcohol. ESS is 20        Previous HPI 9/11/23  Jj Lauren is a 84 y.o. male in office for COLLIN follow up.  States he is doing okay with BIPAP.  States mask is still leaking.  States he changed to nasal mask but feels he needs a bigger size and sometimes has oral leak.  States he cannot tolerate chin strap.    Patient is using BiPAP   6-7 hrs/night. Using humidifier. No snoring on BiPAP. The pressure is well tolerated. The mask is somewhat comfortable- nasal mask. ++mask leak. Some daytime sleepiness. Denies nodding off when driving. +dry mouth.  Some fatigue. Bedtime is 11 pm-MN and rise time is 7-8 am. Sleep onset is few minutes. Wakes up 2-3 times at night total. 2-3 nocturia. It takes few minutes to fall back a sleep. Occasional naps during the day- when inactive. No headache in am. No weight gain. 0-1 caffienated beverages during the day. No alcohol. ESS is 6          Previous HPI

## 2025-04-10 NOTE — PATIENT INSTRUCTIONS
Absolutely no driving if sleepy.  It is your responsibility not to drive if fatigued, tired, or sleepy         Here are some tips to to getting better sleep  1- Avoid napping during the day: This will ensure you are tired at bedtime. If you have to take a nap, sleep less than one hour, before 3 pm.   2- Exercise regularly, but not right before bed: but the timing of the workout is important. Exercising in the morning or early afternoon will not interfere with sleep.  Exercising within two hours before bedtime can decrease your ability to fall asleep.  Regular exercise is recommended to help you deepen the sleep.   3- Avoid heavy, spicy, or sugary foods 4-6 hours before bedtime: These can affect your ability to stay asleep.  4- Have a light snack before bed: Having an empty stomach can interfere with your sleep. Dairy products and turkey contain tryptophan, which acts as a natural sleep inducer.   5- Stay away from caffeine, nicotine and alcohol at least 4-6 hours before bed: Caffeine and nicotine are stimulants that interfere with your ability to fall asleep. While alcohol has an immediate sleep-inducing effect, a few hours later, as alcohol levels in your blood start to fall, there is a stimulant effect and you will experience fragmented sleep.   6- Take a hot bath 90 minutes before bedtime:  A hot bath will raise your body temperature, but it is the drop in body temperature that may leave you feeling sleepy  7- Develop sleep rituals: it is important to give your body cues that it is time to slow down and sleep. Listen to relaxing music, read something soothing for 15 minutes, have a cup of caffeine free tea, or do relaxation exercises such as yoga or deep breathing help relieve anxiety and reduce muscle tension.   8- Fix a bedtime and an awakening time: Even on weekends! When your sleep cycle has a regular rhythm, you will feel better.   9- Sleep only when sleepy: This reduces the time you are awake in bed.   10-

## 2025-04-11 ENCOUNTER — TELEPHONE (OUTPATIENT)
Dept: CARDIOLOGY CLINIC | Age: 85
End: 2025-04-11

## 2025-04-11 ENCOUNTER — CARE COORDINATION (OUTPATIENT)
Dept: CARE COORDINATION | Age: 85
End: 2025-04-11

## 2025-04-11 DIAGNOSIS — I10 ESSENTIAL HYPERTENSION: ICD-10-CM

## 2025-04-11 RX ORDER — POTASSIUM CHLORIDE 750 MG/1
TABLET, EXTENDED RELEASE ORAL
Qty: 180 TABLET | Refills: 2 | Status: SHIPPED | OUTPATIENT
Start: 2025-04-11

## 2025-04-11 NOTE — TELEPHONE ENCOUNTER
----- Message from Dr. Arie Solis MD sent at 4/11/2025 11:06 AM EDT -----  Regarding: FW: refill request  Please fill script for this patient. Thanks.   Dr. Solis  ----- Message -----  From: Yael Guerrero RN  Sent: 4/11/2025   9:49 AM EDT  To: Arie Solis MD  Subject: refill request                                   Patient has been out of of potassium chloride 10 meq bid for 3 days. He is requesting a med refill for this. He uses [facility]. Can you assist with med refil.     Thanks,   Yael Guerrero, RN BSN  Ambulatory Care Manager   Respecting Choices® Advanced Steps ACP Facilitator  [facility]  [email or web address]  [phone]

## 2025-04-11 NOTE — CARE COORDINATION
Ambulatory Care Coordination Note     2025 10:17 AM     Patient Current Location:  Home:  [facility] [address]     ACM contacted the patient by telephone. Verified name and  with patient as identifiers.         ACM: Yael Guerrero RN     Challenges to be reviewed by the provider   Additional needs identified to be addressed with provider Yes  medications-Patient is out of his Potassium Chloride 20 meq bid. Message was sent to prescribed cardiologist to request refill. He has been out 3 days.                Method of communication with provider: chart routing.    Utilization: Patient has not had any utilization since our last call.     Care Summary Note: Additional follow up call place to patient today. He reports not additional falls. Back pain has improved with fall. He was using lidoderm patches. He did notify me that he is out of his Potassium. See above. He was not sure why [employer] did not send him any. Updated him that there were not provider refills left. He states he usually gets them automatically. He reports not breathing issue today on call hx of ILD. He reports not new falls and feels steady with ambulation at home today    Reinforced fall safety at home. Reinforced to report changes with breathing such as increased shortness of breath, congestion, or cough.     COLLIN - completed Pulm f/u yesterday. He needs to follow up with Bipap supplier to check on a different mask. He is having trouble with getting a good seal and awakens often.     Offered patient enrollment in the Remote Patient Monitoring (RPM) program for in-home monitoring: Patient is not eligible for RPM program because: declined need at this time  .     Assessments Completed:       2025     2:35 PM   Amb Fall Risk Assessment and TUG Test   Do you feel unsteady or are you worried about falling?  no   2 or more falls in past year? no   Fall with injury in past year? yes    ,   Hypertension - Encounter Level

## 2025-04-11 NOTE — TELEPHONE ENCOUNTER
Lab Results   Component Value Date/Time     03/27/2025 10:15 AM    K 3.6 03/31/2025 01:49 PM    K 3.6 09/27/2024 12:30 PM     03/27/2025 10:15 AM    CO2 24 03/27/2025 10:15 AM    BUN 22 03/27/2025 10:15 AM    CREATININE 1.2 03/27/2025 10:15 AM    GLUCOSE 119 03/27/2025 10:15 AM    CALCIUM 9.4 03/27/2025 10:15 AM    LABGLOM 60 03/27/2025 10:15 AM    LABGLOM 75 03/26/2024 11:03 AM      LOV 3/2/25

## 2025-04-14 ENCOUNTER — TELEPHONE (OUTPATIENT)
Dept: PULMONOLOGY | Age: 85
End: 2025-04-14

## 2025-04-14 ENCOUNTER — OFFICE VISIT (OUTPATIENT)
Dept: FAMILY MEDICINE CLINIC | Age: 85
End: 2025-04-14
Payer: MEDICARE

## 2025-04-14 VITALS
OXYGEN SATURATION: 98 % | SYSTOLIC BLOOD PRESSURE: 136 MMHG | DIASTOLIC BLOOD PRESSURE: 78 MMHG | HEART RATE: 61 BPM | WEIGHT: 254 LBS | BODY MASS INDEX: 39.87 KG/M2 | HEIGHT: 67 IN

## 2025-04-14 DIAGNOSIS — G47.33 MODERATE OBSTRUCTIVE SLEEP APNEA: Primary | ICD-10-CM

## 2025-04-14 DIAGNOSIS — I10 ESSENTIAL HYPERTENSION: ICD-10-CM
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
## 2025-04-14 DIAGNOSIS — S00.03XD HEMATOMA OF SCALP, SUBSEQUENT ENCOUNTER: ICD-10-CM

## 2025-04-14 DIAGNOSIS — M89.9 LESION OF BONE OF CERVICAL SPINE: ICD-10-CM

## 2025-04-14 DIAGNOSIS — M89.9 LESION OF BONE OF THORACIC SPINE: ICD-10-CM

## 2025-04-14 DIAGNOSIS — M54.9 UPPER BACK PAIN ON RIGHT SIDE: ICD-10-CM

## 2025-04-14 PROCEDURE — 3078F DIAST BP <80 MM HG: CPT | Performed by: NURSE PRACTITIONER

## 2025-04-14 PROCEDURE — 3075F SYST BP GE 130 - 139MM HG: CPT | Performed by: NURSE PRACTITIONER

## 2025-04-14 PROCEDURE — 1159F MED LIST DOCD IN RCRD: CPT | Performed by: NURSE PRACTITIONER

## 2025-04-14 PROCEDURE — 1123F ACP DISCUSS/DSCN MKR DOCD: CPT | Performed by: NURSE PRACTITIONER

## 2025-04-14 PROCEDURE — 99213 OFFICE O/P EST LOW 20 MIN: CPT | Performed by: NURSE PRACTITIONER

## 2025-04-14 RX ORDER — POTASSIUM CHLORIDE 750 MG/1
TABLET, EXTENDED RELEASE ORAL
Qty: 180 TABLET | Refills: 2 | Status: SHIPPED | OUTPATIENT
Start: 2025-04-14

## 2025-04-14 ASSESSMENT — ENCOUNTER SYMPTOMS
VOMITING: 0
BACK PAIN: 1
ABDOMINAL PAIN: 0
NAUSEA: 0
ANAL BLEEDING: 0
ALLERGIC/IMMUNOLOGIC NEGATIVE: 1
SHORTNESS OF BREATH: 0
GASTROINTESTINAL NEGATIVE: 1
BLOOD IN STOOL: 0
EYES NEGATIVE: 1
RESPIRATORY NEGATIVE: 1

## 2025-04-14 NOTE — TELEPHONE ENCOUNTER
Patient called in asking for orders to be faxed to [facility]. Faxed these orders and ov to [facility] via epic

## 2025-04-14 NOTE — TELEPHONE ENCOUNTER
Patient called and said that [facility] received the new orders for his supply but [facility] said that they need an order for the kind of mask that patient wants.

## 2025-04-14 NOTE — PROGRESS NOTES
Jj Lauren (:  1940) is a 84 y.o. male, here for evaluation of the following chief complaint(s):  Other (Follow up from [place] ED)      1. Fall, subsequent encounter  2. Hematoma of scalp, subsequent encounter  3. Upper back pain on right side  4. Lesion of bone of cervical spine  5. Lesion of bone of thoracic spine  6. Essential hypertension  The following orders have not been finalized:  -     potassium chloride (KLOR-CON) 10 MEQ extended release tablet       Assessment & Plan  1. Post-fall follow-up.  - Persistent soreness in the right side of the back and a hematoma on the scalp following a fall on 2025.  - Imaging from the ER visit showed multiple chronic rib fractures and stable lytic areas in the cervical and thoracic spine. No acute intracranial abnormalities were noted on the head CT.  - Discussion about the bone lesions and their stability compared to prior exams. MRI of the spine will be ordered to further evaluate the bone lesions and ensure there are no serious underlying conditions.  - MRI of the spine will be ordered to further evaluate the bone lesions and ensure there are no serious underlying conditions.--due to recent chronic fatigue and visual hallucinations    2. Medication management.  - He has been off his potassium supplement for about a week.  - Prescription for potassium will be sent to his pharmacy to resume his regimen.  - Discussion about the previous issues with prescription refills and pharmacy communication.  - Prescription for potassium will be sent to his pharmacy to resume his regimen.    3. Sleep apnea.  - Reports that his CPAP treatments are not working effectively, leading to poor sleep quality and frequent nighttime urination.  - Awaiting a new CPAP mask, which he expects to receive within the next couple of weeks.  - Discussion about the impact of poor sleep on overall health and the potential need for further evaluation if symptoms persist.  - If

## 2025-04-14 NOTE — TELEPHONE ENCOUNTER
Patient spouse said that he may like the mask that is under the nose and over the mouth that you discussed and showed him at the office visit.
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
## 2025-04-15 DIAGNOSIS — M89.9 LESION OF BONE OF CERVICAL SPINE: ICD-10-CM

## 2025-04-15 NOTE — TELEPHONE ENCOUNTER
It was most likely the ResMed F40.  The DME should have multiple under the nose partial fullface masks he should get mask fitting at [facility] and he can choose whichever mask he likes I will put in new order.

## 2025-04-16 NOTE — TELEPHONE ENCOUNTER
Patient had a mask fitting today. He will use the new mask tonight then let us know how it worked for him

## 2025-04-17 ENCOUNTER — CARE COORDINATION (OUTPATIENT)
Dept: CARE COORDINATION | Age: 85
End: 2025-04-17

## 2025-04-17 NOTE — CARE COORDINATION
Ambulatory Care Coordination Note     2025 5:39 PM     Patient Current Location:  Home:  [facility] [address]     ACM contacted the spouse/partner by telephone. Verified name and  with spouse/partner as identifiers.         ACM: Yael Guerrero RN     Challenges to be reviewed by the provider   Additional needs identified to be addressed with provider No  none               Method of communication with provider: none.    Utilization: Patient has not had any utilization since our last call.     Care Summary Note: Brief call for follow up. I spoke with pt's wife. She stated he still has back pain. He is schedule for follow up MRI. He did pickup potassium pills and restarted them as asked. He received his new sleep mask and it still is leaking. His wife feel the high pressures make it difficult to seal. Sleep study planned in may. She is going to work with the mask more tonight to seal better, hopefullly. F/u call planned for next week.     Offered patient enrollment in the Remote Patient Monitoring (RPM) program for in-home monitoring: Yes, but did not enroll at this time: already monitoring with home equipment.     Assessments Completed:   General Assessment              Medications Reviewed:   Completed during a previous call     Advance Care Planning:   Not reviewed during this call     Care Planning:   Not completed during this call    PCP/Specialist follow up:   Future Appointments         Provider Specialty Dept Phone    2025 10:00 AM (Arrive by 9:30 AM) [facility] RM 1 Radiology [phone]    2025 11:00 AM (Arrive by 10:30 AM) [facility] RM 1 Radiology [phone]    2025 8:30 PM [facility] SLEEP ROOM 1 Sleep Center [phone]    2025 11:20 AM Alla Palomino APRN - CNP Sleep Medicine [phone]    2025 9:45 AM Arie Solis MD Cardiology [phone]    2025 10:00 AM Martha Peoples APRN - CNP Family Medicine [phone]    2026 11:30 AM Al

## 2025-04-20 DIAGNOSIS — G25.81 RESTLESS LEGS SYNDROME (RLS): ICD-10-CM

## 2025-04-21 RX ORDER — PRAMIPEXOLE DIHYDROCHLORIDE 1.5 MG/1
1.5 TABLET ORAL 2 TIMES DAILY
Qty: 180 TABLET | Refills: 0 | Status: SHIPPED | OUTPATIENT
Start: 2025-04-21

## 2025-04-21 NOTE — TELEPHONE ENCOUNTER
Refill Request     CONFIRM preferrred pharmacy with the patient.    If Mail Order Rx - Pend for 90 day refill.      Last Seen: Last Seen Department: 4/14/2025  Last Seen by PCP: 4/14/2025    Last Written: 9/27/24    If no future appointment scheduled, route STAFF MESSAGE with patient name to the  Pool for scheduling.      Next Appointment:   Future Appointments   Date Time Provider Department Center   4/30/2025 10:00 AM [facility]   4/30/2025 11:00 AM [facility]   5/13/2025  8:30 PM [facility] SLEEP ROOM 1 [facility] SLEEP [facility]   6/18/2025 11:20 AM Alla Palomino APRN - CNP [facility] SLEEP [facility]   9/17/2025  9:45 AM [facility]   9/29/2025 10:00 AM Martha ePoples APRN - CNP [facility]   4/9/2026 11:30 AM Carmelo Resendiz MD [facility]       Message sent to  to schedule appt with patient?  NO      Requested Prescriptions     Pending Prescriptions Disp Refills    pramipexole (MIRAPEX) 1.5 MG tablet [Pharmacy Med Name: PRAMIPEXOLE 1.5 MG TABLET] 180 tablet 1     Sig: TAKE 1 TABLET BY MOUTH 2 TIMES A DAY

## 2025-04-29 ENCOUNTER — CARE COORDINATION (OUTPATIENT)
Dept: CARE COORDINATION | Age: 85
End: 2025-04-29

## 2025-04-30 ENCOUNTER — HOSPITAL ENCOUNTER (OUTPATIENT)
Dept: MRI IMAGING | Age: 85
Discharge: HOME | End: 2025-04-30
Payer: MEDICARE
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
## 2025-04-30 DIAGNOSIS — M89.9 LESION OF BONE OF CERVICAL SPINE: ICD-10-CM

## 2025-04-30 PROCEDURE — 72141 MRI NECK SPINE W/O DYE: CPT

## 2025-05-01 ENCOUNTER — CARE COORDINATION (OUTPATIENT)
Dept: CARE COORDINATION | Age: 85
End: 2025-05-01

## 2025-05-01 SDOH — SOCIAL STABILITY: SOCIAL NETWORK: HOW OFTEN DO YOU GET TOGETHER WITH FRIENDS OR RELATIVES?: TWICE A WEEK

## 2025-05-01 SDOH — SOCIAL STABILITY: SOCIAL NETWORK
DO YOU BELONG TO ANY CLUBS OR ORGANIZATIONS SUCH AS CHURCH GROUPS, UNIONS, FRATERNAL OR ATHLETIC GROUPS, OR SCHOOL GROUPS?: NO

## 2025-05-01 SDOH — SOCIAL STABILITY: SOCIAL NETWORK: IN A TYPICAL WEEK, HOW MANY TIMES DO YOU TALK ON THE PHONE WITH FAMILY, FRIENDS, OR NEIGHBORS?: TWICE A WEEK

## 2025-05-01 SDOH — SOCIAL STABILITY: SOCIAL NETWORK: HOW OFTEN DO YOU ATTEND CHURCH OR RELIGIOUS SERVICES?: MORE THAN 4 TIMES PER YEAR

## 2025-05-01 SDOH — SOCIAL STABILITY: SOCIAL NETWORK: HOW OFTEN DO YOU ATTEND MEETINGS OF THE CLUBS OR ORGANIZATIONS YOU BELONG TO?: MORE THAN 4 TIMES PER YEAR

## 2025-05-01 NOTE — ACP (ADVANCE CARE PLANNING)
Ambulatory Care Coordination Note     Advance Care Planning Note      Date: 5/1/2025    Patient Current Location:  Home: [address] [facility] [address]    ACP Specialist:  Yael Guerrero RN  Advance Care Planning   Healthcare Decision Maker:    Primary Decision Maker: Kristine [facility] - Spouse - [phone]    Click here to complete Healthcare Decision Makers including selection of the Healthcare Decision Maker Relationship (ie \"Primary\").  Today we documented Decision Maker(s) consistent with Legal Next of Kin hierarchy. Patient states ADs exist but he needs to locate them for chart.. [place] decision making heirarchy reviewed

## 2025-05-01 NOTE — CARE COORDINATION
Ambulatory Care Coordination Note     2025 9:05 AM     Patient Current Location:  Home:  [facility] [address]     ACM contacted the patient by telephone. Verified name and  with patient as identifiers.         ACM: Yael Guerrero RN     Challenges to be reviewed by the provider   Additional needs identified to be addressed with provider Yes  Unable to fully complete both MRI's. He is not sure what portion actually got finished. Stated he thinks he complete only 1 study and had to get out .  Claustrophobia set in. He is going to need to be reschedule with alternate testing or need meds to tolerate.  Please advise               Method of communication with provider: chart routing.    Utilization: Patient has not had any utilization since our last call.     Care Summary Note: follow up call with Jj. He is doing better. No exacerbation of pulmonary issue or CHF. HTN controlled. Weight was 244  today. Checking daily. He continues to have his stiff neck since previous fall but this not different. Unable to complete both MRI's see note above.     Heart Failure Education outreach Date/Time: 2025 9:05 AM    Ambulatory Care Manager (ACM) contacted the patient by telephone to perform Ambulatory Care Coordination. Verified name and  with patient as identifiers. Provided introduction to self, and explanation of the Ambulatory Care Manager's role.     ACM reviewed that a Heart Healthy tips for the Summer packet has been sent to [employer]. ACM reviewed CHF zones, daily weights, fluid restriction, the importance of low sodium diet, and healthy tips packet with the patient. Instructed patient to call their PCP if they have a weight gain of 3 lbs in 2 days or 5 lbs in a week.     Patient reminded that there is a physician on call 24 hours a day / 7 days a week should the patient have questions or concerns. The patient verbalized understanding.      Offered patient enrollment in the Remote Patient

## 2025-05-02 ENCOUNTER — HOSPITAL ENCOUNTER (EMERGENCY)
Age: 85
Discharge: HOME | End: 2025-05-02
Attending: STUDENT IN AN ORGANIZED HEALTH CARE EDUCATION/TRAINING PROGRAM
Payer: MEDICARE

## 2025-05-02 ENCOUNTER — RESULTS FOLLOW-UP (OUTPATIENT)
Dept: FAMILY MEDICINE CLINIC | Age: 85
End: 2025-05-02

## 2025-05-02 VITALS
HEIGHT: 67 IN | SYSTOLIC BLOOD PRESSURE: 157 MMHG | TEMPERATURE: 98 F | BODY MASS INDEX: 38.01 KG/M2 | OXYGEN SATURATION: 96 % | WEIGHT: 242.2 LBS | DIASTOLIC BLOOD PRESSURE: 75 MMHG | RESPIRATION RATE: 16 BRPM | HEART RATE: 57 BPM

## 2025-05-02 DIAGNOSIS — M54.2 NECK PAIN: Primary | ICD-10-CM

## 2025-05-02 DIAGNOSIS — N17.9 AKI (ACUTE KIDNEY INJURY): ICD-10-CM

## 2025-05-02 DIAGNOSIS — E87.6 HYPOKALEMIA: ICD-10-CM

## 2025-05-02 LAB
ANION GAP SERPL CALCULATED.3IONS-SCNC: 14 MMOL/L (ref 3–16)
ANION GAP SERPL CALCULATED.3IONS-SCNC: 14 MMOL/L (ref 3–16)
BASOPHILS # BLD: 0.1 K/UL (ref 0–0.2)
BASOPHILS NFR BLD: 1.2 %
BUN SERPL-MCNC: 25 MG/DL (ref 7–20)
BUN SERPL-MCNC: 25 MG/DL (ref 7–20)
CALCIUM SERPL-MCNC: 9.3 MG/DL (ref 8.3–10.6)
CALCIUM SERPL-MCNC: 9.4 MG/DL (ref 8.3–10.6)
CHLORIDE SERPL-SCNC: 100 MMOL/L (ref 99–110)
CHLORIDE SERPL-SCNC: 100 MMOL/L (ref 99–110)
CO2 SERPL-SCNC: 24 MMOL/L (ref 21–32)
CO2 SERPL-SCNC: 24 MMOL/L (ref 21–32)
CREAT SERPL-MCNC: 1.5 MG/DL (ref 0.8–1.3)
CREAT SERPL-MCNC: 1.7 MG/DL (ref 0.8–1.3)
CRP SERPL-MCNC: 4.2 MG/L (ref 0–5.1)
DEPRECATED RDW RBC AUTO: 13.9 % (ref 12.4–15.4)
EOSINOPHIL # BLD: 0.5 K/UL (ref 0–0.6)
EOSINOPHIL NFR BLD: 6.8 %
ERYTHROCYTE [SEDIMENTATION RATE] IN BLOOD BY WESTERGREN METHOD: 26 MM/HR (ref 0–20)
GFR SERPLBLD CREATININE-BSD FMLA CKD-EPI: 39 ML/MIN/{1.73_M2}
GFR SERPLBLD CREATININE-BSD FMLA CKD-EPI: 46 ML/MIN/{1.73_M2}
GLUCOSE SERPL-MCNC: 104 MG/DL (ref 70–99)
GLUCOSE SERPL-MCNC: 126 MG/DL (ref 70–99)
HCT VFR BLD AUTO: 39.1 % (ref 40.5–52.5)
HGB BLD-MCNC: 13.6 G/DL (ref 13.5–17.5)
LYMPHOCYTES # BLD: 1.5 K/UL (ref 1–5.1)
LYMPHOCYTES NFR BLD: 19.6 %
MAGNESIUM SERPL-MCNC: 2.05 MG/DL (ref 1.8–2.4)
MCH RBC QN AUTO: 31.3 PG (ref 26–34)
MCHC RBC AUTO-ENTMCNC: 34.7 G/DL (ref 31–36)
MCV RBC AUTO: 90.2 FL (ref 80–100)
MONOCYTES # BLD: 0.5 K/UL (ref 0–1.3)
MONOCYTES NFR BLD: 7.1 %
NEUTROPHILS # BLD: 5 K/UL (ref 1.7–7.7)
NEUTROPHILS NFR BLD: 65.3 %
PLATELET # BLD AUTO: 212 K/UL (ref 135–450)
PMV BLD AUTO: 6.5 FL (ref 5–10.5)
POTASSIUM SERPL-SCNC: 3.2 MMOL/L (ref 3.5–5.1)
POTASSIUM SERPL-SCNC: 3.9 MMOL/L (ref 3.5–5.1)
RBC # BLD AUTO: 4.33 M/UL (ref 4.2–5.9)
SODIUM SERPL-SCNC: 138 MMOL/L (ref 136–145)
SODIUM SERPL-SCNC: 138 MMOL/L (ref 136–145)
WBC # BLD AUTO: 7.6 K/UL (ref 4–11)

## 2025-05-02 PROCEDURE — 6370000000 HC RX 637 (ALT 250 FOR IP): Performed by: STUDENT IN AN ORGANIZED HEALTH CARE EDUCATION/TRAINING PROGRAM

## 2025-05-02 PROCEDURE — 85652 RBC SED RATE AUTOMATED: CPT

## 2025-05-02 PROCEDURE — 85025 COMPLETE CBC W/AUTO DIFF WBC: CPT

## 2025-05-02 PROCEDURE — 80048 BASIC METABOLIC PNL TOTAL CA: CPT

## 2025-05-02 PROCEDURE — 96361 HYDRATE IV INFUSION ADD-ON: CPT

## 2025-05-02 PROCEDURE — 83735 ASSAY OF MAGNESIUM: CPT

## 2025-05-02 PROCEDURE — 99284 EMERGENCY DEPT VISIT MOD MDM: CPT

## 2025-05-02 PROCEDURE — 2580000003 HC RX 258: Performed by: STUDENT IN AN ORGANIZED HEALTH CARE EDUCATION/TRAINING PROGRAM

## 2025-05-02 PROCEDURE — 86140 C-REACTIVE PROTEIN: CPT

## 2025-05-02 PROCEDURE — 96360 HYDRATION IV INFUSION INIT: CPT

## 2025-05-02 RX ORDER — SODIUM CHLORIDE, SODIUM LACTATE, POTASSIUM CHLORIDE, AND CALCIUM CHLORIDE .6; .31; .03; .02 G/100ML; G/100ML; G/100ML; G/100ML
1000 INJECTION, SOLUTION INTRAVENOUS ONCE
Status: COMPLETED | OUTPATIENT
Start: 2025-05-02 | End: 2025-05-02

## 2025-05-02 RX ADMIN — SODIUM CHLORIDE, SODIUM LACTATE, POTASSIUM CHLORIDE, AND CALCIUM CHLORIDE 1000 ML: .6; .31; .03; .02 INJECTION, SOLUTION INTRAVENOUS at 18:52

## 2025-05-02 RX ADMIN — POTASSIUM BICARBONATE 40 MEQ: 782 TABLET, EFFERVESCENT ORAL at 18:51

## 2025-05-02 ASSESSMENT — ENCOUNTER SYMPTOMS: DYSPNEA ASSOCIATED WITH: EXERTION

## 2025-05-02 ASSESSMENT — PAIN SCALES - GENERAL: PAINLEVEL_OUTOF10: 4

## 2025-05-02 ASSESSMENT — PAIN - FUNCTIONAL ASSESSMENT: PAIN_FUNCTIONAL_ASSESSMENT: 0-10

## 2025-05-02 NOTE — TELEPHONE ENCOUNTER
MRI shows Cervical fasciitis---this  refers to inflammation of the cervical fascia, which is the connective tissue that surrounds and supports the muscles, nerves, and blood vessels in the neck. This condition can lead to complications such as the spread of infection due to the fascia's role in compartmentalizing structures in the neck.  Patient needs to go to ER today to be evaluated to see if this is indeed infection and if so will need prompt treatment. .    I agree with the Care Coordinator's Plan of Care     I will address thoracic spine MRI repeat at another time (once the cervical fasciitis issue is evaluated)

## 2025-05-02 NOTE — ED PROVIDER NOTES
[facility] EMERGENCY DEPARTMENT     EMERGENCY DEPARTMENT ENCOUNTER         Pt Name: Jj Lauren   MRN: [id number]   Birthdate 1940   Date of evaluation: 5/2/2025   Provider: Danis Guy MD   PCP: Martha Peoples, ADORE - CNP   Note Started: 6:30 PM EDT 5/2/25       Chief Complaint     Neck Pain (Pt reports he has had a stiff neck for about 3-4 months, pt had mri Wednesday and was called by pcp today to get in to er due to infection in his neck/cervical area)      History of Present Illness     Jj Lauren is a 84 y.o. male who presents with chronic neck pain now with concern for abnormal imaging.  The patient has been suffering neck pain for several months localized to the right paracervical muscles no recent trauma.  I had been visiting with his primary and had an MRI which was resulted with concern for facetitis at C3-C4 and therefore he was referred to the emergency department for evaluation.      Review of Systems     Positives and pertinent negatives as per HPI.    Past Medical, Surgical, Family, and Social History     He has a past medical history of Acute bronchitis, Acute bronchitis due to Streptococcus, Acute calculous cholecystitis, Arthritis, Back injuries, BPH (benign prostatic hyperplasia), Chest pain, Cholecystitis, acute, Claudication, CPAP (continuous positive airway pressure) dependence, Diverticulosis, Esophageal dysmotility, GERD (gastroesophageal reflux disease), Hiatal hernia, History of colon polyps, Hyperlipidemia, Hypertension, Hyponatremia, ILD (interstitial lung disease) ([facility]), Internal hemorrhoid, Iron deficiency anemia, Lichen sclerosus of penis, Mild cognitive impairment, COLLIN (obstructive sleep apnea), Renal insufficiency, Restless legs syndrome, Rosacea, Schatzki's ring, Scrotal pruritus, Sepsis (HCC), and Sleep apnea.  He has a past surgical history that includes Circumcision; fracture surgery; fracture surgery; Toe Surgery; hernia repair; Colonoscopy  40 MG delayed release capsule TAKE 1 CAPSULE BY MOUTH DAILY, Disp-90 capsule, R-1Normal      doxazosin (CARDURA) 8 MG tablet TAKE 1 TABLET BY MOUTH TWICE A DAY, Disp-180 tablet, R-1Normal      FEROSUL 325 (65 Fe) MG tablet TAKE 1 TABLET BY MOUTH DAILY WITH BREAKFAST, Disp-90 tablet, R-0Normal      nitroGLYCERIN (NITROSTAT) 0.4 MG SL tablet Place 1 tablet under the tongue every 5 minutes as needed for Chest pain up to max of 3 total doses. If no relief after 1 dose, call 911., Disp-25 tablet, R-3Normal      levothyroxine (SYNTHROID) 75 MCG tablet TAKE 1 TABLET BY MOUTH DAILY, Disp-90 tablet, R-1Normal      finasteride (PROSCAR) 5 MG tablet Historical Med      triamterene-hydroCHLOROthiazide (MAXZIDE-25) 37.5-25 MG per tablet Take 1 tablet by mouth daily, Disp-90 tablet, R-3Normal      amLODIPine (NORVASC) 10 MG tablet TAKE 1 TABLET BY MOUTH DAILY, Disp-90 tablet, R-3Normal      atorvastatin (LIPITOR) 40 MG tablet Take one tablet by mouth daily, Disp-90 tablet, R-3Normal      albuterol sulfate HFA (PROVENTIL HFA) 108 (90 Base) MCG/ACT inhaler Inhale 2 puffs into the lungs every 4 hours as needed for Wheezing or Shortness of Breath, Disp-18 g, R-6Normal      triamcinolone (KENALOG) 0.1 % cream APPLY 4 GRAMS TO AFFECTED AREA(S) DAILY FOR 7 DAYS, Disp-80 g, R-5, Normal      Elastic Bandages & Supports (RELIEF MEDICAL LEG KNEE HIGH) MISC Disp-1 each, R-0, [id number] mmHG compression knee high stockings with side zipper,  On QAM and remove QHS      methocarbamol (ROBAXIN) 500 MG tablet Take 1 tablet by mouth 3 times daily as needed (spasm), Disp-90 tablet, R-0Normal      cetirizine (ZYRTEC) 10 MG tablet TAKE ONE TABLET BY MOUTH ONCE NIGHTLY, Disp-90 tablet, R-1Normal      zinc gluconate 50 MG tablet Take 1 tablet by mouth dailyHistorical Med      magnesium gluconate (MAGONATE) 500 MG tablet Take 1 tablet by mouth dailyHistorical Med      Vitamin D (CHOLECALCIFEROL) 1000 UNITS CAPS capsule Take 1 capsule by mouth

## 2025-05-02 NOTE — ED NOTES
1837 - Adt20 order placed for consult with Neurosurgery at [facility] requested by Dr. Guy

## 2025-05-03 NOTE — DISCHARGE INSTRUCTIONS
You were evaluated in the emergency department for neck pain. Assessments and testing completed during your visit were reassuring and at this time there is no indication for further testing, treatment or admission to the hospital. Given this it is appropriate to discharge you from the emergency department. At the time of discharge we discussed the following:    Please review this visit to the ER with your doctor and follow up to spine specialist per referral for further guidance. Ask your doctor to reassess your kidney function as well in the next few weeks.     Please note that sometimes it is difficult to diagnose a medical condition early in the disease process before the disease is fully manifest. Because of this, should you develop any new or worsening symptoms, you may return at any time to the emergency department for another evaluation. If available you are also recommended to review this visit with your primary care physician or other medical provider in the next 7 days. Thank you for allowing us to care for you today.

## 2025-05-05 ENCOUNTER — TELEPHONE (OUTPATIENT)
Dept: FAMILY MEDICINE CLINIC | Age: 85
End: 2025-05-05

## 2025-05-05 NOTE — TELEPHONE ENCOUNTER
Pt';s wife called and stated that he needs a referral sent to [facility], so they can get him scheduled.

## 2025-05-05 NOTE — TELEPHONE ENCOUNTER
LMOR detailed for pt with phone number to call, referral was already placed but printed information off including MRI/office and ED notes and faxed to [phone]

## 2025-05-06 ENCOUNTER — CARE COORDINATION (OUTPATIENT)
Dept: CARE COORDINATION | Age: 85
End: 2025-05-06

## 2025-05-06 DIAGNOSIS — M54.2 NECK PAIN: Primary | ICD-10-CM

## 2025-05-06 NOTE — CARE COORDINATION
Call to [facility] to schedule an appt for pt as requested by Yael CASTRO. Spoke to staff member with [facility] who reports that they are unable to accept referral from ED provider, Dr. Danis Guy from 5/2/25. Staff member reports that due to insurance requirements, referral must be from PCP. Staff member reports that referral can be sent electronically and it will come straight to their referral workqueue. Staff member reports that once referral is received in their workqueue, it will take staff a few days for them to contact pt to schedule. Staff member reports that referral does not need to be faxed. Staff member reports that referrals that are faxed are a longer process to get pt's scheduled than sending referrals electronically.       Can you please place a new referral in the system to [facility]?    Thank you!

## 2025-05-06 NOTE — CARE COORDINATION
Call to [facility] to schedule an appt since new referral has been placed by PCP. Spoke to staff member Salyl, with [facility] who reports that author is unable to schedule an appt for pt. Sally reports that referrals have to process in their system and referrals gets assigned to a staff members folder and that staff member will reach out to pt to schedule. Asked staff member if referral has been received in their system and staff member assured author that if referral was placed in system that they have it. Sally reports that she is unsure of who referral has been assigned to, but reports that referrals take 24-48 hours to process before pt will receive a call to schedule an appt.    Call to pt to inform that author is unable to schedule. Spoke to pt's spouse, Kristine and informed that PCP has resent a new referral to [place], but author is unable to schedule an appt for pt. Spouse reports that she was informed the same information and was unable to schedule an appt for pt. Spouse reports that [place] informed her that staff has to schedule the appointment. Informed Spouse that if she has not heard anything from [place] by Friday, 5/9 to contact PCP office to inform and that author will try back again to check on status of referral. Spouse thanked author for calling with an update and was agreeable to plan.

## 2025-05-06 NOTE — CARE COORDINATION
self-management, taught by Yael Guerrero RN at 5/6/2025  8:57 AM.  Learner: Patient  Readiness: Acceptance  Method: Explanation, Handout  Response: Needs Reinforcement  Comment: CHF zones and CHF summer 2025 education sent to pain.    Handout: My Heart Failure Plan of Care, taught by Yael Guerrero RN at 5/6/2025  8:57 AM.  Learner: Patient  Readiness: Acceptance  Method: Explanation, Handout  Response: Needs Reinforcement  Comment: CHF zones and CHF summer 2025 education sent to pain.    Educate follow-up care, taught by Yael Guerrero RN at 5/6/2025  8:57 AM.  Learner: Patient  Readiness: Acceptance  Method: Explanation, Handout  Response: Needs Reinforcement  Comment: CHF zones and CHF summer 2025 education sent to pain.    Educate effective coping behavior, taught by Yael Guerrero RN at 5/6/2025  8:57 AM.  Learner: Patient  Readiness: Acceptance  Method: Explanation, Handout  Response: Needs Reinforcement  Comment: CHF zones and CHF summer 2025 education sent to pain.    Handout: Low Salt Diet, taught by Yael Guerrero RN at 5/6/2025  8:57 AM.  Learner: Patient  Readiness: Acceptance  Method: Explanation, Handout  Response: Needs Reinforcement  Comment: CHF zones and CHF summer 2025 education sent to pain.    Diet, taught by Yael Guerrero RN at 5/6/2025  8:57 AM.  Learner: Patient  Readiness: Acceptance  Method: Explanation, Handout  Response: Needs Reinforcement  Comment: CHF zones and CHF summer 2025 education sent to pain.    WHEN TO CALL THE DOCTOR CHF, taught by Yael Guerrero RN at 5/6/2025  8:57 AM.  Learner: Patient  Readiness: Acceptance  Method: Explanation, Handout  Response: Needs Reinforcement  Comment: CHF zones and CHF summer 2025 education sent to pain.    Monitoring Weight, taught by Yael Guerrero RN at 5/6/2025  8:57 AM.  Learner: Patient  Readiness: Acceptance  Method: Explanation, Handout  Response: Needs Reinforcement  Comment: CHF zones and CHF summer 2025 education sent to

## 2025-05-08 ENCOUNTER — CARE COORDINATION (OUTPATIENT)
Dept: CARE COORDINATION | Age: 85
End: 2025-05-08

## 2025-05-08 DIAGNOSIS — E03.9 HYPOTHYROIDISM, UNSPECIFIED TYPE: ICD-10-CM

## 2025-05-08 RX ORDER — LEVOTHYROXINE SODIUM 75 UG/1
75 TABLET ORAL DAILY
Qty: 90 TABLET | Refills: 1 | Status: SHIPPED | OUTPATIENT
Start: 2025-05-08

## 2025-05-08 NOTE — CARE COORDINATION
Ambulatory Care Coordination Note     2025 4:20 PM     Patient Current Location:  Home: [address] [address]     ACM contacted the spouse/partner by telephone. Verified name and  with spouse/partner as identifiers.         ACM: Yael Guerrero RN     Challenges to be reviewed by the provider   Additional needs identified to be addressed with provider No  none               Method of communication with provider: none.    Utilization: Patient has not had any utilization since our last call.     Care Summary Note: Completed call. Spouse has not yet be notified by [facility] for follow up appt for Jj. Support staff also assisted. Jj has had not symptom changes. He is afebrile and still just has stiff neck. Sleep study is planned for Tuesday night. Follow up call next week planned.     Heart Failure Education outreach Date/Time: 2025 4:23 PM    Ambulatory Care Manager (ACM) contacted the family by telephone to perform Ambulatory Care Coordination. Verified name and  with family as identifiers. Provided introduction to self, and explanation of the Ambulatory Care Manager's role.     ACM reviewed that a Heart Healthy tips for the Summer packet has been mailed to the them. ACM reviewed CHF zones, daily weights, fluid restriction, the importance of low sodium diet, and healthy tips packet with the family. Instructed family to call their PCP if they have a weight gain of 3 lbs in 2 days or 5 lbs in a week. (Mailed information. My chart not being used currently)     Patient reminded that there is a physician on call 24 hours a day / 7 days a week should the patient have questions or concerns. The family verbalized understanding.     Offered patient enrollment in the Remote Patient Monitoring (RPM) program for in-home monitoring: Patient is not eligible for RPM program because: declined previously  .     Assessments Completed:   Congestive Heart Failure Assessment    Are you

## 2025-05-08 NOTE — TELEPHONE ENCOUNTER
Refill Request     CONFIRM preferrred pharmacy with the patient.    If Mail Order Rx - Pend for 90 day refill.      Last Seen: Last Seen Department: 4/14/2025  Last Seen by PCP: 4/14/2025    Last Written:     If no future appointment scheduled, route STAFF MESSAGE with patient name to the  Pool for scheduling.      Next Appointment:   Future Appointments   Date Time Provider Department Center   5/13/2025  8:30 PM [facility] SLE[facility] ROOM 1 [facility] SLE[facility]   6/18/2025 11:20 AM Alla Palomino APRN - CNP [facility] SLEEP [facility]   9/17/2025  9:45 AM [facility]   9/29/2025 10:00 AM Martha Peoples APRN - CNP [facility] [place]   4/9/2026 11:30 AM [facility]       Message sent to  to schedule appt with patient?  NO      Requested Prescriptions     Pending Prescriptions Disp Refills    levothyroxine (SYNTHROID) 75 MCG tablet [Pharmacy Med Name: LEVOTHYROXINE 75 MCG TABLET] 90 tablet 1     Sig: TAKE 1 TABLET BY MOUTH DAILY

## 2025-05-13 ENCOUNTER — HOSPITAL ENCOUNTER (OUTPATIENT)
Dept: SLEEP CENTER | Age: 85
Discharge: HOME | End: 2025-05-15
Payer: MEDICARE

## 2025-05-13 DIAGNOSIS — Z71.89 ENCOUNTER FOR BIPAP USE COUNSELING: ICD-10-CM

## 2025-05-13 DIAGNOSIS — I10 ESSENTIAL HYPERTENSION: ICD-10-CM

## 2025-05-13 DIAGNOSIS — G47.10 HYPERSOMNIA: ICD-10-CM

## 2025-05-13 DIAGNOSIS — G47.33 MODERATE OBSTRUCTIVE SLEEP APNEA: ICD-10-CM

## 2025-05-13 DIAGNOSIS — R53.83 OTHER FATIGUE: ICD-10-CM

## 2025-05-13 DIAGNOSIS — Z78.9 DIFFICULTY USING CONTINUOUS POSITIVE AIRWAY PRESSURE (CPAP) DEVICE: ICD-10-CM

## 2025-05-13 PROCEDURE — 95811 POLYSOM 6/>YRS CPAP 4/> PARM: CPT

## 2025-05-15 ENCOUNTER — RESULTS FOLLOW-UP (OUTPATIENT)
Dept: PULMONOLOGY | Age: 85
End: 2025-05-15

## 2025-05-15 ENCOUNTER — CARE COORDINATION (OUTPATIENT)
Dept: CARE COORDINATION | Age: 85
End: 2025-05-15

## 2025-05-15 DIAGNOSIS — G47.33 MODERATE OBSTRUCTIVE SLEEP APNEA: Primary | ICD-10-CM

## 2025-05-15 NOTE — CARE COORDINATION
Ambulatory Care Coordination Note     5/15/2025 5:47 PM     ACM outreach attempt by this ACM today to perform care management follow up . ACM was unable to reach the patient by telephone today;   Not a working number day when called. [employer] message      ACM: Yael Guerrero, RN     Care Summary Note: none     PCP/Specialist follow up:   Future Appointments         Provider Specialty Dept Phone    6/18/2025 11:20 AM Alla Palomino APRN - CNP Sleep Medicine [phone]    9/17/2025 9:45 AM Arie Solis MD Cardiology [phone]    9/29/2025 10:00 AM Martha Peoples APRN - CNP Family Medicine [phone]    4/9/2026 11:30 AM Carmelo Resendiz MD Pulmonology [phone]            Follow Up:   Plan for next ACM outreach in approximately 1-2 days  to complete:  - follow up appointment with Dr. Erickson (status) .   -reassess status/neck pain   -CHF summer education needs reviewed.   - disease specific assessments  - goal progression  - education

## 2025-05-15 NOTE — TELEPHONE ENCOUNTER
Spoke with patient's wife. She is aware. States they also recently changed his mask size and that is making a difference as well.

## 2025-05-21 DIAGNOSIS — E78.2 MIXED HYPERLIPIDEMIA: ICD-10-CM

## 2025-05-21 DIAGNOSIS — R39.9 UTI SYMPTOMS: ICD-10-CM

## 2025-05-21 DIAGNOSIS — N48.0 LICHEN SCLEROSUS OF PENIS: ICD-10-CM

## 2025-05-21 RX ORDER — PHENAZOPYRIDINE HYDROCHLORIDE 200 MG/1
TABLET, FILM COATED ORAL
Qty: 15 TABLET | Refills: 0 | OUTPATIENT
Start: 2025-05-21

## 2025-05-21 RX ORDER — ATORVASTATIN CALCIUM 40 MG/1
40 TABLET, FILM COATED ORAL DAILY
Qty: 90 TABLET | Refills: 2 | Status: SHIPPED | OUTPATIENT
Start: 2025-05-21

## 2025-05-21 RX ORDER — TRIAMCINOLONE ACETONIDE 1 MG/G
CREAM TOPICAL
Qty: 80 G | Refills: 5 | OUTPATIENT
Start: 2025-05-21

## 2025-05-21 NOTE — TELEPHONE ENCOUNTER
NOV 9/17/25  LOV 3/3/25  Lab Results   Component Value Date    CHOL 123 03/27/2025    TRIG 115 03/27/2025    HDL 39 (L) 03/27/2025    LDL 61 03/27/2025    VLDL 23 03/27/2025     Lab Results   Component Value Date/Time    ALKPHOS 98 03/27/2025 10:15 AM    ALT 27 03/27/2025 10:15 AM    AST 22 03/27/2025 10:15 AM    BILITOT 0.8 03/27/2025 10:15 AM    BILIDIR <0.2 02/23/2023 11:26 AM

## 2025-05-27 ENCOUNTER — CARE COORDINATION (OUTPATIENT)
Dept: CARE COORDINATION | Age: 85
End: 2025-05-27

## 2025-05-27 DIAGNOSIS — N48.0 LICHEN SCLEROSUS OF PENIS: ICD-10-CM

## 2025-05-27 NOTE — CARE COORDINATION
Ambulatory Care Coordination Note     2025 3:34 PM     Patient Current Location:  Home: [address] [id number]     ACM contacted the patient and spouse/partner by telephone. Verified name and  with patient and spouse/partner as identifiers.         ACM: Yael Guerrero RN     Challenges to be reviewed by the provider   Additional needs identified to be addressed with provider No  None                Method of communication with provider: chart routing.    Utilization: Patient has not had any utilization since our last call.     Care Summary Note: follow up call completed with Jj and spouse. He is doing better. Less neck pain. Wife feels his sleeping position can make it worse when he falls asleep in a chair. He has f/u appt planned with [facility] for evaluation. No exacerbation of CHF noted. HTN education and CHF education completed. Aware of worsening s/s to watch for CHF and COPD hx.  Plan to complete calls at this time.     Offered patient enrollment in the Remote Patient Monitoring (RPM) program for in-home monitoring: Patient is not eligible for RPM program because: graduating CC program  .     Assessments Completed:       2025     8:55 AM   Amb Fall Risk Assessment and TUG Test   Do you feel unsteady or are you worried about falling?  no   2 or more falls in past year? yes   Fall with injury in past year? yes    ,   Ambulatory Care Coordination Assessment    Care Coordination Protocol  Referral from Primary Care Provider: No  Week 1 - Initial Assessment     Do you have all of your prescriptions and are they filled?: Yes (Comment: 25 trb)  Are you able to afford your medications?: Yes  How often do you have trouble taking your medications the way you have been told to take them?: I always take them as prescribed.           Current Housing: Private Residence  Who do you live with?: Partner/Spouse/SO  Are you an active caregiver in your home?: No     Do you have any

## 2025-05-27 NOTE — TELEPHONE ENCOUNTER
Refill Request     CONFIRM preferrred pharmacy with the patient.    If Mail Order Rx - Pend for 90 day refill.      Last Seen: Last Seen Department: 4/14/2025  Last Seen by PCP: 4/14/2025    Last Written: 1/23/24    If no future appointment scheduled, route STAFF MESSAGE with patient name to the  Pool for scheduling.      Next Appointment:   Future Appointments   Date Time Provider Department Center   6/18/2025 11:20 AM Alla Palomino APRN - CNP [facility] SLEEP [facility]   9/17/2025  9:45 AM Arie Solis MD [place] [facility]   9/29/2025 10:00 AM Martha Peoples APRN - CNP [place] [facility]   4/9/2026 11:30 AM [facility]       Message sent to  to schedule appt with patient?  N/A      Requested Prescriptions     Pending Prescriptions Disp Refills    triamcinolone (KENALOG) 0.1 % cream 80 g 5     Sig: Apply topically 2 times daily.

## 2025-05-28 RX ORDER — TRIAMCINOLONE ACETONIDE 1 MG/G
CREAM TOPICAL
Qty: 80 G | Refills: 5 | Status: SHIPPED | OUTPATIENT
Start: 2025-05-28

## 2025-06-18 ENCOUNTER — TELEPHONE (OUTPATIENT)
Dept: SLEEP MEDICINE | Age: 85
End: 2025-06-18

## 2025-06-18 ENCOUNTER — OFFICE VISIT (OUTPATIENT)
Dept: SLEEP MEDICINE | Age: 85
End: 2025-06-18
Payer: MEDICARE

## 2025-06-18 VITALS
HEART RATE: 58 BPM | BODY MASS INDEX: 39.02 KG/M2 | OXYGEN SATURATION: 97 % | DIASTOLIC BLOOD PRESSURE: 60 MMHG | WEIGHT: 248.6 LBS | SYSTOLIC BLOOD PRESSURE: 142 MMHG | HEIGHT: 67 IN

## 2025-06-18 DIAGNOSIS — I10 ESSENTIAL HYPERTENSION: ICD-10-CM

## 2025-06-18 DIAGNOSIS — Z78.9 DIFFICULTY WITH BIPAP USE: ICD-10-CM

## 2025-06-18 DIAGNOSIS — G47.33 MODERATE OBSTRUCTIVE SLEEP APNEA: Primary | ICD-10-CM

## 2025-06-18 DIAGNOSIS — Z71.89 ENCOUNTER FOR BIPAP USE COUNSELING: ICD-10-CM

## 2025-06-18 PROCEDURE — 3078F DIAST BP <80 MM HG: CPT | Performed by: NURSE PRACTITIONER

## 2025-06-18 PROCEDURE — 1160F RVW MEDS BY RX/DR IN RCRD: CPT | Performed by: NURSE PRACTITIONER

## 2025-06-18 PROCEDURE — 1123F ACP DISCUSS/DSCN MKR DOCD: CPT | Performed by: NURSE PRACTITIONER

## 2025-06-18 PROCEDURE — 1159F MED LIST DOCD IN RCRD: CPT | Performed by: NURSE PRACTITIONER

## 2025-06-18 PROCEDURE — 3077F SYST BP >= 140 MM HG: CPT | Performed by: NURSE PRACTITIONER

## 2025-06-18 PROCEDURE — 99214 OFFICE O/P EST MOD 30 MIN: CPT | Performed by: NURSE PRACTITIONER

## 2025-06-18 ASSESSMENT — SLEEP AND FATIGUE QUESTIONNAIRES
ESS TOTAL SCORE: 19
HOW LIKELY ARE YOU TO NOD OFF OR FALL ASLEEP WHILE WATCHING TV: HIGH CHANCE OF DOZING
HOW LIKELY ARE YOU TO NOD OFF OR FALL ASLEEP WHILE SITTING INACTIVE IN A PUBLIC PLACE: MODERATE CHANCE OF DOZING
HOW LIKELY ARE YOU TO NOD OFF OR FALL ASLEEP WHILE LYING DOWN TO REST IN THE AFTERNOON WHEN CIRCUMSTANCES PERMIT: HIGH CHANCE OF DOZING
HOW LIKELY ARE YOU TO NOD OFF OR FALL ASLEEP WHEN YOU ARE A PASSENGER IN A CAR FOR AN HOUR WITHOUT A BREAK: HIGH CHANCE OF DOZING
HOW LIKELY ARE YOU TO NOD OFF OR FALL ASLEEP WHILE SITTING QUIETLY AFTER LUNCH WITHOUT ALCOHOL: HIGH CHANCE OF DOZING
HOW LIKELY ARE YOU TO NOD OFF OR FALL ASLEEP IN A CAR, WHILE STOPPED FOR A FEW MINUTES IN TRAFFIC: WOULD NEVER DOZE
HOW LIKELY ARE YOU TO NOD OFF OR FALL ASLEEP WHILE SITTING AND READING: HIGH CHANCE OF DOZING
HOW LIKELY ARE YOU TO NOD OFF OR FALL ASLEEP WHILE SITTING AND TALKING TO SOMEONE: MODERATE CHANCE OF DOZING

## 2025-06-18 NOTE — PROGRESS NOTES
Patient ID: Jj Lauren is a 84 y.o. male who is being seen today for   Chief Complaint   Patient presents with    Follow-up    Sleep Apnea         HPI:     Jj Lauren is a 84 y.o. male in office with wife for COLLIN follow up.  BIPAP titration was reviewed by me and noted below.  It showed improved sleep-related breathing with BiPAP.  Results were dicussed with patient and multiple good questions were answered.  States he feels better with more time with BIPAP.  States F&P Stephan mask Large has been much better than previous BiPAP masks    Patient is using BiPAP   4-5 hrs/night. Not using humidifier. No snoring on BiPAP. The pressure is well tolerated. The mask is comfortable- partial full. +mask leak-better but still some leak.  Some daytime sleepiness improved but with increased BiPAP use. Denies nodding off when driving. No dry nose or throat. Some fatigue. Bedtime is  pm and rise time is 7-730 am. Sleep onset is 1 minutes. Wakes up 1-2 times at night total. 1-2 nocturia. It takes few-unknown minutes to fall back a sleep. Occasional naps during the day. No headache in am. No weight gain. 1 caffienated beverages during the day. No alcohol. ESS is 19        Previous HPI 4/10/25  Jj Lauren is a 84 y.o. male in office with wife and son for COLLIN follow up.  He was last seen in 2023.  States he has been much more sleepy.  He has had trouble using BiPAP, states mask will not seal.  States up and down 6-8 time a night for the bathroom and sometimes does not put BIPAP back on.  States he has not had new supplies in 6+ months.      Patient is using BiPAP 3-4 hrs/night. Not using humidifier. No snoring on BiPAP. The pressure feels high.  The mask is not very comfortable- full face. ++mask leak. +daytime sleepiness.  Denies nodding off when driving.  Sometimes dry mouth. +fatigue. Bedtime is 10-11 pm and rise time is 7 am. Sleep onset is 1 minutes. Wakes up 2-8 times at night total. 2-8 nocturia. It takes 1

## 2025-07-02 DIAGNOSIS — I10 ESSENTIAL HYPERTENSION: ICD-10-CM

## 2025-07-02 RX ORDER — TRIAMTERENE AND HYDROCHLOROTHIAZIDE 37.5; 25 MG/1; MG/1
1 TABLET ORAL DAILY
Qty: 90 TABLET | Refills: 0 | Status: SHIPPED | OUTPATIENT
Start: 2025-07-02

## 2025-07-02 NOTE — TELEPHONE ENCOUNTER
NOV 9/17/25  LOV 3/3/25    Lab Results   Component Value Date/Time     05/02/2025 08:25 PM    K 3.9 05/02/2025 08:25 PM     05/02/2025 08:25 PM    CO2 24 05/02/2025 08:25 PM    BUN 25 05/02/2025 08:25 PM    CREATININE 1.5 05/02/2025 08:25 PM    GLUCOSE 104 05/02/2025 08:25 PM    CALCIUM 9.4 05/02/2025 08:25 PM    LABGLOM 46 05/02/2025 08:25 PM    LABGLOM 75 03/26/2024 11:03 AM

## 2025-07-16 DIAGNOSIS — G25.81 RESTLESS LEGS SYNDROME (RLS): ICD-10-CM

## 2025-07-16 RX ORDER — PRAMIPEXOLE DIHYDROCHLORIDE 1.5 MG/1
1.5 TABLET ORAL 2 TIMES DAILY
Qty: 180 TABLET | Refills: 0 | Status: SHIPPED | OUTPATIENT
Start: 2025-07-16

## 2025-07-16 NOTE — TELEPHONE ENCOUNTER
Refill Request     CONFIRM preferrred pharmacy with the patient.    If Mail Order Rx - Pend for 90 day refill.      Last Seen: Last Seen Department: 4/14/2025  Last Seen by PCP: 4/14/2025    Last Written: 4/21/2025    If no future appointment scheduled, route STAFF MESSAGE with patient name to the  Pool for scheduling.      Next Appointment:   Future Appointments   Date Time Provider Department Center   9/17/2025  9:45 AM Arie Solis MD [place] [facility]   9/22/2025 11:40 AM Palomino, Alla, APRN - CNP [facility]   9/29/2025 10:00 AM Martha Peoples APRN - CNP [place] [facility]   4/9/2026 11:30 AM [facility]       Message sent to  to schedule appt with patient?  NO      Requested Prescriptions     Pending Prescriptions Disp Refills    pramipexole (MIRAPEX) 1.5 MG tablet [Pharmacy Med Name: PRAMIPEXOLE 1.5 MG TABLET] 180 tablet 0     Sig: TAKE 1 TABLET BY MOUTH 2 TIMES A DAY

## 2025-07-21 ENCOUNTER — TELEPHONE (OUTPATIENT)
Dept: FAMILY MEDICINE CLINIC | Age: 85
End: 2025-07-21

## 2025-07-21 NOTE — TELEPHONE ENCOUNTER
There is nothing over-the-counter that can help with blood in his urine.  Patient should be evaluated at urgent care or scheduled an acute appointment

## 2025-07-21 NOTE — TELEPHONE ENCOUNTER
Pt's wife called and stated that pt has been having blood in his urine and he is also having yellow diarrhea. Stated that it started yesterday when he was getting ready for Religion. Was wanting to see if there was anything that they could get OTC.

## 2025-07-23 ENCOUNTER — HOSPITAL ENCOUNTER (OUTPATIENT)
Dept: CT IMAGING | Age: 85
Discharge: HOME | End: 2025-07-23
Payer: MEDICARE

## 2025-07-23 ENCOUNTER — OFFICE VISIT (OUTPATIENT)
Dept: FAMILY MEDICINE CLINIC | Age: 85
End: 2025-07-23
Payer: MEDICARE

## 2025-07-23 VITALS
HEART RATE: 57 BPM | TEMPERATURE: 97.6 F | HEIGHT: 67 IN | WEIGHT: 246 LBS | OXYGEN SATURATION: 98 % | SYSTOLIC BLOOD PRESSURE: 136 MMHG | BODY MASS INDEX: 38.61 KG/M2 | DIASTOLIC BLOOD PRESSURE: 70 MMHG

## 2025-07-23 DIAGNOSIS — R19.7 DIARRHEA, UNSPECIFIED TYPE: ICD-10-CM

## 2025-07-23 DIAGNOSIS — R31.0 GROSS HEMATURIA: ICD-10-CM

## 2025-07-23 DIAGNOSIS — R39.9 UTI SYMPTOMS: ICD-10-CM

## 2025-07-23 DIAGNOSIS — R31.0 GROSS HEMATURIA: Primary | ICD-10-CM

## 2025-07-23 DIAGNOSIS — I10 ESSENTIAL HYPERTENSION: ICD-10-CM

## 2025-07-23 LAB
BILIRUBIN, POC: ABNORMAL
BLOOD URINE, POC: ABNORMAL
CLARITY, POC: ABNORMAL
COLOR, POC: ABNORMAL
GLUCOSE URINE, POC: ABNORMAL MG/DL
KETONES, POC: ABNORMAL MG/DL
LEUKOCYTE EST, POC: ABNORMAL
NITRITE, POC: ABNORMAL
PH, POC: 6.5
PROTEIN, POC: ABNORMAL MG/DL
SPECIFIC GRAVITY, POC: 1.01
UROBILINOGEN, POC: 0.2 MG/DL

## 2025-07-23 PROCEDURE — 3078F DIAST BP <80 MM HG: CPT | Performed by: NURSE PRACTITIONER

## 2025-07-23 PROCEDURE — 3075F SYST BP GE 130 - 139MM HG: CPT | Performed by: NURSE PRACTITIONER

## 2025-07-23 PROCEDURE — 1123F ACP DISCUSS/DSCN MKR DOCD: CPT | Performed by: NURSE PRACTITIONER

## 2025-07-23 PROCEDURE — 81002 URINALYSIS NONAUTO W/O SCOPE: CPT | Performed by: NURSE PRACTITIONER

## 2025-07-23 PROCEDURE — 1159F MED LIST DOCD IN RCRD: CPT | Performed by: NURSE PRACTITIONER

## 2025-07-23 PROCEDURE — 99213 OFFICE O/P EST LOW 20 MIN: CPT | Performed by: NURSE PRACTITIONER

## 2025-07-23 PROCEDURE — 74176 CT ABD & PELVIS W/O CONTRAST: CPT

## 2025-07-23 RX ORDER — AMLODIPINE BESYLATE 10 MG/1
10 TABLET ORAL DAILY
Qty: 90 TABLET | Refills: 3 | Status: SHIPPED | OUTPATIENT
Start: 2025-07-23

## 2025-07-23 ASSESSMENT — ENCOUNTER SYMPTOMS
EYES NEGATIVE: 1
ALLERGIC/IMMUNOLOGIC NEGATIVE: 1
NAUSEA: 0
BLOOD IN STOOL: 0
RESPIRATORY NEGATIVE: 1
ABDOMINAL PAIN: 0
ANAL BLEEDING: 0
DIARRHEA: 1
SHORTNESS OF BREATH: 0

## 2025-07-23 NOTE — PROGRESS NOTES
Jj Lauren (:  1940) is a 84 y.o. male, here for evaluation of the following chief complaint(s):  Other (Blood in urine 5 times on 2025- had severe diarrhea )      1. Gross hematuria  -     Culture, Urine  -     CT ABDOMEN PELVIS WO CONTRAST; Future  -     [facility] - Vic Medrano MD, Urology, [facility]  2. Diarrhea, unspecified type  -     CT ABDOMEN PELVIS WO CONTRAST; Future  3. UTI symptoms  -     POCT Urinalysis no Micro       Results for orders placed or performed in visit on 25   POCT Urinalysis no Micro   Result Value Ref Range    Color, UA dark yellow     Clarity, UA cloudy     Glucose, UA POC neg mg/dL    Bilirubin, UA neg     Ketones, UA neg mg/dL    Spec Grav, UA 1.015     Blood, UA POC large     pH, UA 6.5     Protein, UA POC trace mg/dL    Urobilinogen, UA 0.2 mg/dL    Leukocytes, UA trace     Nitrite, UA neg        Assessment & Plan  1. Gross hematuria.  - The presence of gross hematuria raises concerns for potential bladder cancer, especially given the persistent microscopic hematuria observed over time.  - Differential diagnoses include kidney stones and urethral irritation.  - A urine culture will be sent to rule out any infection as the cause.  - A CT scan will be ordered to investigate further, including checking for kidney stones and masses in the bladder.    2. Diarrhea.  - The patient reports frequent diarrhea, which has been managed with antidiarrheal pills.  - Given the history of constipation followed by diarrhea, there is a concern for a partial bowel obstruction.  - Physical exam reveals tenderness over the kidneys and lower belly pain associated with bowel movements.  - A KUB x-ray will be ordered to rule out any partial bowel obstruction, if necessary--- however patient is now going to be having a CT of the abdomen and pelvis.    3. Skin tag.  - The patient has a large skin tag that occasionally bleeds and causes discomfort.  - It will be

## 2025-07-24 LAB — BACTERIA UR CULT: NORMAL

## 2025-08-05 ENCOUNTER — TELEPHONE (OUTPATIENT)
Dept: FAMILY MEDICINE CLINIC | Age: 85
End: 2025-08-05

## 2025-08-05 ENCOUNTER — TELEPHONE (OUTPATIENT)
Dept: CARDIOLOGY CLINIC | Age: 85
End: 2025-08-05

## 2025-08-08 ENCOUNTER — TELEPHONE (OUTPATIENT)
Dept: CARDIOLOGY CLINIC | Age: 85
End: 2025-08-08

## 2025-08-08 ENCOUNTER — ANESTHESIA EVENT (OUTPATIENT)
Dept: OPERATING ROOM | Age: 85
End: 2025-08-08
Payer: MEDICARE

## 2025-08-18 ENCOUNTER — OFFICE VISIT (OUTPATIENT)
Dept: FAMILY MEDICINE CLINIC | Age: 85
End: 2025-08-18
Payer: MEDICARE

## 2025-08-18 VITALS
HEIGHT: 67 IN | HEART RATE: 62 BPM | DIASTOLIC BLOOD PRESSURE: 72 MMHG | WEIGHT: 237 LBS | OXYGEN SATURATION: 93 % | BODY MASS INDEX: 37.2 KG/M2 | SYSTOLIC BLOOD PRESSURE: 162 MMHG

## 2025-08-18 DIAGNOSIS — G25.81 RESTLESS LEGS SYNDROME (RLS): ICD-10-CM

## 2025-08-18 DIAGNOSIS — E03.9 HYPOTHYROIDISM, UNSPECIFIED TYPE: ICD-10-CM

## 2025-08-18 DIAGNOSIS — I50.32 CHRONIC DIASTOLIC CONGESTIVE HEART FAILURE (HCC): ICD-10-CM

## 2025-08-18 DIAGNOSIS — I25.119 CORONARY ARTERY DISEASE INVOLVING NATIVE CORONARY ARTERY OF NATIVE HEART WITH ANGINA PECTORIS: ICD-10-CM

## 2025-08-18 DIAGNOSIS — Z01.818 PREOPERATIVE EXAMINATION: Primary | ICD-10-CM

## 2025-08-18 DIAGNOSIS — K21.9 GASTROESOPHAGEAL REFLUX DISEASE WITHOUT ESOPHAGITIS: ICD-10-CM

## 2025-08-18 DIAGNOSIS — I10 ESSENTIAL HYPERTENSION: ICD-10-CM

## 2025-08-18 DIAGNOSIS — F33.41 RECURRENT MAJOR DEPRESSIVE DISORDER, IN PARTIAL REMISSION: ICD-10-CM

## 2025-08-18 DIAGNOSIS — N48.0 LICHEN SCLEROSUS OF PENIS: ICD-10-CM

## 2025-08-18 DIAGNOSIS — G47.33 OSA ON CPAP: ICD-10-CM

## 2025-08-18 DIAGNOSIS — D50.9 IRON DEFICIENCY ANEMIA, UNSPECIFIED IRON DEFICIENCY ANEMIA TYPE: ICD-10-CM

## 2025-08-18 DIAGNOSIS — E78.2 MIXED HYPERLIPIDEMIA: ICD-10-CM

## 2025-08-18 DIAGNOSIS — J84.9 ILD (INTERSTITIAL LUNG DISEASE) (HCC): ICD-10-CM

## 2025-08-18 PROCEDURE — 3078F DIAST BP <80 MM HG: CPT | Performed by: NURSE PRACTITIONER

## 2025-08-18 PROCEDURE — 1159F MED LIST DOCD IN RCRD: CPT | Performed by: NURSE PRACTITIONER

## 2025-08-18 PROCEDURE — 3077F SYST BP >= 140 MM HG: CPT | Performed by: NURSE PRACTITIONER

## 2025-08-18 PROCEDURE — 99214 OFFICE O/P EST MOD 30 MIN: CPT | Performed by: NURSE PRACTITIONER

## 2025-08-18 PROCEDURE — 1123F ACP DISCUSS/DSCN MKR DOCD: CPT | Performed by: NURSE PRACTITIONER

## 2025-08-18 PROCEDURE — 93000 ELECTROCARDIOGRAM COMPLETE: CPT | Performed by: NURSE PRACTITIONER

## 2025-08-18 RX ORDER — TRIAMCINOLONE ACETONIDE 1 MG/G
CREAM TOPICAL
Qty: 80 G | Refills: 5 | Status: SHIPPED | OUTPATIENT
Start: 2025-08-18

## 2025-08-19 LAB
ANION GAP SERPL CALCULATED.3IONS-SCNC: 16 MMOL/L (ref 3–16)
BASOPHILS # BLD: 0 K/UL (ref 0–0.2)
BASOPHILS NFR BLD: 0 %
BUN SERPL-MCNC: 27 MG/DL (ref 7–20)
CALCIUM SERPL-MCNC: 9.2 MG/DL (ref 8.3–10.6)
CHLORIDE SERPL-SCNC: 97 MMOL/L (ref 99–110)
CO2 SERPL-SCNC: 24 MMOL/L (ref 21–32)
CREAT SERPL-MCNC: 1.5 MG/DL (ref 0.8–1.3)
DEPRECATED RDW RBC AUTO: 14.4 % (ref 12.4–15.4)
EOSINOPHIL # BLD: 0.3 K/UL (ref 0–0.6)
EOSINOPHIL NFR BLD: 3 %
GFR SERPLBLD CREATININE-BSD FMLA CKD-EPI: 46 ML/MIN/{1.73_M2}
GLUCOSE SERPL-MCNC: 105 MG/DL (ref 70–99)
HCT VFR BLD AUTO: 39.6 % (ref 40.5–52.5)
HGB BLD-MCNC: 13.4 G/DL (ref 13.5–17.5)
LYMPHOCYTES # BLD: 2.1 K/UL (ref 1–5.1)
LYMPHOCYTES NFR BLD: 21 %
MCH RBC QN AUTO: 30.3 PG (ref 26–34)
MCHC RBC AUTO-ENTMCNC: 33.8 G/DL (ref 31–36)
MCV RBC AUTO: 89.7 FL (ref 80–100)
MONOCYTES # BLD: 0.5 K/UL (ref 0–1.3)
MONOCYTES NFR BLD: 5 %
NEUTROPHILS # BLD: 7.2 K/UL (ref 1.7–7.7)
NEUTROPHILS NFR BLD: 69 %
NEUTS BAND NFR BLD MANUAL: 2 % (ref 0–7)
PLATELET # BLD AUTO: 243 K/UL (ref 135–450)
PMV BLD AUTO: 7.4 FL (ref 5–10.5)
POTASSIUM SERPL-SCNC: 3.8 MMOL/L (ref 3.5–5.1)
RBC # BLD AUTO: 4.42 M/UL (ref 4.2–5.9)
SLIDE REVIEW: ABNORMAL
SODIUM SERPL-SCNC: 137 MMOL/L (ref 136–145)
WBC # BLD AUTO: 10.1 K/UL (ref 4–11)

## 2025-08-20 ENCOUNTER — PROCEDURE VISIT (OUTPATIENT)
Dept: FAMILY MEDICINE CLINIC | Age: 85
End: 2025-08-20

## 2025-08-20 VITALS
OXYGEN SATURATION: 98 % | WEIGHT: 236 LBS | DIASTOLIC BLOOD PRESSURE: 68 MMHG | TEMPERATURE: 98.3 F | SYSTOLIC BLOOD PRESSURE: 134 MMHG | BODY MASS INDEX: 37.04 KG/M2 | HEART RATE: 52 BPM | HEIGHT: 67 IN

## 2025-08-20 DIAGNOSIS — R19.7 DIARRHEA, UNSPECIFIED TYPE: ICD-10-CM

## 2025-08-20 DIAGNOSIS — L98.9 SKIN LESION: ICD-10-CM

## 2025-08-20 DIAGNOSIS — R10.9 ABDOMINAL PAIN, UNSPECIFIED ABDOMINAL LOCATION: Primary | ICD-10-CM

## 2025-08-22 ASSESSMENT — ENCOUNTER SYMPTOMS
ROS SKIN COMMENTS: SKIN LESION
ALLERGIC/IMMUNOLOGIC NEGATIVE: 1
BLOOD IN STOOL: 0
SHORTNESS OF BREATH: 0
EYES NEGATIVE: 1
DIARRHEA: 1
RESPIRATORY NEGATIVE: 1
NAUSEA: 1
ABDOMINAL PAIN: 1
ANAL BLEEDING: 0

## 2025-08-27 ENCOUNTER — HOSPITAL ENCOUNTER (OUTPATIENT)
Dept: CT IMAGING | Age: 85
Discharge: HOME | End: 2025-08-27
Payer: MEDICARE

## 2025-08-27 DIAGNOSIS — R10.9 ABDOMINAL PAIN, UNSPECIFIED ABDOMINAL LOCATION: ICD-10-CM

## 2025-08-27 DIAGNOSIS — R19.7 DIARRHEA, UNSPECIFIED TYPE: ICD-10-CM

## 2025-08-27 PROCEDURE — 6360000004 HC RX CONTRAST MEDICATION: Performed by: NURSE PRACTITIONER

## 2025-08-27 PROCEDURE — 74178 CT ABD&PLV WO CNTR FLWD CNTR: CPT

## 2025-08-27 RX ORDER — IOPAMIDOL 755 MG/ML
75 INJECTION, SOLUTION INTRAVASCULAR
Status: COMPLETED | OUTPATIENT
Start: 2025-08-27 | End: 2025-08-27

## 2025-08-27 RX ADMIN — IOPAMIDOL 75 ML: 755 INJECTION, SOLUTION INTRAVENOUS at 11:26

## 2025-09-01 ASSESSMENT — ENCOUNTER SYMPTOMS: SHORTNESS OF BREATH: 1

## 2025-09-03 ENCOUNTER — APPOINTMENT (OUTPATIENT)
Dept: GENERAL RADIOLOGY | Age: 85
End: 2025-09-03
Attending: UROLOGY
Payer: MEDICARE

## 2025-09-03 ENCOUNTER — ANESTHESIA (OUTPATIENT)
Dept: OPERATING ROOM | Age: 85
End: 2025-09-03
Payer: MEDICARE

## 2025-09-03 ENCOUNTER — HOSPITAL ENCOUNTER (OUTPATIENT)
Age: 85
Setting detail: OUTPATIENT SURGERY
Discharge: HOME | End: 2025-09-03
Attending: UROLOGY | Admitting: UROLOGY
Payer: MEDICARE

## 2025-09-03 VITALS
BODY MASS INDEX: 37.04 KG/M2 | DIASTOLIC BLOOD PRESSURE: 62 MMHG | HEIGHT: 67 IN | RESPIRATION RATE: 93 BRPM | OXYGEN SATURATION: 94 % | SYSTOLIC BLOOD PRESSURE: 148 MMHG | TEMPERATURE: 97.6 F | HEART RATE: 49 BPM | WEIGHT: 236 LBS

## 2025-09-03 DIAGNOSIS — N40.1 BENIGN LOCALIZED PROSTATIC HYPERPLASIA WITH LOWER URINARY TRACT SYMPTOMS (LUTS): ICD-10-CM

## 2025-09-03 LAB
ANION GAP SERPL CALCULATED.3IONS-SCNC: 14 MMOL/L (ref 3–16)
BUN SERPL-MCNC: 19 MG/DL (ref 7–20)
CALCIUM SERPL-MCNC: 9.1 MG/DL (ref 8.3–10.6)
CHLORIDE SERPL-SCNC: 100 MMOL/L (ref 99–110)
CO2 SERPL-SCNC: 25 MMOL/L (ref 21–32)
CREAT SERPL-MCNC: 1.5 MG/DL (ref 0.8–1.3)
GFR SERPLBLD CREATININE-BSD FMLA CKD-EPI: 46 ML/MIN/{1.73_M2}
GLUCOSE SERPL-MCNC: 113 MG/DL (ref 70–99)
MAGNESIUM SERPL-MCNC: 2.27 MG/DL (ref 1.8–2.4)
POTASSIUM SERPL-SCNC: 3.4 MMOL/L (ref 3.5–5.1)
SODIUM SERPL-SCNC: 139 MMOL/L (ref 136–145)

## 2025-09-03 PROCEDURE — 3700000001 HC ADD 15 MINUTES (ANESTHESIA): Performed by: UROLOGY

## 2025-09-03 PROCEDURE — 6370000000 HC RX 637 (ALT 250 FOR IP): Performed by: UROLOGY

## 2025-09-03 PROCEDURE — 2500000003 HC RX 250 WO HCPCS: Performed by: NURSE ANESTHETIST, CERTIFIED REGISTERED

## 2025-09-03 PROCEDURE — 7100000000 HC PACU RECOVERY - FIRST 15 MIN: Performed by: UROLOGY

## 2025-09-03 PROCEDURE — C1769 GUIDE WIRE: HCPCS | Performed by: UROLOGY

## 2025-09-03 PROCEDURE — 2580000003 HC RX 258: Performed by: UROLOGY

## 2025-09-03 PROCEDURE — 80048 BASIC METABOLIC PNL TOTAL CA: CPT

## 2025-09-03 PROCEDURE — 74420 UROGRAPHY RTRGR +-KUB: CPT

## 2025-09-03 PROCEDURE — 7100000010 HC PHASE II RECOVERY - FIRST 15 MIN: Performed by: UROLOGY

## 2025-09-03 PROCEDURE — 2500000003 HC RX 250 WO HCPCS: Performed by: UROLOGY

## 2025-09-03 PROCEDURE — 3700000000 HC ANESTHESIA ATTENDED CARE: Performed by: UROLOGY

## 2025-09-03 PROCEDURE — 7100000001 HC PACU RECOVERY - ADDTL 15 MIN: Performed by: UROLOGY

## 2025-09-03 PROCEDURE — 2500000003 HC RX 250 WO HCPCS: Performed by: ANESTHESIOLOGY

## 2025-09-03 PROCEDURE — 3600000014 HC SURGERY LEVEL 4 ADDTL 15MIN: Performed by: UROLOGY

## 2025-09-03 PROCEDURE — 88305 TISSUE EXAM BY PATHOLOGIST: CPT

## 2025-09-03 PROCEDURE — 36415 COLL VENOUS BLD VENIPUNCTURE: CPT

## 2025-09-03 PROCEDURE — 2580000003 HC RX 258: Performed by: ANESTHESIOLOGY

## 2025-09-03 PROCEDURE — 2580000003 HC RX 258: Performed by: NURSE ANESTHETIST, CERTIFIED REGISTERED

## 2025-09-03 PROCEDURE — 7100000011 HC PHASE II RECOVERY - ADDTL 15 MIN: Performed by: UROLOGY

## 2025-09-03 PROCEDURE — 3600000004 HC SURGERY LEVEL 4 BASE: Performed by: UROLOGY

## 2025-09-03 PROCEDURE — 83735 ASSAY OF MAGNESIUM: CPT

## 2025-09-03 PROCEDURE — 6360000002 HC RX W HCPCS: Performed by: NURSE ANESTHETIST, CERTIFIED REGISTERED

## 2025-09-03 PROCEDURE — 6360000002 HC RX W HCPCS: Performed by: ANESTHESIOLOGY

## 2025-09-03 PROCEDURE — C2617 STENT, NON-COR, TEM W/O DEL: HCPCS | Performed by: UROLOGY

## 2025-09-03 PROCEDURE — 2709999900 HC NON-CHARGEABLE SUPPLY: Performed by: UROLOGY

## 2025-09-03 PROCEDURE — 6360000002 HC RX W HCPCS: Performed by: UROLOGY

## 2025-09-03 PROCEDURE — 88112 CYTOPATH CELL ENHANCE TECH: CPT

## 2025-09-03 DEVICE — IMPLANTABLE DEVICE: Type: IMPLANTABLE DEVICE | Site: URETER | Status: FUNCTIONAL

## 2025-09-03 RX ORDER — ONDANSETRON 2 MG/ML
4 INJECTION INTRAMUSCULAR; INTRAVENOUS
Status: DISCONTINUED | OUTPATIENT
Start: 2025-09-03 | End: 2025-09-03 | Stop reason: HOSPADM

## 2025-09-03 RX ORDER — SODIUM CHLORIDE 0.9 % (FLUSH) 0.9 %
5-40 SYRINGE (ML) INJECTION EVERY 12 HOURS SCHEDULED
Status: DISCONTINUED | OUTPATIENT
Start: 2025-09-03 | End: 2025-09-03 | Stop reason: HOSPADM

## 2025-09-03 RX ORDER — SODIUM CHLORIDE 9 MG/ML
INJECTION, SOLUTION INTRAVENOUS PRN
Status: DISCONTINUED | OUTPATIENT
Start: 2025-09-03 | End: 2025-09-03 | Stop reason: HOSPADM

## 2025-09-03 RX ORDER — LABETALOL HYDROCHLORIDE 5 MG/ML
5 INJECTION, SOLUTION INTRAVENOUS EVERY 10 MIN PRN
Status: DISCONTINUED | OUTPATIENT
Start: 2025-09-03 | End: 2025-09-03 | Stop reason: HOSPADM

## 2025-09-03 RX ORDER — ROCURONIUM BROMIDE 10 MG/ML
INJECTION, SOLUTION INTRAVENOUS
Status: DISCONTINUED | OUTPATIENT
Start: 2025-09-03 | End: 2025-09-03 | Stop reason: SDUPTHER

## 2025-09-03 RX ORDER — PROCHLORPERAZINE EDISYLATE 5 MG/ML
5 INJECTION INTRAMUSCULAR; INTRAVENOUS
Status: DISCONTINUED | OUTPATIENT
Start: 2025-09-03 | End: 2025-09-03 | Stop reason: HOSPADM

## 2025-09-03 RX ORDER — PROPOFOL 10 MG/ML
INJECTION, EMULSION INTRAVENOUS
Status: DISCONTINUED | OUTPATIENT
Start: 2025-09-03 | End: 2025-09-03 | Stop reason: SDUPTHER

## 2025-09-03 RX ORDER — LIDOCAINE HYDROCHLORIDE 20 MG/ML
JELLY TOPICAL PRN
Status: DISCONTINUED | OUTPATIENT
Start: 2025-09-03 | End: 2025-09-03 | Stop reason: ALTCHOICE

## 2025-09-03 RX ORDER — LIDOCAINE HYDROCHLORIDE 20 MG/ML
INJECTION, SOLUTION EPIDURAL; INFILTRATION; INTRACAUDAL; PERINEURAL
Status: DISCONTINUED | OUTPATIENT
Start: 2025-09-03 | End: 2025-09-03 | Stop reason: SDUPTHER

## 2025-09-03 RX ORDER — DIPHENHYDRAMINE HYDROCHLORIDE 50 MG/ML
12.5 INJECTION, SOLUTION INTRAMUSCULAR; INTRAVENOUS
Status: DISCONTINUED | OUTPATIENT
Start: 2025-09-03 | End: 2025-09-03 | Stop reason: HOSPADM

## 2025-09-03 RX ORDER — SODIUM CHLORIDE, SODIUM LACTATE, POTASSIUM CHLORIDE, CALCIUM CHLORIDE 600; 310; 30; 20 MG/100ML; MG/100ML; MG/100ML; MG/100ML
INJECTION, SOLUTION INTRAVENOUS
Status: DISCONTINUED | OUTPATIENT
Start: 2025-09-03 | End: 2025-09-03 | Stop reason: SDUPTHER

## 2025-09-03 RX ORDER — IPRATROPIUM BROMIDE AND ALBUTEROL SULFATE 2.5; .5 MG/3ML; MG/3ML
1 SOLUTION RESPIRATORY (INHALATION)
Status: DISCONTINUED | OUTPATIENT
Start: 2025-09-03 | End: 2025-09-03 | Stop reason: HOSPADM

## 2025-09-03 RX ORDER — SULFAMETHOXAZOLE AND TRIMETHOPRIM 400; 80 MG/1; MG/1
1 TABLET ORAL 2 TIMES DAILY
Qty: 8 TABLET | Refills: 0 | Status: SHIPPED | OUTPATIENT
Start: 2025-09-03 | End: 2025-09-07

## 2025-09-03 RX ORDER — SODIUM CHLORIDE 0.9 % (FLUSH) 0.9 %
5-40 SYRINGE (ML) INJECTION PRN
Status: DISCONTINUED | OUTPATIENT
Start: 2025-09-03 | End: 2025-09-03 | Stop reason: HOSPADM

## 2025-09-03 RX ORDER — ONDANSETRON 2 MG/ML
INJECTION INTRAMUSCULAR; INTRAVENOUS
Status: DISCONTINUED | OUTPATIENT
Start: 2025-09-03 | End: 2025-09-03 | Stop reason: SDUPTHER

## 2025-09-03 RX ORDER — SODIUM CHLORIDE, SODIUM LACTATE, POTASSIUM CHLORIDE, CALCIUM CHLORIDE 600; 310; 30; 20 MG/100ML; MG/100ML; MG/100ML; MG/100ML
INJECTION, SOLUTION INTRAVENOUS CONTINUOUS
Status: DISCONTINUED | OUTPATIENT
Start: 2025-09-03 | End: 2025-09-03 | Stop reason: HOSPADM

## 2025-09-03 RX ORDER — PHENAZOPYRIDINE HYDROCHLORIDE 200 MG/1
200 TABLET, FILM COATED ORAL 3 TIMES DAILY PRN
Qty: 15 TABLET | Refills: 0 | Status: SHIPPED | OUTPATIENT
Start: 2025-09-03 | End: 2025-09-08

## 2025-09-03 RX ORDER — ATROPINE SULFATE 0.4 MG/ML
INJECTION, SOLUTION INTRAMUSCULAR; INTRAVENOUS; SUBCUTANEOUS
Status: DISCONTINUED | OUTPATIENT
Start: 2025-09-03 | End: 2025-09-03 | Stop reason: SDUPTHER

## 2025-09-03 RX ADMIN — PROPOFOL 150 MG: 10 INJECTION, EMULSION INTRAVENOUS at 13:37

## 2025-09-03 RX ADMIN — ATROPINE SULFATE 0.4 MCG: 0.4 INJECTION, SOLUTION INTRAMUSCULAR; INTRAVENOUS; SUBCUTANEOUS at 13:50

## 2025-09-03 RX ADMIN — SODIUM CHLORIDE 2000 MG: 9 INJECTION, SOLUTION INTRAVENOUS at 13:30

## 2025-09-03 RX ADMIN — ROCURONIUM BROMIDE 50 MG: 10 INJECTION, SOLUTION INTRAVENOUS at 13:37

## 2025-09-03 RX ADMIN — LIDOCAINE HYDROCHLORIDE 60 MG: 20 INJECTION, SOLUTION EPIDURAL; INFILTRATION; INTRACAUDAL; PERINEURAL at 13:37

## 2025-09-03 RX ADMIN — SUGAMMADEX 200 MG: 100 INJECTION, SOLUTION INTRAVENOUS at 14:15

## 2025-09-03 RX ADMIN — SODIUM CHLORIDE, POTASSIUM CHLORIDE, SODIUM LACTATE AND CALCIUM CHLORIDE: 600; 310; 30; 20 INJECTION, SOLUTION INTRAVENOUS at 11:45

## 2025-09-03 RX ADMIN — SODIUM CHLORIDE, POTASSIUM CHLORIDE, SODIUM LACTATE AND CALCIUM CHLORIDE: 600; 310; 30; 20 INJECTION, SOLUTION INTRAVENOUS at 13:29

## 2025-09-03 RX ADMIN — ONDANSETRON 4 MG: 2 INJECTION INTRAMUSCULAR; INTRAVENOUS at 14:15

## 2025-09-03 RX ADMIN — FAMOTIDINE 20 MG: 10 INJECTION, SOLUTION INTRAVENOUS at 11:46

## 2025-09-03 ASSESSMENT — PAIN SCALES - GENERAL
PAINLEVEL_OUTOF10: 0

## 2025-09-03 ASSESSMENT — PAIN - FUNCTIONAL ASSESSMENT: PAIN_FUNCTIONAL_ASSESSMENT: 0-10

## (undated) DEVICE — GUIDEWIRE UROLOGICAL STR STD 0.035 IN X150 CM (QTY = BOX OF 5)

## (undated) DEVICE — 3M™ STERI-STRIP™ COMPOUND BENZOIN TINCTURE 40 BAGS/CARTON 4 CARTONS/CASE C1544: Brand: 3M™ STERI-STRIP™

## (undated) DEVICE — SYRINGE MED 10ML LUERLOCK TIP W/O SFTY DISP

## (undated) DEVICE — SOLUTION IV IRRIG 500ML 0.9% SODIUM CHL 2F7123

## (undated) DEVICE — CORD ES L10FT MPLR LAP

## (undated) DEVICE — YANKAUER,BULB TIP,W/O VENT,RIGID,STERILE: Brand: MEDLINE

## (undated) DEVICE — INTENDED FOR TISSUE SEPARATION, AND OTHER PROCEDURES THAT REQUIRE A SHARP SURGICAL BLADE TO PUNCTURE OR CUT.: Brand: BARD-PARKER ® STAINLESS STEEL BLADES

## (undated) DEVICE — ELECTRODE PT RET AD L9FT HI MOIST COND ADH HYDRGEL CORDED

## (undated) DEVICE — Device

## (undated) DEVICE — SUTURE SZ 0 27IN 5/8 CIR UR-6  TAPER PT VIOLET ABSRB VICRYL J603H

## (undated) DEVICE — CIRCUIT ANES L72IN 3L BACT AND VIR FLTR EL CONN SGL LIMB

## (undated) DEVICE — GAUZE SPONGES,8 PLY: Brand: CURITY

## (undated) DEVICE — GLOVE ORANGE PI 8   MSG9080

## (undated) DEVICE — SUTURE VCRL SZ 4-0 L18IN ABSRB UD L19MM PS-2 3/8 CIR PRIM J496H

## (undated) DEVICE — GUIDEWIRE ENDOSCP L150CM DIA0.035IN TIP 3CM PTFE NIT

## (undated) DEVICE — AGENT HEMSTAT W2XL4IN OXIDIZED REGENERATED CELOS ABSRB

## (undated) DEVICE — SOLUTION IRRIGATION STRL H2O 1000 ML UROMATIC CONTAINER

## (undated) DEVICE — APPLICATOR MEDICATED 26 CC SOLUTION HI LT ORNG CHLORAPREP

## (undated) DEVICE — INSUFFLATION NEEDLE TO ESTABLISH PNEUMOPERITONEUM.: Brand: INSUFFLATION NEEDLE

## (undated) DEVICE — GOWN,AURORA,NONREINF,RAGLAN,XXL,STERILE: Brand: MEDLINE

## (undated) DEVICE — 3M™ TEGADERM™ TRANSPARENT FILM DRESSING FRAME STYLE, 1624W, 2-3/8 IN X 2-3/4 IN (6 CM X 7 CM), 100/CT 4CT/CASE: Brand: 3M™ TEGADERM™

## (undated) DEVICE — GLOVE ORANGE PI 8 1/2   MSG9085

## (undated) DEVICE — PUMP SUC IRR TBNG L10FT W/ HNDPC ASSEMB STRYKEFLOW 2

## (undated) DEVICE — TUBING, SUCTION, 3/16" X 10', STRAIGHT: Brand: MEDLINE

## (undated) DEVICE — TUBING INSUF ISO CONN DISP

## (undated) DEVICE — TROCAR ENDOSCP L100MM DIA5MM BLDELSS STBL SL THRD OPT VW

## (undated) DEVICE — STANDARD HYPODERMIC NEEDLE,POLYPROPYLENE HUB: Brand: MONOJECT

## (undated) DEVICE — TROCAR ENDOSCP L100MM DIA5MM BLDELSS STBL SL OBT RADLUC

## (undated) DEVICE — MAJOR SET UP PK

## (undated) DEVICE — TROCAR ENDOSCP L100MM DIA11MM STBL SL BLDELSS DISP ENDOPATH

## (undated) DEVICE — DRAPE,ABDOMINAL,MAJOR,STERILE: Brand: MEDLINE

## (undated) DEVICE — KIT,ANTI FOG,W/SPONGE & FLUID,SOFT PACK: Brand: MEDLINE

## (undated) DEVICE — DISPOSABLE LAPAROSCOPIC CLIP APPLIER WITH 20 CLIPS.: Brand: EPIX® UNIVERSAL CLIP APPLIER

## (undated) DEVICE — KIT EVAC 100CC W10MMXL20CM SIL FULL PERF HUBLESS FLAT DRN

## (undated) DEVICE — SOLUTION IRRIG 500ML 0.9% SOD CHLO USP POUR PLAS BTL